# Patient Record
Sex: FEMALE | Race: WHITE | NOT HISPANIC OR LATINO | Employment: OTHER | URBAN - METROPOLITAN AREA
[De-identification: names, ages, dates, MRNs, and addresses within clinical notes are randomized per-mention and may not be internally consistent; named-entity substitution may affect disease eponyms.]

---

## 2017-12-19 ENCOUNTER — CONVERSION ENCOUNTER (OUTPATIENT)
Dept: GENERAL RADIOLOGY | Facility: HOSPITAL | Age: 67
End: 2017-12-19

## 2018-01-29 ENCOUNTER — CONVERSION ENCOUNTER (OUTPATIENT)
Dept: GENERAL RADIOLOGY | Facility: HOSPITAL | Age: 68
End: 2018-01-29

## 2018-04-17 ENCOUNTER — OFFICE VISIT CONVERTED (OUTPATIENT)
Dept: SURGERY | Facility: CLINIC | Age: 68
End: 2018-04-17
Attending: SURGERY

## 2018-05-04 ENCOUNTER — OFFICE VISIT CONVERTED (OUTPATIENT)
Dept: PULMONOLOGY | Facility: CLINIC | Age: 68
End: 2018-05-04
Attending: INTERNAL MEDICINE

## 2018-05-31 ENCOUNTER — OFFICE VISIT CONVERTED (OUTPATIENT)
Dept: PULMONOLOGY | Facility: CLINIC | Age: 68
End: 2018-05-31
Attending: INTERNAL MEDICINE

## 2018-07-30 ENCOUNTER — OFFICE VISIT CONVERTED (OUTPATIENT)
Dept: PULMONOLOGY | Facility: CLINIC | Age: 68
End: 2018-07-30
Attending: PHYSICIAN ASSISTANT

## 2018-08-10 ENCOUNTER — OFFICE VISIT CONVERTED (OUTPATIENT)
Dept: PULMONOLOGY | Facility: CLINIC | Age: 68
End: 2018-08-10
Attending: PHYSICIAN ASSISTANT

## 2018-12-20 ENCOUNTER — OFFICE VISIT CONVERTED (OUTPATIENT)
Dept: PULMONOLOGY | Facility: CLINIC | Age: 68
End: 2018-12-20
Attending: INTERNAL MEDICINE

## 2019-01-08 ENCOUNTER — HOSPITAL ENCOUNTER (OUTPATIENT)
Dept: GENERAL RADIOLOGY | Facility: HOSPITAL | Age: 69
Discharge: HOME | End: 2019-01-08
Attending: INTERNAL MEDICINE

## 2019-01-08 ENCOUNTER — HOSPITAL ENCOUNTER (OUTPATIENT)
Dept: LAB | Facility: HOSPITAL | Age: 69
Discharge: HOME | End: 2019-01-08
Attending: INTERNAL MEDICINE

## 2019-01-08 LAB
ALBUMIN SERPL-MCNC: 4.1 G/DL (ref 3.5–5)
ALBUMIN/GLOB SERPL: 1.6 {RATIO} (ref 1.4–2.6)
ALP SERPL-CCNC: 77 U/L (ref 43–160)
ALT SERPL-CCNC: 17 U/L (ref 10–40)
ANION GAP SERPL CALC-SCNC: 18 MMOL/L (ref 8–19)
AST SERPL-CCNC: 17 U/L (ref 15–50)
BASOPHILS # BLD AUTO: 0.03 10*3/UL (ref 0–0.2)
BASOPHILS NFR BLD AUTO: 0.25 % (ref 0–3)
BILIRUB SERPL-MCNC: 0.27 MG/DL (ref 0.2–1.3)
BUN SERPL-MCNC: 19 MG/DL (ref 5–25)
BUN/CREAT SERPL: 21 {RATIO} (ref 6–20)
CALCIUM SERPL-MCNC: 9.8 MG/DL (ref 8.7–10.4)
CHLORIDE SERPL-SCNC: 105 MMOL/L (ref 99–111)
CONV CO2: 22 MMOL/L (ref 22–32)
CONV TOTAL PROTEIN: 6.6 G/DL (ref 6.3–8.2)
CREAT UR-MCNC: 0.9 MG/DL (ref 0.5–0.9)
EOSINOPHIL # BLD AUTO: 0.12 10*3/UL (ref 0–0.7)
EOSINOPHIL # BLD AUTO: 0.97 % (ref 0–7)
ERYTHROCYTE [DISTWIDTH] IN BLOOD BY AUTOMATED COUNT: 14.5 % (ref 11.5–14.5)
GFR SERPLBLD BASED ON 1.73 SQ M-ARVRAT: >60 ML/MIN/{1.73_M2}
GLOBULIN UR ELPH-MCNC: 2.5 G/DL (ref 2–3.5)
GLUCOSE SERPL-MCNC: 89 MG/DL (ref 65–99)
HBA1C MFR BLD: 12.2 G/DL (ref 12–16)
HCT VFR BLD AUTO: 37.6 % (ref 37–47)
LYMPHOCYTES # BLD AUTO: 1.06 10*3/UL (ref 1–5)
MCH RBC QN AUTO: 30.5 PG (ref 27–31)
MCHC RBC AUTO-ENTMCNC: 32.5 G/DL (ref 33–37)
MCV RBC AUTO: 93.7 FL (ref 81–99)
MONOCYTES # BLD AUTO: 0.48 10*3/UL (ref 0.2–1.2)
MONOCYTES NFR BLD AUTO: 3.85 % (ref 3–10)
NEUTROPHILS # BLD AUTO: 10.7 10*3/UL (ref 2–8)
NEUTROPHILS NFR BLD AUTO: 86.4 % (ref 30–85)
NRBC BLD AUTO-RTO: 0 % (ref 0–0.01)
OSMOLALITY SERPL CALC.SUM OF ELEC: 292 MOSM/KG (ref 273–304)
PLATELET # BLD AUTO: 354 10*3/UL (ref 130–400)
PMV BLD AUTO: 7.7 FL (ref 7.4–10.4)
POTASSIUM SERPL-SCNC: 4.6 MMOL/L (ref 3.5–5.3)
RBC # BLD AUTO: 4.01 10*6/UL (ref 4.2–5.4)
SODIUM SERPL-SCNC: 140 MMOL/L (ref 135–147)
VARIANT LYMPHS NFR BLD MANUAL: 8.54 % (ref 20–45)
WBC # BLD AUTO: 12.4 10*3/UL (ref 4.8–10.8)

## 2019-01-15 ENCOUNTER — HOSPITAL ENCOUNTER (OUTPATIENT)
Dept: GENERAL RADIOLOGY | Facility: HOSPITAL | Age: 69
Discharge: HOME | End: 2019-01-15

## 2019-05-16 ENCOUNTER — HOSPITAL ENCOUNTER (OUTPATIENT)
Dept: URGENT CARE | Facility: CLINIC | Age: 69
Discharge: HOME | End: 2019-05-16
Attending: NURSE PRACTITIONER

## 2019-09-03 ENCOUNTER — OFFICE VISIT CONVERTED (OUTPATIENT)
Dept: PULMONOLOGY | Facility: CLINIC | Age: 69
End: 2019-09-03
Attending: PHYSICIAN ASSISTANT

## 2019-09-05 ENCOUNTER — HOSPITAL ENCOUNTER (OUTPATIENT)
Dept: GENERAL RADIOLOGY | Facility: HOSPITAL | Age: 69
Discharge: HOME | End: 2019-09-05
Attending: PHYSICIAN ASSISTANT

## 2019-10-22 ENCOUNTER — OFFICE VISIT CONVERTED (OUTPATIENT)
Dept: NEUROSURGERY | Facility: CLINIC | Age: 69
End: 2019-10-22
Attending: PHYSICIAN ASSISTANT

## 2019-10-22 ENCOUNTER — CONVERSION ENCOUNTER (OUTPATIENT)
Dept: NEUROLOGY | Facility: CLINIC | Age: 69
End: 2019-10-22

## 2019-11-13 ENCOUNTER — HOSPITAL ENCOUNTER (OUTPATIENT)
Dept: GENERAL RADIOLOGY | Facility: HOSPITAL | Age: 69
Discharge: HOME | End: 2019-11-13
Attending: PHYSICIAN ASSISTANT

## 2019-11-14 ENCOUNTER — HOSPITAL ENCOUNTER (OUTPATIENT)
Dept: GENERAL RADIOLOGY | Facility: HOSPITAL | Age: 69
Discharge: HOME | End: 2019-11-14
Attending: PHYSICIAN ASSISTANT

## 2020-01-08 ENCOUNTER — HOSPITAL ENCOUNTER (OUTPATIENT)
Dept: LAB | Facility: HOSPITAL | Age: 70
Discharge: HOME | End: 2020-01-08
Attending: PHYSICIAN ASSISTANT

## 2020-01-08 LAB
ALBUMIN SERPL-MCNC: 3.9 G/DL (ref 3.5–5)
ALBUMIN/GLOB SERPL: 1.4 {RATIO} (ref 1.4–2.6)
ALP SERPL-CCNC: 92 U/L (ref 43–160)
ALT SERPL-CCNC: 10 U/L (ref 10–40)
ANION GAP SERPL CALC-SCNC: 17 MMOL/L (ref 8–19)
AST SERPL-CCNC: 12 U/L (ref 15–50)
BASOPHILS # BLD AUTO: 0.04 10*3/UL (ref 0–0.2)
BASOPHILS NFR BLD AUTO: 0.3 % (ref 0–3)
BILIRUB SERPL-MCNC: 0.24 MG/DL (ref 0.2–1.3)
BUN SERPL-MCNC: 20 MG/DL (ref 5–25)
BUN/CREAT SERPL: 26 {RATIO} (ref 6–20)
CALCIUM SERPL-MCNC: 10.5 MG/DL (ref 8.7–10.4)
CHLORIDE SERPL-SCNC: 104 MMOL/L (ref 99–111)
CONV ABS IMM GRAN: 0.06 10*3/UL (ref 0–0.2)
CONV CO2: 22 MMOL/L (ref 22–32)
CONV IMMATURE GRAN: 0.5 % (ref 0–1.8)
CONV TOTAL PROTEIN: 6.6 G/DL (ref 6.3–8.2)
CREAT UR-MCNC: 0.77 MG/DL (ref 0.5–0.9)
DEPRECATED RDW RBC AUTO: 47.4 FL (ref 36.4–46.3)
EOSINOPHIL # BLD AUTO: 0.06 10*3/UL (ref 0–0.7)
EOSINOPHIL # BLD AUTO: 0.5 % (ref 0–7)
ERYTHROCYTE [DISTWIDTH] IN BLOOD BY AUTOMATED COUNT: 13.7 % (ref 11.7–14.4)
GFR SERPLBLD BASED ON 1.73 SQ M-ARVRAT: >60 ML/MIN/{1.73_M2}
GLOBULIN UR ELPH-MCNC: 2.7 G/DL (ref 2–3.5)
GLUCOSE SERPL-MCNC: 90 MG/DL (ref 65–99)
HCT VFR BLD AUTO: 39 % (ref 37–47)
HGB BLD-MCNC: 12.1 G/DL (ref 12–16)
LYMPHOCYTES # BLD AUTO: 0.9 10*3/UL (ref 1–5)
LYMPHOCYTES NFR BLD AUTO: 7.8 % (ref 20–45)
MCH RBC QN AUTO: 29.2 PG (ref 27–31)
MCHC RBC AUTO-ENTMCNC: 31 G/DL (ref 33–37)
MCV RBC AUTO: 94.2 FL (ref 81–99)
MONOCYTES # BLD AUTO: 0.66 10*3/UL (ref 0.2–1.2)
MONOCYTES NFR BLD AUTO: 5.7 % (ref 3–10)
NEUTROPHILS # BLD AUTO: 9.86 10*3/UL (ref 2–8)
NEUTROPHILS NFR BLD AUTO: 85.2 % (ref 30–85)
NRBC CBCN: 0 % (ref 0–0.7)
OSMOLALITY SERPL CALC.SUM OF ELEC: 290 MOSM/KG (ref 273–304)
PLATELET # BLD AUTO: 293 10*3/UL (ref 130–400)
PMV BLD AUTO: 10.7 FL (ref 9.4–12.3)
POTASSIUM SERPL-SCNC: 4.3 MMOL/L (ref 3.5–5.3)
RBC # BLD AUTO: 4.14 10*6/UL (ref 4.2–5.4)
SODIUM SERPL-SCNC: 139 MMOL/L (ref 135–147)
WBC # BLD AUTO: 11.58 10*3/UL (ref 4.8–10.8)

## 2020-01-14 ENCOUNTER — OFFICE VISIT CONVERTED (OUTPATIENT)
Dept: PULMONOLOGY | Facility: CLINIC | Age: 70
End: 2020-01-14
Attending: INTERNAL MEDICINE

## 2020-01-14 ENCOUNTER — OFFICE VISIT CONVERTED (OUTPATIENT)
Dept: NEUROSURGERY | Facility: CLINIC | Age: 70
End: 2020-01-14
Attending: PHYSICIAN ASSISTANT

## 2020-01-20 ENCOUNTER — HOSPITAL ENCOUNTER (OUTPATIENT)
Dept: GENERAL RADIOLOGY | Facility: HOSPITAL | Age: 70
Discharge: HOME | End: 2020-01-20
Attending: NEUROLOGICAL SURGERY

## 2020-01-20 ENCOUNTER — CONVERSION ENCOUNTER (OUTPATIENT)
Dept: NEUROLOGY | Facility: CLINIC | Age: 70
End: 2020-01-20

## 2020-01-20 ENCOUNTER — OFFICE VISIT CONVERTED (OUTPATIENT)
Dept: NEUROSURGERY | Facility: CLINIC | Age: 70
End: 2020-01-20
Attending: NEUROLOGICAL SURGERY

## 2020-01-29 ENCOUNTER — OFFICE VISIT CONVERTED (OUTPATIENT)
Dept: NEUROSURGERY | Facility: CLINIC | Age: 70
End: 2020-01-29
Attending: NEUROLOGICAL SURGERY

## 2020-03-03 ENCOUNTER — HOSPITAL ENCOUNTER (OUTPATIENT)
Dept: CARDIOLOGY | Facility: HOSPITAL | Age: 70
Discharge: HOME | End: 2020-03-03
Attending: INTERNAL MEDICINE

## 2020-05-04 ENCOUNTER — TELEPHONE CONVERTED (OUTPATIENT)
Dept: NEUROSURGERY | Facility: CLINIC | Age: 70
End: 2020-05-04
Attending: NEUROLOGICAL SURGERY

## 2020-05-07 ENCOUNTER — OFFICE VISIT CONVERTED (OUTPATIENT)
Dept: PULMONOLOGY | Facility: CLINIC | Age: 70
End: 2020-05-07
Attending: PHYSICIAN ASSISTANT

## 2020-06-11 ENCOUNTER — HOSPITAL ENCOUNTER (OUTPATIENT)
Dept: GENERAL RADIOLOGY | Facility: HOSPITAL | Age: 70
Discharge: HOME | End: 2020-06-11
Attending: PHYSICIAN ASSISTANT

## 2020-06-23 ENCOUNTER — OFFICE VISIT CONVERTED (OUTPATIENT)
Dept: PULMONOLOGY | Facility: CLINIC | Age: 70
End: 2020-06-23
Attending: NURSE PRACTITIONER

## 2020-06-23 ENCOUNTER — HOSPITAL ENCOUNTER (OUTPATIENT)
Dept: LAB | Facility: HOSPITAL | Age: 70
Discharge: HOME | End: 2020-06-23
Attending: NURSE PRACTITIONER

## 2020-06-23 LAB
ALBUMIN SERPL-MCNC: 4.1 G/DL (ref 3.5–5)
ALBUMIN/GLOB SERPL: 1.6 {RATIO} (ref 1.4–2.6)
ALP SERPL-CCNC: 89 U/L (ref 43–160)
ALT SERPL-CCNC: 17 U/L (ref 10–40)
ANION GAP SERPL CALC-SCNC: 22 MMOL/L (ref 8–19)
AST SERPL-CCNC: 22 U/L (ref 15–50)
BASOPHILS # BLD AUTO: 0.03 10*3/UL (ref 0–0.2)
BASOPHILS NFR BLD AUTO: 0.3 % (ref 0–3)
BILIRUB SERPL-MCNC: 0.24 MG/DL (ref 0.2–1.3)
BUN SERPL-MCNC: 13 MG/DL (ref 5–25)
BUN/CREAT SERPL: 17 {RATIO} (ref 6–20)
CALCIUM SERPL-MCNC: 10.2 MG/DL (ref 8.7–10.4)
CHLORIDE SERPL-SCNC: 105 MMOL/L (ref 99–111)
CONV ABS IMM GRAN: 0.05 10*3/UL (ref 0–0.2)
CONV CO2: 20 MMOL/L (ref 22–32)
CONV IMMATURE GRAN: 0.5 % (ref 0–1.8)
CONV TOTAL PROTEIN: 6.7 G/DL (ref 6.3–8.2)
CREAT UR-MCNC: 0.75 MG/DL (ref 0.5–0.9)
DEPRECATED RDW RBC AUTO: 50.6 FL (ref 36.4–46.3)
EOSINOPHIL # BLD AUTO: 0.12 10*3/UL (ref 0–0.7)
EOSINOPHIL # BLD AUTO: 1.2 % (ref 0–7)
ERYTHROCYTE [DISTWIDTH] IN BLOOD BY AUTOMATED COUNT: 14.3 % (ref 11.7–14.4)
GFR SERPLBLD BASED ON 1.73 SQ M-ARVRAT: >60 ML/MIN/{1.73_M2}
GLOBULIN UR ELPH-MCNC: 2.6 G/DL (ref 2–3.5)
GLUCOSE SERPL-MCNC: 112 MG/DL (ref 65–99)
HCT VFR BLD AUTO: 39 % (ref 37–47)
HGB BLD-MCNC: 12.1 G/DL (ref 12–16)
LYMPHOCYTES # BLD AUTO: 0.94 10*3/UL (ref 1–5)
LYMPHOCYTES NFR BLD AUTO: 9.2 % (ref 20–45)
MCH RBC QN AUTO: 29.8 PG (ref 27–31)
MCHC RBC AUTO-ENTMCNC: 31 G/DL (ref 33–37)
MCV RBC AUTO: 96.1 FL (ref 81–99)
MONOCYTES # BLD AUTO: 0.43 10*3/UL (ref 0.2–1.2)
MONOCYTES NFR BLD AUTO: 4.2 % (ref 3–10)
NEUTROPHILS # BLD AUTO: 8.63 10*3/UL (ref 2–8)
NEUTROPHILS NFR BLD AUTO: 84.6 % (ref 30–85)
NRBC CBCN: 0 % (ref 0–0.7)
OSMOLALITY SERPL CALC.SUM OF ELEC: 295 MOSM/KG (ref 273–304)
PLATELET # BLD AUTO: 323 10*3/UL (ref 130–400)
PMV BLD AUTO: 11 FL (ref 9.4–12.3)
POTASSIUM SERPL-SCNC: 4.5 MMOL/L (ref 3.5–5.3)
RBC # BLD AUTO: 4.06 10*6/UL (ref 4.2–5.4)
SODIUM SERPL-SCNC: 142 MMOL/L (ref 135–147)
WBC # BLD AUTO: 10.2 10*3/UL (ref 4.8–10.8)

## 2020-08-11 ENCOUNTER — OFFICE VISIT CONVERTED (OUTPATIENT)
Dept: PULMONOLOGY | Facility: CLINIC | Age: 70
End: 2020-08-11
Attending: NURSE PRACTITIONER

## 2020-08-27 ENCOUNTER — OFFICE VISIT CONVERTED (OUTPATIENT)
Dept: PULMONOLOGY | Facility: CLINIC | Age: 70
End: 2020-08-27
Attending: INTERNAL MEDICINE

## 2020-08-27 ENCOUNTER — HOSPITAL ENCOUNTER (OUTPATIENT)
Dept: LAB | Facility: HOSPITAL | Age: 70
Discharge: HOME | End: 2020-08-27
Attending: INTERNAL MEDICINE

## 2020-08-27 LAB
25(OH)D3 SERPL-MCNC: 25.1 NG/ML (ref 30–100)
ALBUMIN SERPL-MCNC: 3.9 G/DL (ref 3.5–5)
ALBUMIN/GLOB SERPL: 1.6 {RATIO} (ref 1.4–2.6)
ALP SERPL-CCNC: 105 U/L (ref 43–160)
ALT SERPL-CCNC: 9 U/L (ref 10–40)
ANION GAP SERPL CALC-SCNC: 17 MMOL/L (ref 8–19)
AST SERPL-CCNC: 15 U/L (ref 15–50)
BASOPHILS # BLD AUTO: 0.04 10*3/UL (ref 0–0.2)
BASOPHILS NFR BLD AUTO: 0.4 % (ref 0–3)
BILIRUB SERPL-MCNC: 0.36 MG/DL (ref 0.2–1.3)
BUN SERPL-MCNC: 11 MG/DL (ref 5–25)
BUN/CREAT SERPL: 13 {RATIO} (ref 6–20)
CALCIUM SERPL-MCNC: 10.8 MG/DL (ref 8.7–10.4)
CHLORIDE SERPL-SCNC: 104 MMOL/L (ref 99–111)
CONV ABS IMM GRAN: 0.05 10*3/UL (ref 0–0.2)
CONV CO2: 23 MMOL/L (ref 22–32)
CONV IMMATURE GRAN: 0.5 % (ref 0–1.8)
CONV TOTAL PROTEIN: 6.3 G/DL (ref 6.3–8.2)
CREAT UR-MCNC: 0.87 MG/DL (ref 0.5–0.9)
DEPRECATED RDW RBC AUTO: 51.2 FL (ref 36.4–46.3)
EOSINOPHIL # BLD AUTO: 0.08 10*3/UL (ref 0–0.7)
EOSINOPHIL # BLD AUTO: 0.9 % (ref 0–7)
ERYTHROCYTE [DISTWIDTH] IN BLOOD BY AUTOMATED COUNT: 14.4 % (ref 11.7–14.4)
GFR SERPLBLD BASED ON 1.73 SQ M-ARVRAT: >60 ML/MIN/{1.73_M2}
GLOBULIN UR ELPH-MCNC: 2.4 G/DL (ref 2–3.5)
GLUCOSE SERPL-MCNC: 97 MG/DL (ref 65–99)
HCT VFR BLD AUTO: 37.9 % (ref 37–47)
HGB BLD-MCNC: 11.4 G/DL (ref 12–16)
LYMPHOCYTES # BLD AUTO: 0.91 10*3/UL (ref 1–5)
LYMPHOCYTES NFR BLD AUTO: 9.9 % (ref 20–45)
MCH RBC QN AUTO: 29.5 PG (ref 27–31)
MCHC RBC AUTO-ENTMCNC: 30.1 G/DL (ref 33–37)
MCV RBC AUTO: 97.9 FL (ref 81–99)
MONOCYTES # BLD AUTO: 0.39 10*3/UL (ref 0.2–1.2)
MONOCYTES NFR BLD AUTO: 4.3 % (ref 3–10)
NEUTROPHILS # BLD AUTO: 7.69 10*3/UL (ref 2–8)
NEUTROPHILS NFR BLD AUTO: 84 % (ref 30–85)
NRBC CBCN: 0 % (ref 0–0.7)
OSMOLALITY SERPL CALC.SUM OF ELEC: 287 MOSM/KG (ref 273–304)
PLATELET # BLD AUTO: 312 10*3/UL (ref 130–400)
PMV BLD AUTO: 10.8 FL (ref 9.4–12.3)
POTASSIUM SERPL-SCNC: 4.5 MMOL/L (ref 3.5–5.3)
RBC # BLD AUTO: 3.87 10*6/UL (ref 4.2–5.4)
SODIUM SERPL-SCNC: 139 MMOL/L (ref 135–147)
T4 FREE SERPL-MCNC: 1.1 NG/DL (ref 0.9–1.8)
TSH SERPL-ACNC: 1.24 M[IU]/L (ref 0.27–4.2)
WBC # BLD AUTO: 9.16 10*3/UL (ref 4.8–10.8)

## 2020-08-31 ENCOUNTER — HOSPITAL ENCOUNTER (OUTPATIENT)
Dept: GENERAL RADIOLOGY | Facility: HOSPITAL | Age: 70
Discharge: HOME | End: 2020-08-31
Attending: INTERNAL MEDICINE

## 2020-08-31 ENCOUNTER — HOSPITAL ENCOUNTER (OUTPATIENT)
Dept: URGENT CARE | Facility: CLINIC | Age: 70
Discharge: HOME | End: 2020-08-31
Attending: INTERNAL MEDICINE

## 2020-09-04 LAB — SARS-COV-2 RNA SPEC QL NAA+PROBE: NOT DETECTED

## 2020-10-23 ENCOUNTER — OFFICE VISIT CONVERTED (OUTPATIENT)
Dept: PULMONOLOGY | Facility: CLINIC | Age: 70
End: 2020-10-23
Attending: INTERNAL MEDICINE

## 2020-10-30 ENCOUNTER — HOSPITAL ENCOUNTER (OUTPATIENT)
Dept: CT IMAGING | Facility: HOSPITAL | Age: 70
Discharge: HOME | End: 2020-10-30
Attending: INTERNAL MEDICINE

## 2020-12-03 ENCOUNTER — OFFICE VISIT CONVERTED (OUTPATIENT)
Dept: PULMONOLOGY | Facility: CLINIC | Age: 70
End: 2020-12-03
Attending: INTERNAL MEDICINE

## 2021-01-12 ENCOUNTER — HOSPITAL ENCOUNTER (OUTPATIENT)
Dept: CARDIOLOGY | Facility: HOSPITAL | Age: 71
Discharge: HOME | End: 2021-01-12
Attending: INTERNAL MEDICINE

## 2021-04-28 ENCOUNTER — HOSPITAL ENCOUNTER (OUTPATIENT)
Dept: LAB | Facility: HOSPITAL | Age: 71
Discharge: HOME | End: 2021-04-28
Attending: PHYSICIAN ASSISTANT

## 2021-04-28 ENCOUNTER — HOSPITAL ENCOUNTER (OUTPATIENT)
Dept: GENERAL RADIOLOGY | Facility: HOSPITAL | Age: 71
Discharge: HOME | End: 2021-04-28
Attending: PHYSICIAN ASSISTANT

## 2021-04-28 ENCOUNTER — OFFICE VISIT CONVERTED (OUTPATIENT)
Dept: PULMONOLOGY | Facility: CLINIC | Age: 71
End: 2021-04-28
Attending: PHYSICIAN ASSISTANT

## 2021-04-28 LAB
ALBUMIN SERPL-MCNC: 4.2 G/DL (ref 3.5–5)
ALBUMIN/GLOB SERPL: 1.5 {RATIO} (ref 1.4–2.6)
ALP SERPL-CCNC: 127 U/L (ref 43–160)
ALT SERPL-CCNC: 17 U/L (ref 10–40)
ANION GAP SERPL CALC-SCNC: 24 MMOL/L (ref 8–19)
AST SERPL-CCNC: 23 U/L (ref 15–50)
BASOPHILS # BLD AUTO: 0.02 10*3/UL (ref 0–0.2)
BASOPHILS NFR BLD AUTO: 0.2 % (ref 0–3)
BILIRUB SERPL-MCNC: 0.25 MG/DL (ref 0.2–1.3)
BUN SERPL-MCNC: 15 MG/DL (ref 5–25)
BUN/CREAT SERPL: 17 {RATIO} (ref 6–20)
CALCIUM SERPL-MCNC: 10.4 MG/DL (ref 8.7–10.4)
CHLORIDE SERPL-SCNC: 107 MMOL/L (ref 99–111)
CONV ABS IMM GRAN: 0.04 10*3/UL (ref 0–0.2)
CONV CO2: 18 MMOL/L (ref 22–32)
CONV IMMATURE GRAN: 0.4 % (ref 0–1.8)
CONV TOTAL PROTEIN: 7 G/DL (ref 6.3–8.2)
CREAT UR-MCNC: 0.9 MG/DL (ref 0.5–0.9)
DEPRECATED RDW RBC AUTO: 46.7 FL (ref 36.4–46.3)
EOSINOPHIL # BLD AUTO: 0.02 10*3/UL (ref 0–0.7)
EOSINOPHIL # BLD AUTO: 0.2 % (ref 0–7)
ERYTHROCYTE [DISTWIDTH] IN BLOOD BY AUTOMATED COUNT: 13.7 % (ref 11.7–14.4)
GFR SERPLBLD BASED ON 1.73 SQ M-ARVRAT: >60 ML/MIN/{1.73_M2}
GLOBULIN UR ELPH-MCNC: 2.8 G/DL (ref 2–3.5)
GLUCOSE SERPL-MCNC: 104 MG/DL (ref 65–99)
HCT VFR BLD AUTO: 37.1 % (ref 37–47)
HGB BLD-MCNC: 11.7 G/DL (ref 12–16)
LYMPHOCYTES # BLD AUTO: 0.75 10*3/UL (ref 1–5)
LYMPHOCYTES NFR BLD AUTO: 7.7 % (ref 20–45)
MAGNESIUM SERPL-MCNC: 2.13 MG/DL (ref 1.6–2.3)
MCH RBC QN AUTO: 29.5 PG (ref 27–31)
MCHC RBC AUTO-ENTMCNC: 31.5 G/DL (ref 33–37)
MCV RBC AUTO: 93.7 FL (ref 81–99)
MONOCYTES # BLD AUTO: 0.36 10*3/UL (ref 0.2–1.2)
MONOCYTES NFR BLD AUTO: 3.7 % (ref 3–10)
NEUTROPHILS # BLD AUTO: 8.51 10*3/UL (ref 2–8)
NEUTROPHILS NFR BLD AUTO: 87.8 % (ref 30–85)
NRBC CBCN: 0 % (ref 0–0.7)
OSMOLALITY SERPL CALC.SUM OF ELEC: 299 MOSM/KG (ref 273–304)
PLATELET # BLD AUTO: 288 10*3/UL (ref 130–400)
PMV BLD AUTO: 10.8 FL (ref 9.4–12.3)
POTASSIUM SERPL-SCNC: 4.7 MMOL/L (ref 3.5–5.3)
RBC # BLD AUTO: 3.96 10*6/UL (ref 4.2–5.4)
SODIUM SERPL-SCNC: 144 MMOL/L (ref 135–147)
WBC # BLD AUTO: 9.7 10*3/UL (ref 4.8–10.8)

## 2021-05-13 NOTE — PROGRESS NOTES
Quick Note      Patient Name: Christina Espinoza   Patient ID: [id number]   Sex: Female   YOB: 1950    Primary Care Provider: Chris Vega MD   Referring Provider: Chris Vega MD    Visit Date: May 4, 2020    Provider: Roby Reardon MD   Location: [facility]   Location Address: [address] [place] [address]  [id number]   Location Phone: [phone]          History Of Present Illness  TELEHEALTH TELEPHONE VISIT  Chief Complaint: follow up after pain management   Christina Espinoza is a 70 year old /White female who is presenting for evaluation via telehealth telephone visit. Verbal consent obtained before beginning visit.   Provider spent 18 minutes with the patient during telehealth visit.   The following staff were present during this visit: Coty Jenkins MA   Past Medical History/Overview of Patient Symptoms     Was confirmed at the beginning of the visit    Sx ongoing.  Better after the first injection relief after second.  Symptoms into the bilateral lower extremities right greater than left.  Diffuse lower extremities.  Also reports back pain in the lower lumbar spine.          BALBINA symptoms are better for about a week and a half after the first injection.  Second injection last week, has not helped yet.                     Physical Examination     telephone visit  sounds to have dry, nonproductive (chronic, from her pulmonary fibrosis)                   Assessment  · Spinal stenosis     724.00/M48.00  wants to continue with 3rd injection    will rtc after next injection if still symptomatic       Plan  · Orders  o Physician Telephone Evaluation, 11-20 minutes (43998) - - 05/04/2020  · Medications  o Medications have been Reconciled  o Transition of Care or Provider Policy  · Instructions  o Plan Of Care:   o Chronic conditions reviewed and taken into consideration for today's treatment plan.  o Patient instructed to seek medical attention urgently for  new or worsening symptoms.  o Patient was educated/instructed on their diagnosis, treatment and medications prior to discharge from the clinic today.  o Discussed Covid-19 precautions including, but not limited to, social distancing, avoid touching your face, and hand washing.   · Disposition  o Call or Return if symptoms worsen or persist.  o follow up 2 months     This was a  [telephone] visit from [home] .  I personally spent 18 minutes of phone time.  This does not include the time connecting to the patient , our medical assistant spent updating demographics, preferred pharmacy, medication changes, and the note, order(s), maintain those to the office staff or other ancillary task(s).    Please note portions of this document are an electronic transcription of spoken language to printed text. The electronic translation/transcription may permit erroneous or at times nonsensical words or phrases to be inadvertently transcribed.  I have reviewed the note for such errors; however, some may still exist. Before it was signed, the document was checked for such errors, but some may still exist.  The reader is encouraged to use prudent judgment when interpreting this document.             Electronically Signed by: Roby Reardon MD -Author on May 4, 2020 01:44:06 PM

## 2021-05-15 VITALS
HEIGHT: 62 IN | WEIGHT: 156.5 LBS | BODY MASS INDEX: 28.8 KG/M2 | SYSTOLIC BLOOD PRESSURE: 123 MMHG | DIASTOLIC BLOOD PRESSURE: 58 MMHG

## 2021-05-15 VITALS
BODY MASS INDEX: 28.79 KG/M2 | SYSTOLIC BLOOD PRESSURE: 114 MMHG | HEIGHT: 62 IN | DIASTOLIC BLOOD PRESSURE: 62 MMHG | WEIGHT: 156.44 LBS

## 2021-05-15 VITALS
BODY MASS INDEX: 28.52 KG/M2 | SYSTOLIC BLOOD PRESSURE: 125 MMHG | HEIGHT: 62 IN | WEIGHT: 155 LBS | DIASTOLIC BLOOD PRESSURE: 62 MMHG

## 2021-05-15 VITALS
BODY MASS INDEX: 27.84 KG/M2 | DIASTOLIC BLOOD PRESSURE: 65 MMHG | SYSTOLIC BLOOD PRESSURE: 130 MMHG | WEIGHT: 157.12 LBS | HEART RATE: 91 BPM | HEIGHT: 63 IN

## 2021-05-16 VITALS — HEIGHT: 63 IN | RESPIRATION RATE: 14 BRPM | BODY MASS INDEX: 24.87 KG/M2 | WEIGHT: 140.37 LBS

## 2021-05-28 VITALS
HEART RATE: 86 BPM | TEMPERATURE: 96.9 F | HEIGHT: 62 IN | DIASTOLIC BLOOD PRESSURE: 56 MMHG | OXYGEN SATURATION: 97 % | RESPIRATION RATE: 18 BRPM | BODY MASS INDEX: 28.16 KG/M2 | WEIGHT: 153 LBS | SYSTOLIC BLOOD PRESSURE: 126 MMHG

## 2021-05-28 VITALS
BODY MASS INDEX: 24.91 KG/M2 | OXYGEN SATURATION: 98 % | OXYGEN SATURATION: 98 % | DIASTOLIC BLOOD PRESSURE: 58 MMHG | DIASTOLIC BLOOD PRESSURE: 64 MMHG | BODY MASS INDEX: 27.62 KG/M2 | OXYGEN SATURATION: 98 % | DIASTOLIC BLOOD PRESSURE: 67 MMHG | WEIGHT: 150.12 LBS | DIASTOLIC BLOOD PRESSURE: 59 MMHG | HEART RATE: 76 BPM | HEIGHT: 63 IN | TEMPERATURE: 98.2 F | BODY MASS INDEX: 25.02 KG/M2 | RESPIRATION RATE: 14 BRPM | HEART RATE: 89 BPM | HEART RATE: 70 BPM | SYSTOLIC BLOOD PRESSURE: 100 MMHG | RESPIRATION RATE: 14 BRPM | SYSTOLIC BLOOD PRESSURE: 104 MMHG | SYSTOLIC BLOOD PRESSURE: 106 MMHG | DIASTOLIC BLOOD PRESSURE: 80 MMHG | TEMPERATURE: 98.1 F | WEIGHT: 141.19 LBS | WEIGHT: 144.44 LBS | HEIGHT: 62 IN | WEIGHT: 154.12 LBS | HEIGHT: 62 IN | WEIGHT: 140.56 LBS | RESPIRATION RATE: 12 BRPM | SYSTOLIC BLOOD PRESSURE: 114 MMHG | TEMPERATURE: 98.3 F | TEMPERATURE: 98.2 F | RESPIRATION RATE: 16 BRPM | HEART RATE: 77 BPM | HEIGHT: 63 IN | RESPIRATION RATE: 12 BRPM | HEIGHT: 62 IN | BODY MASS INDEX: 28.36 KG/M2 | OXYGEN SATURATION: 97 % | TEMPERATURE: 98.7 F | OXYGEN SATURATION: 96 % | SYSTOLIC BLOOD PRESSURE: 117 MMHG | HEART RATE: 84 BPM | BODY MASS INDEX: 26.58 KG/M2

## 2021-05-28 VITALS
DIASTOLIC BLOOD PRESSURE: 71 MMHG | TEMPERATURE: 98.6 F | OXYGEN SATURATION: 100 % | WEIGHT: 146.5 LBS | BODY MASS INDEX: 25.96 KG/M2 | HEART RATE: 71 BPM | RESPIRATION RATE: 14 BRPM | HEIGHT: 63 IN | SYSTOLIC BLOOD PRESSURE: 110 MMHG

## 2021-05-28 VITALS
DIASTOLIC BLOOD PRESSURE: 50 MMHG | BODY MASS INDEX: 29.44 KG/M2 | OXYGEN SATURATION: 98 % | HEART RATE: 78 BPM | HEART RATE: 76 BPM | OXYGEN SATURATION: 96 % | RESPIRATION RATE: 14 BRPM | SYSTOLIC BLOOD PRESSURE: 109 MMHG | HEIGHT: 62 IN | TEMPERATURE: 98 F | BODY MASS INDEX: 28.62 KG/M2 | HEIGHT: 62 IN | TEMPERATURE: 98.1 F | RESPIRATION RATE: 15 BRPM | WEIGHT: 160 LBS | DIASTOLIC BLOOD PRESSURE: 62 MMHG | SYSTOLIC BLOOD PRESSURE: 107 MMHG

## 2021-05-28 VITALS
TEMPERATURE: 97.7 F | RESPIRATION RATE: 14 BRPM | OXYGEN SATURATION: 97 % | WEIGHT: 141 LBS | HEART RATE: 90 BPM | OXYGEN SATURATION: 99 % | BODY MASS INDEX: 24.98 KG/M2 | BODY MASS INDEX: 28.34 KG/M2 | SYSTOLIC BLOOD PRESSURE: 95 MMHG | HEART RATE: 87 BPM | BODY MASS INDEX: 24.71 KG/M2 | TEMPERATURE: 98.2 F | DIASTOLIC BLOOD PRESSURE: 62 MMHG | TEMPERATURE: 98 F | HEIGHT: 62 IN | HEART RATE: 82 BPM | WEIGHT: 154 LBS | SYSTOLIC BLOOD PRESSURE: 85 MMHG | RESPIRATION RATE: 20 BRPM | DIASTOLIC BLOOD PRESSURE: 50 MMHG | HEIGHT: 63 IN | RESPIRATION RATE: 14 BRPM | HEIGHT: 63 IN | DIASTOLIC BLOOD PRESSURE: 64 MMHG | SYSTOLIC BLOOD PRESSURE: 108 MMHG | WEIGHT: 139.44 LBS | OXYGEN SATURATION: 98 %

## 2021-05-28 NOTE — PROGRESS NOTES
Patient: CHRISTINA ESPINOZA     Acct: [id number]     Report: #[id number]  UNIT #: [id number]     : 1950    Encounter Date:2020  PRIMARY CARE: Laurie Castro  ***Signed***  --------------------------------------------------------------------------------------------------------------------  TELEHEALTH NOTE      History of Present Illness      Chief Complaint: 6 week F/U, Bronchiectasis             Christina Espinoza is presenting for evaluation via Telehealth visit. Verbal consent    obtained before beginning visit.            Provider spent (21) minutes with the patient during telehealth visit.            The following staff were present during the visit: Aide Muro CMA, Patty Mercedes PA-C            The patient is a 70 year old white female patient of Dr. Carrion's last seen by     him in January. She has a history of antisynthetase syndrome, on chronic     immunosuppression therapy with prednisone 10 mg daily and had been on Imuran 75     mg daily. After the patient saw Dr. Carrion she called the office and thought     she had been on 75 mg twice daily and she had more energy and her breathing did     better on that higher dose. He increased her to 50 mg twice daily and she thinks     that has helped some but states that for at least for 4-6 months she feels like     her breathing is going downhill. She gets short of breath when she bends over     or does any activity. She has chronic cough but it is somewhat improved on     pepcid. She does not wish to take PPI due to risk of dementia. She currently     denies increased cough or wheezing and denies hemoptysis, fever or chills or      purulent sputum production. The patient did have sputum culture done that Dr. Carrion ordered as she did not realize she could put the sample cup in the     fridge before taking it to the lab and could never cough up the sputum and get     to the lab within 1 hour. She had repeat pulmonary function test and  these were     grossly unchanged from prior pulmonary function test in 2018. Her diffusion     capacity had actually improved slightly.             I reviewed the Review of Systems, medical, surgical and family history and agree     with those as entered.               Patient: CHRISTINA ESPINOZA   Acct: #[id number]   Report: #[id number]            UNIT #: [id number]    DOS:       LOCATION:[facility]     : 1950            PROVIDERS      ADMITTING:     FAMILY:  MD NONE         OTHER:       DICTATING:  Conrado Carrion DO            REASON FOR VISIT:  J84.9            *******Signed******                                    [facility]                          [facility] [employer] Management Services                            [place], [place]  [address]               __________________________________________________________________________             Patient Name:                   Attending Physician:      Christina Espinoza D.O.             Patient Visit # MR #            Admit Date  Disch Date     Location      [id number]    [id number]      2020                 [facility]- -             Date of Birth      1950      __________________________________________________________________________      821 - DIAGNOSTIC REPORT             PULMONARY FUNCTION TEST             SPIROMETRY:      FEV1/FVC ratio is 80%.      FEV1 is 84% of predicted.      Forced vital capacity is 90% of predicted.      No response to bronchodilator therapy seen.             LUNG VOLUMES:      Total lung capacity is 81% of predicted.      Residual volume is 78% of predicted.             DIFFUSION:      DLCO is 49% of predicted.             IMPRESSION:      1.  Spirometry shows no obstruction.      2.  No restriction seen on lung volumes.      3.  DLCO moderately decreased.      4.  No response to bronchodilator therapy seen.      5.  When compared with pulmonary function studies from  August 2018, there          have been no significant change.             To be electronically signed in [facility]      [id number] CONRADO CARRION D.O.             WC:yoana      D:  03/24/2020 16:11      T:  03/24/2020 16:59      #[id number]             Until signed, this is an unconfirmed preliminary report that may contain      errors and is subject to change.                   Until signed, this is an unconfirmed preliminary report that may contain      errors and is subject to change.                     <Electronically signed by Conrado Carrion DO>                03/25/20 1319               Conrado CarrionWB:NEHAL      D:03/24/20 1611                         Past Med History      HX: Bronchiectasis, Pulmonary Fibrosis, Emphysema      Never Smoker      Vaccines - Current      Overview of Symptoms      Complains of: Cough, SOA      Denies: Fever, body aches, chills            Allergies/Medications      Allergies:        Coded Allergies:             CLINDAMYCIN (Verified  Allergy, Severe, RASH HIVES, 1/14/20)           CIPROFLOXACIN (Verified  Allergy, Unknown, RASH, 1/14/20)           ERYTHROMYCIN BASE (Verified  Allergy, Unknown, RASH, 1/14/20)           LEVOFLOXACIN (Verified  Allergy, Unknown, 1/14/20)      Medications    Last Reconciled on 5/7/20 10:17 by GERARDO GARCIA      azaTHIOprine (Imuran) 50 Mg Tab      50 MG PO BID for 90 Days, #180 TAB         Prov: Conrado Carrion         2/17/20       Alendronate Sodium (Fosamax) 70 Mg Tablet      70 MG PO Sa for 90 Days, #12 TAB 2 Refills         Prov: Conrado Carrion         2/10/20       Ondansetron Hcl (ONDANSETRON HCL) 4 Mg Tablet      4 MG PO Q6H PRN for NAUSEA AND/OR VOMITING for 90 Days, #360 TAB 1 Refill         Prov: LION PERES         10/24/19       NEB-Albuterol Sulf (Albuterol) 2.5 Mg/3 Ml Vial.neb      2.5 MG INH BID, #60 NEB 5 Refills         Prov: Patty Mercedes PA-C          9/3/19       Neb-NaCl 3% (Sodium Chloride 3% Neb) 4 Ml Vial.neb      4 ML INH RTBID for 30 Days, #60 NEB 11 Refills         Prov: Patty Mercedes PA-C         9/3/19       predniSONE (predniSONE) 10 Mg Tablet      10 MG PO QDAY, TAB         Reported         9/3/19       Ibuprofen (Ibuprofen) 800 Mg Tablet      800 MG PO TID, #90 TAB 0 Refills         Reported         9/3/19       Gabapentin (Gabapentin) 300 Mg Capsule      300 MG PO BID, #60 CAP 0 Refills         Reported         9/3/19       MDI-Albuterol (Ventolin HFA) 18 Gm Hfa.aer.ad      2 PUFFS INH Q4H PRN for SHORTNESS OF BREATH, #3 INH 3 Refills         Prov: Conrado Carrion         2/5/19       HYDROcodone-Acetaminophen  Mg (HYDROcodone-Acetaminophen  Mg) 1 Tab     Tablet      1 TAB PO TID PRN for BREAKTHROUGH PAIN, TAB 0 Refills         Reported         12/20/18       Arformoterol Tartrate (Brovana) 15 Mcg/2 Ml Vial.neb      15 MCG INH BID, #60 NEB 12 Refills         Prov: Patty Mercedes PA-C         8/2/18       Nebulizer Accessories (Nebulizer Kit ROD) 1 Each Kit      EACH XX ONCE, #1 0 Refills         Prov: Patty Mercedes PA-C         7/31/18       Cholecalciferol (Vitamin D3) (Vitamin D3) 1,000 Units Capsule      1000 UNITS PO QDAY, #120 CAP 4 Refills         Prov: Conrado Carrion         11/2/17       Omega-3 Fatty Acids/Fish Oil (Fish Oil 1000 Mg) Unknown Strength Capsule      PO QDAY, #30 CAP 0 Refills         Reported         3/17/17            Plan/Instructions      Ambulatory Assessment/Plan:        Pulmonary fibrosis - J84.10            Therapeutic drug monitoring - Z51.81            Notes      New Medications      * Neb-Budesonide (Pulmicort) 0.5 MG/2 ML AMPUL.NEB: 0.5 MG INH BID #60         Instructions: DIAGNOSIS CODE REQUIRED PRIOR TO PRESCRIBING.      Renewed Medications      * MDI-Albuterol (Ventolin HFA) 18 GM HFA.AER.AD: 2 PUFFS INH Q4H PRN SHORTNESS       OF BREATH #3      * predniSONE 10 MG TABLET:         From: 10 MG  PO QDAY         To: 10 MG PO QDAY 30 Days #30      * Neb-NaCl 3% (Sodium Chloride 3% Neb) 4 ML VIAL.NEB: 4 ML INH RTBID 30 Days #60         Dx: Pulmonary fibrosis - J84.10      * NEB-Albuterol Sulf (Albuterol) 2.5 MG/3 ML VIAL.NEB: 2.5 MG INH BID #60         Instructions: DIAGNOSIS CODE REQUIRED PRIOR TO PRESCRIBING.      Discontinued Medications      * PANTOPRAZOLE (Protonix) 20 MG TABLET: 40 MG PO HS #90         Instructions: Take on an empty stomach.      * Alendronate Sodium (Fosamax) 70 MG TABLET: 70 MG PO Sa 90 Days #12      * Famotidine 20 MG TABLET: 20 MG PO BID 30 Days #60         Dx: ILD (interstitial lung disease) - J84.9      New Diagnostics      * Chest W/O Cont CT, Month         Dx: Pulmonary fibrosis - J84.10      * CBC, Month         Dx: Therapeutic drug monitoring - Z51.81      * Comp Metabolic Panel, Month         Dx: Therapeutic drug monitoring - Z51.81      * Vitamin D Level, Month         Dx: Therapeutic drug monitoring - Z51.81      Plan/Instructions      * Plan Of Care: ()            * Chronic conditions reviewed and taken into consideration for today's treatment       plan.      * Patient instructed to seek medical attention urgently for new or worsening       symptoms.      * Patient was educated/instructed on their diagnosis, treatment and medications       prior to discharge from the clinic today.            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      2. Bronchiectasis without acute exacerbation.       3. Chronic cough at baseline.       4. Exertional dyspnea somewhat worse in the past 6 months.        5. 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       6. Toxic  drug monitoring.             PLAN:       1. I have discussed with the patient regarding her current symptoms and     pulmonary function test results that do not have any significant change overall     from prior.       2. She has been feeling more short  of breath within the past 6 months and I     discussed that additional testing can be done so I will repeat a CT scan of the     chest for her now or within the next month rather than waiting until September 2020 when it would have been 1 year since he last CT scan. She would like to     have evaluation done sooner than that.       3. Continue brovana nebulizers twice daily and I will add pulmicort nebulizer     twice daily. I advised her to use albuterol nebulizer followed by 3% saline     nebulizer and vest therapy twice daily for airway clearance.       4. I advised her to have sputum culture done and how to arrange this. She can     put the sample cup in the fridge until she can take it to the lab. The patient     verbalized understanding.       5. Continue current dose of Imuran 50 mg twice daily and prednisone 10 mg daily.           6. She will need repeat labs done for monitoring while on these medications     without I will have these ordered for her.       7. Follow up in 2-3 months with Dr. Carrion to review test results and symptoms,     sooner if needed.      Codes:  Phone Eval 21-30 mi [id number]            Electronically signed by KAI MILES PA-C  05/08/2020 14:03       Disclaimer: Converted document may not contain table formatting or lab diagrams. Please see [employer] for the authenticated document.

## 2021-05-28 NOTE — PROGRESS NOTES
Patient: LAMIN MONTERO     Acct: [id number]     Report: #[id number]  UNIT #: [id number]     : 1950    Encounter Date:2019  PRIMARY CARE: Laurie Castro  ***Signed***  --------------------------------------------------------------------------------------------------------------------  Chief Complaint      Encounter Date      Sep 3, 2019            Primary Care Provider      DENNY SANTIAGO            Referring Provider      DENNY SANTIAGO            Patient Complaint      Patient is complaining of      Patient here today for 9 month follow up            VITALS      Height 62 in / 157.48 cm      Weight 154 lbs  / 69.22832 kg      BSA 1.71 m2      BMI 28.2 kg/m2      Temperature 98.2 F / 36.78 C - Oral      Pulse 90      Respirations 14      Blood Pressure 95/50 Sitting, Left Arm      Pulse Oximetry 98%, room air            HPI      The patient is a pleasant 69 year old white female patient of Dr. Carrion's and     also known to me from previous office visits.  She was last seen by Dr. Carrion     in 2018.  She has not followed up since then as she has been in [place] with     her sister whose  was on hospice and has since passed away.  She had     moved back her follow up appointments with us and has been under a lot of stres    s. 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  She has also had bronchiectasis. I have written for    vest therapy for her about one year ago and had just learned that she never did     receive that.  She is using 3% saline nebs, albuterol and feels like they help     her somewhat.  She does still have increased cough, typically dry cough,     sometimes productive of white and clear sputum.  She denies purulent sputum     production, hemoptysis, fever or chills.  She denies wheezing.  She is followed     by her rheumatologist, Dr. Jacobs and has not had any recent changes to her     medication  regimen.  She does tell me that she had lab work done by Dr. Singh    about 3-4 months ago.              I have reviewed her ROS, medical, surgical and family history and agree with     those as entered in the chart.      Copies To:   Conrado Carrion ;            DHARMESH      Constitutional:  Denies: Fatigue, Fever, Weight gain, Weight loss, Chills,     Insomnia, Other      Respiratory/Breathing:  Complains of: Shortness of air, Wheezing, Cough; Denies:    Hemoptysis, Pleuritic pain, Other      Endocrine:  Denies: Polydipsia, Polyuria, Heat/cold intolerance, Abnorml     menstrual pattern, Diabetes, Other      Eyes:  Denies: Blurred vision, Vision Changes, Other      Ears, nose, mouth, throat:  Denies: Mouth lesions, Thrush, Throat pain,     Hoarseness, Allergies/Hay Fever, Post Nasal Drip, Headaches, Recent Head Injury,    Nose Bleeding, Neck Stiffness, Thyroid Mass, Hearing Loss, Ear Fullness, Dry     Mouth, Nasal or Sinus Pain, Dry Lips, Nasal discharge, Nasal congestion, Other      Cardiovascular:  Denies: Palpitations, Syncope, Claudication, Chest Pain, Wake     up Gasping for air, Leg Swelling, Irregular Heart Rate, Cyanosis, Dyspnea on     Exertion, Other      Gastrointestinal:  Denies: Nausea, Constipation, Diarrhea, Abdominal pain,     Vomiting, Difficulty Swallowing, Reflux/Heartburn, Dysphagia, Jaundice,     Bloating, Melena, Bloody stools, Other      Genitourinary:  Denies: Urinary frequency, Incontinence, Hematuria, Urgency,     Nocturia, Dysuria, Testicular problems, Other      Musculoskeletal:  Denies: Joint Pain, Joint Stiffness, Joint Swelling, Myalgias,    Other      Hematologic/lymphatic:  DENIES: Lymphadenopathy, Bruising, Bleeding tendencies,     Other      Neurological:  Denies: Headache, Numbness, Weakness, Seizures, Other      Psychiatric:  Denies: Anxiety, Appropriate Effect, Depression, Other      Sleep:  No: Excessive daytime sleep, Morning Headache?, Snoring, Insomnia?, Stop    breathing at  sleep?, Other      Integumentary:  Denies: Rash, Dry skin, Skin Warm to Touch, Other      Immunologic/Allergic:  Denies: Latex allergy, Seasonal allergies, Asthma, Ur    ticaria, Eczema, Other      Immunization status:  No: Up to date            FAMILY/SOCIAL/MEDICAL HX      Surgical History:  Yes: Oral Surgery (SINUS SX, TONSILLECTOMY), Throat Surgery     (t  Surgery, Bladder Surgery, Bowel Surgery, Breast Surgery, CABG, Carotid Stenosis,    Cholecystectomy, Ear Surgery, Eye Surgery, Head Surgery, Hernia Surgery, Kidney     Surgery, Nose Surgery, Orthopedic Surgery, Prostatectomy, Rectal Surgery, Spinal    Surgery, Testicular Surgery, Valve Replacement, Vascular Surgery, Other     Surgeries      Heart - Family Hx:  Father      Diabetes - Family Hx:  Father, Child, Grandparent      Cancer/Type - Family Hx:  Mother, Father      Is Father Still Living?:  No      Is Mother Still Living?:  No      Social History:  No Tobacco Use, No Alcohol Use, No Recreational Drug use      Smoking status:  Never smoker      Occupation:  [employer]      Anticoagulation Therapy:  No      Antibiotic Prophylaxis:  No      Medical History:  Yes: Arthritis (RA), Hemorrhoids/Rectal Prob (ACID REFLUX),     Shortness Of Breath (PULMONARY FIBROSIS), Tuberculosis or Pos TB Te; No:     Alcoholism, Allergies, Anemia, Asthma, Blood Disease, Broken Bones, Cataracts,     Chemical Dependency, Chemotherapy/Cancer, Chronic Bronchitis/COPD, Emphysema,     Chronic Liver Disease, Colon Trouble, Colitis, Diverticulitis, Congestive Heart     Failu, Deafness or Ringing Ears, Convulsions, Depression, Anxiety, Bipolar     Disorder, Diabetes, Epilepsy, Seizures, Forgetfullness, Glaucoma, Gall Stones,     Gout, Head Injury, Heart Attack, Heart Murmur, Hepatitis, Hiatal Hernia, High     Blood Pressure, High Cholesterol, HIV (Do not ask - volu, Jaundice, Kidney or Bl    adder Disease, Kidney Stones, Migrane Headaches, Mitral Valve Prolapse, Night      sweats, Phlebitis, Psychiatric Care, Reflux Disease, Rheumatic Fever, Sexually     Transmitted Dis, Sinus Trouble, Skin Disease/Psoriais/Ecz, Stroke, Thyroid     Problem, Miscellaneous Medical/oth      Psychiatric History      none            PREVENTION      Date Influenza Vaccine Given:  Sep 1, 2018      Influenza Vaccine Declined:  No      2 or More Falls Past Year?:  No      Fall Past Year with Injury?:  No      Hx Pneumococcal Vaccination:  Yes      Encouraged to follow-up with:  PCP regarding preventative exams.      Chart initiated by      Aide Muro CMA            ALLERGIES/MEDICATIONS      Allergies:        Coded Allergies:             CLINDAMYCIN (Verified  Allergy, Severe, RASH HIVES, 9/3/19)           CIPROFLOXACIN (Verified  Allergy, Unknown, RASH, 9/3/19)           ERYTHROMYCIN BASE (Verified  Allergy, Unknown, RASH, 9/3/19)           LEVOFLOXACIN (Verified  Allergy, Unknown, 9/3/19)      Medications    Last Reconciled on 9/3/19 10:00 by GERARDO GARCIA      predniSONE* (predniSONE*) 10 Mg Tablet      10 MG PO QDAY, TAB         Reported         9/3/19       Ibuprofen (Ibuprofen) 800 Mg Tablet      800 MG PO TID, #90 TAB 0 Refills         Reported         9/3/19       Gabapentin (Gabapentin) 300 Mg Capsule      300 MG PO BID, #60 CAP 0 Refills         Reported         9/3/19       azaTHIOprine (Imuran*) 50 Mg Tab      75 MG PO QDAY for 30 Days, #45 TAB 3 Refills         Prov: Conrado Carrion         8/2/19       Pantoprazole (Protonix*) 20 Mg Tablet      40 MG PO HS, #90 TAB         Prov: Conrado Carrion         7/15/19       MDI-Albuterol (Ventolin HFA) 18 Gm Hfa.aer.ad      2 PUFFS INH Q4H PRN for SHORTNESS OF BREATH, #3 INH 3 Refills         Prov: Conrado Carrion         2/5/19       DULoxetine (Cymbalta) 60 Mg Capsule.dr      60 MG PO HS, #30 CAP 0 Refills         Reported         12/20/18       Hydrocodone/Acetaminophen 10/325 MG (Hydrocodone/Acetaminophen 10/325 MG) 1 Tab     Tablet       1 TAB PO TID PRN for BREAKTHROUGH PAIN, TAB 0 Refills         Reported         12/20/18       Alendronate Sodium (Fosamax) 70 Mg Tablet      70 MG PO Sa for 90 Days, #12 TAB 2 Refills         Prov: Conrado Carrion         11/30/18       Arformoterol Tartrate (Brovana) 15 Mcg/2 Ml Vial.neb      15 MCG INH BID, #60 NEB 12 Refills         Prov: Patty Mercedes PA-C         8/2/18       Nebulizer Accessories (Nebulizer Kit ROD) 1 Each Kit      EACH XX ONCE, #1 0 Refills         Prov: Patty Mercedes PA-C         7/31/18       Neb-NaCl 3% (Sodium Chloride 3% Neb) 4 Ml Vial.neb      4 ML INH RTBID for 30 Days, #60 NEB 4 Refills         Prov: Patty Mercedes PA-C         7/30/18       Ondansetron Hcl (ONDANSETRON HCL) 4 Mg Tablet      4 MG PO Q6H PRN for NAUSEA AND/OR VOMITING, #120 TAB 4 Refills         Prov: Conrado Carrion         5/4/18       Cholecalciferol (Vitamin D3*) 1,000 Units Capsule      1000 UNITS PO QDAY, #120 CAP 4 Refills         Prov: Conrado Carrion         11/2/17       Omega-3 Fatty Acids/Fish Oil (Fish Oil 1000 Mg) Unknown Strength Capsule      PO QDAY, #30 CAP 0 Refills         Reported         3/17/17      Current Medications      Current Medications Reviewed 9/3/19            EXAM      GEN-patient appears stated age resting comfortable in no acute distress      Eyes-PERRL,  conjunctiva are normal in appearance extraocular muscles are     intact, no scleral icterus      Nasal-both nares are patent turbinates appear normal no polyps seen no nasal     discharge or ulcerations      Ears-tympanic membranes are normal no erythema no bulging, normal to inspection      Lymphatic-no swollen or enlarged cervical nodes, or axillary node, or femoral     nodes, or supraclavicular nodes      Mouth normal dentition, no erythema no ulcerations oropharynx appears normal no     exudate no evidence of postnasal drip, MP(default value)      Neck-there are no palpable supraclavicular or cervical adenopathy,  thyroid is     normal in appearance no apparent nodules, there is no inspiratory or expiratory     stridor      Respiratory-Lungs have mildly decreased breath sounds, no wheezes, rhonchi or     crackles appreciated, normal work of breathing noted.        Cardiovascular-the heart rate is normal and regular S1 and S2 present with no     murmur or extra heart sounds, there is no JVD or pedal edema present      GI-the abdomen is normal in appearance, bowel sounds present and normal in all     quadrants no hepatosplenomegaly or masses felt      Extremities-no clubbing is present, pulses present in all extremities, capillary    refill time is normal      Musculoskeletal-Normal strength in upper and lower extremities, inspection shows    no evidence of muscle atrophy      Skin-skin is normal in appearance it is warm and dry, no rashes present, no     evidence of cyanosis, palpation reveals no masses      Neurological-the patient is alert and oriented to time place and person, moves     all 4 extremities, normal gait, normal affect and mood, CN2-12 intact      Psych-normal judgment and insight is good, normal mood and affect, alert and     oriented to person, place, time and date      Vtials      Vitals:             Height 62 in / 157.48 cm           Weight 154 lbs  / 69.51274 kg           BSA 1.71 m2           BMI 28.2 kg/m2           Temperature 98.2 F / 36.78 C - Oral           Pulse 90           Respirations 14           Blood Pressure 95/50 Sitting, Left Arm           Pulse Oximetry 98%, room air            REVIEW      Results Reviewed      [facility] Results Reviewed?:  Yes Prev Lab Results, Yes Prev Radiology Results, Yes     Previous [facility] Records      Lab Results      I personally reviewed previous lab work, imaging and provider notes.            Assessment      Pulmonary fibrosis - J84.10            Bronchiectasis - J47.9            Notes      New Medications      * Gabapentin 300 MG CAPSULE: 300 MG PO BID #60      *  Ibuprofen 800 MG TABLET: 800 MG PO TID #90      * predniSONE* 10 MG TABLET: 10 MG PO QDAY      * Benzonatate (Tessalon Perles) 100 MG CAP: 100 MG PO Q4H PRN COUGH #100      * NEB-Albuterol Sulf (Albuterol) 2.5 MG/3 ML VIAL.NEB: 2.5 MG INH BID #60         Instructions: DIAGNOSIS CODE REQUIRED PRIOR TO PRESCRIBING.      * azaTHIOprine (Imuran*) 50 MG TAB: 75 MG PO QDAY 30 Days #45         Dx: Pulmonary fibrosis - J84.10      Renewed Medications      * Neb-NaCl 3% (Sodium Chloride 3% Neb) 4 ML VIAL.NEB: 4 ML INH RTBID 30 Days #60         Dx: Pulmonary fibrosis - J84.10      Discontinued Medications      * Diclofenac Sodium 50 MG TABLET.DR: 50 MG PO BID #60      * BACLOFEN 10 MG TABLET: 10 MG PO BID #60      * azaTHIOprine (Imuran*) 50 MG TAB: 75 MG PO QDAY 30 Days #45         Instructions: 1 1/2 TAB      New Diagnostics      * Chest W/O Cont CT, 1 DAY         Dx: Pulmonary fibrosis - J84.10      * CBC, 3 Months         Dx: Pulmonary fibrosis - J84.10      * Comp Metabolic Panel, 3 Months         Dx: Pulmonary fibrosis - J84.10      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      2. Bronchiectasis on chest CT scan without acute exacerbation.       3.  Chronic cough.      4. Dyspnea at baseline.        5. 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       6. Toxic drug monitoring.             PLAN:       1.  At this time, I have discussed with patient regarding current management.      She should continue on Imuran and prednisone at current dosages and I will     request recent lab work from Dr. Jacobs's office.  She will need follow up CBC    and CMP in the next three months for continued monitoring while on     immunosuppression drugs.        2.  I will repeat a chest CT for her now for annual follow up of her pulmonary     fibrosis and bronchiectasis.  I have refilled 3% saline nebs and albuterol nebs     for her to use twice a day.        3.   Patient has tried using flutter valve, but due to poor expiratory force, the    patient was unable to effectively mobilize secretions.  There is no skilled     caregiver available to provide manual CPT.  She would benefit from vest therapy     to help her with airway clearance and I have appreciated bronchiectasis on last     chest CT.  We will try to get therapy for her now and I have asked her to call     us if she does not hear back from us about that in the next two weeks.      4.  I have also prescribed Tessalon Perles for her to use as needed for her     chronic cough, particularly at night to help her with sleep.      5. I will have her follow up in three months with Dr. Carrion to see how she is     doing with vest therapy and review CT results and lab work and she is to call us    sooner if needed.            Patient Education      Patient Education Provided:  How to use an Inhaler, How to use a Nebulizer      Time Spent:  > 50% /[facility]                 Disclaimer: Converted document may not contain table formatting or lab diagrams. Please see [employer] [facility] for the authenticated document.

## 2021-05-28 NOTE — PROGRESS NOTES
Patient: LAMIN MONTERO     Acct: [id number]     Report: #[id number]  UNIT #: [id number]     : 1950    Encounter Date:2020  PRIMARY CARE: Laurie Castro  ***Signed***  --------------------------------------------------------------------------------------------------------------------  Chief Complaint      Encounter Date      Aug 27, 2020            Primary Care Provider      Laurie Castro            Referring Provider      DENNY SANTIAGO            Patient Complaint      Patient is complaining of      Pt here for f/u and results, emphysema            VITALS      Height 5 ft 2 in / 157.48 cm      Weight 150 lbs 2 oz / 68.852061 kg      BSA 1.69 m2      BMI 27.5 kg/m2      Temperature 98.3 F / 36.83 C - Temporal      Pulse 77      Respirations 14      Blood Pressure 114/58 Sitting, Right Arm      Pulse Oximetry 97%, Room air      Initial Exhaled Nitrous Oxide      Date:  2020      Exhaled Nitrous Oxide Results:  5            HPI      The patient is a very pleasant 70 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             She is doing well in regards to her breathing. Pulmonary function test and CT     scan of the chest from 2020 were stable. She is complaining of severe     fatigue that has been going since having her surgery. The patient is chronically    on imuran 75 mg twice daily along with a small low dose of prednisone. The     patient states her fatigue is present in the morning until she goes to bed at     night. It decreases her activities. She has some associated depression, no     suicidal or homicidal ideations. She denies any associated chest pains, she has     no increased cough, no increased sputum production. She has significant joint     pains, she has some disfiguration of her hands from arthritis that she was     diagnosed with by a rheumatologist. These symptoms are persistent despite the     use of her prednisone. The patient  recently had back surgery a few months ago     and since then she has been severely fatigued. She has no associated symptoms,     no aggravating or relieving factors. She claims that her fatigue is quite     severe.            ROS      Constitutional:  Denies: Fatigue, Fever, Weight gain, Weight loss, Chills,     Insomnia, Other      Respiratory/Breathing:  Complains of: Shortness of air, Wheezing, Cough; Denies:    Hemoptysis, Pleuritic pain, Other      Endocrine:  Denies: Polydipsia, Polyuria, Heat/cold intolerance, Abnorml     menstrual pattern, Diabetes, Other      Eyes:  Denies: Blurred vision, Vision Changes, Other      Ears, nose, mouth, throat:  Denies: Mouth lesions, Thrush, Throat pain,     Hoarseness, Allergies/Hay Fever, Post Nasal Drip, Headaches, Recent Head Injury,    Nose Bleeding, Neck Stiffness, Thyroid Mass, Hearing Loss, Ear Fullness, Dry     Mouth, Nasal or Sinus Pain, Dry Lips, Nasal discharge, Nasal congestion, Other      Cardiovascular:  Denies: Palpitations, Syncope, Claudication, Chest Pain, Wake     up Gasping for air, Leg Swelling, Irregular Heart Rate, Cyanosis, Dyspnea on     Exertion, Other      Gastrointestinal:  Denies: Nausea, Constipation, Diarrhea, Abdominal pain,     Vomiting, Difficulty Swallowing, Reflux/Heartburn, Dysphagia, Jaundice,     Bloating, Melena, Bloody stools, Other      Genitourinary:  Denies: Urinary frequency, Incontinence, Hematuria, Urgency,     Nocturia, Dysuria, Testicular problems, Other      Musculoskeletal:  Denies: Joint Pain, Joint Stiffness, Joint Swelling, Myalgias,    Other      Hematologic/lymphatic:  DENIES: Lymphadenopathy, Bruising, Bleeding tendencies,     Other      Neurological:  Denies: Headache, Numbness, Weakness, Seizures, Other      Psychiatric:  Denies: Anxiety, Appropriate Effect, Depression, Other      Sleep:  No: Excessive daytime sleep, Morning Headache?, Snoring, Insomnia?, Stop    breathing at sleep?, Other      Integumentary:   Denies: Rash, Dry skin, Skin Warm to Touch, Other      Immunologic/Allergic:  Denies: Latex allergy, Seasonal allergies, Asthma,     Urticaria, Eczema, Other      Immunization status:  No: Up to date            FAMILY/SOCIAL/MEDICAL HX      Surgical History:  Yes: Back Surgery (july 2020), Oral Surgery (SINUS SX,     TONSILLECTOMY), Throat Surgery (t  Angioplasty, Appendectomy, Bladder Surgery, Bowel Surgery, Breast Surgery, CABG,    Carotid Stenosis, Cholecystectomy, Ear Surgery, Eye Surgery, Head Surgery,     Hernia Surgery, Kidney Surgery, Nose Surgery, Orthopedic Surgery, Prostatectomy,    Rectal Surgery, Spinal Surgery, Testicular Surgery, Valve Replacement, Vascular     Surgery, Other Surgeries      Heart - Family Hx:  Father      Diabetes - Family Hx:  Father, Child, Grandparent      Cancer/Type - Family Hx:  Mother, Father      Is Father Still Living?:  No      Is Mother Still Living?:  No      Social History:  No Tobacco Use, No Alcohol Use, No Recreational Drug use      Smoking status:  Never smoker      Occupation:  [employer]      Anticoagulation Therapy:  No      Antibiotic Prophylaxis:  No      Medical History:  Yes: Arthritis (RA), Hemorrhoids/Rectal Prob (ACID REFLUX),     Shortness Of Breath (PULMONARY FIBROSIS), Tuberculosis or Pos TB Te; No:     Alcoholism, Allergies, Anemia, Asthma, Blood Disease, Broken Bones, Cataracts,     Chemical Dependency, Chemotherapy/Cancer, Chronic Bronchitis/COPD, Emphysema,     Chronic Liver Disease, Colon Trouble, Colitis, Diverticulitis, Congestive Heart     Failu, Deafness or Ringing Ears, Convulsions, Depression, Anxiety, Bipolar     Disorder, Diabetes, Epilepsy, Seizures, Forgetfullness, Glaucoma, Gall Stones,     Gout, Head Injury, Heart Attack, Heart Murmur, Hepatitis, Hiatal Hernia, High     Blood Pressure, High Cholesterol, HIV (Do not ask - volu, Jaundice, Kidney or     Bladder Disease, Kidney Stones, Migrane Headaches, Mitral Valve Prolapse,  Night     sweats, Phlebitis, Psychiatric Care, Reflux Disease, Rheumatic Fever, Sexually     Transmitted Dis, Sinus Trouble, Skin Disease/Psoriais/Ecz, Stroke, Thyroid     Problem, Miscellaneous Medical/oth      Psychiatric History      None            PREVENTION      Hx Influenza Vaccination:  Yes      Date Influenza Vaccine Given:  Sep 1, 2019      Influenza Vaccine Declined:  No      2 or More Falls in Past Year?:  No      Fall Past Year with Injury?:  No      Hx Pneumococcal Vaccination:  Yes      Encouraged to follow-up with:  PCP regarding preventative exams.      Chart initiated by      Cristal Mcneill MA            ALLERGIES/MEDICATIONS      Allergies:        Coded Allergies:             CLINDAMYCIN (Verified  Allergy, Severe, RASH HIVES, 8/27/20)           CIPROFLOXACIN (Verified  Allergy, Unknown, RASH, 8/27/20)           ERYTHROMYCIN BASE (Verified  Allergy, Unknown, RASH, 8/27/20)           LEVOFLOXACIN (Verified  Allergy, Unknown, 8/27/20)      Medications    Last Reconciled on 8/27/20 11:31 by CONRADO CARRION MD      MDI-Albuterol (Ventolin HFA) 18 Gm Hfa.aer.ad      2 PUFFS INH Q4H PRN for SHORTNESS OF BREATH, #3 INH 3 Refills         Prov: IVET IBANEZ [facility]         8/11/20       Baclofen (BACLOFEN) 10 Mg Tablet      10 MG PO TID, #90 TAB 0 Refills         Reported         8/11/20       azaTHIOprine (Imuran) 50 Mg Tab      75 MG PO BID for 30 Days, #90 TAB 2 Refills         Prov: IVET IBANEZ [facility]         6/23/20       Neb-NaCl 3% (Sodium Chloride 3% Neb) 4 Ml Vial.neb      4 ML INH RTBID for 30 Days, #60 NEB 5 Refills         Prov: IVET IBANEZ [facility]         6/23/20       Arformoterol Tartrate (Brovana) 15 Mcg/2 Ml Vial.neb      15 MCG INH BID for 90 Days, #60 NEB 3 Refills         Prov: Conrado Carrion         6/1/20       Neb-Budesonide (Pulmicort) 0.5 Mg/2 Ml Ampul.neb      0.5 MG INH BID, #60 NEB 11 Refills         Prov: Patty Mercedes PA-C         5/7/20       NEB-Albuterol Sulf  (Albuterol) 2.5 Mg/3 Ml Vial.neb      2.5 MG INH BID, #60 NEB 5 Refills         Prov: Patty Mercedes PA-C         5/7/20       predniSONE (predniSONE) 10 Mg Tablet      10 MG PO QDAY for 30 Days, #30 TAB 5 Refills         Prov: Patty Mercedes PA-C         5/7/20       Alendronate Sodium (Fosamax) 70 Mg Tablet      70 MG PO Sa for 90 Days, #12 TAB 2 Refills         Prov: Conrado Carrion         2/10/20       Ondansetron Hcl (ONDANSETRON HCL) 4 Mg Tablet      4 MG PO Q6H PRN for NAUSEA AND/OR VOMITING for 90 Days, #360 TAB 1 Refill         Prov: LION PERES         10/24/19       Ibuprofen (Ibuprofen) 800 Mg Tablet      800 MG PO TID, #90 TAB 0 Refills         Reported         9/3/19       Gabapentin (Gabapentin) 300 Mg Capsule      300 MG PO BID, #60 CAP 0 Refills         Reported         9/3/19       HYDROcodone-Acetaminophen  Mg (HYDROcodone-Acetaminophen  Mg) 1 Tab     Tablet      1 TAB PO TID PRN for BREAKTHROUGH PAIN, TAB 0 Refills         Reported         12/20/18       Nebulizer Accessories (Nebulizer Kit ROD) 1 Each Kit      EACH XX ONCE, #1 0 Refills         Prov: Patty Mercedes PA-C         7/31/18       Cholecalciferol (Vitamin D3) (Vitamin D3) 1,000 Units Capsule      1000 UNITS PO QDAY, #120 CAP 4 Refills         Prov: Conrado Carrion         11/2/17       Omega-3 Fatty Acids/Fish Oil (Fish Oil 1000 Mg) Unknown Strength Capsule      PO QDAY, #30 CAP 0 Refills         Reported         3/17/17      Current Medications      Current Medications Reviewed 8/27/20            EXAM      GEN-patient appears stated age resting comfortable in no acute distress      Eyes-PERRL,  conjunctiva are normal in appearance extraocular muscles are     intact, no scleral icterus      Nasal-both nares are patent turbinates appear normal no polyps seen no nasal     discharge or ulcerations      Mouth upper dentures, no erythema no ulcerations oropharynx appears normal no     exudate no evidence of postnasal  drip, MP1      Neck-there are no palpable supraclavicular or cervical adenopathy, thyroid is     normal in appearance no apparent nodules, there is no inspiratory or expiratory     stridor      Respiratory-patient exhibits normal work of breathing, speaking in full     sentences without difficulty, the chest is normal in appearance, auscultation r    eveals bibasal crackles present normal percussion      Cardiovascular-the heart rate is normal and regular S1 and S2 present with no     murmur or extra heart sounds, there is no JVD or pedal edema present      GI-the abdomen is normal in appearance, bowel sounds present and normal in all     quadrants no hepatosplenomegaly or masses felt      Extremities bilateral upper extremity clubbing is present, pulses present in all    extremities, capillary refill time is normal      Skin-skin overlying the fingers do show some skin tightening and skin cracking,      Neurological-the patient is alert and oriented to time place and person, moves     all 4 extremities, normal gait, normal affect and mood cranial nerves II through    XII are grossly intact, deep tendon reflexes are grossly normal      Psych-patient is alert and oriented to person place time and date, normal mood,     normal affect, normal insight normal judgment      Vtials      Vitals:             Height 5 ft 2 in / 157.48 cm           Weight 150 lbs 2 oz / 68.090368 kg           BSA 1.69 m2           BMI 27.5 kg/m2           Temperature 98.3 F / 36.83 C - Temporal           Pulse 77           Respirations 14           Blood Pressure 114/58 Sitting, Right Arm           Pulse Oximetry 97%, Room air            REVIEW      Results Reviewed      P[facility]S Results Reviewed?:  Yes Prev Lab Results, Yes Prev Radiology Results, Yes     [facility]            Assessment      ILD (interstitial lung disease) - J84.9            Notes      New Diagnostics      * Overnight Oximetry, Routine         Dx: ILD (interstitial  lung disease) - J84.9      * CBC, Month         Dx: ILD (interstitial lung disease) - J84.9      * Vitamin D Level, Routine         Dx: ILD (interstitial lung disease) - J84.9      * CMP Comp Metabolic Panel, Month         Dx: ILD (interstitial lung disease) - J84.9      * Thyroid Profile, Month         Dx: ILD (interstitial lung disease) - J84.9      New Office Procedures      * Pneumovax-23, As Soon As Possible         Pneumococcal Vaccine Polyvalen (Pneumovax-23) 25 MCG/0.5 ML VIAL: 25        MICROGRAM INTRAMUSCULARLY Qty 25 MCG      ASSESSMENT:       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      2. Bronchiectasis with acute exacerbation.      3. Chronic cough, baseline.      4. Exertional dyspnea.      5. Recent back surgery two weeks ago.      6. 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      7. Therapeutic drug monitoring.      8. Depression.             PLAN:      1. We will check overnight oximetry to see if the patient has any significant     oxygen desaturations and if she does we will start her on overnight oximetry and    we will obtain home  sleep study to assess for the possibility of sleep apnea.       2. Check vitamin D level.       3. Check thyroid function studies.       4. Check CMP and CBC.       5. Continue current bronchodilator therapies.       6. Continue imuran and chronic prednisone for the patient's antisynthetase     syndrome.       7. Pneumovax given today in the office. The patient has had Prevnar. I     instructed the patient to get flu vaccine when it is available.       8. I have personally reviewed laboratory data, imaging and previous medical     records.            Patient Education      Education resources provided:  Yes (ILD)            Electronically signed by Conrado Carrion  08/28/2020 11:54       Disclaimer: Converted document may not contain table formatting or lab diagrams. Please see [employer] for the authenticated document.

## 2021-05-28 NOTE — PROGRESS NOTES
Patient: LAMIN MONTERO     Acct: [id number]     Report: #[id number]  UNIT #: [id number]     : 1950    Encounter Date:08/10/2018  PRIMARY CARE: Laurie Castro  ***Signed***  --------------------------------------------------------------------------------------------------------------------  Chief Complaint      Encounter Date      Aug 10, 2018            Primary Care Provider      DENNY SANTIAGO            Referring Provider      DENNY SANTIAGO            Patient Complaint      Patient is complaining of      Chest Ct/Pft/Sputum Results            VITALS      Height 5 ft 3 in / 160.02 cm      Weight 139 lbs 7 oz / 63.46615 kg      BSA 1.69 m2      BMI 24.7 kg/m2      Temperature 97.7 F / 36.5 C - Oral      Pulse 87      Respirations 14      Blood Pressure 85/64 Sitting, Right Arm      Pulse Oximetry 97%, roomair            HPI      The patient is a very pleasant 68 year old white female who is a patient of Dr. Lim. 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 At that time she was having some     increased coughing with increased clear sputum production that was thick. She     was not on any breathing treatments or inhalers when I saw her but she has been     on Imuran 75 mg twice daily and Prednisone 10 mg daily. She felt like her     shortness of breath had overall progressed in the past 6 months so I repeated a     chest CT scan and pulmonary function tests which we have reviewed today. Her     chest CT scan done on 18 showed stable pulmonary fibrosis, stable     pulmonary nodules and significant bronchiectasis in the upper lobes bilaterally.    I reviewed her pulmonary function tests performed on 18 anterior those do     not show any significant change from her pulmonary function tests performed back    in . Her FEV1 if 87% and was previously 90%, FVC 86% and her diffusion     capacity remains reduced at 44%  which is what it was previously. I obtained a     sputum culture last time and did not get the results but today I see it was gr    owing staphylococcus aureus susceptible to Levaquin and tetracycline. She     previously grew group B strep after her bronchoalveolar lavage in May and was     treated with 2 rounds of antibiotics but has not yet been treated for this     staphylococcus aureus she grew 10 days ago. She denies fevers or chills or     hemoptysis. She denies any wheezing.  About a week ago I started the patient on     Brovana nebulizer twice daily and she says these are helping her. She has also     been using 3% saline nebs and feels those are helping her as well. Her dyspnea     is improving but she still has cough productive of thick, clear sputum.             I have reviewed his Review of Systems medical, surgical and family history and     agree with those as entered.            ROS      Constitutional:  Denies: Fatigue, Fever, Weight gain, Weight loss, Chills,     Insomnia, Other      Respiratory/Breathing:  Complains of: Shortness of air, Wheezing, Cough; Denies:    Hemoptysis, Pleuritic pain, Other      Endocrine:  Denies: Polydipsia, Polyuria, Heat/cold intolerance, Abnorml     menstrual pattern, Diabetes, Other      Eyes:  Denies: Blurred vision, Vision Changes, Other      Ears, nose, mouth, throat:  Denies: Mouth lesions, Thrush, Throat pain,     Hoarseness, Allergies/Hay Fever, Post Nasal Drip, Headaches, Recent Head Injury,    Nose Bleeding, Neck Stiffness, Thyroid Mass, Hearing Loss, Ear Fullness, Dry     Mouth, Nasal or Sinus Pain, Dry Lips, Nasal discharge, Nasal congestion, Other      Cardiovascular:  Denies: Palpitations, Syncope, Claudication, Chest Pain, Wake     up Gasping for air, Leg Swelling, Irregular Heart Rate, Cyanosis, Dyspnea on     Exertion, Other      Gastrointestinal:  Denies: Nausea, Constipation, Diarrhea, Abdominal pain,     Vomiting, Difficulty Swallowing,  Reflux/Heartburn, Dysphagia, Jaundice,     Bloating, Melena, Bloody stools, Other      Genitourinary:  Denies: Urinary frequency, Incontinence, Hematuria, Urgency,     Nocturia, Dysuria, Testicular problems, Other      Musculoskeletal:  Denies: Joint Pain, Joint Stiffness, Joint Swelling, Myalgias,    Other      Hematologic/lymphatic:  DENIES: Lymphadenopathy, Bruising, Bleeding tendencies,     Other      Neurological:  Denies: Headache, Numbness, Weakness, Seizures, Other      Psychiatric:  Denies: Anxiety, Appropriate Effect, Depression, Other      Sleep:  No: Excessive daytime sleep, Morning Headache?, Snoring, Insomnia?, Stop    breathing at sleep?, Other      Integumentary:  Denies: Rash, Dry skin, Skin Warm to Touch, Other      Immunologic/Allergic:  Denies: Latex allergy, Seasonal allergies, Asthma,     Urticaria, Eczema, Other      Immunization status:  No: Up to date            FAMILY/SOCIAL/MEDICAL HX      Surgical History:  Yes: Oral Surgery (SINUS SX, TONSILLECTOMY), Throat Surgery     (t  Surgery, Bladder Surgery, Bowel Surgery, Breast Surgery, CABG, Carotid Stenosis,    Cholecystectomy, Ear Surgery, Eye Surgery, Head Surgery, Hernia Surgery, Kidney     Surgery, Nose Surgery, Orthopedic Surgery, Prostatectomy, Rectal Surgery, Spinal    Surgery, Testicular Surgery, Valve Replacement, Vascular Surgery, Other     Surgeries      Heart - Family Hx:  Father      Diabetes - Family Hx:  Father, Child, Grandparent      Cancer/Type - Family Hx:  Mother, Father      Is Father Still Living?:  No      Is Mother Still Living?:  No      Social History:  No Tobacco Use, No Alcohol Use, No Recreational Drug use      Smoking status:  Never smoker      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      Anticoagulation Therapy:  No      Antibiotic Prophylaxis:  No      Medical History:  Yes: Arthritis (RA), Hemorrhoids/Rectal Prob (ACID REFLUX),     Shortness Of Breath (PULMONARY FIBROSIS), Tuberculosis or Pos TB Te; No:      Alcoholism, Allergies, Anemia, Asthma, Blood Disease, Broken Bones, Cataracts,     Chemical Dependency, Chemotherapy/Cancer, Chronic Bronchitis/COPD, Emphysema,     Chronic Liver Disease, Colon Trouble, Colitis, Diverticulitis, Congestive Heart     Failu, Deafness or Ringing Ears, Convulsions, Depression, Anxiety, Bipolar     Disorder, Diabetes, Epilepsy, Seizures, Forgetfullness, Glaucoma, Gall Stones,     Gout, Head Injury, Heart Attack, Heart Murmur, Hepatitis, Hiatal Hernia, High     Blood Pressure, High Cholesterol, HIV (Do not ask - volu, Jaundice, Kidney or     Bladder Disease, Kidney Stones, Migrane Headaches, Mitral Valve Prolapse, Night     sweats, Phlebitis, Psychiatric Care, Reflux Disease, Rheumatic Fever, Sexually     Transmitted Dis, Sinus Trouble, Skin Disease/Psoriais/Ecz, Stroke, Thyroid     Problem, Miscellaneous Medical/oth            PREVENTION      Hx Influenza Vaccination:  Yes      Date Influenza Vaccine Given:  Sep 1, 2017      Influenza Vaccine Declined:  No      2 or More Falls Past Year?:  No      Fall Past Year with Injury?:  No      Hx Pneumococcal Vaccination:  Yes      Encouraged to follow-up with:  PCP regarding preventative exams.      Chart initiated by      Christina Colvin ma            ALLERGIES/MEDICATIONS      Allergies:        Coded Allergies:             CLINDAMYCIN (Verified  Allergy, Severe, RASH HIVES, 8/10/18)           CIPROFLOXACIN (Verified  Allergy, Unknown, RASH, 8/10/18)           ERYTHROMYCIN BASE (Verified  Allergy, Unknown, RASH, 8/10/18)           LEVOFLOXACIN (Verified  Allergy, Unknown, 8/10/18)      Medications    Last Reconciled on 8/10/18 11:33 by GERARDO GARCIA      Doxycycline Hyclate (Doxycycline Hyclate*) 100 Mg Capsule      100 MG PO BID for 7 Days, #14 CAP 0 Refills         Prov: Patty Mercedes PA-C         8/10/18       Arformoterol Tartrate (Brovana) 15 Mcg/2 Ml Vial.neb      15 MCG INH BID, #60 NEB 12 Refills         Prov: Patty Mercedes  EDWIN         8/2/18       Nebulizer Accessories (Nebulizer Kit ROD) 1 Each Kit      EACH XX ONCE, #1 0 Refills         Prov: Patty Mercedes PA-C         7/31/18       Neb-NaCl 3% (Sodium Chloride 3% Neb) 4 Ml Vial.neb      4 ML INH RTBID for 30 Days, #60 NEB 4 Refills         Prov: Patty Mercedes PA-C         7/30/18       Promethazine Hcl (Phenergan*) 25 Mg Tablet      25 MG PO Q8H Y for NAUSEA, #60 TAB 0 Refills         Prov: Conrado Carrion         5/16/18       predniSONE* (predniSONE*) 10 Mg Tablet      10 MG PO QDAY, TAB         Reported         5/14/18       azaTHIOprine (Imuran*) 50 Mg Tab      75 MG PO QDAY, TAB         Reported         5/14/18       Multivitamins (Multi-Vitamin) 1 Each Tablet      1 TAB PO QDAY, #30 TAB 0 Refills         Reported         5/14/18       Ondansetron HCl (Zofran*) 4 Mg Tablet      4 MG PO Q6H Y for NAUSEA AND/OR VOMITING, #120 TAB 4 Refills         Prov: Conrado Carrion         5/4/18       Pantoprazole (Protonix*) 20 Mg Tablet      40 MG PO HS, #90 TAB 4 Refills         Prov: Conrado Carrion         5/4/18       DULoxetine (Cymbalta) 20 Mg Capsule.dr      20 MG PO QDAY for 30 Days, #30 CAP 1 Refill         Prov: Conrado Carrion         2/6/18       MDI-Albuterol (Ventolin HFA*) 18 Gm Hfa.aer.ad      2 PUFFS INH Q4H Y for SHORTNESS OF BREATH, #3 INH 3 Refills         Prov: Conrado Carrion         1/8/18       Cholecalciferol (Vitamin D3*) 1,000 Units Capsule      1000 UNITS PO QDAY, #120 CAP 4 Refills         Prov: Conrado Carrion         11/2/17       Alendronate Sodium (Fosamax) 70 Mg Tablet      70 MG PO Sa for 90 Days, #12 TAB 2 Refills         Prov: Conrado Carrion         11/2/17       Omega-3 Fatty Acids/Fish Oil (Fish Oil 1000 Mg) Unknown Strength Capsule      PO QDAY, #30 CAP 0 Refills         Reported         3/17/17       Aspirin (Aspirin Baby *) 81 Mg Tab.chew      81 MG PO QDAY, #30 TAB.CHEW 0 Refills         Reported         9/22/15        Hydrocodone/Acetaminophen 10/325 MG (Hydrocodone/Acetaminophen 10/325 MG) 1 Tab     Tablet      1 TAB PO Q6H Y for PAIN, TAB 0 Refills         Reported         9/22/15      Current Medications      Current Medications Reviewed 8/10/18            EXAM      GEN-patient appears stated age resting comfortable in no acute distress      Eyes-PERRL,  conjunctiva are normal in appearance extraocular muscles are     intact, no scleral icterus      Nasal-both nares are patent turbinates appear normal no polyps seen no nasal     discharge or ulcerations      Ears-tympanic membranes are normal no erythema no bulging, normal to inspection      Lymphatic-no swollen or enlarged cervical nodes, or axillary node, or femoral     nodes, or supraclavicular nodes      Mouth normal dentition, no erythema no ulcerations oropharynx appears normal no     exudate no evidence of postnasal drip, MP(default value)      Neck-there are no palpable supraclavicular or cervical adenopathy, thyroid is     normal in appearance no apparent nodules, there is no inspiratory or expiratory     stridor      Respiratory-Grossly clear to auscultation bilaterally without wheezes, rhonchi     or crackles appreciated.  Normal work of breathing noted.       Cardiovascular-the heart rate is normal and regular S1 and S2 present with no     murmur or extra heart sounds, there is no JVD or pedal edema present      GI-the abdomen is normal in appearance, bowel sounds present and normal in all     quadrants no hepatosplenomegaly or masses felt      Extremities-no clubbing is present, pulses present in all extremities, capillary    refill time is normal      Musculoskeletal-Normal strength in upper and lower extremities, inspection shows    no evidence of muscle atrophy      Skin-skin is normal in appearance it is warm and dry, no rashes present, no     evidence of cyanosis, palpation reveals no masses      Neurological-the patient is alert and oriented to time place  and person, moves     all 4 extremities, normal gait, normal affect and mood, CN2-12 intact      Psych-normal judgment and insight is good, normal mood and affect, alert and     oriented to person, place, and time, and date      Vtials      Vitals:             Height 5 ft 3 in / 160.02 cm           Weight 139 lbs 7 oz / 63.85057 kg           BSA 1.69 m2           BMI 24.7 kg/m2           Temperature 97.7 F / 36.5 C - Oral           Pulse 87           Respirations 14           Blood Pressure 85/64 Sitting, Right Arm           Pulse Oximetry 97%, roomair            REVIEW      Results Reviewed      PCCS Results Reviewed?:  Yes Prev Lab Results, Yes Prev Radiology Results, Yes     Previous Mecial Records      Radiographic Results               [facility]                PACS RADIOLOGY REPORT            Patient: LAMIN MONTERO   Acct: #[id number]   Report: #[id number]            UNIT #: [id number]    DOS: 18 0756      INSURANCE:MEDICARE PART A   LOCATION:[facility]     : 1950            PROVIDERS      ADMITTING:     ATTENDING: Patty Mercedes PA-C      FAMILY:  DENNY SANTIAGO   ORDERING:  Patty Mercedes PA-C         OTHER:    DICTATING:  Eduard Fairchild MD            REQ #:[id number]   [facility]      REASON FOR EXAM:  COUGH      REASON FOR VISIT:  COUGH            *******Signed******         PROCEDURE:   CT CHEST WITHOUT CONTRAST             COMPARISON:   [facility], CT, CHEST W/O CONTRAST, 2018,     14:46.             INDICATIONS:   COUGH/PULMONARY FIBROSIS/SOA             TECHNIQUE:   CT images were created without the administration of contrast     material.               PROTOCOL:     Standard imaging protocol performed                RADIATION:     DLP: 437mGy*cm          Automated exposure control was utilized to minimize radiation dose.              FINDINGS:         A 0.9 cm nodule is noted in the  right thyroid lobe.             Several sub cm mediastinal lymph nodes are nonpathologic by size criteria.  No     hilar adenopathy is       identified.  No axillary or supraclavicular adenopathy is seen.  No pleural or     pericardial effusion       is seen.  Minimal Coronary artery atherosclerotic disease is present.             No pleural or pericardial effusion is seen.             There is severe reticulosis and traction bronchiectasis noted in the lower lobes    bilaterally.        There is at least mild honeycombing.  A 0.2 cm nodule in the right upper lobe on    image 21 is       stable.  A 0.4 cm nodular density in the left upper lobe on image 23 is stable.             The liver, gallbladder, spleen, pancreas and visualized adrenal glands appear     unremarkable.  There       is a healed fracture of the poster lateral left 8th rib.             CONCLUSION:         1. Findings consistent with usual interstitial pneumonitis, similar to the     previous examination.        Differential for this includes idiopathic pulmonary fibrosis, drug induced     pneumonitis, asbestosis       and connective tissue disease.      2. Small stable pulmonary nodules, as above              Eduard Fairchild M.D.             Electronically Signed and Approved By: Edaurd Fairchild M.D. on 8/06/2018 at 13:08                           Until signed, this is an unconfirmed preliminary report that may contain      errors and is subject to change.                                              WURRO:      D:08/06/18 1308      PFT Results      [id number]  [id number]                                 [facility]                          [employer]                            [place], [place]  [address]               __________________________________________________________________________             Patient Name:                   Attending Physician:      Christina Espinoza                 KAI ZHONG PA-C             Patient Visit # MR #            Admit Date  Disch Date     Location      [id number]    [id number]      08/06/2018                 [facility]             Date of Birth      1950      __________________________________________________________________________      0821 - DIAGNOSTIC REPORT             PULMONARY FUNCTION TEST             SPIROMETRY:      FEV1/FVC ratio is 78%.      FEV1 is 1.94 L, 87% of predicted.      Forced vital capacity is 2.94 L, 85% of predicted.      No response to bronchodilator therapy seen.             LUNG VOLUMES:      Total lung capacity is 85% of predicted.      Residual volume is 79% of predicted.             DIFFUSION:      DLCO is 44% of predicted.             IMPRESSION:      1. Spirometry shows no evidence of obstruction.      2. There is no evidence of restriction seen.      3. No response to bronchodilator therapy seen.      4. Diffusion capacity is severely decreased.      5. In comparison with pulmonary function studies that the patient had          performed in January of 2018, the patient has had no statistically          significant change in forced vital capacity and no statistically          significant change in FEV1.  The patient has had a 23% reduction in DLCO          and a 14% reduction in total lung capacity.             To be electronically signed in [employer]      [id number] JOSE ANGEL YOU D.O.             WC:rt      D:  08/20/2018 12:51      T:  08/20/2018 14:45      #[id number]             Until signed, this is an unconfirmed preliminary report that may contain      errors and is subject to change.                   08/20/18 8211  <Electronically signed by Jose Angel You DO>            Assessment      Notes      New Medications      * Doxycycline Hyclate (Doxycycline Hyclate*) 100 MG CAPSULE: 100 MG PO BID 7       Days #14      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      2. Bronchiectasis on  chest CT scan with acute exacerbation.       3.  Chronic cough.      4. Dyspnea improving.        5. 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       6. Toxic drug monitoring.             PLAN:       1. I have reviewed the patient recent chest CT scan and pulmonary function tests    with her and there is no overall significant change noted on either test. She     does have bronchiectasis on her chest CT scan and has had difficulty with airway    clearance so we will continue 3% saline nebs and start vest therapy. She has     tried to use flutter valve but due to poor expiratory force, she has been unable    to effectively mobilize her secretions. She has had 3 or more exacerbations in     the past year requiring antibiotics and daily productive cough for at least 6     months.       2.  We will set the patient  up with vest therapy to be done twice daily along     with 3% saline nebs.       3. Continue Brovana twice daily.       4. We will treat her staphylococcus aureus with doxycycline.       5. The patient will be out of town for 3 months visiting her sister so she can     follow up with us when she returns. She should call sooner if needed.            Patient Education      Patient Education Provided:  How to use a Nebulizer      Time Spent:  > 50% /[facility]                 Disclaimer: Converted document may not contain table formatting or lab diagrams. Please see [employer] for the authenticated document.

## 2021-05-28 NOTE — PROGRESS NOTES
Patient: LAMIN MONTERO     Acct: [id number]     Report: #[id number]  UNIT #: [id number]     : 1950    Encounter Date:2018  PRIMARY CARE: Laurie Castro  ***Signed***  --------------------------------------------------------------------------------------------------------------------  Chief Complaint      Encounter Date      May 4, 2018            Primary Care Provider      DENNY SANTIAGO            Referring Provider      DENNY SANTIAGO            Patient Complaint      Patient is complaining of      Pt here for 3m f/u on cough            VITALS      Height 5 ft 3 in / 160.02 cm      Weight 140 lbs 9 oz / 63.801046 kg      BSA 1.70 m2      BMI 24.9 kg/m2      Temperature 98.2 F / 36.78 C - Oral      Pulse 76      Respirations 12      Blood Pressure 100/59 Sitting, Left Arm      Pulse Oximetry 98%, Room air            HPI      The patient is a very pleasant 68 year old white 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             The patient still has persistent cough. This has been going on since before her     last office visit with me back in 2017. Her cough is nonproductive and     dry. It is present on a daily basis numerous times throughout the day. She     takes numerous cough drops with no significant improvement. She has no     aggravating or relieving factors that she is aware of. She has reflux but her     symptoms are very well controlled. She denies any excessive nasal congestion     with postnasal drainage. Her cough is not associated with any fevers or chills     or night sweats.  She is not complaining of any worsening shortness of breath.     She had CT scan of the chest since her last office visit that showed stability     in the previously seen pulmonary fibrosis. She had pulmonary function tests     that actually showed improvement. The patient takes Imuran 75 mg PO twice daily     along with 10 mg of Prednisone on a daily basis.            ROS       Constitutional:  Denies: Fatigue, Fever, Weight gain, Weight loss, Chills,     Insomnia, Other      Respiratory/Breathing:  Complains of: Cough, Denies: Shortness of air, Wheezing    , Hemoptysis, Pleuritic pain, Other      Endocrine:  Denies: Polydipsia, Polyuria, Heat/cold intolerance, Abnorml     menstrual pattern, Diabetes, Other      Eyes:  Denies: Blurred vision, Vision Changes, Other      Ears, nose, mouth, throat:  Denies: Mouth lesions, Thrush, Throat pain,     Hoarseness, Allergies/Hay Fever, Post Nasal Drip, Headaches, Recent Head Injury    , Nose Bleeding, Neck Stiffness, Thyroid Mass, Hearing Loss, Ear Fullness, Dry     Mouth, Nasal or Sinus Pain, Dry Lips, Nasal discharge, Nasal congestion, Other      Cardiovascular:  Denies: Palpitations, Syncope, Claudication, Chest Pain, Wake     up Gasping for air, Leg Swelling, Irregular Heart Rate, Cyanosis, Dyspnea on     Exertion, Other      Gastrointestinal:  Denies: Nausea, Constipation, Diarrhea, Abdominal pain,     Vomiting, Difficulty Swallowing, Reflux/Heartburn, Dysphagia, Jaundice, Bloating    , Melena, Bloody stools, Other      Genitourinary:  Denies: Urinary frequency, Incontinence, Hematuria, Urgency,     Nocturia, Dysuria, Testicular problems, Other      Musculoskeletal:  Denies: Joint Pain, Joint Stiffness, Joint Swelling, Myalgias    , Other      Hematologic/lymphatic:  DENIES: Lymphadenopathy, Bruising, Bleeding tendencies,     Other      Neurological:  Denies: Headache, Numbness, Weakness, Seizures, Other      Psychiatric:  Denies: Anxiety, Appropriate Effect, Depression, Other      Sleep:  No: Excessive daytime sleep, Morning Headache?, Snoring, Insomnia?,     Stop breathing at sleep?, Other      Integumentary:  Denies: Rash, Dry skin, Skin Warm to Touch, Other      Immunologic/Allergic:  Denies: Latex allergy, Seasonal allergies, Asthma,     Urticaria, Eczema, Other      Immunization status:  No: Up to date            FAMILY/SOCIAL/MEDICAL  HX      Surgical History:  Yes: Oral Surgery (SINUS SX, TONSILLECTOMY), Throat Surgery (    t  , Bladder Surgery, Bowel Surgery, Breast Surgery, CABG, Carotid Stenosis,     Cholecystectomy, Ear Surgery, Eye Surgery, Head Surgery, Hernia Surgery, Kidney     Surgery, Nose Surgery, Orthopedic Surgery, Prostatectomy, Rectal Surgery,     Spinal Surgery, Testicular Surgery, Valve Replacement, Vascular Surgery, Other     Surgeries      Heart - Family Hx:  Father      Diabetes - Family Hx:  Father, Child, Grandparent      Cancer/Type - Family Hx:  Mother, Father      Is Father Still Living?:  No      Is Mother Still Living?:  No      Social History:  No Tobacco Use, No Alcohol Use, No Recreational Drug use      Smoking status:  Never smoker      Occupation:  Impressto      Anticoagulation Therapy:  No      Antibiotic Prophylaxis:  No      Medical History:  Yes: Arthritis (RA), Hemorrhoids/Rectal Prob (ACID REFLUX),     Shortness Of Breath (PULMONARY FIBROSIS), Tuberculosis or Pos TB Te, No:     Alcoholism, Allergies, Anemia, Asthma, Blood Disease, Broken Bones, Cataracts,     Chemical Dependency, Chemotherapy/Cancer, Chronic Bronchitis/COPD, Emphysema,     Chronic Liver Disease, Colon Trouble, Colitis, Diverticulitis, Congestive Heart     Failu, Deafness or Ringing Ears, Convulsions, Depression, Anxiety, Bipolar     Disorder, Diabetes, Epilepsy, Seizures, Forgetfullness, Glaucoma, Gall Stones,     Gout, Head Injury, Heart Attack, Heart Murmur, Hepatitis, Hiatal Hernia, High     Blood Pressure, High Cholesterol, HIV (Do not ask - volu, Jaundice, Kidney or     Bladder Disease, Kidney Stones, Migrane Headaches, Mitral Valve Prolapse, Night     sweats, Phlebitis, Psychiatric Care, Reflux Disease, Rheumatic Fever, Sexually     Transmitted Dis, Sinus Trouble, Skin Disease/Psoriais/Ecz, Stroke, Thyroid     Problem, Miscellaneous Medical/oth      Psychiatric History      None            PREVENTION      Hx Influenza  Vaccination:  Yes      Date Influenza Vaccine Given:  Sep 1, 2017      Influenza Vaccine Declined:  No      2 or More Falls Past Year?:  No      Fall Past Year with Injury?:  No      Hx Pneumococcal Vaccination:  Yes      Encouraged to follow-up with:  PCP regarding preventative exams.      Chart initiated by      Cristal Mcneill MA            ALLERGIES/MEDICATIONS      Allergies:        Coded Allergies:             CIPROFLOXACIN (Verified  Allergy, Unknown, RASH, 5/4/18)           CLINDAMYCIN (Verified  Allergy, Unknown, RASH HIVES, 5/4/18)           ERYTHROMYCIN BASE (Verified  Allergy, Unknown, RASH, 5/4/18)           LEVOFLOXACIN (Verified  Allergy, Unknown, 5/4/18)      Medications    Last Reconciled on 5/4/18 13:06 by Conrado Carrion MD      Pantoprazole (Protonix*) 40 Mg Tablet      40 MG PO HS, #30 TAB 3 Refills         Prov: Conrado Carrion         4/20/18       Ondansetron Hcl (Ondansetron*) 4 Mg Tablet      4 MG PO Q4H Y for NAUSEA, #90 TAB 0 Refills         Prov: Conrado Carrion         4/19/18       DULoxetine (Cymbalta) 20 Mg Capsule.dr      20 MG PO QDAY for 30 Days, #30 CAP 1 Refill         Prov: Conrado Carrion         2/6/18       predniSONE* (predniSONE*) 20 Mg Tablet      20 MG PO QDAY for 30 Days, #30 TAB 0 Refills         Prov: Conrado Carrion         2/6/18       azaTHIOprine (Imuran*) 50 Mg Tab      75 MG PO BID for 30 Days, #90 TAB 3 Refills         Prov: Conrado Carrion         2/5/18       MDI-Albuterol (Ventolin HFA*) 18 Gm Hfa.aer.ad      2 PUFFS INH Q4H Y for SHORTNESS OF BREATH, #3 INH 3 Refills         Prov: Conrado Carrion         1/8/18       Benzonatate (Tessalon Perles) 100 Mg Capsule      200 MG PO TID, #120 CAP 3 Refills         Prov: Conrado Carrion         12/20/17       azaTHIOprine (Imuran*) 50 Mg Tab      75 MG PO QDAY, #180 TAB 6 Refills         Prov: Conrado Carrion         11/29/17       Cholecalciferol (Vitamin D3*) 1,000 Units Capsule      1000 UNITS PO QDAY, #120 CAP 4  Refills         Prov: Conrado Carrion         11/2/17       Alendronate Sodium (Fosamax) 70 Mg Tablet      70 MG PO Sa for 90 Days, #12 TAB 2 Refills         Prov: Conrado Carrion         11/2/17       Ergocalciferol (Ergocalciferol*) 50,000 Unit Capsule      15165 UNITS PO MO@09, #4 CAP 0 Refills         Prov: Conrado Carrion         5/18/17       rOPINIRole HCl (Requip) 0.25 Mg Tab      0.25 MG PO HS for 30 Days, #30 TAB         Reported         4/25/17       Multivitamins (Multi-Vitamin) Unknown Strength Tablet      PO QDAY, #30 TAB 0 Refills         Reported         3/17/17       Omega-3 Fatty Acids/Fish Oil (Fish Oil 1000 Mg) Unknown Strength Capsule      PO QDAY, #30 CAP 0 Refills         Reported         3/17/17       predniSONE* (predniSONE*) 20 Mg Tablet      20 MG PO QDAY, TAB 0 Refills         Reported         2/1/16       Albuterol (Proair HFA*) 8.5 Gm Inh      1-2 PUFFS INH RTQ6H Y for SHORTNESS OF BREATH, #1 INH 0 Refills         Reported         1/26/16       Aspirin (Aspirin Baby *) 81 Mg Tab.chew      81 MG PO QDAY, #30 TAB.CHEW 0 Refills         Reported         9/22/15       Hydrocodone/Acetaminophen 10/325 MG (Hydrocodone/Acetaminophen 10/325 MG) 1 Tab     Tablet      1 TAB PO Q6H Y for PAIN, TAB 0 Refills         Reported         9/22/15      Current Medications      Current Medications Reviewed 5/4/18            EXAM      GEN-patient appears stated age resting comfortable in no acute distress      Eyes-PERRL,  conjunctiva are normal in appearance extraocular muscles are intact    , no scleral icterus      Nasal-both nares are patent turbinates appear normal no polyps seen no nasal     discharge or ulcerations      Mouth upper dentures, no erythema no ulcerations oropharynx appears normal no     exudate no evidence of postnasal drip, MP1      Neck-there are no palpable supraclavicular or cervical adenopathy, thyroid is     normal in appearance no apparent nodules, there is no inspiratory or  expiratory     stridor      Respiratory-patient exhibits normal work of breathing, speaking in full     sentences without difficulty, the chest is normal in appearance, auscultation     reveals bibasal crackles present normal percussion      Cardiovascular-the heart rate is normal and regular S1 and S2 present with no     murmur or extra heart sounds, there is no JVD or pedal edema present      GI-the abdomen is normal in appearance, bowel sounds present and normal in all     quadrants no hepatosplenomegaly or masses felt      Extremities bilateral upper extremity clubbing is present, pulses present in     all extremities, capillary refill time is normal      Skin-skin overlying the fingers do show some skin tightening and skin cracking,      Neurological-the patient is alert and oriented to time place and person, moves     all 4 extremities, normal gait, normal affect and mood cranial nerves II     through XII are grossly intact, deep tendon reflexes are grossly normal      Psych-patient is alert and oriented to person place time and date, normal mood,     normal affect, normal insight normal judgment      Vtials      Vitals:             Height 5 ft 3 in / 160.02 cm           Weight 140 lbs 9 oz / 63.688527 kg           BSA 1.70 m2           BMI 24.9 kg/m2           Temperature 98.2 F / 36.78 C - Oral           Pulse 76           Respirations 12           Blood Pressure 100/59 Sitting, Left Arm           Pulse Oximetry 98%, Room air            REVIEW      Results Reviewed      [facility] Results Reviewed?:  Yes Prev Lab Results, Yes Prev Radiology Results, Yes     [facility]            Assessment      ILD (interstitial lung disease) - J84.9            Notes      New Medications      * azaTHIOprine (Imuran*) 50 MG TAB: 75 MG PO BID #270      * PANTOPRAZOLE (Protonix*) 20 MG TABLET: 40 MG PO HS #90       Instructions: Take on an empty stomach.      * Ondansetron HCl (Zofran*) 4 MG TABLET: 4 MG PO Q6H PRN  NAUSEA AND/OR VOMITING     #120      New Diagnostics      * PFT-Comp, PrePost,DLCO,BodyBox, 6 Months       Dx: ILD (interstitial lung disease) - J84.9      ASSESSMENT/PLAN:            1.  Chronic cough lasting greater than 8 weeks in immunosuppressed patient. At     this time we will plan on doing bronchoscopy with bronchoalveolar lavage to     rule out atypical infections, in particular Pneumocystis.        2. Antisynthetase syndrome. Continue Imuran 75 mg PO twice daily. Continue     Prednisone 10 mg daily.       3. Toxic drug monitoring. Continue following CBC as well as comprehensive     metabolic panel every 3 months.       4. Myositis. Continue Imuran.  Symptoms are very well controlled at this time.       5. I have personally reviewed laboratory data, imaging as well as previous     medical records.            Patient Education      Education resources provided:  Yes      Patient Education Provided:  Bronchoscopy      Other Education:  ILD            Procedure Orders      Get Consent signed for:        Bronchoscopy with bronchoalveolar lavage.      Risks and Benefits:        I explained the risks versus benefits to the patient including      pneumothorax, respiratory failure, bleeding and death. The patient voices      understanding and wishes to proceed with the procedure.                 Disclaimer: Converted document may not contain table formatting or lab diagrams. Please see [employer] for the authenticated document.

## 2021-05-28 NOTE — PROGRESS NOTES
Patient: LAMIN ESPINOZA     Acct: [id number]     Report: #[id number]  UNIT #: [id number]     : 1950    Encounter Date:2020  PRIMARY CARE: Laurie Castro  ***Signed***  --------------------------------------------------------------------------------------------------------------------  History of Present Illness            Chief Complaint: 6 week follow up/ Chest ct results/ Bronchiectasis            Lamin Espinoza is presenting for evaluation via Telehealth visit. Verbal consent    obtained before beginning visit.            PAST MEDICAL HISTORY/OVERVIEW OF PATIENT SYMPTOMS            Date of Onset: ()            Symptoms: Cough            Flu Vaccine: Denies            Pneumonia Vaccine: Current            Never Smoker            Quality of Symptoms:             Anything make it worse or better: ()            Severity of Symptoms: ()            Any known Exposure to COVID-19: No            Provider spent (12) minutes with the patient during telehealth visit.            The following staff were present during this visit: ()            The patient is a pleasant 70 year old female here for TeleHealth visit. The     patient has a history of antisynthetase syndrome with pulmonary fibrosis on     chronic Imuran and prednisone.  She is doing much better since her last office     visit. Since taking the Breztri her cough and shortness of breath is improved     significantly.  She also was taking the Bactrim for 14 days which states helped     significantly as well.  CT scan since last office visit showed stable inters    titial changes and overall she is doing very well.            Physical exam deferred due to TeleHealth visit.              Allergies and Medications      Allergies:        Coded Allergies:             CLINDAMYCIN (Verified  Allergy, Severe, RASH HIVES, 12/3/20)           CIPROFLOXACIN (Verified  Allergy, Unknown, RASH, 12/3/20)           ERYTHROMYCIN BASE (Verified  Allergy, Unknown,  RASH, 12/3/20)           LEVOFLOXACIN (Verified  Allergy, Unknown, 12/3/20)      Medications    Last Reconciled on 12/3/20 11:06 by JOSE ANGEL CARRION MD      Budesonide/Glycopyr/Formoterol (Breztri Aerosphere Inhaler) 10.7 Gm Hfa.aer.ad               Prov: Jose Angel Carrion         11/12/20       azaTHIOprine (Imuran) 50 Mg Tab      75 MG PO BID for 30 Days, #90 TAB 3 Refills         Prov: Jose Angel Carrion         10/29/20       Trimethoprim/Sulfamethoxazole DS (Bactrim /160 MG) 1 Each Tablet      1 TAB PO BID, #28 TAB 0 Refills         Prov: Jose Angel Carrion         10/23/20       Formoterol Fumarate (Perforomist) 20 Mcg/2 Ml Vial.neb               Prov: Jose Angel Carrion         8/27/20       MDI-Albuterol (Ventolin HFA) 18 Gm Hfa.aer.ad      2 PUFFS INH Q4H PRN for SHORTNESS OF BREATH, #3 INH 3 Refills         Prov: IVET IBANEZ [facility]         8/11/20       Baclofen (BACLOFEN) 10 Mg Tablet      10 MG PO TID, #90 TAB 0 Refills         Reported         8/11/20       Neb-NaCl 3% (Sodium Chloride 3% Neb) 4 Ml Vial.neb      4 ML INH RTBID for 30 Days, #60 NEB 5 Refills         Prov: IVET IBANEZ [facility]         6/23/20       Arformoterol Tartrate (Brovana) 15 Mcg/2 Ml Vial.neb      15 MCG INH BID for 90 Days, #60 NEB 3 Refills         Prov: Jose Angel Carrion         6/1/20       Neb-Budesonide (Pulmicort) 0.5 Mg/2 Ml Ampul.neb      0.5 MG INH BID, #60 NEB 11 Refills         Prov: KAI MILES PA-C         5/7/20       NEB-Albuterol Sulf (Albuterol) 2.5 Mg/3 Ml Vial.neb      2.5 MG INH BID, #60 NEB 5 Refills         Prov: KAI MILES PA-C         5/7/20       predniSONE (predniSONE) 10 Mg Tablet      10 MG PO QDAY for 30 Days, #30 TAB 5 Refills         Prov: KAI MILES PA-C         5/7/20       Alendronate Sodium (Fosamax) 70 Mg Tablet      70 MG PO Sa for 90 Days, #12 TAB 2 Refills         Prov: Jose Angel Carrion         2/10/20       Ondansetron Hcl (ONDANSETRON HCL) 4 Mg Tablet      4 MG PO Q6H  PRN for NAUSEA AND/OR VOMITING for 90 Days, #360 TAB 1 Refill         Prov: LION PERES         10/24/19       Ibuprofen (Ibuprofen) 800 Mg Tablet      800 MG PO TID, #90 TAB 0 Refills         Reported         9/3/19       Gabapentin (Gabapentin) 300 Mg Capsule      300 MG PO BID, #60 CAP 0 Refills         Reported         9/3/19       HYDROcodone-Acetaminophen  Mg (HYDROcodone-Acetaminophen  Mg) 1 Tab     Tablet      1 TAB PO TID PRN for BREAKTHROUGH PAIN, TAB 0 Refills         Reported         12/20/18       Nebulizer Accessories (Nebulizer Kit ROD) 1 Each Kit      EACH XX ONCE, #1 0 Refills         Prov: KAI MILES PA-C         7/31/18       Cholecalciferol (Vitamin D3) (Vitamin D3) 1,000 Units Capsule      1000 UNITS PO QDAY, #120 CAP 4 Refills         Prov: Conrado Carrion         11/2/17       Omega-3 Fatty Acids/Fish Oil (Fish Oil 1000 Mg) Unknown Strength Capsule      PO QDAY, #30 CAP 0 Refills         Reported         3/17/17            Assessment      Cough R05            Plan      Orders:  Phone Eval 11-20 mi [id number]      Instructions            * Chronic conditions reviewed and taken in consideration for today's treatment       plan.      * Plan Of Care: ()      * Patient instructed to seek medical attention urgently for new or worsening       symptoms.      * Patient was educated/instructed on their diagnosis, treatment and medications       today.      * Recommend self monitoring. Instructions given.      * Recommend self quarantine for 14 days.      * Recommend self quarantine until without fever for 72 hours without using fever       reducing medications.      * Recommends over the counter medications for symptom management.            ASSESSMENT:      1.  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       2. Bronchiectasis.      3. asthma       4. Antisynthetase syndrome.        5. Chronic immune suppression with imuran and chronic prednisone.              PLAN:        1. We will start patient on Symbicort.  I explained to the patient that if her     symptoms reoccur, then we can try Breztri.  I would like to get rid of the     glycopyrrolate as to not dry up any secretions for future since she does not     have any overlying COPD.      2. For the patient's myositis/antisynthetase syndrome, her CT scan appears to be     stable. We are holding off on pulmonary function tests at this time due to the     pandemic.      3. Continue Imuran at current dose.,      4. Continue prednisone at current dose.      5.  Continue serial labs for toxic drug monitoring.      6. Follow up in three months.            Electronically signed by Conrado Carrion  12/14/2020 14:17       Disclaimer: Converted document may not contain table formatting or lab diagrams. Please see [employer] [facility] for the authenticated document.

## 2021-05-28 NOTE — PROGRESS NOTES
Patient: LAMIN MONTERO     Acct: [id number]     Report: #[id number]  UNIT #: [id number]     : 1950    Encounter Date:2018  PRIMARY CARE: Laurie Castro  ***Signed***  --------------------------------------------------------------------------------------------------------------------  Chief Complaint      Encounter Date      May 31, 2018            Primary Care Provider      DENNY SANTIAGO            Referring Provider      DENNY SANTIAGO            Patient Complaint      Patient is complaining of      Pt here for 2w f/u and bronch results            VITALS      Height 5 ft 3 in / 160.02 cm      Weight 141 lbs 3 oz / 64.570513 kg      BSA 1.70 m2      BMI 25.0 kg/m2      Temperature 98.7 F / 37.06 C - Oral      Pulse 70      Respirations 12      Blood Pressure 104/64 Sitting, Left Arm      Pulse Oximetry 98%, Room air            HPI      The patient is a very pleasant 68 year old white female here today for follow     up. The patient has myositis induced lung disease.  She had endoscopy on 05/15/    2018. The patient grew group F strep.  The patient's cough is still present, it     is improved somewhat.  She has been on a week of Amoxil.  Her cough is dry, not     currently taking any medications at this time. She has no fevers, chills or     worsening shortness of breath.  The patient does feel fatigued and has some     difficulty sleeping, unsure if this is from her cough. She has no     lightheadedness, chest pain or any other symptoms at this time.            ROS      Constitutional:  Denies: Fatigue, Fever, Weight gain, Weight loss, Chills,     Insomnia, Other      Respiratory/Breathing:  Complains of: Cough, Denies: Shortness of air, Wheezing    , Hemoptysis, Pleuritic pain, Other      Endocrine:  Denies: Polydipsia, Polyuria, Heat/cold intolerance, Abnorml     menstrual pattern, Diabetes, Other      Eyes:  Denies: Blurred vision, Vision Changes, Other      Ears, nose, mouth, throat:   Denies: Mouth lesions, Thrush, Throat pain,     Hoarseness, Allergies/Hay Fever, Post Nasal Drip, Headaches, Recent Head Injury    , Nose Bleeding, Neck Stiffness, Thyroid Mass, Hearing Loss, Ear Fullness, Dry     Mouth, Nasal or Sinus Pain, Dry Lips, Nasal discharge, Nasal congestion, Other      Cardiovascular:  Denies: Palpitations, Syncope, Claudication, Chest Pain, Wake     up Gasping for air, Leg Swelling, Irregular Heart Rate, Cyanosis, Dyspnea on     Exertion, Other      Gastrointestinal:  Denies: Nausea, Constipation, Diarrhea, Abdominal pain,     Vomiting, Difficulty Swallowing, Reflux/Heartburn, Dysphagia, Jaundice, Bloating    , Melena, Bloody stools, Other      Genitourinary:  Denies: Urinary frequency, Incontinence, Hematuria, Urgency,     Nocturia, Dysuria, Testicular problems, Other      Musculoskeletal:  Denies: Joint Pain, Joint Stiffness, Joint Swelling, Myalgias    , Other      Hematologic/lymphatic:  DENIES: Lymphadenopathy, Bruising, Bleeding tendencies,     Other      Neurological:  Denies: Headache, Numbness, Weakness, Seizures, Other      Psychiatric:  Denies: Anxiety, Appropriate Effect, Depression, Other      Sleep:  No: Excessive daytime sleep, Morning Headache?, Snoring, Insomnia?,     Stop breathing at sleep?, Other      Integumentary:  Denies: Rash, Dry skin, Skin Warm to Touch, Other      Immunologic/Allergic:  Denies: Latex allergy, Seasonal allergies, Asthma,     Urticaria, Eczema, Other      Immunization status:  No: Up to date            FAMILY/SOCIAL/MEDICAL HX      Surgical History:  Yes: Oral Surgery (SINUS SX, TONSILLECTOMY), Throat Surgery (    t  , Bladder Surgery, Bowel Surgery, Breast Surgery, CABG, Carotid Stenosis,     Cholecystectomy, Ear Surgery, Eye Surgery, Head Surgery, Hernia Surgery, Kidney     Surgery, Nose Surgery, Orthopedic Surgery, Prostatectomy, Rectal Surgery,     Spinal Surgery, Testicular Surgery, Valve Replacement, Vascular Surgery, Other     Surgeries       Heart - Family Hx:  Father      Diabetes - Family Hx:  Father, Child, Grandparent      Cancer/Type - Family Hx:  Mother, Father      Is Father Still Living?:  No      Is Mother Still Living?:  No      Social History:  No Tobacco Use, No Alcohol Use, No Recreational Drug use      Smoking status:  Never smoker      Occupation:  [employer]      Anticoagulation Therapy:  No      Antibiotic Prophylaxis:  No      Medical History:  Yes: Arthritis (RA), Hemorrhoids/Rectal Prob (ACID REFLUX),     Shortness Of Breath (PULMONARY FIBROSIS), Tuberculosis or Pos TB Te, No:     Alcoholism, Allergies, Anemia, Asthma, Blood Disease, Broken Bones, Cataracts,     Chemical Dependency, Chemotherapy/Cancer, Chronic Bronchitis/COPD, Emphysema,     Chronic Liver Disease, Colon Trouble, Colitis, Diverticulitis, Congestive Heart     Failu, Deafness or Ringing Ears, Convulsions, Depression, Anxiety, Bipolar     Disorder, Diabetes, Epilepsy, Seizures, Forgetfullness, Glaucoma, Gall Stones,     Gout, Head Injury, Heart Attack, Heart Murmur, Hepatitis, Hiatal Hernia, High     Blood Pressure, High Cholesterol, HIV (Do not ask - volu, Jaundice, Kidney or     Bladder Disease, Kidney Stones, Migrane Headaches, Mitral Valve Prolapse, Night     sweats, Phlebitis, Psychiatric Care, Reflux Disease, Rheumatic Fever, Sexually     Transmitted Dis, Sinus Trouble, Skin Disease/Psoriais/Ecz, Stroke, Thyroid     Problem, Miscellaneous Medical/oth      Psychiatric History      None            PREVENTION      Hx Influenza Vaccination:  Yes      Date Influenza Vaccine Given:  Sep 1, 2017      Influenza Vaccine Declined:  No      2 or More Falls Past Year?:  No      Fall Past Year with Injury?:  No      Hx Pneumococcal Vaccination:  Yes      Encouraged to follow-up with:  PCP regarding preventative exams.      Chart initiated by      Cristal Mcneill MA            ALLERGIES/MEDICATIONS      Allergies:        Coded Allergies:             CLINDAMYCIN  (Verified  Allergy, Severe, RASH HIVES, 5/14/18)           CIPROFLOXACIN (Verified  Allergy, Unknown, RASH, 5/14/18)           ERYTHROMYCIN BASE (Verified  Allergy, Unknown, RASH, 5/14/18)           LEVOFLOXACIN (Verified  Allergy, Unknown, 5/14/18)      Medications    Last Reconciled on 12/20/17 16:22 by JOSE ANGEL CARRION MD      Amoxicillin (Amoxicillin) 875 Mg Tablet      875 MG PO BID for 7 Days, #14 TAB         Prov: Jose Angel Carrion         5/24/18       Promethazine Hcl (Phenergan*) 25 Mg Tablet      25 MG PO Q8H Y for NAUSEA, #60 TAB 0 Refills         Prov: Jose Angel Carrion         5/16/18       predniSONE* (predniSONE*) 10 Mg Tablet      10 MG PO QDAY, TAB         Reported         5/14/18       azaTHIOprine (Imuran*) 50 Mg Tab      75 MG PO QDAY, TAB         Reported         5/14/18       Multivitamins (Multi-Vitamin) 1 Each Tablet      1 TAB PO QDAY, #30 TAB 0 Refills         Reported         5/14/18       Ondansetron HCl (Zofran*) 4 Mg Tablet      4 MG PO Q6H Y for NAUSEA AND/OR VOMITING, #120 TAB 4 Refills         Prov: Jose Angel Carrion         5/4/18       Pantoprazole (Protonix*) 20 Mg Tablet      40 MG PO HS, #90 TAB 4 Refills         Prov: Jose Angel Carrion         5/4/18       DULoxetine (Cymbalta) 20 Mg Capsule.dr      20 MG PO QDAY for 30 Days, #30 CAP 1 Refill         Prov: Jose Angel Carrion         2/6/18       MDI-Albuterol (Ventolin HFA*) 18 Gm Hfa.aer.ad      2 PUFFS INH Q4H Y for SHORTNESS OF BREATH, #3 INH 3 Refills         Prov: Jose Angel Carrion         1/8/18       Cholecalciferol (Vitamin D3*) 1,000 Units Capsule      1000 UNITS PO QDAY, #120 CAP 4 Refills         Prov: Jose Angel Carrion         11/2/17       Alendronate Sodium (Fosamax) 70 Mg Tablet      70 MG PO Sa for 90 Days, #12 TAB 2 Refills         Prov: Jose Angel Carrion         11/2/17       Omega-3 Fatty Acids/Fish Oil (Fish Oil 1000 Mg) Unknown Strength Capsule      PO QDAY, #30 CAP 0 Refills         Reported         3/17/17       Aspirin  (Aspirin Baby *) 81 Mg Tab.chew      81 MG PO QDAY, #30 TAB.CHEW 0 Refills         Reported         9/22/15       Hydrocodone/Acetaminophen 10/325 MG (Hydrocodone/Acetaminophen 10/325 MG) 1 Tab     Tablet      1 TAB PO Q6H Y for PAIN, TAB 0 Refills         Reported         9/22/15      Current Medications      Current Medications Reviewed 5/31/18            EXAM      GEN-patient appears stated age resting comfortable in no acute distress      Eyes-PERRL,  conjunctiva are normal in appearance extraocular muscles are intact    , no scleral icterus      Nasal-both nares are patent turbinates appear normal no polyps seen no nasal     discharge or ulcerations      Mouth upper dentures, no erythema no ulcerations oropharynx appears normal no     exudate no evidence of postnasal drip, MP1      Neck-there are no palpable supraclavicular or cervical adenopathy, thyroid is     normal in appearance no apparent nodules, there is no inspiratory or expiratory     stridor      Respiratory-patient exhibits normal work of breathing, speaking in full     sentences without difficulty, the chest is normal in appearance, auscultation     reveals bibasal crackles present normal percussion      Cardiovascular-the heart rate is normal and regular S1 and S2 present with no     murmur or extra heart sounds, there is no JVD or pedal edema present      GI-the abdomen is normal in appearance, bowel sounds present and normal in all     quadrants no hepatosplenomegaly or masses felt      Extremities bilateral upper extremity clubbing is present, pulses present in     all extremities, capillary refill time is normal      Skin-skin overlying the fingers do show some skin tightening and skin cracking,      Neurological-the patient is alert and oriented to time place and person, moves     all 4 extremities, normal gait, normal affect and mood cranial nerves II     through XII are grossly intact, deep tendon reflexes are grossly normal       Psych-patient is alert and oriented to person place time and date, normal mood,     normal affect, normal insight normal judgment      Vtials      Vitals:             Height 5 ft 3 in / 160.02 cm           Weight 141 lbs 3 oz / 64.707863 kg           BSA 1.70 m2           BMI 25.0 kg/m2           Temperature 98.7 F / 37.06 C - Oral           Pulse 70           Respirations 12           Blood Pressure 104/64 Sitting, Left Arm           Pulse Oximetry 98%, Room air            REVIEW      Results Reviewed      [facility] Results Reviewed?:  Yes Prev Lab Results, Yes Prev Radiology Results, Yes     Previous [facility] Records            Assessment      Fatigue - R53.83            Notes      New Medications      * Amoxicillin 875 MG TABLET: 875 MG PO BID #14      New Diagnostics      * Thyroid Profile, Month       Dx: Fatigue - R53.83      * Vitamin D Level, Routine       Dx: Fatigue - R53.83      * CBC, Month       Dx: Fatigue - R53.83      * CMP Comp Metabolic Panel, Month       Dx: Fatigue - R53.83      ASSESSMENT/PLAN:       1. Chronic cough lasting greater than 8 weeks.  At this time, the patient does     have BAL with group F strep. We will give another week of antibiotics.  We will     give patient Tussionex.      2.  Fatigue. Check vitamin D level, CBC and CMP.      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 Continue     Prednisone 10 mg daily.       4. Toxic drug monitoring. Continue monitoring CMP and CBC.       5. I have personally reviewed laboratory data, imaging as well as previous     medical records.                 Disclaimer: Converted document may not contain table formatting or lab diagrams. Please see [facility] for the authenticated document.

## 2021-05-28 NOTE — PROGRESS NOTES
Patient: LAMIN MONTERO     Acct: [id number]     Report: #[id number]  UNIT #: [id number]     : 1950    Encounter Date:2018  PRIMARY CARE: Laurie Castro  ***Signed***  --------------------------------------------------------------------------------------------------------------------  Chief Complaint      Encounter Date      Dec 20, 2018            Primary Care Provider      DENNY SANTIAGO            Referring Provider      DENNY SANTIAGO            Patient Complaint      Patient is complaining of      Pt here for 4m f/u, chronic cough            VITALS      Height 5 ft 2 in / 157.48 cm      Weight 144 lbs 7 oz / 65.576004 kg      BSA 1.66 m2      BMI 26.4 kg/m2      Temperature 98.1 F / 36.72 C - Oral      Pulse 89      Respirations 14      Blood Pressure 106/80 Sitting, Right Arm      Pulse Oximetry 96%, Room air            HPI      The patient is a very pleasant 68 year old white female with 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             The patient is complaining of some cough and some worsening shortness of breath.    She has been having some cough now that has been present over the course of the     past 4 days. She denies any fevers or chills or myalgias. Overall she feels a     little more short of breath than usual. He has no aggravating or relieving     factors and describes her shortness of breath as very mild in nature and has     only been present over the course of the past 3-4 days and present in     conjunction with her cough. Her cough is nonproductive, she denies any     hemoptysis at this time.            ROS      Constitutional:  Denies: Fatigue, Fever, Weight gain, Weight loss, Chills,     Insomnia, Other      Respiratory/Breathing:  Complains of: Shortness of air, Wheezing, Cough; Denies:    Hemoptysis, Pleuritic pain, Other      Endocrine:  Denies: Polydipsia, Polyuria, Heat/cold intolerance, Abnorml      menstrual pattern, Diabetes, Other      Eyes:  Denies: Blurred vision, Vision Changes, Other      Ears, nose, mouth, throat:  Denies: Mouth lesions, Thrush, Throat pain,     Hoarseness, Allergies/Hay Fever, Post Nasal Drip, Headaches, Recent Head Injury,    Nose Bleeding, Neck Stiffness, Thyroid Mass, Hearing Loss, Ear Fullness, Dry     Mouth, Nasal or Sinus Pain, Dry Lips, Nasal discharge, Nasal congestion, Other      Cardiovascular:  Denies: Palpitations, Syncope, Claudication, Chest Pain, Wake     up Gasping for air, Leg Swelling, Irregular Heart Rate, Cyanosis, Dyspnea on     Exertion, Other      Gastrointestinal:  Denies: Nausea, Constipation, Diarrhea, Abdominal pain,     Vomiting, Difficulty Swallowing, Reflux/Heartburn, Dysphagia, Jaundice,     Bloating, Melena, Bloody stools, Other      Genitourinary:  Denies: Urinary frequency, Incontinence, Hematuria, Urgency,     Nocturia, Dysuria, Testicular problems, Other      Musculoskeletal:  Denies: Joint Pain, Joint Stiffness, Joint Swelling, Myalgias,    Other      Hematologic/lymphatic:  DENIES: Lymphadenopathy, Bruising, Bleeding tendencies,     Other      Neurological:  Denies: Headache, Numbness, Weakness, Seizures, Other      Psychiatric:  Denies: Anxiety, Appropriate Effect, Depression, Other      Sleep:  No: Excessive daytime sleep, Morning Headache?, Snoring, Insomnia?, Stop    breathing at sleep?, Other      Integumentary:  Denies: Rash, Dry skin, Skin Warm to Touch, Other      Immunologic/Allergic:  Denies: Latex allergy, Seasonal allergies, Asthma,     Urticaria, Eczema, Other      Immunization status:  No: Up to date            FAMILY/SOCIAL/MEDICAL HX      Surgical History:  Yes: Oral Surgery (SINUS SX, TONSILLECTOMY), Throat Surgery     (t  Surgery, Bladder Surgery, Bowel Surgery, Breast Surgery, CABG, Carotid Stenosis,    Cholecystectomy, Ear Surgery, Eye Surgery, Head Surgery, Hernia Surgery, Kidney     Surgery, Nose Surgery, Orthopedic Surgery,  Prostatectomy, Rectal Surgery, Spinal    Surgery, Testicular Surgery, Valve Replacement, Vascular Surgery, Other     Surgeries      Heart - Family Hx:  Father      Diabetes - Family Hx:  Father, Child, Grandparent      Cancer/Type - Family Hx:  Mother, Father      Is Father Still Living?:  No      Is Mother Still Living?:  No      Social History:  No Tobacco Use, No Alcohol Use, No Recreational Drug use      Smoking status:  Never smoker      Occupation:  [employer]      Anticoagulation Therapy:  No      Antibiotic Prophylaxis:  No      Medical History:  Yes: Arthritis (RA), Hemorrhoids/Rectal Prob (ACID REFLUX),     Shortness Of Breath (PULMONARY FIBROSIS), Tuberculosis or Pos TB Te; No:     Alcoholism, Allergies, Anemia, Asthma, Blood Disease, Broken Bones, Cataracts,     Chemical Dependency, Chemotherapy/Cancer, Chronic Bronchitis/COPD, Emphysema,     Chronic Liver Disease, Colon Trouble, Colitis, Diverticulitis, Congestive Heart     Failu, Deafness or Ringing Ears, Convulsions, Depression, Anxiety, Bipolar     Disorder, Diabetes, Epilepsy, Seizures, Forgetfullness, Glaucoma, Gall Stones,     Gout, Head Injury, Heart Attack, Heart Murmur, Hepatitis, Hiatal Hernia, High     Blood Pressure, High Cholesterol, HIV (Do not ask - volu, Jaundice, Kidney or     Bladder Disease, Kidney Stones, Migrane Headaches, Mitral Valve Prolapse, Night     sweats, Phlebitis, Psychiatric Care, Reflux Disease, Rheumatic Fever, Sexually     Transmitted Dis, Sinus Trouble, Skin Disease/Psoriais/Ecz, Stroke, Thyroid     Problem, Miscellaneous Medical/oth      Psychiatric History      None            PREVENTION      Hx Influenza Vaccination:  Yes      Date Influenza Vaccine Given:  Sep 1, 2018      Influenza Vaccine Declined:  No      2 or More Falls Past Year?:  No      Fall Past Year with Injury?:  No      Hx Pneumococcal Vaccination:  Yes      Encouraged to follow-up with:  PCP regarding preventative exams.      Chart  initiated by      Cristal Mcneill MA            ALLERGIES/MEDICATIONS      Allergies:        Coded Allergies:             CLINDAMYCIN (Verified  Allergy, Severe, RASH HIVES, 12/20/18)           CIPROFLOXACIN (Verified  Allergy, Unknown, RASH, 12/20/18)           ERYTHROMYCIN BASE (Verified  Allergy, Unknown, RASH, 12/20/18)           LEVOFLOXACIN (Verified  Allergy, Unknown, 12/20/18)      Medications    Last Reconciled on 12/20/18 12:36 by CONRADO YOU MD      Azithromycin Z-David (Zithromax Z-David) 250 Mg Tablet      250 MG PO ASDIR, #1 PACKET         Prov: Conrado You         12/20/18       azaTHIOprine (Imuran*) 50 Mg Tab      75 MG PO QDAY, #135 TAB 4 Refills         Prov: Conrado You         12/20/18       Azithromycin Z-David (Zithromax Z-David) 250 Mg Tablet      250 MG PO ASDIR, #1 PACKET         Prov: Conrado You         12/20/18       DULoxetine (Cymbalta) 60 Mg Capsule.dr      60 MG PO HS, #30 CAP 0 Refills         Reported         12/20/18       Hydrocodone/Acetaminophen 10/325 MG (Hydrocodone/Acetaminophen 10/325 MG) 1 Tab     Tablet      1 TAB PO TID PRN for BREAKTHROUGH PAIN, TAB 0 Refills         Reported         12/20/18       rOPINIRole HCl (rOPINIRole HCl) Unknown Strength Tablet      PO TID for 30 Days, TAB         Reported         12/20/18       Alendronate Sodium (Fosamax) 70 Mg Tablet      70 MG PO Sa for 90 Days, #12 TAB 2 Refills         Prov: Conrado You         11/30/18       predniSONE* (predniSONE*) 20 Mg Tablet      20 MG PO QDAY for 90 Days, #90 TAB 1 Refill         Prov: Conrado You         10/12/18       Doxycycline Hyclate (Doxycycline Hyclate*) 100 Mg Capsule      100 MG PO BID for 7 Days, #14 CAP 0 Refills         Prov: Patty Mercedes PA-C         8/10/18       Arformoterol Tartrate (Brovana) 15 Mcg/2 Ml Vial.neb      15 MCG INH BID, #60 NEB 12 Refills         Prov: Patty Mercedes PA-C         8/2/18       Nebulizer Accessories (Nebulizer Kit ROD) 1 Each Kit       EACH XX ONCE, #1 0 Refills         Prov: Patty Mercedes PA-C         7/31/18       Neb-NaCl 3% (Sodium Chloride 3% Neb) 4 Ml Vial.neb      4 ML INH RTBID for 30 Days, #60 NEB 4 Refills         Prov: Patty Mercedes PA-C         7/30/18       Promethazine Hcl (Phenergan*) 25 Mg Tablet      25 MG PO Q8H PRN for NAUSEA, #60 TAB 0 Refills         Prov: Conrado Carrion         5/16/18       azaTHIOprine (Imuran*) 50 Mg Tab      75 MG PO QDAY, TAB         Reported         5/14/18       Multivitamins (Multi-Vitamin) 1 Each Tablet      1 TAB PO QDAY, #30 TAB 0 Refills         Reported         5/14/18       Ondansetron HCl (Zofran*) 4 Mg Tablet      4 MG PO Q6H PRN for NAUSEA AND/OR VOMITING, #120 TAB 4 Refills         Prov: Conrado Carrion         5/4/18       Pantoprazole (Protonix*) 20 Mg Tablet      40 MG PO HS, #90 TAB 4 Refills         Prov: Conrado Carrion         5/4/18       MDI-Albuterol (Ventolin HFA) 18 Gm Hfa.aer.ad      2 PUFFS INH Q4H PRN for SHORTNESS OF BREATH, #3 INH 3 Refills         Prov: Conrado Carrion         1/8/18       Cholecalciferol (Vitamin D3*) 1,000 Units Capsule      1000 UNITS PO QDAY, #120 CAP 4 Refills         Prov: Conrado Carrion         11/2/17       Omega-3 Fatty Acids/Fish Oil (Fish Oil 1000 Mg) Unknown Strength Capsule      PO QDAY, #30 CAP 0 Refills         Reported         3/17/17       Aspirin Chew (Aspirin Baby) 81 Mg Tab.chew      81 MG PO QDAY, #30 TAB.CHEW 0 Refills         Reported         9/22/15       Hydrocodone/Acetaminophen 10/325 MG (Hydrocodone/Acetaminophen 10/325 MG) 1 Tab     Tablet      1 TAB PO Q6H PRN for PAIN, TAB 0 Refills         Reported         9/22/15      Current Medications      Current Medications Reviewed 12/20/18            EXAM      GEN-patient appears stated age resting comfortable in no acute distress      Eyes-PERRL,  conjunctiva are normal in appearance extraocular muscles are     intact, no scleral icterus      Nasal-both nares are patent  turbinates appear normal no polyps seen no nasal dis    charge or ulcerations      Mouth upper dentures, no erythema no ulcerations oropharynx appears normal no     exudate no evidence of postnasal drip, MP1      Neck-there are no palpable supraclavicular or cervical adenopathy, thyroid is     normal in appearance no apparent nodules, there is no inspiratory or expiratory     stridor      Respiratory-patient exhibits normal work of breathing, speaking in full sen    tences without difficulty, the chest is normal in appearance, auscultation     reveals bibasal crackles present normal percussion      Cardiovascular-the heart rate is normal and regular S1 and S2 present with no     murmur or extra heart sounds, there is no JVD or pedal edema present      GI-the abdomen is normal in appearance, bowel sounds present and normal in all     quadrants no hepatosplenomegaly or masses felt      Extremities bilateral upper extremity clubbing is present, pulses present in all    extremities, capillary refill time is normal      Skin-skin overlying the fingers do show some skin tightening and skin cracking,      Neurological-the patient is alert and oriented to time place and person, moves     all 4 extremities, normal gait, normal affect and mood cranial nerves II through    XII are grossly intact, deep tendon reflexes are grossly normal      Psych-patient is alert and oriented to person place time and date, normal mood,     normal affect, normal insight normal judgment      Vtials      Vitals:             Height 5 ft 2 in / 157.48 cm           Weight 144 lbs 7 oz / 65.417028 kg           BSA 1.66 m2           BMI 26.4 kg/m2           Temperature 98.1 F / 36.72 C - Oral           Pulse 89           Respirations 14           Blood Pressure 106/80 Sitting, Right Arm           Pulse Oximetry 96%, Room air            REVIEW      Results Reviewed      [facility] Results Reviewed?:  Yes Prev Lab Results, Yes Prev Radiology Results, Yes      [facility]            Assessment      Cough - R05            SOB (shortness of breath) - R06.02            Notes      New Medications      * rOPINIRole HCl Unknown Strength TABLET: PO TID 30 Days      * Hydrocodone/Acetaminophen 10/325 MG 1 TAB TABLET: 1 TAB PO TID PRN BREAKTHROUG      H PAIN      * DULoxetine (Cymbalta) 60 MG CAPSULE.DR: 60 MG PO HS #30      * Azithromycin Z-David (Zithromax Z-David) 250 MG TABLET: 250 MG PO ASDIR #1         Instructions: Take 500 mg (two tablets) by mouth the first day, then 250 mg (       one tablet) daily until all taken.      * azaTHIOprine (Imuran*) 50 MG TAB: 75 MG PO QDAY #135      * Azithromycin Z-David (Zithromax Z-David) 250 MG TABLET: 250 MG PO ASDIR #1         Instructions: Take 500 mg (two tablets) by mouth the first day, then 250 mg        (one tablet) daily until all taken.      New Diagnostics      * Chest 2 View, Week         Dx: Cough - R05      * CBC, Month         Dx: SOB (shortness of breath) - R06.02      * CMP Comp Metabolic Panel, Month         Dx: SOB (shortness of breath) - R06.02      New Office Procedures      * Flu Swab ELMA, As Soon As Possible      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      2. Bronchiectasis on chest CT scan with acute exacerbation.       3.  Chronic cough.      4. Dyspnea improving.        5. Antisynthetase syndrome on immunosuppression with Imuran and chronic P    rednisone.       6. Toxic drug monitoring.             PLAN:       1. We will obtain chest x-ray.       2. Obtain CBC and renal panel.       3. Continue bronchodilator therapies as needed.      4. The patient had influenza testing here today in the office with no need for     Tamiflu at this time.       5. The patient has no need for antibiotics at this time, she has no mucopurulent    sputum.       6. Continue Imuran 75 mg daily. Refills of this was given today.       7. Continue Prednisone at 10 mg. I have instructed her that  while she is acutely    ill that it is okay to increase her Prednisone to 20 mg but after her illness     subsides to decrease it back.       8. I have personally reviewed laboratory data, imaging as well as previous     medical records.            Patient Education      Education resources provided:  Yes (ild)                 Disclaimer: Converted document may not contain table formatting or lab diagrams. Please see [employer] for the authenticated document.

## 2021-05-28 NOTE — PROGRESS NOTES
"Patient: LAMIN MONTERO     Acct: [id number]     Report: #[id number]  UNIT #: [id number]     : 1950    Encounter Date:10/23/2020  PRIMARY CARE: Laurie Castro  ***Signed***  --------------------------------------------------------------------------------------------------------------------  Chief Complaint      Encounter Date      Oct 23, 2020            Primary Care Provider      Laurie Castro            Referring Provider      DENNY SANTIAGO            Patient Complaint      Patient is complaining of      Patient is complaining of SOA            VITALS      Height 5 ft 3 in / 160.02 cm      Weight 146 lbs 8 oz / 66.620246 kg      BSA 1.69 m2      BMI 26.0 kg/m2      Temperature 98.6 F / 37 C - Temporal      Pulse 71      Respirations 14      Blood Pressure 110/71 Sitting, Left Arm      Pulse Oximetry 100%, Room air      Initial Exhaled Nitrous Oxide      Date:  2020            HPI      The patient is a 70 year old female with history of myositis associated     interstitial lung disease here for follow up today.             The patient has significant amount of cough and dyspnea. She is using her rescue    inhaler more. Her symptoms have slowly progressed over the course of the past 2-    3 months. She coughs so much that she has \"back spasms and chest spasms.\"  Her     symptoms are slowly getting worse, the are worse with exertion, no other a    ggravating factors and no relieving factors that she can identify at this time.     She has no associated fever or chills, no hemoptysis and no night sweats. She     was tested for COVID-19 which was negative. She uses her bronchodilator     therapies and they do help but only partially.            ROS      Constitutional:  Denies: Fatigue, Fever, Weight gain, Weight loss, Chills,     Insomnia, Other      Respiratory/Breathing:  Complains of: Shortness of air; Denies: Wheezing, Cough,    Hemoptysis, Pleuritic pain, Other      Endocrine:  Denies: " Polydipsia, Polyuria, Heat/cold intolerance, Abnorml mens    trual pattern, Diabetes, Other      Eyes:  Denies: Blurred vision, Vision Changes, Other      Ears, nose, mouth, throat:  Denies: Mouth lesions, Thrush, Throat pain,     Hoarseness, Allergies/Hay Fever, Post Nasal Drip, Headaches, Recent Head Injury,    Nose Bleeding, Neck Stiffness, Thyroid Mass, Hearing Loss, Ear Fullness, Dry     Mouth, Nasal or Sinus Pain, Dry Lips, Nasal discharge, Nasal congestion, Other      Cardiovascular:  Denies: Palpitations, Syncope, Claudication, Chest Pain, Wake     up Gasping for air, Leg Swelling, Irregular Heart Rate, Cyanosis, Dyspnea on     Exertion, Other      Gastrointestinal:  Denies: Nausea, Constipation, Diarrhea, Abdominal pain,     Vomiting, Difficulty Swallowing, Reflux/Heartburn, Dysphagia, Jaundice,     Bloating, Melena, Bloody stools, Other      Genitourinary:  Denies: Urinary frequency, Incontinence, Hematuria, Urgency,     Nocturia, Dysuria, Testicular problems, Other      Musculoskeletal:  Denies: Joint Pain, Joint Stiffness, Joint Swelling, Myalgias,    Other      Hematologic/lymphatic:  DENIES: Lymphadenopathy, Bruising, Bleeding tendencies,     Other      Neurological:  Denies: Headache, Numbness, Weakness, Seizures, Other      Psychiatric:  Denies: Anxiety, Appropriate Effect, Depression, Other      Sleep:  No: Excessive daytime sleep, Morning Headache?, Snoring, Insomnia?, Stop    breathing at sleep?, Other      Integumentary:  Denies: Rash, Dry skin, Skin Warm to Touch, Other      Immunologic/Allergic:  Denies: Latex allergy, Seasonal allergies, Asthma,     Urticaria, Eczema, Other      Immunization status:  No: Up to date            FAMILY/SOCIAL/MEDICAL HX      Surgical History:  Yes: Back Surgery (july 2020), Oral Surgery (SINUS SX,     TONSILLECTOMY), Throat Surgery (t  Angioplasty, Appendectomy, Bladder Surgery, Bowel Surgery, Breast Surgery, CABG,    Carotid Stenosis, Cholecystectomy, Ear  Surgery, Eye Surgery, Head Surgery,     Hernia Surgery, Kidney Surgery, Nose Surgery, Orthopedic Surgery, Prostatectomy,    Rectal Surgery, Spinal Surgery, Testicular Surgery, Valve Replacement, Vascular     Surgery, Other Surgeries      Heart - Family Hx:  Father      Diabetes - Family Hx:  Father, Child, Grandparent      Cancer/Type - Family Hx:  Mother, Father      Is Father Still Living?:  No      Is Mother Still Living?:  No       Family History:  Yes      Social History:  No Tobacco Use, No Alcohol Use, No Recreational Drug use      Smoking status:  Never smoker      Anticoagulation Therapy:  No      Antibiotic Prophylaxis:  No      Medical History:  Yes: Arthritis (RA), Hemorrhoids/Rectal Prob (ACID REFLUX),     Shortness Of Breath (PULMONARY FIBROSIS), Tuberculosis or Pos TB Te; No: Anemia,    Asthma, Blood Disease, Broken Bones, Cataracts, Chemical Dependency,     Chemotherapy/Cancer, Chronic Bronchitis/COPD, Emphysema, Chronic Liver Disease,     Colon Trouble, Colitis, Diverticulitis, Congestive Heart Failu, Deafness or     Ringing Ears, Depression, Anxiety, Bipolar Disorder, Diabetes, Epilepsy,     Seizures, Glaucoma, Gall Stones, Gout, Head Injury, Heart Attack, Heart Murmur,     Hepatitis, Hiatal Hernia, High Blood Pressure, HIV (Do not ask - volu, Kidney or    Bladder Disease, Kidney Stones, Migrane Headaches, Mitral Valve Prolapse,     Psychiatric Care, Reflux Disease, Rheumatic Fever, Sexually Transmitted Dis,     Sinus Trouble, Skin Disease/Psoriais/Ecz, Stroke, Thyroid Problem, Miscellaneous    Medical/oth      Psychiatric History      none            PREVENTION      Hx Influenza Vaccination:  Yes      Date Influenza Vaccine Given:  Sep 1, 2019      Influenza Vaccine Declined:  Yes      2 or More Falls in Past Year?:  No      Fall Past Year with Injury?:  No      Hx Pneumococcal Vaccination:  Yes      Encouraged to follow-up with:  PCP regarding preventative exams.      Chart initiated by       Cristal Mcneill MA            ALLERGIES/MEDICATIONS      Allergies:        Coded Allergies:             CLINDAMYCIN (Verified  Allergy, Severe, RASH HIVES, 10/23/20)           CIPROFLOXACIN (Verified  Allergy, Unknown, RASH, 10/23/20)           ERYTHROMYCIN BASE (Verified  Allergy, Unknown, RASH, 10/23/20)           LEVOFLOXACIN (Verified  Allergy, Unknown, 10/23/20)      Medications    Last Reconciled on 10/23/20 11:17 by CONRADO CARRION MD      Formoterol Fumarate (Perforomist) 20 Mcg/2 Ml Vial.neb               Prov: Conrado Carrion         8/27/20       MDI-Albuterol (Ventolin HFA) 18 Gm Hfa.aer.ad      2 PUFFS INH Q4H PRN for SHORTNESS OF BREATH, #3 INH 3 Refills         Prov: IVET IBANEZ [facility]         8/11/20       Baclofen (BACLOFEN) 10 Mg Tablet      10 MG PO TID, #90 TAB 0 Refills         Reported         8/11/20       azaTHIOprine (Imuran) 50 Mg Tab      75 MG PO BID for 30 Days, #90 TAB 2 Refills         Prov: IVET IBANEZ [facility]         6/23/20       Neb-NaCl 3% (Sodium Chloride 3% Neb) 4 Ml Vial.neb      4 ML INH RTBID for 30 Days, #60 NEB 5 Refills         Prov: IVET IBANEZ [facility]         6/23/20       Arformoterol Tartrate (Brovana) 15 Mcg/2 Ml Vial.neb      15 MCG INH BID for 90 Days, #60 NEB 3 Refills         Prov: Conrado Carrion         6/1/20       Neb-Budesonide (Pulmicort) 0.5 Mg/2 Ml Ampul.neb      0.5 MG INH BID, #60 NEB 11 Refills         Prov: KAI MILES PA-C         5/7/20       NEB-Albuterol Sulf (Albuterol) 2.5 Mg/3 Ml Vial.neb      2.5 MG INH BID, #60 NEB 5 Refills         Prov: KAI MILES PA-C         5/7/20       predniSONE (predniSONE) 10 Mg Tablet      10 MG PO QDAY for 30 Days, #30 TAB 5 Refills         Prov: KAI MILES-C         5/7/20       Alendronate Sodium (Fosamax) 70 Mg Tablet      70 MG PO Sa for 90 Days, #12 TAB 2 Refills         Prov: Conrado Carrion         2/10/20       Ondansetron Hcl (ONDANSETRON HCL) 4 Mg Tablet      4 MG PO Q6H  PRN for NAUSEA AND/OR VOMITING for 90 Days, #360 TAB 1 Refill         Prov: LION PERES         10/24/19       Ibuprofen (Ibuprofen) 800 Mg Tablet      800 MG PO TID, #90 TAB 0 Refills         Reported         9/3/19       Gabapentin (Gabapentin) 300 Mg Capsule      300 MG PO BID, #60 CAP 0 Refills         Reported         9/3/19       HYDROcodone-Acetaminophen  Mg (HYDROcodone-Acetaminophen  Mg) 1 Tab     Tablet      1 TAB PO TID PRN for BREAKTHROUGH PAIN, TAB 0 Refills         Reported         12/20/18       Nebulizer Accessories (Nebulizer Kit ROD) 1 Each Kit      EACH XX ONCE, #1 0 Refills         Prov: KAI MILES PA-C         7/31/18       Cholecalciferol (Vitamin D3) (Vitamin D3) 1,000 Units Capsule      1000 UNITS PO QDAY, #120 CAP 4 Refills         Prov: Conrado Carrion         11/2/17       Omega-3 Fatty Acids/Fish Oil (Fish Oil 1000 Mg) Unknown Strength Capsule      PO QDAY, #30 CAP 0 Refills         Reported         3/17/17      Current Medications      Current Medications Reviewed 10/23/20            EXAM      GEN-patient appears stated age resting comfortable in no acute distress      Eyes-PERRL,  conjunctiva are normal in appearance extraocular muscles are intac    t, no scleral icterus      Nasal-both nares are patent turbinates appear normal no polyps seen no nasal     discharge or ulcerations      Mouth upper dentures, no erythema no ulcerations oropharynx appears normal no     exudate no evidence of postnasal drip, MP1      Neck-there are no palpable supraclavicular or cervical adenopathy, thyroid is     normal in appearance no apparent nodules, there is no inspiratory or expiratory     stridor      Respiratory-patient exhibits normal work of breathing, speaking in full     sentences without difficulty, the chest is normal in appearance, auscultation     reveals bibasal crackles present normal percussion      Cardiovascular-the heart rate is normal and regular S1 and S2  present with no     murmur or extra heart sounds, there is no JVD or pedal edema present      GI-the abdomen is normal in appearance, bowel sounds present and normal in all     quadrants no hepatosplenomegaly or masses felt      Extremities bilateral upper extremity clubbing is present, pulses present in all    extremities, capillary refill time is normal      Skin-skin overlying the fingers do show some skin tightening and skin cracking,      Neurological-the patient is alert and oriented to time place and person, moves     all 4 extremities, normal gait, normal affect and mood cranial nerves II through    XII are grossly intact, deep tendon reflexes are grossly normal      Psych-patient is alert and oriented to person place time and date, normal mood,     normal affect, normal insight normal judgment      Vtials      Vitals:             Height 5 ft 3 in / 160.02 cm           Weight 146 lbs 8 oz / 66.056144 kg           BSA 1.69 m2           BMI 26.0 kg/m2           Temperature 98.6 F / 37 C - Temporal           Pulse 71           Respirations 14           Blood Pressure 110/71 Sitting, Left Arm           Pulse Oximetry 100%, Room air            REVIEW      Results Reviewed      [facility] Results Reviewed?:  Yes Prev Lab Results, Yes Prev Radiology Results, Yes     [facility]            Assessment      ILD (interstitial lung disease) - J84.9            Notes      New Medications      * Trimethoprim/Sulfamethoxazole DS (Bactrim /160 MG) 1 EACH TABLET: 1 TAB       PO BID #28      * (Breztri)          Sample - Qty 4         Instructions: 2 puffs BID         Dx: ILD (interstitial lung disease) - J84.9      New Diagnostics      * PFT-Comp, PrePost,DLCO,BodyBox, Week         Dx: ILD (interstitial lung disease) - J84.9      * Chest W/O Cont CT, SCHEDULED PROCEDURE         Dx: ILD (interstitial lung disease) - J84.9      ASSESSMENT:      1.  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       2.  Bronchiectasis.      3. Acute on chronic cough.       4. Antisynthetase syndrome.        5. Chronic immune suppression with imuran and chronic prednisone.              PLAN:      1. Given the patient's chronic immune suppression with prednisone and     azathioprine, we will obtain a CT scan of the chest and if the patient has any     evidence of any acute infiltrative process we will proceed with bronchoscopy to     rule out PCJ or any other atypical organisms.       2. We will give the patient bactrim double strength 1 tablet PO twice daily for     14 days to see if this helps with her symptoms.       3. Continue current bronchodilator therapies.       4. I encouraged her to increase her baclofen to twice daily for her back spasms.           5. Obtain pulmonary function test to see if there is any worsening of her lung     function.       6. Samples will be given of Breztri today. She does not appear to be taking her     nebulized Brovana and Pulmicort as prescribed.       7. I have personally reviewed laboratory data, imaging and previous medical     records.            Patient Education      Education resources provided:  Yes      Patient Education Provided:  Acute Bronchitis            Electronically signed by Conrado Carrion  10/26/2020 21:30       Disclaimer: Converted document may not contain table formatting or lab diagrams. Please see [employer] for the authenticated document.

## 2021-05-28 NOTE — PROGRESS NOTES
Patient: LAMIN MONTERO     Acct: [id number]     Report: #[id number]  UNIT #: [id number]     : 1950    Encounter Date:2021  PRIMARY CARE: Laurie Castro  ***ISigned***  --------------------------------------------------------------------------------------------------------------------  Chief Complaint      Encounter Date      2021            Primary Care Provider      Laurie Castro            Referring Provider      DENNY SANTIAGO            Patient Complaint      Patient is complaining of      Pt here for 3m f/u,  pulmonary fibrosis,Bronchiectasis.            VITALS      Height 5 ft 2 in / 157.48 cm      Weight 153 lbs 0 oz / 69.984612 kg      BSA 1.71 m2      BMI 28.0 kg/m2      Temperature 96.9 F / 36.06 C - Temporal      Pulse 86      Respirations 18      Blood Pressure 126/56 Sitting, Left Arm      Pulse Oximetry 97%, Room air      Initial Exhaled Nitrous Oxide      Date:  2020            HPI      The patient is a 71 year old  female with history of pulmonary fibrosis,     bronchiectasis, antisynthetase syndrome. She follows with Dr. Carrion and is on     breztri inhaler at home however she states over the past several weeks she has     felt more short of breath and has had worsening cough. The patient felt she     would be able to take extra doses of her breztri inhaler as if it was a PRN     medication. She states she does not have an albuterol rescue inhaler at home. As    discussed she has had worsening cough and shortness of air. Her cough is pr    oductive of thick white sputum. She states that the color has not changed     however the amount is increased. She is also complaining of muscle spasms and     took it upon herself to increase her potassium intake and this helped minimally.    She denies any fever or chest pain. She denies any lower extremity edema and no     known sick contacts. She has not gotten her COVID-19 vaccine as she states she     had a poor reaction  to the pneumonia and she is afraid she will have a similar     response. The patient states in March she required an antibiotic for a sinus     infection. She states she feels she has not completely gotten over this     infection and it is leading to her worsening respiratory status. She states she     did less than 1 week of Bactrim at that time.             A 10 out of 14 point Review of Systems was explored with the patient and is     negative unless otherwise stated in the HPI.      Copies To:   Conrado Carrion      Constitutional:  Denies: Fatigue, Fever, Weight gain, Weight loss, Chills,     Insomnia, Other      Respiratory/Breathing:  Complains of: Shortness of air, Wheezing; Denies: Cough,    Hemoptysis, Pleuritic pain, Other      Endocrine:  Denies: Polydipsia, Polyuria, Heat/cold intolerance, Abnorml me    nstrual pattern, Diabetes, Other      Eyes:  Denies: Blurred vision, Vision Changes, Other      Ears, nose, mouth, throat:  Denies: Mouth lesions, Thrush, Throat pain,     Hoarseness, Allergies/Hay Fever, Post Nasal Drip, Headaches, Recent Head Injury,    Nose Bleeding, Neck Stiffness, Thyroid Mass, Hearing Loss, Ear Fullness, Dry     Mouth, Nasal or Sinus Pain, Dry Lips, Nasal discharge, Nasal congestion, Other      Cardiovascular:  Denies: Palpitations, Syncope, Claudication, Chest Pain, Wake     up Gasping for air, Leg Swelling, Irregular Heart Rate, Cyanosis, Dyspnea on     Exertion, Other      Gastrointestinal:  Denies: Nausea, Constipation, Diarrhea, Abdominal pain,     Vomiting, Difficulty Swallowing, Reflux/Heartburn, Dysphagia, Jaundice,     Bloating, Melena, Bloody stools, Other      Genitourinary:  Denies: Urinary frequency, Incontinence, Hematuria, Urgency,     Nocturia, Dysuria, Testicular problems, Other      Musculoskeletal:  Denies: Joint Pain, Joint Stiffness, Joint Swelling, Myalgias,    Other      Hematologic/lymphatic:  DENIES: Lymphadenopathy, Bruising, Bleeding  tendencies,     Other      Neurological:  Denies: Headache, Numbness, Weakness, Seizures, Other      Psychiatric:  Denies: Anxiety, Appropriate Effect, Depression, Other      Sleep:  No: Excessive daytime sleep, Morning Headache?, Snoring, Insomnia?, Stop    breathing at sleep?, Other      Integumentary:  Denies: Rash, Dry skin, Skin Warm to Touch, Other      Immunologic/Allergic:  Denies: Latex allergy, Seasonal allergies, Asthma,     Urticaria, Eczema, Other      Immunization status:  No: Up to date            FAMILY/SOCIAL/MEDICAL HX      Surgical History:  Yes: Back Surgery (july 2020), Oral Surgery (SINUS SX,     TONSILLECTOMY), Throat Surgery (t  Angioplasty, Appendectomy, Bladder Surgery, Bowel Surgery, Breast Surgery, CABG,    Carotid Stenosis, Cholecystectomy, Ear Surgery, Eye Surgery, Head Surgery,     Hernia Surgery, Kidney Surgery, Nose Surgery, Orthopedic Surgery, Prostatectomy,    Rectal Surgery, Spinal Surgery, Testicular Surgery, Valve Replacement, Vascular     Surgery, Other Surgeries      Heart - Family Hx:  Father      Diabetes - Family Hx:  Father, Child, Grandparent      Cancer/Type - Family Hx:  Mother, Father      Is Father Still Living?:  No      Is Mother Still Living?:  No       Family History:  Yes      Social History:  No Tobacco Use, No Alcohol Use, No Recreational Drug use      Smoking status:  Never smoker      Anticoagulation Therapy:  No      Antibiotic Prophylaxis:  No      Medical History:  Yes: Arthritis (RA), Hemorrhoids/Rectal Prob (ACID REFLUX),     Shortness Of Breath (PULMONARY FIBROSIS), Tuberculosis or Pos TB Te; No: Anemia,    Asthma, Blood Disease, Broken Bones, Cataracts, Chemical Dependency,     Chemotherapy/Cancer, Chronic Bronchitis/COPD, Emphysema, Chronic Liver Disease,     Colon Trouble, Colitis, Diverticulitis, Congestive Heart Failu, Deafness or     Ringing Ears, Depression, Anxiety, Bipolar Disorder, Diabetes, Epilepsy,     Seizures, Glaucoma, Gall Stones, Gout,  Head Injury, Heart Attack, Heart Murmur,     Hepatitis, Hiatal Hernia, High Blood Pressure, HIV (Do not ask - volu, Kidney or    Bladder Disease, Kidney Stones, Migrane Headaches, Mitral Valve Prolapse,     Psychiatric Care, Reflux Disease, Rheumatic Fever, Sexually Transmitted Dis,     Sinus Trouble, Skin Disease/Psoriais/Ecz, Stroke, Thyroid Problem, Miscellaneous    Medical/oth      Psychiatric History      None            PREVENTION      Hx Influenza Vaccination:  Yes      Date Influenza Vaccine Given:  Sep 1, 2019      Influenza Vaccine Declined:  Yes      2 or More Falls in Past Year?:  No      Fall Past Year with Injury?:  No      Hx Pneumococcal Vaccination:  Yes      Encouraged to follow-up with:  PCP regarding preventative exams.      Chart initiated by      Cristal Mcneill MA            ALLERGIES/MEDICATIONS      Allergies:        Coded Allergies:             CLINDAMYCIN (Verified  Allergy, Severe, RASH HIVES, 4/28/21)           CIPROFLOXACIN (Verified  Allergy, Unknown, RASH, 4/28/21)           ERYTHROMYCIN BASE (Verified  Allergy, Unknown, RASH, 4/28/21)           LEVOFLOXACIN (Verified  Allergy, Unknown, 4/28/21)      Medications    Last Reconciled on 4/28/21 10:21 by JULIOCESAR PATHAK      Trimethoprim/Sulfamethoxazole DS (Bactrim /160 MG) 1 Each Tablet      1 TAB PO BID for 14 Days, #28 TAB 0 Refills         Prov: JULIOCESAR PATHAK         4/28/21       MDI-Albuterol (Ventolin HFA) 8 Gm Hfa.aer.ad      2 PUFFS INH Q4H PRN for SHORTNESS OF BREATH, #1 MDI 4 Refills         Prov: JULIOCESAR PATHAK         4/28/21       Budesonide/Glycopyr/Formoterol (Breztri Aerosphere Inhaler) 10.7 Gm Hfa.aer.ad      2 PUFFS INH BID, #1 INH 4 Refills         Prov: JULIOCESAR PATHAK         4/28/21       predniSONE (predniSONE) 10 Mg Tablet      10 MG PO QDAY for 90 Days, #90 TAB 3 Refills         Prov: Conrado Carrion         3/3/21       Alendronate Sodium (Fosamax) 70 Mg Tablet      70 MG PO Sa for 30 Days, #4 TAB 11  Refills         Prov: Conrado Carrion         3/3/21       azaTHIOprine (Imuran) 50 Mg Tab      75 MG PO QDAY for 30 Days, #45 TAB 11 Refills         Prov: Conrado Carrion         3/3/21       Budesonide/Glycopyr/Formoterol (Breztri Aerosphere Inhaler) 10.7 Gm Hfa.aer.ad      2 PUFFS INH BID for 30 Days, #1 INH 3 Refills         Prov: Conrado Carrion         1/18/21       MDI-Albuterol (Ventolin HFA) 18 Gm Hfa.aer.ad      2 PUFFS INH Q4H PRN for SHORTNESS OF BREATH, #3 INH 3 Refills         Prov: IVET IBANEZ [facility]         8/11/20       Baclofen (BACLOFEN) 10 Mg Tablet      10 MG PO TID, #90 TAB 0 Refills         Reported         8/11/20       Neb-NaCl 3% (Sodium Chloride 3% Neb) 4 Ml Vial.neb      4 ML INH RTBID for 30 Days, #60 NEB 5 Refills         Prov: IVET IBANEZ [facility]         6/23/20       NEB-Albuterol Sulf (Albuterol) 2.5 Mg/3 Ml Vial.neb      2.5 MG INH BID, #60 NEB 5 Refills         Prov: KAI MILES PA-C         5/7/20       Ondansetron Hcl (ONDANSETRON HCL) 4 Mg Tablet      4 MG PO Q6H PRN for NAUSEA AND/OR VOMITING for 90 Days, #360 TAB 1 Refill         Prov: LION PERES         10/24/19       Ibuprofen (Ibuprofen) 800 Mg Tablet      800 MG PO TID, #90 TAB 0 Refills         Reported         9/3/19       Gabapentin (Gabapentin) 300 Mg Capsule      300 MG PO BID, #60 CAP 0 Refills         Reported         9/3/19       HYDROcodone-Acetaminophen  Mg (HYDROcodone-Acetaminophen  Mg) 1 Tab     Tablet      1 TAB PO TID PRN for BREAKTHROUGH PAIN, TAB 0 Refills         Reported         12/20/18       Nebulizer Accessories (Nebulizer Kit ROD) 1 Each Kit      EACH XX ONCE, #1 0 Refills         Prov: KAI MILES PA-C         7/31/18       Cholecalciferol (Vitamin D3) (Vitamin D3) 1,000 Units Capsule      1000 UNITS PO QDAY, #120 CAP 4 Refills         Prov: Conrado Cariron         11/2/17       Omega-3 Fatty Acids/Fish Oil (Fish Oil 1000 Mg) Unknown Strength Capsule      PO QDAY,  #30 CAP 0 Refills         Reported         3/17/17      Current Medications      Current Medications Reviewed 4/28/21            EXAM      Vital Signs Reviewed      Gen: WDWN, Alert, NAD.        HEENT:  PERRL, EOMI.  OP, nares clear, no sinus tenderness.      Neck:  Supple, no JVD, no thyromegaly.      Lymph: No axillary, cervical, supraclavicular lymphadenopathy noted bilaterally.      Chest: The patient is able to speak full sentences, she has an occasional cough.     Good aeration, clear to auscultation bilaterally, tympanic to percussion     bilaterally, no work of breathing noted.      CV:  RRR, no MGR, pulses 2+, equal.      Abd:  Soft, NT, ND, + BS, no HSM.      EXT:  No clubbing, no cyanosis, no edema, no joint tenderness.       Neuro:  A  Skin: No rashes or lesions.      Vtials      Vitals:             Height 5 ft 2 in / 157.48 cm           Weight 153 lbs 0 oz / 69.931520 kg           BSA 1.71 m2           BMI 28.0 kg/m2           Temperature 96.9 F / 36.06 C - Temporal           Pulse 86           Respirations 18           Blood Pressure 126/56 Sitting, Left Arm           Pulse Oximetry 97%, Room air            REVIEW      Results Reviewed      [facility] Results Reviewed?:  Yes Prev Lab Results, Yes Prev Radiology Results, Yes     Previous [facility] Records            Assessment      Cough - R05            Therapeutic drug monitoring - Z51.81            Notes      New Medications      * Budesonide/Glycopyr/Formoterol (Breztri Aerosphere Inhaler) 10.7 GM       HFA.AER.AD: 2 PUFFS INH BID #1      * MDI-Albuterol (Ventolin HFA) 8 GM HFA.AER.AD: 2 PUFFS INH Q4H PRN SHORTNESS OF      BREATH #1      * Trimethoprim/Sulfamethoxazole DS (Bactrim /160 MG) 1 EACH TABLET: 1 TAB       PO BID 14 Days #28      Discontinued Medications      * Neb-Budesonide (Pulmicort) 0.5 MG/2 ML AMPUL.NEB: 0.5 MG INH BID #60         Instructions: DIAGNOSIS CODE REQUIRED PRIOR TO PRESCRIBING.      * ARFORMOTEROL TARTRATE (Brovana) 15 MCG/2  ML VIAL.NEB: 15 MCG INH BID 90 Days       #60         Instructions: DX: J41.0 NPI: [id number]         Dx: Pulmonary fibrosis - J84.10      * Trimethoprim/Sulfamethoxazole DS (Bactrim /160 MG) 1 EACH TABLET: 1 TAB       PO BID 7 Days #14      New Diagnostics      * Chest 2 View, As Soon As Possible         Dx: Cough - R05      * CBC, Routine         Dx: Therapeutic drug monitoring - Z51.81      * CMP Comp Metabolic Panel, As Soon As Possible         Dx: Therapeutic drug monitoring - Z51.81      * Vitamin D Level, As Soon As Possible         Dx: Therapeutic drug monitoring - Z51.81      * Magnesium Serum, As Soon As Possible         Dx: Therapeutic drug monitoring - Z51.81      ASSESSMENT:      1.  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       2. Bronchiectasis with acute exacerbation.      3. Asthma.       4. Antisynthetase syndrome.        5. Chronic immune suppression with imuran and chronic prednisone.        6. Muscle spasms.             PLAN:      1. Continue breztri inhaler 2 puffs twice daily. We will give her some samples     from clinic today.       2. I will send a prescription for albuterol rescue inhaler. I educated the     patient on proper inhaler usage and when to use her rescue inhaler.       3. Obtain chest x-ray given worsening cough and shortness of breath. If there     are any changed noted on the chest x-ray a CT scan may be beneficial as the     patient may need bronchoscopy if she has any dense consolidations concerning for    pneumonia given her immunosuppressed status.        4. Obtain CBC, CMP, vitamin D and magnesium level.  We will see if the patient     can have these labs done today.       5. I feel that the patient has an acute exacerbation of her bronchiectasis.  I     suspect that the antibiotics that she took back in march were not enough to     fully dose given her immunosuppressed status. I will send in a prescription for     Bactrim for 14 days.       6.  Continue Imuran and prednisone at the current dose.       7. I advised the patient that if she is not feeling any better after 4 days of     antibiotic therapy that she is to call our office. She is also to call us if she    starts spiking fevers. The patient verbalized understanding.      8. I advised the patient to go to the ER, call our office or call her primary     care provider for any new or concerning symptoms.       9. Follow up with Dr. Carrion in 3-4 months, sooner if needed.            Electronically signed by JULIOCESAR PATHAK  05/10/2021 19:15       Disclaimer: Converted document may not contain table formatting or lab diagrams. Please see [facility] for the authenticated document.

## 2021-05-28 NOTE — PROGRESS NOTES
Patient: LAMIN MONTERO     Acct: [id number]     Report: #[id number]  UNIT #: [id number]     : 1950    Encounter Date:2018  PRIMARY CARE: Laurie Castro  ***Signed***  --------------------------------------------------------------------------------------------------------------------  Chief Complaint      Encounter Date      2018            Primary Care Provider      DENNY SANTIAGO            Referring Provider      DENNY SANTIAGO            Patient Complaint      Patient is complaining of      Pt here for cough and congestion            VITALS      Height 5 ft 3 in / 160.02 cm      Weight 141 lbs 0 oz / 63.562258 kg      BSA 1.70 m2      BMI 25.0 kg/m2      Temperature 98.0 F / 36.67 C - Oral      Pulse 82      Respirations 20      Blood Pressure 108/62 Sitting, Right Arm      Pulse Oximetry 99%, room air            HPI      The patient is a very pleasant 68 year old white female who is a patient of Dr. Carrion's. She last saw him on 18 after having a bronchoscopy 2 weeks     before that. 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 She had a bronchoscopy done for chronic     cough and grew group F strep. She was treated with 1 week of amoxicillin by Dr. Carrion and when she returned for follow up she was still having some increased     coughing so he treated her with another week of amoxicillin. The patient states     that she thinks she did better on antibiotics but when she finished the second     week of amoxicillin and then on she has felt more short of breath with     increased coughing. She has been coughing up a lot of clear to white colored     sputum. This has become very bothersome to her. The patient states that she     coughs all day long and does not notice any precipitating factors, any time     when it is worse or any relieving factors. She is currently not using any     breathing treatments or  inhalers. 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 The patient states that     overall her breathing seems to have worsened in the past 5-6 months. She denies     any hemoptysis, fevers or chills. She denies increased wheezing. She denies any     purulent sputum production.             I have reviewed his Review of Systems medical, surgical and family history and     agree with those as entered.            ROS      Constitutional:  Denies: Fatigue, Fever, Weight gain, Weight loss, Chills,     Insomnia, Other      Respiratory/Breathing:  Complains of: Shortness of air, Wheezing, Cough, Denies    : Hemoptysis, Pleuritic pain, Other      Endocrine:  Denies: Polydipsia, Polyuria, Heat/cold intolerance, Abnorml     menstrual pattern, Diabetes, Other      Eyes:  Denies: Blurred vision, Vision Changes, Other      Ears, nose, mouth, throat:  Denies: Mouth lesions, Thrush, Throat pain,     Hoarseness, Allergies/Hay Fever, Post Nasal Drip, Headaches, Recent Head Injury    , Nose Bleeding, Neck Stiffness, Thyroid Mass, Hearing Loss, Ear Fullness, Dry     Mouth, Nasal or Sinus Pain, Dry Lips, Nasal discharge, Nasal congestion, Other      Cardiovascular:  Denies: Palpitations, Syncope, Claudication, Chest Pain, Wake     up Gasping for air, Leg Swelling, Irregular Heart Rate, Cyanosis, Dyspnea on     Exertion, Other      Gastrointestinal:  Denies: Nausea, Constipation, Diarrhea, Abdominal pain,     Vomiting, Difficulty Swallowing, Reflux/Heartburn, Dysphagia, Jaundice, Bloating    , Melena, Bloody stools, Other      Genitourinary:  Denies: Urinary frequency, Incontinence, Hematuria, Urgency,     Nocturia, Dysuria, Testicular problems, Other      Musculoskeletal:  Denies: Joint Pain, Joint Stiffness, Joint Swelling, Myalgias    , Other      Hematologic/lymphatic:  DENIES: Lymphadenopathy, Bruising, Bleeding tendencies,     Other      Neurological:  Denies: Headache,  Numbness, Weakness, Seizures, Other      Psychiatric:  Denies: Anxiety, Appropriate Effect, Depression, Other      Sleep:  No: Excessive daytime sleep, Morning Headache?, Snoring, Insomnia?,     Stop breathing at sleep?, Other      Integumentary:  Denies: Rash, Dry skin, Skin Warm to Touch, Other      Immunologic/Allergic:  Denies: Latex allergy, Seasonal allergies, Asthma,     Urticaria, Eczema, Other      Immunization status:  No: Up to date            FAMILY/SOCIAL/MEDICAL HX      Surgical History:  Yes: Oral Surgery (SINUS SX, TONSILLECTOMY), Throat Surgery (    t  , Bladder Surgery, Bowel Surgery, Breast Surgery, CABG, Carotid Stenosis,     Cholecystectomy, Ear Surgery, Eye Surgery, Head Surgery, Hernia Surgery, Kidney     Surgery, Nose Surgery, Orthopedic Surgery, Prostatectomy, Rectal Surgery,     Spinal Surgery, Testicular Surgery, Valve Replacement, Vascular Surgery, Other     Surgeries      Heart - Family Hx:  Father      Diabetes - Family Hx:  Father, Child, Grandparent      Cancer/Type - Family Hx:  Mother, Father      Is Father Still Living?:  No      Is Mother Still Living?:  No      Social History:  No Tobacco Use, No Alcohol Use, No Recreational Drug use      Smoking status:  Never smoker      Occupation:  [employer]      Anticoagulation Therapy:  No      Antibiotic Prophylaxis:  No      Medical History:  Yes: Arthritis (RA), Hemorrhoids/Rectal Prob (ACID REFLUX),     Shortness Of Breath (PULMONARY FIBROSIS), Tuberculosis or Pos TB Te, No:     Alcoholism, Allergies, Anemia, Asthma, Blood Disease, Broken Bones, Cataracts,     Chemical Dependency, Chemotherapy/Cancer, Chronic Bronchitis/COPD, Emphysema,     Chronic Liver Disease, Colon Trouble, Colitis, Diverticulitis, Congestive Heart     Failu, Deafness or Ringing Ears, Convulsions, Depression, Anxiety, Bipolar     Disorder, Diabetes, Epilepsy, Seizures, Forgetfullness, Glaucoma, Gall Stones,     Gout, Head Injury, Heart Attack, Heart  Murmur, Hepatitis, Hiatal Hernia, High     Blood Pressure, High Cholesterol, HIV (Do not ask - volu, Jaundice, Kidney or     Bladder Disease, Kidney Stones, Migrane Headaches, Mitral Valve Prolapse, Night     sweats, Phlebitis, Psychiatric Care, Reflux Disease, Rheumatic Fever, Sexually     Transmitted Dis, Sinus Trouble, Skin Disease/Psoriais/Ecz, Stroke, Thyroid     Problem, Miscellaneous Medical/oth      Psychiatric History      None            PREVENTION      Hx Influenza Vaccination:  Yes      Date Influenza Vaccine Given:  Sep 1, 2017      Influenza Vaccine Declined:  No      2 or More Falls Past Year?:  No      Fall Past Year with Injury?:  No      Hx Pneumococcal Vaccination:  Yes      Encouraged to follow-up with:  PCP regarding preventative exams.      Chart initiated by      Joy Laureano MA            ALLERGIES/MEDICATIONS      Allergies:        Coded Allergies:             CLINDAMYCIN (Verified  Allergy, Severe, RASH HIVES, 7/30/18)           CIPROFLOXACIN (Verified  Allergy, Unknown, RASH, 7/30/18)           ERYTHROMYCIN BASE (Verified  Allergy, Unknown, RASH, 7/30/18)           LEVOFLOXACIN (Verified  Allergy, Unknown, 7/30/18)      Medications    Last Reconciled on 7/30/18 14:50 by KAI ZHONG PA-C      Neb-NaCl 3% (Sodium Chloride 3% Neb) 4 Ml Vial.neb      4 ML INH RTBID for 30 Days, #60 NEB 4 Refills         Prov: Kai Mercedes PA-C         7/30/18       Arformoterol Tartrate (Brovana) 15 Mcg/2 Ml Vial.neb      15 MCG INH BID, #60 NEB 4 Refills         Prov: Kai Mercedes PA-C         7/30/18       Promethazine Hcl (Phenergan*) 25 Mg Tablet      25 MG PO Q8H Y for NAUSEA, #60 TAB 0 Refills         Prov: Conrado Carrion         5/16/18       predniSONE* (predniSONE*) 10 Mg Tablet      10 MG PO QDAY, TAB         Reported         5/14/18       azaTHIOprine (Imuran*) 50 Mg Tab      75 MG PO QDAY, TAB         Reported         5/14/18       Multivitamins (Multi-Vitamin) 1 Each Tablet      1  TAB PO QDAY, #30 TAB 0 Refills         Reported         5/14/18       Ondansetron HCl (Zofran*) 4 Mg Tablet      4 MG PO Q6H Y for NAUSEA AND/OR VOMITING, #120 TAB 4 Refills         Prov: Conrado Carrion         5/4/18       Pantoprazole (Protonix*) 20 Mg Tablet      40 MG PO HS, #90 TAB 4 Refills         Prov: Conrado Carrion         5/4/18       DULoxetine (Cymbalta) 20 Mg Capsule.dr      20 MG PO QDAY for 30 Days, #30 CAP 1 Refill         Prov: Conrado Carrion         2/6/18       MDI-Albuterol (Ventolin HFA*) 18 Gm Hfa.aer.ad      2 PUFFS INH Q4H Y for SHORTNESS OF BREATH, #3 INH 3 Refills         Prov: Conrado Carrion         1/8/18       Cholecalciferol (Vitamin D3*) 1,000 Units Capsule      1000 UNITS PO QDAY, #120 CAP 4 Refills         Prov: Conrado Carrion         11/2/17       Alendronate Sodium (Fosamax) 70 Mg Tablet      70 MG PO Sa for 90 Days, #12 TAB 2 Refills         Prov: Conrado Carrion         11/2/17       Omega-3 Fatty Acids/Fish Oil (Fish Oil 1000 Mg) Unknown Strength Capsule      PO QDAY, #30 CAP 0 Refills         Reported         3/17/17       Aspirin (Aspirin Baby *) 81 Mg Tab.chew      81 MG PO QDAY, #30 TAB.CHEW 0 Refills         Reported         9/22/15       Hydrocodone/Acetaminophen 10/325 MG (Hydrocodone/Acetaminophen 10/325 MG) 1 Tab     Tablet      1 TAB PO Q6H Y for PAIN, TAB 0 Refills         Reported         9/22/15      Current Medications      Current Medications Reviewed 7/30/18            EXAM      GEN-patient appears stated age resting comfortable in no acute distress      Eyes-PERRL,  conjunctiva are normal in appearance extraocular muscles are intact    , no scleral icterus      Nasal-both nares are patent turbinates appear normal no polyps seen no nasal     discharge or ulcerations      Ears-tympanic membranes are normal no erythema no bulging, normal to inspection      Lymphatic-no swollen or enlarged cervical nodes, or axillary node, or femoral     nodes, or  supraclavicular nodes      Mouth normal dentition, no erythema no ulcerations oropharynx appears normal no     exudate no evidence of postnasal drip, MP(default value)      Neck-there are no palpable supraclavicular or cervical adenopathy, thyroid is     normal in appearance no apparent nodules, there is no inspiratory or expiratory     stridor      Respiratory-mildly decreased breath sounds throughout but overall clear, no     wheezing, rhonchi or crackles appreciated, normal work of breathing noted.       Cardiovascular-the heart rate is normal and regular S1 and S2 present with no     murmur or extra heart sounds, there is no JVD or pedal edema present      GI-the abdomen is normal in appearance, bowel sounds present and normal in all     quadrants no hepatosplenomegaly or masses felt      Extremities-no clubbing is present, pulses present in all extremities,     capillary refill time is normal      Musculoskeletal-Normal strength in upper and lower extremities, inspection     shows no evidence of muscle atrophy      Skin-skin is normal in appearance it is warm and dry, no rashes present, no     evidence of cyanosis, palpation reveals no masses      Neurological-the patient is alert and oriented to time place and person, moves     all 4 extremities, normal gait, normal affect and mood, CN2-12 intact      Psych-normal judgment and insight is good, normal mood and affect, alert and     oriented to person, place, and time, and date      Vtials      Vitals:             Height 5 ft 3 in / 160.02 cm           Weight 141 lbs 0 oz / 63.653558 kg           BSA 1.70 m2           BMI 25.0 kg/m2           Temperature 98.0 F / 36.67 C - Oral           Pulse 82           Respirations 20           Blood Pressure 108/62 Sitting, Right Arm           Pulse Oximetry 99%, room air            REVIEW      Results Reviewed      P[facility] Results Reviewed?:  Yes Prev Lab Results, Yes Prev Radiology Results, Yes     Previous [facility] Records             Assessment      Pulmonary fibrosis - J84.10            Chronic cough - R05            SOB (shortness of breath) - R06.02            Notes      New Medications      * Neb-NaCl 3% (Sodium Chloride 3% Neb) 4 ML VIAL.NEB: 4 ML INH RTBID 30 Days #60       Dx: Pulmonary fibrosis - J84.10      * NEBULIZER ACCESSORIES (Nebulizer Kit ROD) 1 EACH KIT: EACH XX ONCE #1       Instructions: Use as directed to administer nebulized medications.      * ARFORMOTEROL TARTRATE (Brovana) 15 MCG/2 ML VIAL.NEB: 15 MCG INH BID #60       Instructions: DX: J41.0 NPI: [id number]       Dx: Pulmonary fibrosis - J84.10      Discontinued Medications      * Amoxicillin 875 MG TABLET: 875 MG PO BID 7 Days #14      * Amoxicillin 875 MG TABLET: 875 MG PO BID #14      New Diagnostics      * PFT-Comp, PrePost,DLCO,BodyBox, As Soon As Possible       Dx: Pulmonary fibrosis - J84.10      * Chest W/O Cont CT, As Soon As Possible       Dx: Pulmonary fibrosis - J84.10      * Sputum Culture W/Gram Stain, As Soon As Possible       Dx: Pulmonary fibrosis - J84.10      ASSESSMENT:      1. Chronic cough, seems to be worsening lately.       2. Shortness of breath.       3. 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       4. Toxic drug monitoring.       5. Pulmonary fibrosis.             PLAN:       1. I have discussed with the patient in detail as well as Dr. Carrion about     rechecking a chest CT scan and pulmonary function tests now that her symptoms     have been worsening in the past 6 months. Her last chest imaging and pulmonary     function tests were done 6-7 months ago.       2 I will check a sputum culture to see if she is growing  any bacteria or     atypical organisms in her sputum since the group F strep has been treated with     2 weeks of amoxicillin.       3. I have given the patient a nebulizer machine and samples of Brovana and     showed her how to use those today. She will start on Brovana  treatments twice     daily and 3% saline nebulizer twice daily.       4. Follow up next month after her testing, sooner if needed.            Patient Education      Patient Education Provided:  How to use a Nebulizer      Time Spent:  > 50% /[facility]                 Disclaimer: Converted document may not contain table formatting or lab diagrams. Please see [employer] for the authenticated document.

## 2021-05-28 NOTE — PROGRESS NOTES
Patient: LAMIN MONTERO     Acct: [id number]     Report: #[id number]  UNIT #: [id number]     : 1950    Encounter Date:2020  PRIMARY CARE: Laurie Castro  ***Signed***  --------------------------------------------------------------------------------------------------------------------  Chief Complaint      Encounter Date      2020            Primary Care Provider      Laurie Castro            Referring Provider      DENNY SANTIAGO            Patient Complaint      Patient is complaining of      Patient here today for acute visit, cough, SOA and wheezing            VITALS      Height 62 in / 157.48 cm      Weight 160 lbs  / 72.433696 kg      BSA 1.74 m2      BMI 29.3 kg/m2      Temperature 98.1 F / 36.72 C - Temporal      Pulse 76      Respirations 14      Blood Pressure 109/50 Sitting, Left Arm      Pulse Oximetry 96%, room air      Initial Exhaled Nitrous Oxide      Date:  2020      Exhaled Nitrous Oxide Results:  5            HPI      The patient is a 70 year old female patient of Dr. 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 The patient states over the past coupled     weeks she has been having some increasing shortness of air and fatigue. The     patient states she is wanting her imuran dose increased. The patient states she     does get short of air that is worse with exertion and activity, moderate in     severity and improved with rest. The patient has a chronic cough that is     somewhat improved on Pepcid. The patient denies any fever or chills, night s    weats, hemoptysis,  purulent sputum production, swollen glands in head and neck,    unintentional weight loss, chest pain or chest tightness, abdominal pain, nausea    or vomiting or diarrhea. The patient denies  any headaches, myalgias, changes in    sense of taste and smell any coronavirus or flu like symptoms.  The patient     states she is on Brovana and Pulmicort however her medications are expensive and    wants to see if we can get the nebulizer treatment somewhere else cheaper. The     patient states she is also needs a new mask for her nebulizer machine. The     patient would like to have a 6 minute walk test done and an order to have oxygen    to use with exertion. A 6 minute walk test was performed in the office today and    the patient's resting oxygen was 95% and with ambulation it dropped to 88%.     After 1.5 liters was applied it went back up to 98%. The patient does qualify     for oxygen and she would like to be set up for oxygen. The patient states she     needs a refill on her prednisone today. The patient had a repeat CT scan of the     chest completed on 06/11/2020  and it showed stable findings of interstitial     lung disease with probable UIP pattern.             I reviewed the Review of Systems, medical, surgical and family history and agree    with those as entered.      Copies To:   Conrado Carrion      Constitutional:  Complains of: Fatigue; Denies: Fever, Weight gain, Weight loss,    Chills, Insomnia, Other      Respiratory/Breathing:  Complains of: Shortness of air, Wheezing, Cough; Denies:    Hemoptysis, Pleuritic pain, Other      Endocrine:  Denies: Polydipsia, Polyuria, Heat/cold intolerance, Abnorml     menstrual pattern, Diabetes, Other      Eyes:  Denies: Blurred vision, Vision Changes, Other      Ears, nose, mouth, throat:  Denies: Mouth lesions, Thrush, Throat pain,     Hoarseness, Allergies/Hay Fever, Post Nasal Drip, Headaches, Recent Head Injury,    Nose Bleeding, Neck Stiffness, Thyroid Mass, Hearing Loss, Ear Fullness, Dry     Mouth, Nasal or Sinus Pain, Dry Lips, Nasal discharge, Nasal congestion, Other      Cardiovascular:  Denies: Palpitations, Syncope, Claudication, Chest Pain, Wake     up Gasping for air, Leg Swelling, Irregular Heart Rate, Cyanosis, Dyspnea on      Exertion, Other      Gastrointestinal:  Denies: Nausea, Constipation, Diarrhea, Abdominal pain,     Vomiting, Difficulty Swallowing, Reflux/Heartburn, Dysphagia, Jaundice,     Bloating, Melena, Bloody stools, Other      Genitourinary:  Denies: Urinary frequency, Incontinence, Hematuria, Urgency,     Nocturia, Dysuria, Testicular problems, Other      Musculoskeletal:  Denies: Joint Pain, Joint Stiffness, Joint Swelling, Myalgias,    Other      Hematologic/lymphatic:  DENIES: Lymphadenopathy, Bruising, Bleeding tendencies,     Other      Neurological:  Denies: Headache, Numbness, Weakness, Seizures, Other      Psychiatric:  Denies: Anxiety, Appropriate Effect, Depression, Other      Integumentary:  Denies: Rash, Dry skin, Skin Warm to Touch, Other      Immunologic/Allergic:  Denies: Latex allergy, Seasonal allergies, Asthma,     Urticaria, Eczema, Other      Immunization status:  No: Up to date            FAMILY/SOCIAL/MEDICAL HX      Surgical History:  Yes: Oral Surgery (SINUS SX, TONSILLECTOMY), Throat Surgery     (t  Surgery, Bladder Surgery, Bowel Surgery, Breast Surgery, CABG, Carotid Stenosis,    Cholecystectomy, Ear Surgery, Eye Surgery, Head Surgery, Hernia Surgery, Kidney     Surgery, Nose Surgery, Orthopedic Surgery, Prostatectomy, Rectal Surgery, Spinal    Surgery, Testicular Surgery, Valve Replacement, Vascular Surgery, Other     Surgeries      Heart - Family Hx:  Father      Diabetes - Family Hx:  Father, Child, Grandparent      Cancer/Type - Family Hx:  Mother, Father      Is Father Still Living?:  No      Is Mother Still Living?:  No      Social History:  No Tobacco Use, No Alcohol Use, No Recreational Drug use      Smoking status:  Never smoker      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      Anticoagulation Therapy:  No      Antibiotic Prophylaxis:  No      Medical History:  Yes: Arthritis (RA), Hemorrhoids/Rectal Prob (ACID REFLUX),     Shortness Of Breath (PULMONARY FIBROSIS), Tuberculosis or Pos TB  Te; No: Al    coholism, Allergies, Anemia, Asthma, Blood Disease, Broken Bones, Cataracts,     Chemical Dependency, Chemotherapy/Cancer, Chronic Bronchitis/COPD, Emphysema,     Chronic Liver Disease, Colon Trouble, Colitis, Diverticulitis, Congestive Heart     Failu, Deafness or Ringing Ears, Convulsions, Depression, Anxiety, Bipolar     Disorder, Diabetes, Epilepsy, Seizures, Forgetfullness, Glaucoma, Gall Stones,     Gout, Head Injury, Heart Attack, Heart Murmur, Hepatitis, Hiatal Hernia, High     Blood Pressure, High Cholesterol, HIV (Do not ask - volu, Jaundice, Kidney or     Bladder Disease, Kidney Stones, Migrane Headaches, Mitral Valve Prolapse, Night     sweats, Phlebitis, Psychiatric Care, Reflux Disease, Rheumatic Fever, Sexually     Transmitted Dis, Sinus Trouble, Skin Disease/Psoriais/Ecz, Stroke, Thyroid     Problem, Miscellaneous Medical/oth      Psychiatric History      none            PREVENTION      Hx Influenza Vaccination:  Yes      Date Influenza Vaccine Given:  Sep 1, 2019      Influenza Vaccine Declined:  No      2 or More Falls Past Year?:  No      Fall Past Year with Injury?:  No      Hx Pneumococcal Vaccination:  Yes      Encouraged to follow-up with:  PCP regarding preventative exams.      Chart initiated by      Aide Muro CMA            ALLERGIES/MEDICATIONS      Allergies:        Coded Allergies:             CLINDAMYCIN (Verified  Allergy, Severe, RASH HIVES, 6/23/20)           CIPROFLOXACIN (Verified  Allergy, Unknown, RASH, 6/23/20)           ERYTHROMYCIN BASE (Verified  Allergy, Unknown, RASH, 6/23/20)           LEVOFLOXACIN (Verified  Allergy, Unknown, 6/23/20)      Medications    Last Reconciled on 6/23/20 16:13 by IVET IBANEZ       azaTHIOprine (Imuran) 50 Mg Tab      75 MG PO BID for 30 Days, #90 TAB 2 Refills         Prov: IVET IBANEZ [facility]         6/23/20       Neb-NaCl 3% (Sodium Chloride 3% Neb) 4 Ml Vial.neb      4 ML INH RTBID for 30 Days, #60 NEB 5 Refills          [facility]         6/23/20       Arformoterol Tartrate (Brovana) 15 Mcg/2 Ml Vial.neb      15 MCG INH BID for 90 Days, #60 NEB 3 Refills         Prov: Conrado Carrion         6/1/20       Neb-Budesonide (Pulmicort) 0.5 Mg/2 Ml Ampul.neb      0.5 MG INH BID, #60 NEB 11 Refills         Prov: Patty Mercedes PA-C         5/7/20       NEB-Albuterol Sulf (Albuterol) 2.5 Mg/3 Ml Vial.neb      2.5 MG INH BID, #60 NEB 5 Refills         Prov: Patty Mercedes PA-C         5/7/20       predniSONE (predniSONE) 10 Mg Tablet      10 MG PO QDAY for 30 Days, #30 TAB 5 Refills         Prov: Patty Mecredes PA-C         5/7/20       MDI-Albuterol (Ventolin HFA) 18 Gm Hfa.aer.ad      2 PUFFS INH Q4H PRN for SHORTNESS OF BREATH, #3 INH 3 Refills         Prov: Patty Mercedes PA-C         5/7/20       Alendronate Sodium (Fosamax) 70 Mg Tablet      70 MG PO Sa for 90 Days, #12 TAB 2 Refills         Prov: Conrado Carrion         2/10/20       Ondansetron Hcl (ONDANSETRON HCL) 4 Mg Tablet      4 MG PO Q6H PRN for NAUSEA AND/OR VOMITING for 90 Days, #360 TAB 1 Refill         Prov: LION PERES         10/24/19       Ibuprofen (Ibuprofen) 800 Mg Tablet      800 MG PO TID, #90 TAB 0 Refills         Reported         9/3/19       Gabapentin (Gabapentin) 300 Mg Capsule      300 MG PO BID, #60 CAP 0 Refills         Reported         9/3/19       HYDROcodone-Acetaminophen  Mg (HYDROcodone-Acetaminophen  Mg) 1 Tab     Tablet      1 TAB PO TID PRN for BREAKTHROUGH PAIN, TAB 0 Refills         Reported         12/20/18       Nebulizer Accessories (Nebulizer Kit ROD) 1 Each Kit      EACH XX ONCE, #1 0 Refills         Prov: Patty Mercedes PA-C         7/31/18       Cholecalciferol (Vitamin D3) (Vitamin D3) 1,000 Units Capsule      1000 UNITS PO QDAY, #120 CAP 4 Refills         Prov: Conrado Carrion         11/2/17       Omega-3 Fatty Acids/Fish Oil (Fish Oil 1000 Mg) Unknown Strength Capsule      PO QDAY, #30 CAP  0 Refills         Reported         3/17/17      Current Medications      Current Medications Reviewed 6/23/20            EXAM      GEN-patient appears stated age resting comfortable in no acute distress      Eyes-PERRL,  conjunctiva are normal in appearance extraocular muscles are     intact, no scleral icterus      Lymphatic-no swollen or enlarged cervical nodes, or axillary node, or femoral     nodes, or supraclavicular nodes      Mouth normal dentition, no erythema no ulcerations oropharynx appears normal no     exudate no evidence of postnasal drip, MP       Neck-there are no palpable supraclavicular or cervical adenopathy, thyroid is     normal in appearance no apparent nodules, there is no inspiratory or expiratory     stridor      Respiratory-mildly decreased breath sounds throughout, no wheezes rales or rhon    chi, normal work of breathing noted, the patient is able to speak full sentences    without difficulty.       Cardiovascular-the heart rate is normal and regular S1 and S2 present with no     murmur or extra heart sounds, there is no JVD or pedal edema present      GI-the abdomen is normal in appearance, bowel sounds present and normal in all     quadrants no hepatosplenomegaly or masses felt      Extremities-no clubbing is present, pulses present in all extremities, capillary    refill time is normal      Musculoskeletal-Normal strength in upper and lower extremities, inspection shows    no evidence of muscle atrophy      Skin-skin is normal in appearance it is warm and dry, no rashes present, no     evidence of cyanosis, palpation reveals no masses      Neurological-the patient is alert and oriented to time place and person, moves     all 4 extremities, normal gait, normal affect and mood, CN2-12 intact      Psych-normal judgment and insight is good, normal mood and affect, alert and     oriented to person, place, and time, and date      Vtials      Vitals:             Height 62 in / 157.48 cm            Weight 160 lbs  / 72.917989 kg           BSA 1.74 m2           BMI 29.3 kg/m2           Temperature 98.1 F / 36.72 C - Temporal           Pulse 76           Respirations 14           Blood Pressure 109/50 Sitting, Left Arm           Pulse Oximetry 96%, room air            REVIEW      Results Reviewed      [facility] Results Reviewed?:  Yes Prev Lab Results, Yes Prev Radiology Results, Yes     Previous M[facility] Records      Lab Results      I personally reviewed GERARDO Lawson office note.      Radiographic Results               [facility]                PACS RADIOLOGY REPORT            Patient: LAMIN MONTERO   Acct: #[id number]   Report: #[id number]            UNIT #: [id number]    DOS: 20 0745      INSURANCE:[employer] O   ORDER #:[id number]      LOCATION:[facility]     : 1950            PROVIDERS      ADMITTING:     ATTENDING: Patty Mercedes PA-C      FAMILY:  Laurie Castro   ORDERING:  Patty Mercedes PA-C         OTHER:    DICTATING:  GABRIELA GILLETTE MD            REQ #:[id number]   [facility]      REASON FOR EXAM:        REASON FOR VISIT:  PULMONARY FIBROSIS            *******Signed******         PROCEDURE:   CT CHEST WITHOUT CONTRAST             COMPARISON:   [facility], CT, CHEST W/ CONTRAST, 2016, 17:28.     [facility], CT, CHEST W/O CONTRAST, 2019, 11:53.             INDICATIONS:   PULMONARY FIBROSIS FOLLOWUP, CHRONIC SHORTNESS OF BREATH,     CHEST/MUSCLE SPASMS FOR 6       MONTHS             TECHNIQUE:   CT images were created without the administration of contrast     material.               PROTOCOL:     High Resolution Protocol                RADIATION:     DLP: 235.4mGy*cm          Automated exposure control was utilized to minimize radiation dose.              FINDINGS:         The visualized soft tissue structures at the base  of the neck including the     thyroid appear within       normal limits.  There is no lower cervical or axillary adenopathy by size     criteria.  The heart size       is normal.  There is no pericardial effusion.  The aorta and main pulmonary     artery are normal in       caliber.  There is calcified coronary artery atherosclerotic calcification.      There are small       reactive mediastinal lymph nodes not meeting size criteria for pathologic     enlargement.  These       appear unchanged from 2019. The esophagus is normal in course and caliber.             The central airways are patent.  There is lower lobe predominant bronchiectasis.     There is       peripheral reticulation and interlobular septal thickening within a basilar     predominance.  There is       some ground-glass opacity which does abut the pleural surface.  There is likely     developing       honeycombing within the periphery of the lung bases.  This is most compatible     with a probable UIP       pattern.  There is a small area of ground-glass opacity within the medial left     upper lobe adjacent       to the great vessels.  This is similar to the prior examination.  There is no     significant       progression of interstitial lung disease findings.  There is pleural calcifi    cation within the       posterior lung apices.              The visualized portions of the upper abdomen demonstrate no acute findings.      There is a partially       visualized contour deformity of the anterior left kidney which appears to     represent volume       averaging.  There is no hydronephrosis.  Degenerative changes of the midthoracic    spine.  No acute       osseous findings.             CONCLUSION:         1. 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                GABRIELA GILLETTE MD             Electronically Signed and Approved By: GABRIELA GILLETTE MD on 6/11/2020 at 8:31                           Until signed, this  is an unconfirmed preliminary report that may contain      errors and is subject to change.                                              BATB1:      D:06/11/20 0812            Assessment      Pulmonary fibrosis - J84.10            Medication monitoring encounter - Z51.81            Notes      New Medications      * azaTHIOprine (Imuran) 50 MG TAB: 75 MG PO BID 30 Days #90      Changed Medications      * Neb-NaCl 3% (Sodium Chloride 3% Neb) 4 ML VIAL.NEB: 4 ML INH RTBID 30 Days #60         Instructions: DX: J84.10, J43.9         Dx: Pulmonary fibrosis - J84.10      New Diagnostics      * CBC With Auto Diff, Routine         Dx: Pulmonary fibrosis - J84.10      * Comp Metabolic Panel, 1 DAY         Dx: Pulmonary fibrosis - J84.10      ASSESSMENT:      1. 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       2. Bronchiectasis without acute exacerbation.      3. Chronic cough at baseline.       4. Exertional dyspnea.       5. Antisynthesase syndrome on immunosuppression with imuran and chronic     prednisone.       6. Therapeutic drug monitoring.             PLAN:      1. I will increase the patient's Imuran from 50 mg twice daily to 75 mg twice     daily. I will order a CBC and CMP.       2. I will write an order for the patient to receive a new nebulizer mask .       3. Continue pulmicort and brovana everyday as prescribed. I will have our     medical assistant see if they can get her nebulizer treatment cheaper through     [facility] and notify the patient.       4. The patient admits to not using her chest vest every day. I advised the     patient to use her chest vest twice daily for assistance with airway clearance     and use albuterol nebulizer followed by 3% saline nebulizer prior to using the     chest vest.       5. The patient is advised to call the office, call 911 or go to the ER for any     new or worsening symptoms.       6. The patient had a 6 minute walk test in the office today and her oxygen      saturation dropped to 88%. I will set the patient up on 2 liters per minute     oxygen per nasal cannula.       7. The patient reports she is up to date with flu and pneumonia vaccines.       8. The patient's CT scan of the chest done on 06/11/2020 shows stable findings     of interstitial lung disease with probable UIP pattern. We will continue monitor    CT scan of the chest yearly.       9. Follow up with Dr. Carrion as scheduled in August, sooner if needed.            Patient Education      Patient Education Provided:  Acute Bronchitis      Time Spent:  > 50% /Coord Care            Electronically signed by IVET IBANEZ [facility]  06/28/2020 20:31       Disclaimer: Converted document may not contain table formatting or lab diagrams. Please see [employer] [facility] for the authenticated document.

## 2021-05-28 NOTE — PROGRESS NOTES
Patient: LAMIN MONTERO     Acct: [id number]     Report: #[id number]  UNIT #: [id number]     : 1950    Encounter Date:2020  PRIMARY CARE: Laurie Castro  ***Signed***  --------------------------------------------------------------------------------------------------------------------  Chief Complaint      Encounter Date      Aug 11, 2020            Primary Care Provider      Laurie Castro            Referring Provider      DENNY SANTIAGO            Patient Complaint      Patient is complaining of      PT here today for acute cough and SOA, wants increase in Steriod, Pulmonary     fibrosis            VITALS      Height 62 in / 157.48 cm      Temperature 98 F / 36.67 C - Temporal      Pulse 78      Respirations 15      Blood Pressure 107/62 Sitting, Left Arm      Pulse Oximetry 98%, room air      Initial Exhaled Nitrous Oxide      Date:  2020      Exhaled Nitrous Oxide Results:  5            HPI      The patient is a 70 year old female, patient of Dr. 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  The patient reports that she is taking     Brovana and Pulmicort everyday as prescribed.  The patient states that she does     have shortness of air at times that is worse with exertion, moderate in severity    and improved with rest.  The patient states she does have an intermittent dry     cough.  The patient states she also has rheumatoid arthritis and does have joint    pain. The patient reports she did have back surgery two weeks and was wondering     if we could increase her prednisone from 10 mg to 20 mg daily due to having some    increasing joint pain and some shortness of air. The patient also has a chronic     cough that is somewhat improved on Pepcid as well. The patient denies any fever,    chills, night sweats, hemoptysis, purulent sputum production, chest pain, chest      tightness, swollen glands in the head and neck, nausea, vomiting or diarrhea.      The patient denies any headaches, myalgias, changes in sense of taste and/or     smell or any other coronavirus or flu-like symptoms.  The patient also denies     any paresthesias, numbness and tingling, incontinence or cauda equina syndrome     symptoms.  The patient states that she is able to perform ADLs.              I have personally reviewed the review of systems, past family, social, surgical     and medical histories and I agree with those as entered in the chart.      Copies To:   Conrado Carrion      Constitutional:  Denies: Fatigue, Fever, Weight gain, Weight loss, Chills,     Insomnia, Other      Respiratory/Breathing:  Complains of: Shortness of air, Wheezing, Cough; Denies:    Hemoptysis, Pleuritic pain, Other      Endocrine:  Denies: Polydipsia, Polyuria, Heat/cold intolerance, Abnorml     menstrual pattern, Diabetes, Other      Eyes:  Denies: Blurred vision, Vision Changes, Other      Ears, nose, mouth, throat:  Denies: Mouth lesions, Thrush, Throat pain,     Hoarseness, Allergies/Hay Fever, Post Nasal Drip, Headaches, Recent Head Injury,    Nose Bleeding, Neck Stiffness, Thyroid Mass, Hearing Loss, Ear Fullness, Dry     Mouth, Nasal or Sinus Pain, Dry Lips, Nasal discharge, Nasal congestion, Other      Cardiovascular:  Denies: Palpitations, Syncope, Claudication, Chest Pain, Wake     up Gasping for air, Leg Swelling, Irregular Heart Rate, Cyanosis, Dyspnea on     Exertion, Other      Gastrointestinal:  Denies: Nausea, Constipation, Diarrhea, Abdominal pain,     Vomiting, Difficulty Swallowing, Reflux/Heartburn, Dysphagia, Jaundice,     Bloating, Melena, Bloody stools, Other      Genitourinary:  Denies: Urinary frequency, Incontinence, Hematuria, Urgency,     Nocturia, Dysuria, Testicular problems, Other      Musculoskeletal:  Denies: Joint Pain, Joint Stiffness, Joint Swelling, Myalgias,    Other       Hematologic/lymphatic:  DENIES: Lymphadenopathy, Bruising, Bleeding tendencies,     Other      Neurological:  Denies: Headache, Numbness, Weakness, Seizures, Other      Psychiatric:  Denies: Anxiety, Appropriate Effect, Depression, Other      Sleep:  No: Excessive daytime sleep, Morning Headache?, Snoring, Insomnia?, Stop    breathing at sleep?, Other      Integumentary:  Denies: Rash, Dry skin, Skin Warm to Touch, Other      Immunologic/Allergic:  Denies: Latex allergy, Seasonal allergies, Asthma,     Urticaria, Eczema, Other      Immunization status:  No: Up to date            FAMILY/SOCIAL/MEDICAL HX      Surgical History:  Yes: Back Surgery (july 2020), Oral Surgery (SINUS SX,     TONSILLECTOMY), Throat Surgery (t  Angioplasty, Appendectomy, Bladder Surgery, Bowel Surgery, Breast Surgery, CABG,    Carotid Stenosis, Cholecystectomy, Ear Surgery, Eye Surgery, Head Surgery,     Hernia Surgery, Kidney Surgery, Nose Surgery, Orthopedic Surgery, Prostatectomy,    Rectal Surgery, Spinal Surgery, Testicular Surgery, Valve Replacement, Vascular     Surgery, Other Surgeries      Heart - Family Hx:  Father      Diabetes - Family Hx:  Father, Child, Grandparent      Cancer/Type - Family Hx:  Mother, Father      Is Father Still Living?:  No      Is Mother Still Living?:  No      Social History:  No Tobacco Use, No Alcohol Use, No Recreational Drug use      Smoking status:  Never smoker      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      Anticoagulation Therapy:  No      Antibiotic Prophylaxis:  No      Medical History:  Yes: Arthritis (RA), Hemorrhoids/Rectal Prob (ACID REFLUX),     Shortness Of Breath (PULMONARY FIBROSIS), Tuberculosis or Pos TB Te; No:     Alcoholism, Allergies, Anemia, Asthma, Blood Disease, Broken Bones, Cataracts,     Chemical Dependency, Chemotherapy/Cancer, Chronic Bronchitis/COPD, Emphysema,     Chronic Liver Disease, Colon Trouble, Colitis, Diverticulitis, Congestive Heart     Failu, Deafness or Ringing  Ears, Convulsions, Depression, Anxiety, Bipolar     Disorder, Diabetes, Epilepsy, Seizures, Forgetfullness, Glaucoma, Gall Stones,     Gout, Head Injury, Heart Attack, Heart Murmur, Hepatitis, Hiatal Hernia, High     Blood Pressure, High Cholesterol, HIV (Do not ask - volu, Jaundice, Kidney or     Bladder Disease, Kidney Stones, Migrane Headaches, Mitral Valve Prolapse, Night     sweats, Phlebitis, Psychiatric Care, Reflux Disease, Rheumatic Fever, Sexually     Transmitted Dis, Sinus Trouble, Skin Disease/Psoriais/Ecz, Stroke, Thyroid     Problem, Miscellaneous Medical/oth      Psychiatric History      none            PREVENTION      Hx Influenza Vaccination:  Yes      Date Influenza Vaccine Given:  Sep 1, 2019      Influenza Vaccine Declined:  No      2 or More Falls in Past Year?:  No      Fall Past Year with Injury?:  No      Hx Pneumococcal Vaccination:  Yes      Encouraged to follow-up with:  PCP regarding preventative exams.      Chart initiated by      Aide Muro CMA            ALLERGIES/MEDICATIONS      Allergies:        Coded Allergies:             CLINDAMYCIN (Verified  Allergy, Severe, RASH HIVES, 8/11/20)           CIPROFLOXACIN (Verified  Allergy, Unknown, RASH, 8/11/20)           ERYTHROMYCIN BASE (Verified  Allergy, Unknown, RASH, 8/11/20)           LEVOFLOXACIN (Verified  Allergy, Unknown, 8/11/20)      Medications    Last Reconciled on 8/11/20 16:16 by KIRBY PANTOJA-Albuterol (Ventolin HFA) 18 Gm Hfa.aer.ad      2 PUFFS INH Q4H PRN for SHORTNESS OF BREATH, #3 INH 3 Refills         Prov: IVET IBANEZ [facility]         8/11/20       Baclofen (BACLOFEN) 10 Mg Tablet      10 MG PO TID, #90 TAB 0 Refills         Reported         8/11/20       azaTHIOprine (Imuran) 50 Mg Tab      75 MG PO BID for 30 Days, #90 TAB 2 Refills         Prov: IVET IBANEZ [facility]         6/23/20       Neb-NaCl 3% (Sodium Chloride 3% Neb) 4 Ml Vial.neb      4 ML INH RTBID for 30 Days, #60 NEB 5 Refills          Prov: IVET IBANEZ [facility]         6/23/20       Arformoterol Tartrate (Brovana) 15 Mcg/2 Ml Vial.neb      15 MCG INH BID for 90 Days, #60 NEB 3 Refills         Prov: Conrado Carrion         6/1/20       Neb-Budesonide (Pulmicort) 0.5 Mg/2 Ml Ampul.neb      0.5 MG INH BID, #60 NEB 11 Refills         Prov: Patty Mercedes PA-C         5/7/20       NEB-Albuterol Sulf (Albuterol) 2.5 Mg/3 Ml Vial.neb      2.5 MG INH BID, #60 NEB 5 Refills         Prov: Patty Mercedes PA-C         5/7/20       predniSONE (predniSONE) 10 Mg Tablet      10 MG PO QDAY for 30 Days, #30 TAB 5 Refills         Prov: Patty Mercedes PA-C         5/7/20       Alendronate Sodium (Fosamax) 70 Mg Tablet      70 MG PO Sa for 90 Days, #12 TAB 2 Refills         Prov: Conrado Carrion         2/10/20       Ondansetron Hcl (ONDANSETRON HCL) 4 Mg Tablet      4 MG PO Q6H PRN for NAUSEA AND/OR VOMITING for 90 Days, #360 TAB 1 Refill         Prov: LION PERES         10/24/19       Ibuprofen (Ibuprofen) 800 Mg Tablet      800 MG PO TID, #90 TAB 0 Refills         Reported         9/3/19       Gabapentin (Gabapentin) 300 Mg Capsule      300 MG PO BID, #60 CAP 0 Refills         Reported         9/3/19       HYDROcodone-Acetaminophen  Mg (HYDROcodone-Acetaminophen  Mg) 1 Tab     Tablet      1 TAB PO TID PRN for BREAKTHROUGH PAIN, TAB 0 Refills         Reported         12/20/18       Nebulizer Accessories (Nebulizer Kit ROD) 1 Each Kit      EACH XX ONCE, #1 0 Refills         Prov: Patty Mercedes PA-C         7/31/18       Cholecalciferol (Vitamin D3) (Vitamin D3) 1,000 Units Capsule      1000 UNITS PO QDAY, #120 CAP 4 Refills         Prov: Conrado Carrion         11/2/17       Omega-3 Fatty Acids/Fish Oil (Fish Oil 1000 Mg) Unknown Strength Capsule      PO QDAY, #30 CAP 0 Refills         Reported         3/17/17      Current Medications      Current Medications Reviewed 8/11/20            EXAM      GEN-patient appears stated age resting  comfortable in no acute distress      Eyes-PERRL,  conjunctiva are normal in appearance extraocular muscles are     intact, no scleral icterus      Nasal-both nares are patent turbinates appear normal no polyps seen no nasal     discharge or ulcerations      Ears-tympanic membranes are normal no erythema no bulging, normal to inspection      Lymphatic-no swollen or enlarged cervical nodes, or axillary node, or femoral     nodes, or supraclavicular nodes      Mouth normal dentition, no erythema no ulcerations oropharynx appears normal no     exudate no evidence of postnasal drip.        Neck-there are no palpable supraclavicular or cervical adenopathy, thyroid is     normal in appearance no apparent nodules, there is no inspiratory or expiratory     stridor      Respiratory-Mildly decreased breath sounds throughout, no wheezes, rales or     rhonchi appreciated, normal work of breathing noted.  Patient is able to speak     full sentences without difficulty.        Cardiovascular-the heart rate is normal and regular S1 and S2 present with no     murmur or extra heart sounds, there is no JVD or pedal edema present      GI-the abdomen is normal in appearance, bowel sounds present and normal in all     quadrants no hepatosplenomegaly or masses felt      Extremities-no clubbing is present, pulses present in all extremities, capillary    refill time is normal      Musculoskeletal-Normal strength in upper and lower extremities, inspection shows    no evidence of muscle atrophy      Skin-skin is normal in appearance it is warm and dry, no rashes present, no     evidence of cyanosis, palpation reveals no masses      Neurological-the patient is alert and oriented to time place and person, moves     all 4 extremities, normal gait, normal affect and mood, CN2-12 intact      Psych-normal judgment and insight is good, normal mood and affect, alert and     oriented to person, place, time and date      Vtials      Vitals:              Height 62 in / 157.48 cm           Temperature 98 F / 36.67 C - Temporal           Pulse 78           Respirations 15           Blood Pressure 107/62 Sitting, Left Arm           Pulse Oximetry 98%, room air            REVIEW      Results Reviewed      PCCS Results Reviewed?:  Yes Prev Lab Results, Yes Prev Radiology Results, Yes     Previous Mecial Records      Lab Results      I reviewed my last office visit note.            Assessment      Cough - R05            Notes      New Medications      * BACLOFEN 10 MG TABLET: 10 MG PO TID #90      Renewed Medications      * MDI-Albuterol (Ventolin HFA) 18 GM HFA.AER.AD: 2 PUFFS INH Q4H PRN SHORTNESS       OF BREATH #3      New Diagnostics      * Chest 2 View, 1 DAY         Dx: Cough - R05      ASSESSMENT:       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      2. Bronchiectasis with acute exacerbation.      3. Chronic cough, baseline.      4. Exertional dyspnea.      5. Recent back surgery two weeks ago.      6. 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      7. Therapeutic drug monitoring.            PLAN:      1. The patient to continue Imuran 75 mg twice a day as prescribed.      2. I spoke with Dr. Carrion and appreciate his help and input.  I advised     patient that we will not increase prednisone at this time due to patient only     being two weeks out from back surgery and the risks of not healing, spinal     infection, etc.  The patient is in agreement to continue prednisone 10 mg daily     as currently prescribed.      3. I will order a chest x-ray.      4. The patient is to continue Brovana and Pulmicort everyday as prescribed and     rinse her mouth out after each use.      5. The patient to continue albuterol nebulizers and saline nebulizers for airway    clearance.      6. The patient is advised to continue oxygen and keep SPO2 89% and above.      7. Patient is advised to call the office, 911 or go to  the ER with any new or     worsening symptoms.      8.  The patient is up to date on flu and pneumonia vaccines.  The patient states    she will check with [facility] and see when she is due for her Pneumovax vaccine.      9. The patient is advised to follow up with Dr. Carrion as already scheduled in     August, sooner if needed.            Patient Education      Patient Education Provided:  Acute Bronchitis      Time Spent:  > 50% /Coord Care            Electronically signed by IVET IBANEZ [facility]  08/12/2020 20:11       Disclaimer: Converted document may not contain table formatting or lab diagrams. Please see [facility] for the authenticated document.

## 2021-06-03 ENCOUNTER — CONVERSION ENCOUNTER (OUTPATIENT)
Dept: OTOLARYNGOLOGY | Facility: CLINIC | Age: 71
End: 2021-06-03

## 2021-06-03 ENCOUNTER — OFFICE VISIT CONVERTED (OUTPATIENT)
Dept: OTOLARYNGOLOGY | Facility: CLINIC | Age: 71
End: 2021-06-03
Attending: NURSE PRACTITIONER

## 2021-06-05 NOTE — H&P
History and Physical      Patient Name: Christina Espinoza   Patient ID: [id number]   Sex: Female   YOB: 1950    Primary Care Provider: Laurie Castro MD   Referring Provider: Laurie Castro MD    Visit Date: Clarissa 3, 2021    Provider: CATY Patricio   [place] [facility]   Location Address: [address] [place] [address], [address] [place] [address]  [id number]   Location Phone: [phone]          Chief Complaint     1. Vertigo       History Of Present Illness  Christina Espinoza is a 71 year old /White female who presents to the office today as a consult from Laurie Castro MD.      She presents to the clinic today for evaluation of vertigo. She states that this began around 6 months ago.  She states the first time it occurred she was sitting on her couch and suddenly began to have a room spinning sensation and nausea. The vertigo lasted approximately one minute. Since then she has had this happen multiple times. She also feels off balance with walking at times.  She sometimes will use a cane when she goes out. She reports she had back surgery in July 2020 and since that time she has not been as physically active as she had been before. She has not had any falls.  She has had no change in her hearing or tinnitus symptoms but occasionally does have achiness in her ears.       Past Medical History  Arthritis; Asthma; Cervicalgia; Dizziness; Hip pain; Leg pain; Leg swelling; Limb Pain; Limb Swelling; Lumbago/low back pain; Lumbar spinal stenosis; Lung disease; Nausea; Neurogenic claudication; Neuropathy; Radiculopathy, lumbosacral; Spondylolisthesis , lumbar         Past Surgical History  Spinal Surgery; Tonsillectomy; Tubal ligation         Medication List  Fosamax 70 mg oral tablet; hydrocodone-acetaminophen  mg oral tablet; ibuprofen 800 mg oral tablet; Imuran 50 mg oral tablet; prednisone 10 mg oral tablet         Allergy List  clindamycin HCl; erythromycin       Allergies  "Reconciled  Family Medical History  Family history of bleeding disorder; Family history of Arthritis; Family history of cancer; Family history of stroke; Family history of heart disease; Family history of diabetes mellitus; Family history of osteoporosis         Social History  Alcohol (Never); Caffeine (Current some day); ; Second hand smoke exposure (Never); Tobacco (Never)         Review of Systems  · Constitutional  o Denies  o : fever, night sweats, weight loss  · Eyes  o Denies  o : discharge from eye, impaired vision  · HENT  o Admits  o : *See HPI  · Cardiovascular  o Denies  o : chest pain, irregular heart beats  · Respiratory  o Denies  o : shortness of breath, wheezing, coughing up blood  · Gastrointestinal  o Admits  o : heartburn, reflux  o Denies  o : vomiting blood  · Genitourinary  o Denies  o : frequency  · Integument  o Denies  o : rash, skin dryness  · Neurologic  o Admits  o : dizziness  o Denies  o : seizures, loss of balance, loss of consciousness  · Endocrine  o Denies  o : cold intolerance, heat intolerance  · Heme-Lymph  o Denies  o : easy bleeding, anemia      Vitals  Date Time BP Position Site L\R Cuff Size HR RR TEMP (F) WT  HT  BMI kg/m2 BSA m2 O2 Sat FR L/min FiO2 HC       06/03/2021 02:06 PM        97.6 151lbs 6oz 5'  2.5\" 27.25 1.74             Physical Examination  · Constitutional  o Appearance  o : well developed, well-nourished, alert and in no acute distress, voice clear and strong  · Head and Face  o Head  o :   § Inspection  § : no deformities or lesions  o Face  o :   § Inspection  § : No facial lesions; House-Brackmann I/VI bilaterally  § Palpation  § : No TMJ crepitus nor  muscle tenderness bilaterally  · Eyes  o Vision  o :   § Visual Fields  § : Extraocular movements are intact. No spontaneous or gaze-induced nystagmus.  o Conjunctivae  o : clear  o Sclerae  o : clear  o Pupils and Irises  o : pupils equal, round, and reactive to light.   · Ears, Nose, " Mouth and Throat  o Ears  o :   § External Ears  § : appearance within normal limits, no lesions present  § Otoscopic Examination  § : tympanic membrane appearance within normal limits bilaterally without perforations, well-aerated middle ears  § Hearing  § : intact to conversational voice both ears  o Nose  o :   § External Nose  § : appearance normal  § Intranasal Exam  § : mucosa within normal limits, vestibules normal, no intranasal lesions present, septum midline, sinuses non tender to percussion  o Oral Cavity  o :   § Oral Mucosa  § : oral mucosa normal without pallor or cyanosis  § Lips  § : lip appearance normal  § Teeth  § : normal dentition for age  § Gums  § : gums pink, non-swollen, no bleeding present  § Tongue  § : tongue appearance normal; normal mobility  § Palate  § : hard palate normal, soft palate appearance normal with symmetric mobility  o Throat  o :   § Oropharynx  § : no inflammation or lesions present, tonsils within normal limits  · Neck  o Inspection/Palpation  o : normal appearance, no masses or tenderness, trachea midline; thyroid size normal, nontender, no nodules or masses present on palpation  · Respiratory  o Respiratory Effort  o : breathing unlabored  o Inspection of Chest  o : normal appearance, no retractions  · Lymphatic  o Neck  o : no lymphadenopathy present  o Supraclavicular Nodes  o : no lymphadenopathy present  o Preauricular Nodes  o : no lymphadenopathy present  · Skin and Subcutaneous Tissue  o General Inspection  o : Regarding face and neck - there are no rashes present, no lesions present, and no areas of discoloration  · Neurologic  o Cranial Nerves  o : cranial nerves II-XII are grossly intact bilaterally  o Gait and Station  o : normal gait, able to stand without diffculty. [place]-Hallpike maneuver negative bilaterally. The keto marching test negative. Romberg test negative.  · Psychiatric  o Judgement and Insight  o : judgment and insight intact  o Mood and  Affect  o : mood normal, affect appropriate          Assessment  · BPPV (benign paroxysmal positional vertigo)     386.11/H81.10  · Balance problem     781.99/R26.89      Plan  · Orders  o PHYSICAL THERAPY CONSULTATION ([facility]) - 386.11/H81.10, 781.99/R26.89 - 06/03/2021  · Medications  o Medications have been Reconciled  o Transition of Care or Provider Policy  · Instructions  o She presents to the clinic today for evaluation of vertigo. She states that this began around 6 months ago. She states the first time it occurred she was sitting on her couch and suddenly began to have a room spinning sensation and nausea. The vertigo lasted approximately one minute. Since then she has had this happen multiple times. She also feels off balance with walking at times. She sometimes will use a cane when she goes out. She reports she had back surgery in July 2020 and since that time she has not been as physically active as she had been before. She has not had any falls. She has had no change in her hearing or tinnitus symptoms but occasionally does have achiness in her ears. On examination today bilateral external auditory canals and bilateral tympanic membrane appearance is normal. There are no perforations and middle ears are well aerated. Tuning fork testing shows Rush with no lateralization and Rinne positive bilaterally. Eros-Hallpike maneuver was negative bilaterally. Romberg test and Fukuda marching test were both negative. Her gait is a little bit wide-based she seems steady on her feet. Based on her history it sounds like she is having issues with benign paroxysmal positional vertigo. Additionally she has likely experienced some deconditioning from being more sedentary in the past year. I think she would benefit from physical therapy consult and treatment for BPPV. We have discussed maintaining her physical activity level, drinking plenty of water and being sure blood pressure is well controlled. She will take her  meclizine as needed.  o Electronically Identified Patient Education Materials Provided Electronically            Electronically Signed by: CATY Patricio -Author on Clarissa 3, 2021 02:54:29 PM

## 2021-06-09 ENCOUNTER — TRANSCRIBE ORDERS (OUTPATIENT)
Dept: PHYSICAL THERAPY | Facility: CLINIC | Age: 71
End: 2021-06-09

## 2021-06-09 DIAGNOSIS — R26.89 BALANCE PROBLEM: ICD-10-CM

## 2021-06-09 DIAGNOSIS — H81.10 BENIGN PAROXYSMAL POSITIONAL VERTIGO, UNSPECIFIED LATERALITY: Primary | ICD-10-CM

## 2021-07-06 ENCOUNTER — TELEPHONE (OUTPATIENT)
Dept: PULMONOLOGY | Facility: CLINIC | Age: 71
End: 2021-07-06

## 2021-07-06 NOTE — TELEPHONE ENCOUNTER
Pt called stating she was going to be put on xeljanz rra and was told not to take prednisone.  She wants your input

## 2021-07-13 RX ORDER — BUDESONIDE, GLYCOPYRROLATE, AND FORMOTEROL FUMARATE 160; 9; 4.8 UG/1; UG/1; UG/1
AEROSOL, METERED RESPIRATORY (INHALATION)
Qty: 10.7 G | Refills: 2 | Status: SHIPPED | OUTPATIENT
Start: 2021-07-13 | End: 2021-10-14 | Stop reason: SDUPTHER

## 2021-07-15 VITALS — TEMPERATURE: 97.6 F | WEIGHT: 151.37 LBS | HEIGHT: 62 IN | BODY MASS INDEX: 27.86 KG/M2

## 2021-07-26 ENCOUNTER — LAB (OUTPATIENT)
Dept: LAB | Facility: HOSPITAL | Age: 71
End: 2021-07-26

## 2021-07-26 ENCOUNTER — TRANSCRIBE ORDERS (OUTPATIENT)
Dept: LAB | Facility: HOSPITAL | Age: 71
End: 2021-07-26

## 2021-07-26 DIAGNOSIS — R53.83 TIREDNESS: ICD-10-CM

## 2021-07-26 DIAGNOSIS — Z79.899 ENCOUNTER FOR LONG-TERM (CURRENT) USE OF OTHER MEDICATIONS: ICD-10-CM

## 2021-07-26 DIAGNOSIS — M06.09 RHEUMATOID ARTHRITIS OF MULTIPLE SITES WITHOUT RHEUMATOID FACTOR (HCC): ICD-10-CM

## 2021-07-26 DIAGNOSIS — J84.9 TROPICAL PULMONARY ALVEOLITIS (HCC): ICD-10-CM

## 2021-07-26 DIAGNOSIS — Z79.899 ENCOUNTER FOR LONG-TERM (CURRENT) USE OF OTHER MEDICATIONS: Primary | ICD-10-CM

## 2021-07-26 LAB
ALBUMIN SERPL-MCNC: 4.1 G/DL (ref 3.5–5.2)
ALBUMIN/GLOB SERPL: 1.7 G/DL
ALP SERPL-CCNC: 246 U/L (ref 39–117)
ALT SERPL W P-5'-P-CCNC: 29 U/L (ref 1–33)
ANION GAP SERPL CALCULATED.3IONS-SCNC: 10.8 MMOL/L (ref 5–15)
AST SERPL-CCNC: 23 U/L (ref 1–32)
BASOPHILS # BLD AUTO: 0.02 10*3/MM3 (ref 0–0.2)
BASOPHILS NFR BLD AUTO: 0.3 % (ref 0–1.5)
BILIRUB SERPL-MCNC: 0.4 MG/DL (ref 0–1.2)
BUN SERPL-MCNC: 12 MG/DL (ref 8–23)
BUN/CREAT SERPL: 17.9 (ref 7–25)
CALCIUM SPEC-SCNC: 10.2 MG/DL (ref 8.6–10.5)
CHLORIDE SERPL-SCNC: 106 MMOL/L (ref 98–107)
CO2 SERPL-SCNC: 23.2 MMOL/L (ref 22–29)
CREAT SERPL-MCNC: 0.67 MG/DL (ref 0.57–1)
CRP SERPL-MCNC: 3.48 MG/DL (ref 0–0.5)
DEPRECATED RDW RBC AUTO: 44 FL (ref 37–54)
EOSINOPHIL # BLD AUTO: 0.12 10*3/MM3 (ref 0–0.4)
EOSINOPHIL NFR BLD AUTO: 1.8 % (ref 0.3–6.2)
ERYTHROCYTE [DISTWIDTH] IN BLOOD BY AUTOMATED COUNT: 13.1 % (ref 12.3–15.4)
GFR SERPL CREATININE-BSD FRML MDRD: 87 ML/MIN/1.73
GLOBULIN UR ELPH-MCNC: 2.4 GM/DL
GLUCOSE SERPL-MCNC: 78 MG/DL (ref 65–99)
HAV IGM SERPL QL IA: NORMAL
HBV CORE IGM SERPL QL IA: NORMAL
HBV SURFACE AG SERPL QL IA: NORMAL
HCT VFR BLD AUTO: 35.6 % (ref 34–46.6)
HCV AB SER DONR QL: NORMAL
HGB BLD-MCNC: 11.3 G/DL (ref 12–15.9)
IMM GRANULOCYTES # BLD AUTO: 0.02 10*3/MM3 (ref 0–0.05)
IMM GRANULOCYTES NFR BLD AUTO: 0.3 % (ref 0–0.5)
LYMPHOCYTES # BLD AUTO: 0.96 10*3/MM3 (ref 0.7–3.1)
LYMPHOCYTES NFR BLD AUTO: 14.3 % (ref 19.6–45.3)
MCH RBC QN AUTO: 29.3 PG (ref 26.6–33)
MCHC RBC AUTO-ENTMCNC: 31.7 G/DL (ref 31.5–35.7)
MCV RBC AUTO: 92.2 FL (ref 79–97)
MONOCYTES # BLD AUTO: 0.44 10*3/MM3 (ref 0.1–0.9)
MONOCYTES NFR BLD AUTO: 6.6 % (ref 5–12)
NEUTROPHILS NFR BLD AUTO: 5.14 10*3/MM3 (ref 1.7–7)
NEUTROPHILS NFR BLD AUTO: 76.7 % (ref 42.7–76)
NRBC BLD AUTO-RTO: 0 /100 WBC (ref 0–0.2)
PLATELET # BLD AUTO: 308 10*3/MM3 (ref 140–450)
PMV BLD AUTO: 10.6 FL (ref 6–12)
POTASSIUM SERPL-SCNC: 4.2 MMOL/L (ref 3.5–5.2)
PROT SERPL-MCNC: 6.5 G/DL (ref 6–8.5)
RBC # BLD AUTO: 3.86 10*6/MM3 (ref 3.77–5.28)
SODIUM SERPL-SCNC: 140 MMOL/L (ref 136–145)
WBC # BLD AUTO: 6.7 10*3/MM3 (ref 3.4–10.8)

## 2021-07-26 PROCEDURE — 36415 COLL VENOUS BLD VENIPUNCTURE: CPT

## 2021-07-26 PROCEDURE — 85025 COMPLETE CBC W/AUTO DIFF WBC: CPT

## 2021-07-26 PROCEDURE — 80074 ACUTE HEPATITIS PANEL: CPT

## 2021-07-26 PROCEDURE — 80053 COMPREHEN METABOLIC PANEL: CPT

## 2021-07-26 PROCEDURE — 86480 TB TEST CELL IMMUN MEASURE: CPT

## 2021-07-26 PROCEDURE — 86140 C-REACTIVE PROTEIN: CPT

## 2021-07-29 LAB
GAMMA INTERFERON BACKGROUND BLD IA-ACNC: 0.01 IU/ML
M TB IFN-G BLD-IMP: NEGATIVE
M TB IFN-G CD4+ BCKGRND COR BLD-ACNC: 0.02 IU/ML
M TB IFN-G CD4+CD8+ BCKGRND COR BLD-ACNC: 0.01 IU/ML
MITOGEN IGNF BLD-ACNC: >10 IU/ML
QUANTIFERON INCUBATION: NORMAL
SERVICE CMNT-IMP: NORMAL

## 2021-08-26 ENCOUNTER — OFFICE VISIT (OUTPATIENT)
Dept: INTERNAL MEDICINE | Facility: CLINIC | Age: 71
End: 2021-08-26

## 2021-08-26 VITALS
BODY MASS INDEX: 27.32 KG/M2 | DIASTOLIC BLOOD PRESSURE: 72 MMHG | HEIGHT: 63 IN | HEART RATE: 70 BPM | TEMPERATURE: 97.6 F | OXYGEN SATURATION: 100 % | SYSTOLIC BLOOD PRESSURE: 120 MMHG | WEIGHT: 154.2 LBS

## 2021-08-26 DIAGNOSIS — G89.29 OTHER CHRONIC PAIN: ICD-10-CM

## 2021-08-26 DIAGNOSIS — M06.9 RHEUMATOID ARTHRITIS, INVOLVING UNSPECIFIED SITE, UNSPECIFIED WHETHER RHEUMATOID FACTOR PRESENT (HCC): Primary | ICD-10-CM

## 2021-08-26 PROBLEM — E78.2 MIXED HYPERLIPIDEMIA: Status: ACTIVE | Noted: 2021-08-26

## 2021-08-26 PROBLEM — M43.10 ACQUIRED SPONDYLOLISTHESIS: Status: ACTIVE | Noted: 2020-01-29

## 2021-08-26 PROBLEM — M25.559 HIP PAIN: Status: ACTIVE | Noted: 2020-01-20

## 2021-08-26 PROBLEM — J45.909 ASTHMA: Status: ACTIVE | Noted: 2021-08-26

## 2021-08-26 PROBLEM — M51.36 DEGENERATION OF LUMBAR INTERVERTEBRAL DISC: Status: ACTIVE | Noted: 2020-07-10

## 2021-08-26 PROBLEM — M54.40 LUMBAGO WITH SCIATICA: Status: ACTIVE | Noted: 2021-08-26

## 2021-08-26 PROBLEM — K58.1 IRRITABLE BOWEL SYNDROME WITH CONSTIPATION: Status: ACTIVE | Noted: 2021-08-26

## 2021-08-26 PROBLEM — J84.10 PULMONARY FIBROSIS (HCC): Status: ACTIVE | Noted: 2021-08-26

## 2021-08-26 PROBLEM — R42 DIZZINESS: Status: ACTIVE | Noted: 2021-08-26

## 2021-08-26 PROBLEM — E66.3 OVERWEIGHT WITH BODY MASS INDEX (BMI) 25.0-29.9: Status: ACTIVE | Noted: 2021-08-26

## 2021-08-26 PROBLEM — Z98.1 S/P LUMBAR SPINAL FUSION: Status: ACTIVE | Noted: 2020-08-18

## 2021-08-26 PROBLEM — M54.2 NECK PAIN: Status: ACTIVE | Noted: 2020-01-20

## 2021-08-26 PROBLEM — M81.0 AGE RELATED OSTEOPOROSIS: Status: ACTIVE | Noted: 2021-08-26

## 2021-08-26 PROBLEM — M48.062 SPINAL STENOSIS OF LUMBAR REGION WITH NEUROGENIC CLAUDICATION: Status: ACTIVE | Noted: 2020-01-20

## 2021-08-26 PROBLEM — J44.9 COPD (CHRONIC OBSTRUCTIVE PULMONARY DISEASE) CASE MANAGEMENT PATIENT: Status: ACTIVE | Noted: 2020-07-10

## 2021-08-26 PROBLEM — M51.369 DEGENERATION OF LUMBAR INTERVERTEBRAL DISC: Status: ACTIVE | Noted: 2020-07-10

## 2021-08-26 PROCEDURE — 99204 OFFICE O/P NEW MOD 45 MIN: CPT | Performed by: INTERNAL MEDICINE

## 2021-08-26 RX ORDER — ABATACEPT 125 MG/ML
INJECTION, SOLUTION SUBCUTANEOUS
COMMUNITY
Start: 2021-08-12 | End: 2021-09-16 | Stop reason: SINTOL

## 2021-08-26 RX ORDER — HYDROCODONE BITARTRATE AND ACETAMINOPHEN 10; 325 MG/1; MG/1
1 TABLET ORAL EVERY 6 HOURS PRN
COMMUNITY

## 2021-08-26 RX ORDER — ALENDRONATE SODIUM 70 MG/1
70 TABLET ORAL
COMMUNITY

## 2021-08-26 RX ORDER — IBUPROFEN 800 MG/1
TABLET ORAL
COMMUNITY
Start: 2021-07-24 | End: 2021-09-22

## 2021-08-26 RX ORDER — AZATHIOPRINE 50 MG/1
50 TABLET ORAL DAILY
COMMUNITY
End: 2021-10-06 | Stop reason: HOSPADM

## 2021-08-26 RX ORDER — ONDANSETRON 4 MG/1
4 TABLET, FILM COATED ORAL EVERY 8 HOURS PRN
COMMUNITY

## 2021-08-26 NOTE — PROGRESS NOTES
"Chief Complaint  Establish [facility], Rheumatoid Arthritis (\"My medication im on doesnt touch it\"), and Foot Swelling    Subjective          Christina Espinoza presents to [facility]  Patient presents to establish care    Patient has multiple specialists for management of her Chronic medical conditions  Dr. Brenton Carrion, pulmonology for pulmonary fibrosis  Dr. Bunny Clemens, orthopedics, seeing for neck pain  Dr. Tray yu, neurosurgery, for low back/sciatica  Dr. Joaquin, rheumatology for RA    Recently started Orencia for RA, had first injection last Friday. On Imuran for pulm fib.   Was recently taken off of prednisone. Having severe pain since then. Takes IBU 800mg BID and Norco 10 at least TID. States that the Norco makes her very groggy. Wanting to know if there is anything else she can take for pain that will not make her sleepy.         Objective   Vital Signs:   /72   Pulse 70   Temp 97.6 °F (36.4 °C)   Ht 160 cm (63\")   Wt 69.9 kg (154 lb 3.2 oz)   SpO2 100%   BMI 27.32 kg/m²     Physical Exam  Vitals reviewed.   Constitutional:       Appearance: Normal appearance. She is well-developed.   HENT:      Head: Normocephalic and atraumatic.      Mouth/Throat:      Pharynx: No oropharyngeal exudate.   Eyes:      Conjunctiva/sclera: Conjunctivae normal.      Pupils: Pupils are equal, round, and reactive to light.   Neck:      Thyroid: No thyroid mass, thyromegaly or thyroid tenderness.   Cardiovascular:      Rate and Rhythm: Normal rate and regular rhythm.      Heart sounds: No murmur heard.   No friction rub. No gallop.    Pulmonary:      Effort: Pulmonary effort is normal.      Breath sounds: Normal breath sounds. No wheezing or rhonchi.   Abdominal:      General: There is no distension.      Tenderness: There is no abdominal tenderness.   Lymphadenopathy:      Cervical: No cervical adenopathy.   Skin:     General: Skin is warm and dry. "   Neurological:      Mental Status: She is alert and oriented to person, place, and time.   Psychiatric:         Mood and Affect: Affect normal.        Result Review :          Procedures      Assessment and Plan    Diagnoses and all orders for this visit:    1. Rheumatoid arthritis, involving unspecified site, unspecified whether rheumatoid factor present (CMS/[facility]) (Primary)  Assessment & Plan:  Patient recently started Orencia for RA. Has only had 1 injection so far.   Encouraged patient to continue current pain management regimen until she sees how she will respond to the Orencia.   Discussed that pain management referral, patient refused.       2. Other chronic pain  Assessment & Plan:  Discussed with patient that I do not typically prescribe chronic narcotic pain medications and that I would refer her to pain management for ongoing treatment of pain.   Discussed that she may benefit from other modalities of pain control if she continues to need high dose narcotics and is having sedation.   Patient prefers to hold off on pain management referral at this time.       I spent 45 minutes caring for Christina on this date of service. This time includes time spent by me in the following activities:preparing for the visit, obtaining and/or reviewing a separately obtained history, performing a medically appropriate examination and/or evaluation , counseling and educating the patient/family/caregiver and documenting information in the medical record  Follow Up   Return for follow up as needed/desired.  Patient was given instructions and counseling regarding her condition or for health maintenance advice. Please see specific information pulled into the AVS if appropriate.

## 2021-08-26 NOTE — ASSESSMENT & PLAN NOTE
Discussed with patient that I do not typically prescribe chronic narcotic pain medications and that I would refer her to pain management for ongoing treatment of pain.   Discussed that she may benefit from other modalities of pain control if she continues to need high dose narcotics and is having sedation.   Patient prefers to hold off on pain management referral at this time.

## 2021-08-26 NOTE — ASSESSMENT & PLAN NOTE
Patient recently started Orencia for RA. Has only had 1 injection so far.   Encouraged patient to continue current pain management regimen until she sees how she will respond to the Orencia.   Discussed that pain management referral, patient refused.

## 2021-09-15 NOTE — PROGRESS NOTES
Primary Care Provider  Laurie Castro MD     Referring Provider  No ref. provider found     Chief Complaint  Follow-up (3-4 month/ issues with coughing), Cough, Shortness of Breath, and Wheezing    Subjective          History of Presenting Illness  Patient is a 71-year-old  female, patient of Dr. Carrion with a history of pulmonary fibrosis, bronchiectasis, and antisynthetase syndrome who presents for follow-up visit today.  Patient states that she is taking Imuran 50 mg once daily however she would like to have medicine increased as she has been feeling worse since last visit.  Patient states that she is taking Breztri every day as prescribed and use albuterol inhaler as needed.  Patient states over the last week or so she has been having an increasing productive cough, shortness of breath, and wheezing.  Patient states that she is also feeling fatigued.  Patient states that she took a Covid test several days ago that she bought at the [email or web address] and it came back negative.  Patient states that she has not received her Covid vaccine and has not received her flu vaccine this year yet either.  Patient does report she is up-to-date with her pneumonia vaccines.  Patient denies fever, chills, night sweats, swollen glands in the head and neck, unintentional weight loss, hemoptysis, dysphagia, chest pain, palpitations, chest tightness, abdominal pain, nausea, vomiting, and diarrhea.  Patient also denies any myalgias, changes in sense of taste and/or smell, sore throat, any other coronavirus or flu-like symptoms.  Patient denies any leg swelling, orthopnea, paroxysmal nocturnal dyspnea.  Patient is able to perform her activities of daily living.      Review of Systems   Constitutional: Positive for fatigue. Negative for activity change, appetite change, chills, diaphoresis, fever, unexpected weight gain and unexpected weight loss.        Negative for Insomnia   HENT: Negative for congestion (Nasal), mouth sores,  nosebleeds, postnasal drip, sore throat, swollen glands and trouble swallowing.         Negative for Thrush  Negative for Hoarseness  Negative for Allergies/Hay Fever  Negative for Recent Head injury  Negative for Ear Fullness  Negative for Nasal or Sinus pain  Negative for Dry lips  Negative for Nasal discharge   Respiratory: Positive for cough, shortness of breath and wheezing. Negative for apnea and chest tightness.         Negative for Hemoptysis  Negative for Pleuritic pain   Cardiovascular: Negative for chest pain, palpitations and leg swelling.        Negative for Claudication  Negative for Cyanosis  Negative for Dyspnea on exertion   Gastrointestinal: Negative for abdominal pain, diarrhea, nausea, vomiting and GERD.   Musculoskeletal: Negative for joint swelling and myalgias.        Negative for Joint pain  Negative for Joint stiffness   Skin: Negative for color change, dry skin, pallor and rash.   Neurological: Negative for syncope, weakness and headache.   Hematological: Negative for adenopathy. Does not bruise/bleed easily.        Family History   Problem Relation Age of Onset   • Arthritis Mother    • Cancer Mother    • Osteoporosis Mother    • Cancer Father    • Heart disease Father    • Diabetes Father    • Arthritis Sister    • Heart disease Sister    • Bleeding Disorder Brother    • Stroke Brother    • Diabetes Maternal Grandfather    • Diabetes Son         Social History     Socioeconomic History   • Marital status:      Spouse name: Not on file   • Number of children: Not on file   • Years of education: Not on file   • Highest education level: Not on file   Tobacco Use   • Smoking status: Never Smoker   • Smokeless tobacco: Never Used   Vaping Use   • Vaping Use: Never used   Substance and Sexual Activity   • Alcohol use: Never        Past Medical History:   Diagnosis Date   • Arthritis    • Asthma    • Cervicalgia 01/20/2020   • Leg pain    • Leg swelling    • Limb pain    • Lumbago  01/20/2020    low back pain    • Lumbar spinal stenosis 01/20/2020   • Lung disease    • Nausea    • Neurogenic claudication 01/20/2020   • Neuropathy    • Radiculopathy, lumbosacral region 01/20/2020   • Spondylolisthesis, lumbar region 01/29/2020    grade 1 at L3/4        Immunization History   Administered Date(s) Administered   • Pneumococcal Conjugate 13-Valent (PCV13) 01/16/2017       Allergies   Allergen Reactions   • Clindamycin Rash, Shortness Of Breath and Swelling   • Adalimumab Rash   • Azithromycin Rash and Nausea And Vomiting   • Erythromycin Rash and Unknown - High Severity          Current Outpatient Medications:   •  albuterol (PROVENTIL) (2.5 MG/3ML) 0.083% nebulizer solution, Take 2.5 mg by nebulization Every 4 (Four) Hours As Needed., Disp: , Rfl:   •  albuterol sulfate HFA (Ventolin HFA) 108 (90 Base) MCG/ACT inhaler, Inhale 2 puffs Every 4 (Four) Hours As Needed for Wheezing., Disp: , Rfl:   •  alendronate (FOSAMAX) 70 MG tablet, 1 tablet 30 minutes before the first food, beverage or medicine of the day with plain water, Disp: , Rfl:   •  azaTHIOprine (Imuran) 50 MG tablet, Imuran 50 mg oral tablet take 1 tablet (50 mg) by oral route once daily   Active, Disp: , Rfl:   •  Breztri Aerosphere 160-9-4.8 MCG/ACT aerosol inhaler, INHALE TWO PUFFS BY MOUTH TWICE A DAY, Disp: 10.7 g, Rfl: 2  •  HYDROcodone-acetaminophen (NORCO)  MG per tablet, hydrocodone-acetaminophen  mg oral tablet take 1 tablet by oral route every 6 hours as needed for pain   Active, Disp: , Rfl:   •  ibuprofen (ADVIL,MOTRIN) 800 MG tablet, , Disp: , Rfl:   •  ondansetron (ZOFRAN) 4 MG tablet, ondansetron HCl 4 mg tablet, Disp: , Rfl:   •  doxycycline (VIBRAMYCIN) 100 MG capsule, Take 1 capsule by mouth 2 (Two) Times a Day., Disp: 20 capsule, Rfl: 0  •  predniSONE (DELTASONE) 20 MG tablet, Take 2 tablets by mouth Daily., Disp: 14 tablet, Rfl: 0     Objective     Physical Exam  Vital Signs Reviewed  WDWN, Alert, NAD.   "  HEENT:  PERRL, EOMI.  OP, nares clear, no sinus tenderness  Neck:  Supple, no JVD, no thyromegaly.  Lymph: no axillary, cervical, supraclavicular lymphadenopathy noted bilaterally  Chest: Mildly decreased breath sounds throughout with diffuse expiratory wheezes. No rales or rhonchi appreciated.  Normal work of breathing noted.  Patient is able speak full sentences without difficulty.  CV: RRR, no MGR, pulses 2+, equal.  Abd:  Soft, NT, ND, + BS, no HSM  EXT:  no clubbing, no cyanosis, no edema, no joint tenderness  Neuro:  A&Ox3, CN grossly intact, no focal deficits.  Skin: No rashes or lesions noted.    Vital Signs:   /53 (BP Location: Left arm, Patient Position: Sitting, Cuff Size: Adult)   Pulse 91   Temp 98 °F (36.7 °C) (Temporal)   Resp 19   Ht 157.5 cm (62\")   Wt 67.1 kg (148 lb)   SpO2 95% Comment: room air  BMI 27.07 kg/m²         Result Review :   I have personally reviewed GERARDO Naidu last office visit note.          Assessment and Plan      Assessment:  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       2. Bronchiectasis with acute exacerbation.      3. Asthma.       4. Antisynthetase syndrome.        5. Chronic immune suppression with imuran and chronic prednisone.        6. Cough.  7. Wheezing.  8. Never smoker.         Plan:  1.  Bronchiectasis with acute exacerbation.  I will start patient on doxycycline 100 mg twice daily for 10 days and prednisone burst.  Expectations and course of treatment discussed with the patient. How to take medications and possible side effects of medications discussed with the patient. Patient verbalized understanding and compliance.  2.  Flu swab completed in the office today came back negative.  I will send patient for Covid 19 testing.  3.  I will order a CBC, CMP, chest x-ray, and sputum culture.  4.  Patient is wanting Imuran increased however I spoke with Dr. Carrion and appreciate his help and input.  I advised patient we will not " increase Imuran until we have the results of her Covid test.   Patient is advised to quarantine until Covid results come back.  Patient is advised to continue to follow CDC recommendations such as social distancing, wearing a mask, and washing hands for least 20 seconds.  5.  Continue Imuran at current dose.    6.  Continue Breztri every day as prescribed and rinse mouth out after each use.  7.  Continue albuterol inhaler and nebulizer treatments as needed.  8.  Patient reports they are up-to-date with her pneumonia vaccine.  Patient is advised to receive the flu vaccine when it comes out this fall and to receive the Covid vaccine.  Risks of not receiving vaccines discussed with patient and patient verbalized understanding.  9. Patient to call the office, 911, or go to the ER with new or worsening symptoms.      10. Follow up in 3 to 4 weeks, sooner if needed.          Follow Up   Return for 3-4 weeks.  Patient was given instructions and counseling regarding her condition or for health maintenance advice. Please see specific information pulled into the AVS if appropriate.

## 2021-09-16 ENCOUNTER — OFFICE VISIT (OUTPATIENT)
Dept: PULMONOLOGY | Facility: CLINIC | Age: 71
End: 2021-09-16

## 2021-09-16 ENCOUNTER — LAB (OUTPATIENT)
Dept: LAB | Facility: HOSPITAL | Age: 71
End: 2021-09-16

## 2021-09-16 VITALS
HEIGHT: 62 IN | SYSTOLIC BLOOD PRESSURE: 110 MMHG | RESPIRATION RATE: 19 BRPM | DIASTOLIC BLOOD PRESSURE: 53 MMHG | BODY MASS INDEX: 27.23 KG/M2 | TEMPERATURE: 98 F | HEART RATE: 91 BPM | WEIGHT: 148 LBS | OXYGEN SATURATION: 95 %

## 2021-09-16 DIAGNOSIS — J44.9 COPD (CHRONIC OBSTRUCTIVE PULMONARY DISEASE) CASE MANAGEMENT PATIENT (HCC): ICD-10-CM

## 2021-09-16 DIAGNOSIS — R05.9 COUGH: ICD-10-CM

## 2021-09-16 DIAGNOSIS — D89.89 ANTISYNTHETASE SYNDROME (HCC): ICD-10-CM

## 2021-09-16 DIAGNOSIS — J47.9 BRONCHIECTASIS WITHOUT COMPLICATION (HCC): ICD-10-CM

## 2021-09-16 DIAGNOSIS — J84.10 PULMONARY FIBROSIS (HCC): ICD-10-CM

## 2021-09-16 DIAGNOSIS — J45.909 ASTHMA, UNSPECIFIED ASTHMA SEVERITY, UNSPECIFIED WHETHER COMPLICATED, UNSPECIFIED WHETHER PERSISTENT: ICD-10-CM

## 2021-09-16 DIAGNOSIS — R06.00 DYSPNEA, UNSPECIFIED TYPE: ICD-10-CM

## 2021-09-16 DIAGNOSIS — D84.9 IMMUNOSUPPRESSED STATUS (HCC): ICD-10-CM

## 2021-09-16 DIAGNOSIS — D84.9 IMMUNOSUPPRESSED STATUS (HCC): Primary | ICD-10-CM

## 2021-09-16 LAB
EXPIRATION DATE: NORMAL
FLUAV AG NPH QL: NEGATIVE
FLUBV AG NPH QL: NEGATIVE
INTERNAL CONTROL: NORMAL
Lab: NORMAL

## 2021-09-16 PROCEDURE — U0004 COV-19 TEST NON-CDC HGH THRU: HCPCS

## 2021-09-16 PROCEDURE — 99214 OFFICE O/P EST MOD 30 MIN: CPT | Performed by: NURSE PRACTITIONER

## 2021-09-16 PROCEDURE — 87804 INFLUENZA ASSAY W/OPTIC: CPT | Performed by: NURSE PRACTITIONER

## 2021-09-16 RX ORDER — DOXYCYCLINE HYCLATE 100 MG/1
100 CAPSULE ORAL 2 TIMES DAILY
Qty: 20 CAPSULE | Refills: 0 | Status: SHIPPED | OUTPATIENT
Start: 2021-09-16 | End: 2021-10-06 | Stop reason: HOSPADM

## 2021-09-16 RX ORDER — ALBUTEROL SULFATE 90 UG/1
2 AEROSOL, METERED RESPIRATORY (INHALATION) EVERY 4 HOURS PRN
COMMUNITY
End: 2021-09-22

## 2021-09-16 RX ORDER — PREDNISONE 20 MG/1
40 TABLET ORAL DAILY
Qty: 14 TABLET | Refills: 0 | Status: SHIPPED | OUTPATIENT
Start: 2021-09-16 | End: 2021-10-06 | Stop reason: HOSPADM

## 2021-09-16 RX ORDER — ALBUTEROL SULFATE 2.5 MG/3ML
2.5 SOLUTION RESPIRATORY (INHALATION) EVERY 4 HOURS PRN
COMMUNITY
End: 2022-08-09

## 2021-09-17 ENCOUNTER — HOSPITAL ENCOUNTER (OUTPATIENT)
Dept: GENERAL RADIOLOGY | Facility: HOSPITAL | Age: 71
Discharge: HOME | End: 2021-09-17

## 2021-09-17 ENCOUNTER — LAB (OUTPATIENT)
Dept: LAB | Facility: HOSPITAL | Age: 71
End: 2021-09-17

## 2021-09-17 DIAGNOSIS — J84.10 PULMONARY FIBROSIS (HCC): ICD-10-CM

## 2021-09-17 DIAGNOSIS — D84.9 IMMUNOSUPPRESSED STATUS (HCC): ICD-10-CM

## 2021-09-17 DIAGNOSIS — D89.89 ANTISYNTHETASE SYNDROME (HCC): ICD-10-CM

## 2021-09-17 DIAGNOSIS — J44.9 COPD (CHRONIC OBSTRUCTIVE PULMONARY DISEASE) CASE MANAGEMENT PATIENT (HCC): ICD-10-CM

## 2021-09-17 DIAGNOSIS — J45.909 ASTHMA, UNSPECIFIED ASTHMA SEVERITY, UNSPECIFIED WHETHER COMPLICATED, UNSPECIFIED WHETHER PERSISTENT: ICD-10-CM

## 2021-09-17 DIAGNOSIS — J47.9 BRONCHIECTASIS WITHOUT COMPLICATION (HCC): ICD-10-CM

## 2021-09-17 LAB
ALBUMIN SERPL-MCNC: 3.7 G/DL (ref 3.5–5.2)
ALBUMIN/GLOB SERPL: 1.8 G/DL
ALP SERPL-CCNC: 115 U/L (ref 39–117)
ALT SERPL W P-5'-P-CCNC: 25 U/L (ref 1–33)
ANION GAP SERPL CALCULATED.3IONS-SCNC: 11.2 MMOL/L (ref 5–15)
AST SERPL-CCNC: 34 U/L (ref 1–32)
BASOPHILS # BLD AUTO: 0 10*3/MM3 (ref 0–0.2)
BASOPHILS NFR BLD AUTO: 0 % (ref 0–1.5)
BILIRUB SERPL-MCNC: 0.3 MG/DL (ref 0–1.2)
BUN SERPL-MCNC: 17 MG/DL (ref 8–23)
BUN/CREAT SERPL: 28.3 (ref 7–25)
CALCIUM SPEC-SCNC: 9.5 MG/DL (ref 8.6–10.5)
CHLORIDE SERPL-SCNC: 104 MMOL/L (ref 98–107)
CO2 SERPL-SCNC: 22.8 MMOL/L (ref 22–29)
CREAT SERPL-MCNC: 0.6 MG/DL (ref 0.57–1)
DEPRECATED RDW RBC AUTO: 44.3 FL (ref 37–54)
EOSINOPHIL # BLD AUTO: 0 10*3/MM3 (ref 0–0.4)
EOSINOPHIL NFR BLD AUTO: 0 % (ref 0.3–6.2)
ERYTHROCYTE [DISTWIDTH] IN BLOOD BY AUTOMATED COUNT: 13.7 % (ref 12.3–15.4)
GFR SERPL CREATININE-BSD FRML MDRD: 99 ML/MIN/1.73
GLOBULIN UR ELPH-MCNC: 2.1 GM/DL
GLUCOSE SERPL-MCNC: 84 MG/DL (ref 65–99)
HCT VFR BLD AUTO: 33.7 % (ref 34–46.6)
HGB BLD-MCNC: 11 G/DL (ref 12–15.9)
IMM GRANULOCYTES # BLD AUTO: 0.03 10*3/MM3 (ref 0–0.05)
IMM GRANULOCYTES NFR BLD AUTO: 0.4 % (ref 0–0.5)
LYMPHOCYTES # BLD AUTO: 0.41 10*3/MM3 (ref 0.7–3.1)
LYMPHOCYTES NFR BLD AUTO: 6 % (ref 19.6–45.3)
MCH RBC QN AUTO: 28.9 PG (ref 26.6–33)
MCHC RBC AUTO-ENTMCNC: 32.6 G/DL (ref 31.5–35.7)
MCV RBC AUTO: 88.5 FL (ref 79–97)
MONOCYTES # BLD AUTO: 0.36 10*3/MM3 (ref 0.1–0.9)
MONOCYTES NFR BLD AUTO: 5.3 % (ref 5–12)
NEUTROPHILS NFR BLD AUTO: 6 10*3/MM3 (ref 1.7–7)
NEUTROPHILS NFR BLD AUTO: 88.3 % (ref 42.7–76)
NRBC BLD AUTO-RTO: 0 /100 WBC (ref 0–0.2)
PLATELET # BLD AUTO: 200 10*3/MM3 (ref 140–450)
PMV BLD AUTO: 11.2 FL (ref 6–12)
POTASSIUM SERPL-SCNC: 4.4 MMOL/L (ref 3.5–5.2)
PROT SERPL-MCNC: 5.8 G/DL (ref 6–8.5)
RBC # BLD AUTO: 3.81 10*6/MM3 (ref 3.77–5.28)
SARS-COV-2 RNA NOSE QL NAA+PROBE: DETECTED
SODIUM SERPL-SCNC: 138 MMOL/L (ref 136–145)
WBC # BLD AUTO: 6.8 10*3/MM3 (ref 3.4–10.8)

## 2021-09-17 PROCEDURE — 36415 COLL VENOUS BLD VENIPUNCTURE: CPT

## 2021-09-17 PROCEDURE — 85025 COMPLETE CBC W/AUTO DIFF WBC: CPT

## 2021-09-17 PROCEDURE — 80053 COMPREHEN METABOLIC PANEL: CPT

## 2021-09-17 PROCEDURE — 71046 X-RAY EXAM CHEST 2 VIEWS: CPT

## 2021-09-20 ENCOUNTER — TRANSCRIBE ORDERS (OUTPATIENT)
Dept: ADMINISTRATIVE | Facility: HOSPITAL | Age: 71
End: 2021-09-20

## 2021-09-20 ENCOUNTER — TELEPHONE (OUTPATIENT)
Dept: PULMONOLOGY | Facility: CLINIC | Age: 71
End: 2021-09-20

## 2021-09-20 DIAGNOSIS — U07.1 COVID-19 VIRUS DETECTED: ICD-10-CM

## 2021-09-20 DIAGNOSIS — U07.1 COVID-19 VIRUS DETECTED: Primary | ICD-10-CM

## 2021-09-20 DIAGNOSIS — U07.1 PNEUMONIA DUE TO COVID-19 VIRUS: Primary | ICD-10-CM

## 2021-09-20 DIAGNOSIS — Z51.81 MEDICATION MONITORING ENCOUNTER: Primary | ICD-10-CM

## 2021-09-20 DIAGNOSIS — U07.1 COVID-19: Primary | ICD-10-CM

## 2021-09-20 DIAGNOSIS — J12.82 PNEUMONIA DUE TO COVID-19 VIRUS: Primary | ICD-10-CM

## 2021-09-20 RX ORDER — EPINEPHRINE 1 MG/ML
0.3 INJECTION, SOLUTION, CONCENTRATE INTRAVENOUS AS NEEDED
Status: DISCONTINUED | OUTPATIENT
Start: 2021-09-21 | End: 2021-09-23 | Stop reason: HOSPADM

## 2021-09-20 RX ORDER — DIPHENHYDRAMINE HYDROCHLORIDE 50 MG/ML
50 INJECTION INTRAMUSCULAR; INTRAVENOUS AS NEEDED
Status: DISCONTINUED | OUTPATIENT
Start: 2021-09-21 | End: 2021-09-23 | Stop reason: HOSPADM

## 2021-09-20 NOTE — TELEPHONE ENCOUNTER
Patient called and left  statting she spoke with Dr. Alejandro on satuerday and he wants her scheduled for an infusion today. Please call patient with appt.

## 2021-09-20 NOTE — TELEPHONE ENCOUNTER
Spoke with patient and informed her of positive Covid results and chest x-ray results. Patient states that she already spoke with someone from our office and is being set up for monoclonal antibody infusion. Patient advised to go to the ER with any new or worsening symptoms and to follow health department and CDC recommendations. Advised patient that we will repeat scan in 6-8 weeks to ensure resolution of pneumonia.

## 2021-09-21 ENCOUNTER — HOSPITAL ENCOUNTER (OUTPATIENT)
Dept: INFUSION THERAPY | Facility: HOSPITAL | Age: 71
Discharge: HOME | End: 2021-09-21
Admitting: INTERNAL MEDICINE

## 2021-09-21 VITALS
OXYGEN SATURATION: 100 % | HEART RATE: 99 BPM | DIASTOLIC BLOOD PRESSURE: 52 MMHG | RESPIRATION RATE: 18 BRPM | TEMPERATURE: 97.5 F | SYSTOLIC BLOOD PRESSURE: 101 MMHG

## 2021-09-21 DIAGNOSIS — U07.1 COVID-19: Primary | ICD-10-CM

## 2021-09-21 PROCEDURE — M0243 CASIRIVI AND IMDEVI INFUSION: HCPCS | Performed by: INTERNAL MEDICINE

## 2021-09-21 PROCEDURE — 25010000006 INJECTION, CASIRIVIMAB AND IMDEVIMAB, 1200 MG: Performed by: INTERNAL MEDICINE

## 2021-09-21 RX ORDER — ARFORMOTEROL TARTRATE 15 UG/2ML
15 SOLUTION RESPIRATORY (INHALATION) 2 TIMES DAILY
COMMUNITY
End: 2021-10-14

## 2021-09-21 RX ORDER — ALBUTEROL SULFATE 90 UG/1
2 AEROSOL, METERED RESPIRATORY (INHALATION) EVERY 4 HOURS PRN
COMMUNITY
End: 2021-10-29 | Stop reason: SDUPTHER

## 2021-09-21 RX ORDER — ATORVASTATIN CALCIUM 20 MG/1
20 TABLET, FILM COATED ORAL DAILY
COMMUNITY
Start: 2021-09-15

## 2021-09-21 RX ADMIN — CASIRIVIMAB AND IMDEVIMAB 300 MG: 600; 600 INJECTION, SOLUTION, CONCENTRATE INTRAVENOUS at 09:33

## 2021-09-22 ENCOUNTER — HOSPITAL ENCOUNTER (INPATIENT)
Facility: HOSPITAL | Age: 71
LOS: 14 days | Discharge: HOME HEALTH | End: 2021-10-06
Attending: EMERGENCY MEDICINE | Admitting: INTERNAL MEDICINE

## 2021-09-22 ENCOUNTER — APPOINTMENT (OUTPATIENT)
Dept: GENERAL RADIOLOGY | Facility: HOSPITAL | Age: 71
End: 2021-09-22

## 2021-09-22 DIAGNOSIS — Z98.1 S/P LUMBAR SPINAL FUSION: ICD-10-CM

## 2021-09-22 DIAGNOSIS — R09.02 HYPOXIA: ICD-10-CM

## 2021-09-22 DIAGNOSIS — M48.062 SPINAL STENOSIS OF LUMBAR REGION WITH NEUROGENIC CLAUDICATION: ICD-10-CM

## 2021-09-22 DIAGNOSIS — Z78.9 DECREASED ACTIVITIES OF DAILY LIVING (ADL): ICD-10-CM

## 2021-09-22 DIAGNOSIS — J44.9 COPD (CHRONIC OBSTRUCTIVE PULMONARY DISEASE) CASE MANAGEMENT PATIENT (HCC): ICD-10-CM

## 2021-09-22 DIAGNOSIS — J84.10 PULMONARY FIBROSIS (HCC): ICD-10-CM

## 2021-09-22 DIAGNOSIS — R26.2 DIFFICULTY WALKING: ICD-10-CM

## 2021-09-22 DIAGNOSIS — R13.12 OROPHARYNGEAL DYSPHAGIA: Primary | ICD-10-CM

## 2021-09-22 DIAGNOSIS — U07.1 COVID-19: ICD-10-CM

## 2021-09-22 LAB
ALBUMIN SERPL-MCNC: 3 G/DL (ref 3.5–5.2)
ALBUMIN SERPL-MCNC: 3.4 G/DL (ref 3.5–5.2)
ALBUMIN/GLOB SERPL: 1.4 G/DL
ALP SERPL-CCNC: 155 U/L (ref 39–117)
ALP SERPL-CCNC: 157 U/L (ref 39–117)
ALT SERPL W P-5'-P-CCNC: 26 U/L (ref 1–33)
ALT SERPL W P-5'-P-CCNC: 29 U/L (ref 1–33)
ANION GAP SERPL CALCULATED.3IONS-SCNC: 11.8 MMOL/L (ref 5–15)
AST SERPL-CCNC: 33 U/L (ref 1–32)
AST SERPL-CCNC: 35 U/L (ref 1–32)
BASOPHILS # BLD AUTO: 0.01 10*3/MM3 (ref 0–0.2)
BASOPHILS NFR BLD AUTO: 0.1 % (ref 0–1.5)
BILIRUB CONJ SERPL-MCNC: 0.4 MG/DL (ref 0–0.3)
BILIRUB INDIRECT SERPL-MCNC: 0.5 MG/DL
BILIRUB SERPL-MCNC: 0.9 MG/DL (ref 0–1.2)
BILIRUB SERPL-MCNC: 1 MG/DL (ref 0–1.2)
BUN SERPL-MCNC: 23 MG/DL (ref 8–23)
BUN/CREAT SERPL: 31.9 (ref 7–25)
CALCIUM SPEC-SCNC: 9.2 MG/DL (ref 8.6–10.5)
CHLORIDE SERPL-SCNC: 101 MMOL/L (ref 98–107)
CO2 SERPL-SCNC: 24.2 MMOL/L (ref 22–29)
CREAT SERPL-MCNC: 0.65 MG/DL (ref 0.57–1)
CREAT SERPL-MCNC: 0.72 MG/DL (ref 0.57–1)
D DIMER PPP FEU-MCNC: 27.1 MG/L (FEU) (ref 0–0.59)
D-LACTATE SERPL-SCNC: 1.5 MMOL/L (ref 0.5–2)
D-LACTATE SERPL-SCNC: 2.3 MMOL/L (ref 0.5–2)
DEPRECATED RDW RBC AUTO: 49.8 FL (ref 37–54)
EOSINOPHIL # BLD AUTO: 0.03 10*3/MM3 (ref 0–0.4)
EOSINOPHIL NFR BLD AUTO: 0.3 % (ref 0.3–6.2)
ERYTHROCYTE [DISTWIDTH] IN BLOOD BY AUTOMATED COUNT: 14.6 % (ref 12.3–15.4)
FERRITIN SERPL-MCNC: 1183 NG/ML (ref 13–150)
GFR SERPL CREATININE-BSD FRML MDRD: 80 ML/MIN/1.73
GFR SERPL CREATININE-BSD FRML MDRD: 90 ML/MIN/1.73
GLOBULIN UR ELPH-MCNC: 2.4 GM/DL
GLUCOSE SERPL-MCNC: 85 MG/DL (ref 65–99)
HCT VFR BLD AUTO: 36.7 % (ref 34–46.6)
HGB BLD-MCNC: 11.4 G/DL (ref 12–15.9)
HOLD SPECIMEN: NORMAL
HOLD SPECIMEN: NORMAL
IMM GRANULOCYTES # BLD AUTO: 0.17 10*3/MM3 (ref 0–0.05)
IMM GRANULOCYTES NFR BLD AUTO: 1.5 % (ref 0–0.5)
LARGE PLATELETS: NORMAL
LDH SERPL-CCNC: 431 U/L (ref 135–214)
LYMPHOCYTES # BLD AUTO: 0.73 10*3/MM3 (ref 0.7–3.1)
LYMPHOCYTES NFR BLD AUTO: 6.4 % (ref 19.6–45.3)
MCH RBC QN AUTO: 28.7 PG (ref 26.6–33)
MCHC RBC AUTO-ENTMCNC: 31.1 G/DL (ref 31.5–35.7)
MCV RBC AUTO: 92.4 FL (ref 79–97)
MONOCYTES # BLD AUTO: 0.49 10*3/MM3 (ref 0.1–0.9)
MONOCYTES NFR BLD AUTO: 4.3 % (ref 5–12)
NEUTROPHILS NFR BLD AUTO: 87.4 % (ref 42.7–76)
NEUTROPHILS NFR BLD AUTO: 9.91 10*3/MM3 (ref 1.7–7)
NRBC BLD AUTO-RTO: 0 /100 WBC (ref 0–0.2)
NT-PROBNP SERPL-MCNC: 1110 PG/ML (ref 0–900)
PLATELET # BLD AUTO: 89 10*3/MM3 (ref 140–450)
PMV BLD AUTO: 10.5 FL (ref 6–12)
POTASSIUM SERPL-SCNC: 3.5 MMOL/L (ref 3.5–5.2)
PROCALCITONIN SERPL-MCNC: 1.08 NG/ML (ref 0–0.25)
PROT SERPL-MCNC: 5.7 G/DL (ref 6–8.5)
PROT SERPL-MCNC: 5.8 G/DL (ref 6–8.5)
RBC # BLD AUTO: 3.97 10*6/MM3 (ref 3.77–5.28)
RBC MORPH BLD: NORMAL
SMALL PLATELETS BLD QL SMEAR: NORMAL
SODIUM SERPL-SCNC: 137 MMOL/L (ref 136–145)
TROPONIN T SERPL-MCNC: 0.19 NG/ML (ref 0–0.03)
WBC # BLD AUTO: 11.34 10*3/MM3 (ref 3.4–10.8)
WBC MORPH BLD: NORMAL
WHOLE BLOOD HOLD SPECIMEN: NORMAL
WHOLE BLOOD HOLD SPECIMEN: NORMAL

## 2021-09-22 PROCEDURE — 93005 ELECTROCARDIOGRAM TRACING: CPT | Performed by: EMERGENCY MEDICINE

## 2021-09-22 PROCEDURE — 25010000002 MORPHINE PER 10 MG: Performed by: EMERGENCY MEDICINE

## 2021-09-22 PROCEDURE — 87150 DNA/RNA AMPLIFIED PROBE: CPT | Performed by: EMERGENCY MEDICINE

## 2021-09-22 PROCEDURE — 83615 LACTATE (LD) (LDH) ENZYME: CPT | Performed by: INTERNAL MEDICINE

## 2021-09-22 PROCEDURE — 84145 PROCALCITONIN (PCT): CPT | Performed by: INTERNAL MEDICINE

## 2021-09-22 PROCEDURE — 94799 UNLISTED PULMONARY SVC/PX: CPT

## 2021-09-22 PROCEDURE — 63710000001 AZATHIOPRINE PER 50 MG: Performed by: INTERNAL MEDICINE

## 2021-09-22 PROCEDURE — 25010000002 CEFEPIME PER 500 MG: Performed by: INTERNAL MEDICINE

## 2021-09-22 PROCEDURE — 93010 ELECTROCARDIOGRAM REPORT: CPT | Performed by: INTERNAL MEDICINE

## 2021-09-22 PROCEDURE — 93005 ELECTROCARDIOGRAM TRACING: CPT

## 2021-09-22 PROCEDURE — 84484 ASSAY OF TROPONIN QUANT: CPT | Performed by: EMERGENCY MEDICINE

## 2021-09-22 PROCEDURE — 85379 FIBRIN DEGRADATION QUANT: CPT | Performed by: INTERNAL MEDICINE

## 2021-09-22 PROCEDURE — 71045 X-RAY EXAM CHEST 1 VIEW: CPT

## 2021-09-22 PROCEDURE — 36415 COLL VENOUS BLD VENIPUNCTURE: CPT | Performed by: EMERGENCY MEDICINE

## 2021-09-22 PROCEDURE — 82728 ASSAY OF FERRITIN: CPT | Performed by: INTERNAL MEDICINE

## 2021-09-22 PROCEDURE — 85007 BL SMEAR W/DIFF WBC COUNT: CPT | Performed by: EMERGENCY MEDICINE

## 2021-09-22 PROCEDURE — 87147 CULTURE TYPE IMMUNOLOGIC: CPT | Performed by: EMERGENCY MEDICINE

## 2021-09-22 PROCEDURE — 83605 ASSAY OF LACTIC ACID: CPT | Performed by: EMERGENCY MEDICINE

## 2021-09-22 PROCEDURE — 80053 COMPREHEN METABOLIC PANEL: CPT | Performed by: EMERGENCY MEDICINE

## 2021-09-22 PROCEDURE — 80076 HEPATIC FUNCTION PANEL: CPT | Performed by: INTERNAL MEDICINE

## 2021-09-22 PROCEDURE — 25010000002 ENOXAPARIN PER 10 MG: Performed by: INTERNAL MEDICINE

## 2021-09-22 PROCEDURE — 99285 EMERGENCY DEPT VISIT HI MDM: CPT

## 2021-09-22 PROCEDURE — XW033E5 INTRODUCTION OF REMDESIVIR ANTI-INFECTIVE INTO PERIPHERAL VEIN, PERCUTANEOUS APPROACH, NEW TECHNOLOGY GROUP 5: ICD-10-PCS | Performed by: INTERNAL MEDICINE

## 2021-09-22 PROCEDURE — 83880 ASSAY OF NATRIURETIC PEPTIDE: CPT | Performed by: EMERGENCY MEDICINE

## 2021-09-22 PROCEDURE — 87040 BLOOD CULTURE FOR BACTERIA: CPT | Performed by: EMERGENCY MEDICINE

## 2021-09-22 PROCEDURE — 85025 COMPLETE CBC W/AUTO DIFF WBC: CPT | Performed by: EMERGENCY MEDICINE

## 2021-09-22 PROCEDURE — 25010000002 DEXAMETHASONE PER 1 MG: Performed by: EMERGENCY MEDICINE

## 2021-09-22 PROCEDURE — 82565 ASSAY OF CREATININE: CPT | Performed by: INTERNAL MEDICINE

## 2021-09-22 RX ORDER — HYDROCODONE BITARTRATE AND ACETAMINOPHEN 10; 325 MG/1; MG/1
1 TABLET ORAL EVERY 6 HOURS PRN
Status: DISCONTINUED | OUTPATIENT
Start: 2021-09-22 | End: 2021-10-06 | Stop reason: HOSPADM

## 2021-09-22 RX ORDER — ARFORMOTEROL TARTRATE 15 UG/2ML
15 SOLUTION RESPIRATORY (INHALATION)
Status: DISCONTINUED | OUTPATIENT
Start: 2021-09-22 | End: 2021-10-06 | Stop reason: HOSPADM

## 2021-09-22 RX ORDER — DEXAMETHASONE SODIUM PHOSPHATE 10 MG/ML
10 INJECTION INTRAMUSCULAR; INTRAVENOUS DAILY
Status: DISCONTINUED | OUTPATIENT
Start: 2021-09-23 | End: 2021-09-24

## 2021-09-22 RX ORDER — ALBUTEROL SULFATE 2.5 MG/3ML
2.5 SOLUTION RESPIRATORY (INHALATION) EVERY 4 HOURS PRN
Status: DISCONTINUED | OUTPATIENT
Start: 2021-09-22 | End: 2021-10-06 | Stop reason: HOSPADM

## 2021-09-22 RX ORDER — ONDANSETRON 4 MG/1
4 TABLET, FILM COATED ORAL EVERY 8 HOURS PRN
Status: DISCONTINUED | OUTPATIENT
Start: 2021-09-22 | End: 2021-10-06 | Stop reason: HOSPADM

## 2021-09-22 RX ORDER — CEFEPIME 1 G/50ML
2 INJECTION, SOLUTION INTRAVENOUS EVERY 12 HOURS
Status: DISCONTINUED | OUTPATIENT
Start: 2021-09-23 | End: 2021-09-25

## 2021-09-22 RX ORDER — DEXAMETHASONE SODIUM PHOSPHATE 10 MG/ML
10 INJECTION INTRAMUSCULAR; INTRAVENOUS ONCE
Status: COMPLETED | OUTPATIENT
Start: 2021-09-22 | End: 2021-09-22

## 2021-09-22 RX ORDER — SODIUM CHLORIDE 0.9 % (FLUSH) 0.9 %
10 SYRINGE (ML) INJECTION AS NEEDED
Status: DISCONTINUED | OUTPATIENT
Start: 2021-09-22 | End: 2021-10-06 | Stop reason: HOSPADM

## 2021-09-22 RX ORDER — DEXAMETHASONE SODIUM PHOSPHATE 10 MG/ML
10 INJECTION INTRAMUSCULAR; INTRAVENOUS DAILY
Status: DISCONTINUED | OUTPATIENT
Start: 2021-09-22 | End: 2021-09-22

## 2021-09-22 RX ORDER — IBUPROFEN 200 MG
200 TABLET ORAL EVERY 6 HOURS PRN
COMMUNITY
End: 2021-10-06 | Stop reason: HOSPADM

## 2021-09-22 RX ORDER — CEFEPIME 1 G/50ML
2 INJECTION, SOLUTION INTRAVENOUS ONCE
Status: COMPLETED | OUTPATIENT
Start: 2021-09-22 | End: 2021-09-22

## 2021-09-22 RX ORDER — ATORVASTATIN CALCIUM 20 MG/1
20 TABLET, FILM COATED ORAL NIGHTLY
Status: DISCONTINUED | OUTPATIENT
Start: 2021-09-22 | End: 2021-10-06 | Stop reason: HOSPADM

## 2021-09-22 RX ORDER — AZATHIOPRINE 50 MG/1
50 TABLET ORAL DAILY
Status: DISCONTINUED | OUTPATIENT
Start: 2021-09-22 | End: 2021-09-23

## 2021-09-22 RX ADMIN — ENOXAPARIN SODIUM 40 MG: 40 INJECTION SUBCUTANEOUS at 17:58

## 2021-09-22 RX ADMIN — CEFEPIME 2 G: 1 INJECTION, SOLUTION INTRAVENOUS at 19:49

## 2021-09-22 RX ADMIN — HYDROCODONE BITARTRATE AND ACETAMINOPHEN 1 TABLET: 10; 325 TABLET ORAL at 20:06

## 2021-09-22 RX ADMIN — MORPHINE SULFATE 4 MG: 4 INJECTION, SOLUTION INTRAMUSCULAR; INTRAVENOUS at 11:13

## 2021-09-22 RX ADMIN — ATORVASTATIN CALCIUM 20 MG: 40 TABLET, FILM COATED ORAL at 20:43

## 2021-09-22 RX ADMIN — REMDESIVIR 200 MG: 100 INJECTION, POWDER, LYOPHILIZED, FOR SOLUTION INTRAVENOUS at 17:06

## 2021-09-22 RX ADMIN — AZATHIOPRINE 50 MG: 50 TABLET ORAL at 19:48

## 2021-09-22 RX ADMIN — DEXAMETHASONE SODIUM PHOSPHATE 10 MG: 10 INJECTION INTRAMUSCULAR; INTRAVENOUS at 09:14

## 2021-09-23 ENCOUNTER — APPOINTMENT (OUTPATIENT)
Dept: CARDIOLOGY | Facility: HOSPITAL | Age: 71
End: 2021-09-23

## 2021-09-23 ENCOUNTER — APPOINTMENT (OUTPATIENT)
Dept: CT IMAGING | Facility: HOSPITAL | Age: 71
End: 2021-09-23

## 2021-09-23 PROBLEM — E43 SEVERE MALNUTRITION (HCC): Status: ACTIVE | Noted: 2021-09-23

## 2021-09-23 LAB
ALBUMIN SERPL-MCNC: 2.8 G/DL (ref 3.5–5.2)
ALBUMIN/GLOB SERPL: 1 G/DL
ALP SERPL-CCNC: 174 U/L (ref 39–117)
ALT SERPL W P-5'-P-CCNC: 23 U/L (ref 1–33)
ANION GAP SERPL CALCULATED.3IONS-SCNC: 13 MMOL/L (ref 5–15)
AST SERPL-CCNC: 29 U/L (ref 1–32)
BACTERIA BLD CULT: ABNORMAL
BASOPHILS # BLD AUTO: 0.03 10*3/MM3 (ref 0–0.2)
BASOPHILS NFR BLD AUTO: 0.2 % (ref 0–1.5)
BH CV LOW VAS LEFT COMMON FEMORAL SPONT: 1
BH CV LOW VAS LEFT EXTERNAL ILIAC AUGMENT: NORMAL
BH CV LOW VAS LEFT EXTERNAL ILIAC COMPRESS: NORMAL
BH CV LOW VAS RIGHT POSTERIOR TIBIAL VESSEL: 1
BH CV LOW VAS RIGHT SOLEAL VESSEL: 1
BH CV LOWER VASCULAR LEFT COMMON FEMORAL COMPRESS: NORMAL
BH CV LOWER VASCULAR LEFT COMMON FEMORAL PHASIC: NORMAL
BH CV LOWER VASCULAR LEFT COMMON FEMORAL SPONT: NORMAL
BH CV LOWER VASCULAR LEFT COMMON FEMORAL THROMBUS: NORMAL
BH CV LOWER VASCULAR LEFT DISTAL FEMORAL COMPRESS: NORMAL
BH CV LOWER VASCULAR LEFT EXTERNAL ILIAC COMPETENT: NORMAL
BH CV LOWER VASCULAR LEFT EXTERNAL ILIAC PHASIC: NORMAL
BH CV LOWER VASCULAR LEFT EXTERNAL ILIAC SPONT: NORMAL
BH CV LOWER VASCULAR LEFT GREATER SAPH AK COMPRESS: NORMAL
BH CV LOWER VASCULAR LEFT GREATER SAPH BK COMPRESS: NORMAL
BH CV LOWER VASCULAR LEFT MID FEMORAL AUGMENT: NORMAL
BH CV LOWER VASCULAR LEFT MID FEMORAL COMPETENT: NORMAL
BH CV LOWER VASCULAR LEFT MID FEMORAL COMPRESS: NORMAL
BH CV LOWER VASCULAR LEFT MID FEMORAL PHASIC: NORMAL
BH CV LOWER VASCULAR LEFT MID FEMORAL SPONT: NORMAL
BH CV LOWER VASCULAR LEFT PERONEAL COMPRESS: NORMAL
BH CV LOWER VASCULAR LEFT POPLITEAL AUGMENT: NORMAL
BH CV LOWER VASCULAR LEFT POPLITEAL COMPETENT: NORMAL
BH CV LOWER VASCULAR LEFT POPLITEAL COMPRESS: NORMAL
BH CV LOWER VASCULAR LEFT POPLITEAL PHASIC: NORMAL
BH CV LOWER VASCULAR LEFT POPLITEAL SPONT: NORMAL
BH CV LOWER VASCULAR LEFT POSTERIOR TIBIAL COMPRESS: NORMAL
BH CV LOWER VASCULAR LEFT PROXIMAL FEMORAL COMPRESS: NORMAL
BH CV LOWER VASCULAR RIGHT COMMON FEMORAL AUGMENT: NORMAL
BH CV LOWER VASCULAR RIGHT COMMON FEMORAL COMPETENT: NORMAL
BH CV LOWER VASCULAR RIGHT COMMON FEMORAL COMPRESS: NORMAL
BH CV LOWER VASCULAR RIGHT COMMON FEMORAL PHASIC: NORMAL
BH CV LOWER VASCULAR RIGHT COMMON FEMORAL SPONT: NORMAL
BH CV LOWER VASCULAR RIGHT DISTAL FEMORAL COMPRESS: NORMAL
BH CV LOWER VASCULAR RIGHT GREATER SAPH AK COMPRESS: NORMAL
BH CV LOWER VASCULAR RIGHT GREATER SAPH BK COMPRESS: NORMAL
BH CV LOWER VASCULAR RIGHT MID FEMORAL AUGMENT: NORMAL
BH CV LOWER VASCULAR RIGHT MID FEMORAL COMPETENT: NORMAL
BH CV LOWER VASCULAR RIGHT MID FEMORAL COMPRESS: NORMAL
BH CV LOWER VASCULAR RIGHT MID FEMORAL PHASIC: NORMAL
BH CV LOWER VASCULAR RIGHT MID FEMORAL SPONT: NORMAL
BH CV LOWER VASCULAR RIGHT PERONEAL COMPRESS: NORMAL
BH CV LOWER VASCULAR RIGHT POPLITEAL AUGMENT: NORMAL
BH CV LOWER VASCULAR RIGHT POPLITEAL COMPETENT: NORMAL
BH CV LOWER VASCULAR RIGHT POPLITEAL COMPRESS: NORMAL
BH CV LOWER VASCULAR RIGHT POPLITEAL PHASIC: NORMAL
BH CV LOWER VASCULAR RIGHT POPLITEAL SPONT: NORMAL
BH CV LOWER VASCULAR RIGHT POSTERIOR TIBIAL COMPRESS: NORMAL
BH CV LOWER VASCULAR RIGHT POSTERIOR TIBIAL THROMBUS: NORMAL
BH CV LOWER VASCULAR RIGHT PROXIMAL FEMORAL COMPRESS: NORMAL
BH CV LOWER VASCULAR RIGHT SAPHENOFEMORAL JUNCTION COMPRESS: NORMAL
BH CV LOWER VASCULAR RIGHT SOLEAL COMPRESS: NORMAL
BH CV LOWER VASCULAR RIGHT SOLEAL THROMBUS: NORMAL
BILIRUB CONJ SERPL-MCNC: 0.2 MG/DL (ref 0–0.3)
BILIRUB SERPL-MCNC: 0.6 MG/DL (ref 0–1.2)
BOTTLE TYPE: ABNORMAL
BUN SERPL-MCNC: 31 MG/DL (ref 8–23)
BUN/CREAT SERPL: 50.8 (ref 7–25)
CALCIUM SPEC-SCNC: 9 MG/DL (ref 8.6–10.5)
CHLORIDE SERPL-SCNC: 103 MMOL/L (ref 98–107)
CO2 SERPL-SCNC: 23 MMOL/L (ref 22–29)
CREAT SERPL-MCNC: 0.61 MG/DL (ref 0.57–1)
DEPRECATED RDW RBC AUTO: 48.2 FL (ref 37–54)
EOSINOPHIL # BLD AUTO: 0.01 10*3/MM3 (ref 0–0.4)
EOSINOPHIL NFR BLD AUTO: 0.1 % (ref 0.3–6.2)
ERYTHROCYTE [DISTWIDTH] IN BLOOD BY AUTOMATED COUNT: 14.6 % (ref 12.3–15.4)
GFR SERPL CREATININE-BSD FRML MDRD: 97 ML/MIN/1.73
GLOBULIN UR ELPH-MCNC: 2.8 GM/DL
GLUCOSE SERPL-MCNC: 89 MG/DL (ref 65–99)
HCT VFR BLD AUTO: 34.4 % (ref 34–46.6)
HGB BLD-MCNC: 10.9 G/DL (ref 12–15.9)
IMM GRANULOCYTES # BLD AUTO: 0.25 10*3/MM3 (ref 0–0.05)
IMM GRANULOCYTES NFR BLD AUTO: 1.3 % (ref 0–0.5)
LARGE PLATELETS: NORMAL
LYMPHOCYTES # BLD AUTO: 0.72 10*3/MM3 (ref 0.7–3.1)
LYMPHOCYTES NFR BLD AUTO: 3.8 % (ref 19.6–45.3)
MAXIMAL PREDICTED HEART RATE: 149 BPM
MCH RBC QN AUTO: 28.7 PG (ref 26.6–33)
MCHC RBC AUTO-ENTMCNC: 31.7 G/DL (ref 31.5–35.7)
MCV RBC AUTO: 90.5 FL (ref 79–97)
MONOCYTES # BLD AUTO: 0.88 10*3/MM3 (ref 0.1–0.9)
MONOCYTES NFR BLD AUTO: 4.6 % (ref 5–12)
NEUTROPHILS NFR BLD AUTO: 17.17 10*3/MM3 (ref 1.7–7)
NEUTROPHILS NFR BLD AUTO: 90 % (ref 42.7–76)
NRBC BLD AUTO-RTO: 0 /100 WBC (ref 0–0.2)
PLATELET # BLD AUTO: 107 10*3/MM3 (ref 140–450)
PMV BLD AUTO: 11.3 FL (ref 6–12)
POTASSIUM SERPL-SCNC: 3.9 MMOL/L (ref 3.5–5.2)
PROT SERPL-MCNC: 5.6 G/DL (ref 6–8.5)
QT INTERVAL: 331 MS
RBC # BLD AUTO: 3.8 10*6/MM3 (ref 3.77–5.28)
RBC MORPH BLD: NORMAL
SMALL PLATELETS BLD QL SMEAR: NORMAL
SODIUM SERPL-SCNC: 139 MMOL/L (ref 136–145)
STRESS TARGET HR: 127 BPM
WBC # BLD AUTO: 19.06 10*3/MM3 (ref 3.4–10.8)
WBC MORPH BLD: NORMAL

## 2021-09-23 PROCEDURE — 94640 AIRWAY INHALATION TREATMENT: CPT

## 2021-09-23 PROCEDURE — 25010000002 TOCILIZUMAB 162 MG/0.9ML SOLUTION 0.9 ML SYRINGE: Performed by: INTERNAL MEDICINE

## 2021-09-23 PROCEDURE — 80053 COMPREHEN METABOLIC PANEL: CPT | Performed by: INTERNAL MEDICINE

## 2021-09-23 PROCEDURE — 99291 CRITICAL CARE FIRST HOUR: CPT | Performed by: INTERNAL MEDICINE

## 2021-09-23 PROCEDURE — 82248 BILIRUBIN DIRECT: CPT | Performed by: INTERNAL MEDICINE

## 2021-09-23 PROCEDURE — 94799 UNLISTED PULMONARY SVC/PX: CPT

## 2021-09-23 PROCEDURE — 97161 PT EVAL LOW COMPLEX 20 MIN: CPT

## 2021-09-23 PROCEDURE — 63710000001 ONDANSETRON PER 8 MG: Performed by: INTERNAL MEDICINE

## 2021-09-23 PROCEDURE — 93970 EXTREMITY STUDY: CPT | Performed by: SURGERY

## 2021-09-23 PROCEDURE — 97110 THERAPEUTIC EXERCISES: CPT

## 2021-09-23 PROCEDURE — 85007 BL SMEAR W/DIFF WBC COUNT: CPT | Performed by: INTERNAL MEDICINE

## 2021-09-23 PROCEDURE — XW033H5 INTRODUCTION OF TOCILIZUMAB INTO PERIPHERAL VEIN, PERCUTANEOUS APPROACH, NEW TECHNOLOGY GROUP 5: ICD-10-PCS | Performed by: INTERNAL MEDICINE

## 2021-09-23 PROCEDURE — 25010000002 ENOXAPARIN PER 10 MG: Performed by: INTERNAL MEDICINE

## 2021-09-23 PROCEDURE — 0 IOPAMIDOL PER 1 ML: Performed by: INTERNAL MEDICINE

## 2021-09-23 PROCEDURE — 97165 OT EVAL LOW COMPLEX 30 MIN: CPT

## 2021-09-23 PROCEDURE — 94660 CPAP INITIATION&MGMT: CPT

## 2021-09-23 PROCEDURE — 93970 EXTREMITY STUDY: CPT

## 2021-09-23 PROCEDURE — 25010000002 DEXAMETHASONE PER 1 MG: Performed by: INTERNAL MEDICINE

## 2021-09-23 PROCEDURE — 71260 CT THORAX DX C+: CPT

## 2021-09-23 PROCEDURE — 25010000002 CEFEPIME PER 500 MG: Performed by: INTERNAL MEDICINE

## 2021-09-23 PROCEDURE — 85025 COMPLETE CBC W/AUTO DIFF WBC: CPT | Performed by: INTERNAL MEDICINE

## 2021-09-23 PROCEDURE — 25010000002 VANCOMYCIN 5 G RECONSTITUTED SOLUTION: Performed by: INTERNAL MEDICINE

## 2021-09-23 PROCEDURE — 63710000001 AZATHIOPRINE PER 50 MG: Performed by: INTERNAL MEDICINE

## 2021-09-23 RX ORDER — BUDESONIDE 0.5 MG/2ML
0.5 INHALANT ORAL
Status: DISCONTINUED | OUTPATIENT
Start: 2021-09-23 | End: 2021-10-06 | Stop reason: HOSPADM

## 2021-09-23 RX ORDER — CLONAZEPAM 0.5 MG/1
0.5 TABLET ORAL 2 TIMES DAILY
Status: DISCONTINUED | OUTPATIENT
Start: 2021-09-23 | End: 2021-09-28

## 2021-09-23 RX ORDER — MORPHINE SULFATE 2 MG/ML
2 INJECTION, SOLUTION INTRAMUSCULAR; INTRAVENOUS EVERY 4 HOURS PRN
Status: DISCONTINUED | OUTPATIENT
Start: 2021-09-23 | End: 2021-10-06 | Stop reason: HOSPADM

## 2021-09-23 RX ADMIN — CEFEPIME 2 G: 1 INJECTION, SOLUTION INTRAVENOUS at 22:03

## 2021-09-23 RX ADMIN — DEXAMETHASONE SODIUM PHOSPHATE 10 MG: 10 INJECTION INTRAMUSCULAR; INTRAVENOUS at 08:00

## 2021-09-23 RX ADMIN — ALBUTEROL SULFATE 2.5 MG: 2.5 SOLUTION RESPIRATORY (INHALATION) at 00:36

## 2021-09-23 RX ADMIN — CLONAZEPAM 0.5 MG: 0.5 TABLET ORAL at 22:05

## 2021-09-23 RX ADMIN — HYDROCODONE BITARTRATE AND ACETAMINOPHEN 1 TABLET: 10; 325 TABLET ORAL at 08:03

## 2021-09-23 RX ADMIN — ENOXAPARIN SODIUM 60 MG: 60 INJECTION SUBCUTANEOUS at 22:36

## 2021-09-23 RX ADMIN — VANCOMYCIN HYDROCHLORIDE 1250 MG: 5 INJECTION, POWDER, LYOPHILIZED, FOR SOLUTION INTRAVENOUS at 20:01

## 2021-09-23 RX ADMIN — CEFEPIME 2 G: 1 INJECTION, SOLUTION INTRAVENOUS at 08:00

## 2021-09-23 RX ADMIN — BUDESONIDE 0.5 MG: 0.5 INHALANT ORAL at 22:10

## 2021-09-23 RX ADMIN — TOCILIZUMAB 486 MG: 180 INJECTION, SOLUTION SUBCUTANEOUS at 14:37

## 2021-09-23 RX ADMIN — ARFORMOTEROL TARTRATE 15 MCG: 15 SOLUTION RESPIRATORY (INHALATION) at 22:10

## 2021-09-23 RX ADMIN — ALBUTEROL SULFATE 2.5 MG: 2.5 SOLUTION RESPIRATORY (INHALATION) at 04:10

## 2021-09-23 RX ADMIN — AZATHIOPRINE 50 MG: 50 TABLET ORAL at 08:00

## 2021-09-23 RX ADMIN — ATORVASTATIN CALCIUM 20 MG: 40 TABLET, FILM COATED ORAL at 22:36

## 2021-09-23 RX ADMIN — ONDANSETRON HYDROCHLORIDE 4 MG: 4 TABLET, FILM COATED ORAL at 09:22

## 2021-09-23 RX ADMIN — ALBUTEROL SULFATE 2.5 MG: 2.5 SOLUTION RESPIRATORY (INHALATION) at 11:23

## 2021-09-23 RX ADMIN — ENOXAPARIN SODIUM 60 MG: 60 INJECTION SUBCUTANEOUS at 14:37

## 2021-09-23 RX ADMIN — ARFORMOTEROL TARTRATE 15 MCG: 15 SOLUTION RESPIRATORY (INHALATION) at 11:22

## 2021-09-23 RX ADMIN — CLONAZEPAM 0.5 MG: 0.5 TABLET ORAL at 14:37

## 2021-09-23 RX ADMIN — IOPAMIDOL 100 ML: 755 INJECTION, SOLUTION INTRAVENOUS at 13:55

## 2021-09-23 RX ADMIN — ARFORMOTEROL TARTRATE 15 MCG: 15 SOLUTION RESPIRATORY (INHALATION) at 00:36

## 2021-09-24 LAB
ALBUMIN SERPL-MCNC: 3.1 G/DL (ref 3.5–5.2)
ALBUMIN SERPL-MCNC: 3.1 G/DL (ref 3.5–5.2)
ALBUMIN/GLOB SERPL: 1.2 G/DL
ALP SERPL-CCNC: 141 U/L (ref 39–117)
ALP SERPL-CCNC: 142 U/L (ref 39–117)
ALT SERPL W P-5'-P-CCNC: 21 U/L (ref 1–33)
ALT SERPL W P-5'-P-CCNC: 22 U/L (ref 1–33)
ANION GAP SERPL CALCULATED.3IONS-SCNC: 13.5 MMOL/L (ref 5–15)
AST SERPL-CCNC: 20 U/L (ref 1–32)
AST SERPL-CCNC: 20 U/L (ref 1–32)
BACTERIA SPEC AEROBE CULT: ABNORMAL
BILIRUB CONJ SERPL-MCNC: 0.2 MG/DL (ref 0–0.3)
BILIRUB INDIRECT SERPL-MCNC: 0.4 MG/DL
BILIRUB SERPL-MCNC: 0.6 MG/DL (ref 0–1.2)
BILIRUB SERPL-MCNC: 0.7 MG/DL (ref 0–1.2)
BUN SERPL-MCNC: 34 MG/DL (ref 8–23)
BUN/CREAT SERPL: 51.5 (ref 7–25)
CALCIUM SPEC-SCNC: 9.4 MG/DL (ref 8.6–10.5)
CHLORIDE SERPL-SCNC: 105 MMOL/L (ref 98–107)
CO2 SERPL-SCNC: 20.5 MMOL/L (ref 22–29)
CREAT SERPL-MCNC: 0.66 MG/DL (ref 0.57–1)
CREAT SERPL-MCNC: 0.69 MG/DL (ref 0.57–1)
GFR SERPL CREATININE-BSD FRML MDRD: 84 ML/MIN/1.73
GFR SERPL CREATININE-BSD FRML MDRD: 88 ML/MIN/1.73
GLOBULIN UR ELPH-MCNC: 2.5 GM/DL
GLUCOSE SERPL-MCNC: 120 MG/DL (ref 65–99)
GRAM STN SPEC: ABNORMAL
ISOLATED FROM: ABNORMAL
POTASSIUM SERPL-SCNC: 4.6 MMOL/L (ref 3.5–5.2)
PROT SERPL-MCNC: 5.6 G/DL (ref 6–8.5)
PROT SERPL-MCNC: 5.6 G/DL (ref 6–8.5)
SODIUM SERPL-SCNC: 139 MMOL/L (ref 136–145)
VANCOMYCIN SERPL-MCNC: 6.63 MCG/ML (ref 5–40)

## 2021-09-24 PROCEDURE — 63710000001 ONDANSETRON PER 8 MG: Performed by: INTERNAL MEDICINE

## 2021-09-24 PROCEDURE — 94799 UNLISTED PULMONARY SVC/PX: CPT

## 2021-09-24 PROCEDURE — 25010000002 MORPHINE PER 10 MG: Performed by: INTERNAL MEDICINE

## 2021-09-24 PROCEDURE — 80076 HEPATIC FUNCTION PANEL: CPT | Performed by: INTERNAL MEDICINE

## 2021-09-24 PROCEDURE — 25010000002 ENOXAPARIN PER 10 MG: Performed by: INTERNAL MEDICINE

## 2021-09-24 PROCEDURE — 25010000002 CEFEPIME PER 500 MG: Performed by: INTERNAL MEDICINE

## 2021-09-24 PROCEDURE — 80202 ASSAY OF VANCOMYCIN: CPT | Performed by: INTERNAL MEDICINE

## 2021-09-24 PROCEDURE — 25010000002 VANCOMYCIN 5 G RECONSTITUTED SOLUTION: Performed by: INTERNAL MEDICINE

## 2021-09-24 PROCEDURE — 80053 COMPREHEN METABOLIC PANEL: CPT

## 2021-09-24 PROCEDURE — 25010000002 DEXAMETHASONE PER 1 MG: Performed by: INTERNAL MEDICINE

## 2021-09-24 PROCEDURE — 94760 N-INVAS EAR/PLS OXIMETRY 1: CPT

## 2021-09-24 PROCEDURE — 99291 CRITICAL CARE FIRST HOUR: CPT | Performed by: INTERNAL MEDICINE

## 2021-09-24 PROCEDURE — 82565 ASSAY OF CREATININE: CPT | Performed by: INTERNAL MEDICINE

## 2021-09-24 RX ORDER — DEXAMETHASONE SODIUM PHOSPHATE 10 MG/ML
10 INJECTION INTRAMUSCULAR; INTRAVENOUS 2 TIMES DAILY
Status: DISCONTINUED | OUTPATIENT
Start: 2021-09-24 | End: 2021-09-28

## 2021-09-24 RX ADMIN — VANCOMYCIN HYDROCHLORIDE 750 MG: 5 INJECTION, POWDER, LYOPHILIZED, FOR SOLUTION INTRAVENOUS at 12:36

## 2021-09-24 RX ADMIN — ENOXAPARIN SODIUM 60 MG: 60 INJECTION SUBCUTANEOUS at 11:36

## 2021-09-24 RX ADMIN — CEFEPIME 2 G: 1 INJECTION, SOLUTION INTRAVENOUS at 21:57

## 2021-09-24 RX ADMIN — DEXAMETHASONE SODIUM PHOSPHATE 10 MG: 10 INJECTION INTRAMUSCULAR; INTRAVENOUS at 20:03

## 2021-09-24 RX ADMIN — BUDESONIDE 0.5 MG: 0.5 INHALANT ORAL at 21:37

## 2021-09-24 RX ADMIN — REMDESIVIR 100 MG: 100 INJECTION, POWDER, LYOPHILIZED, FOR SOLUTION INTRAVENOUS at 14:28

## 2021-09-24 RX ADMIN — NYSTATIN 500000 UNITS: 100000 SUSPENSION ORAL at 20:03

## 2021-09-24 RX ADMIN — MORPHINE SULFATE 2 MG: 2 INJECTION, SOLUTION INTRAMUSCULAR; INTRAVENOUS at 05:41

## 2021-09-24 RX ADMIN — CEFEPIME 2 G: 1 INJECTION, SOLUTION INTRAVENOUS at 08:00

## 2021-09-24 RX ADMIN — ARFORMOTEROL TARTRATE 15 MCG: 15 SOLUTION RESPIRATORY (INHALATION) at 08:49

## 2021-09-24 RX ADMIN — MORPHINE SULFATE 2 MG: 2 INJECTION, SOLUTION INTRAMUSCULAR; INTRAVENOUS at 01:14

## 2021-09-24 RX ADMIN — MORPHINE SULFATE 2 MG: 2 INJECTION, SOLUTION INTRAMUSCULAR; INTRAVENOUS at 11:36

## 2021-09-24 RX ADMIN — DEXAMETHASONE SODIUM PHOSPHATE 10 MG: 10 INJECTION INTRAMUSCULAR; INTRAVENOUS at 08:10

## 2021-09-24 RX ADMIN — CLONAZEPAM 0.5 MG: 0.5 TABLET ORAL at 20:03

## 2021-09-24 RX ADMIN — CLONAZEPAM 0.5 MG: 0.5 TABLET ORAL at 08:10

## 2021-09-24 RX ADMIN — BUDESONIDE 0.5 MG: 0.5 INHALANT ORAL at 08:49

## 2021-09-24 RX ADMIN — NYSTATIN 500000 UNITS: 100000 SUSPENSION ORAL at 18:06

## 2021-09-24 RX ADMIN — ONDANSETRON HYDROCHLORIDE 4 MG: 4 TABLET, FILM COATED ORAL at 01:14

## 2021-09-24 RX ADMIN — ATORVASTATIN CALCIUM 20 MG: 40 TABLET, FILM COATED ORAL at 20:03

## 2021-09-24 RX ADMIN — LINACLOTIDE 145 MCG: 145 CAPSULE, GELATIN COATED ORAL at 07:59

## 2021-09-24 RX ADMIN — SODIUM CHLORIDE, PRESERVATIVE FREE 10 ML: 5 INJECTION INTRAVENOUS at 20:04

## 2021-09-24 RX ADMIN — HYDROCODONE BITARTRATE AND ACETAMINOPHEN 1 TABLET: 10; 325 TABLET ORAL at 14:28

## 2021-09-24 RX ADMIN — ARFORMOTEROL TARTRATE 15 MCG: 15 SOLUTION RESPIRATORY (INHALATION) at 21:37

## 2021-09-25 ENCOUNTER — APPOINTMENT (OUTPATIENT)
Dept: CARDIOLOGY | Facility: HOSPITAL | Age: 71
End: 2021-09-25

## 2021-09-25 LAB
BASOPHILS # BLD AUTO: 0.01 10*3/MM3 (ref 0–0.2)
BASOPHILS NFR BLD AUTO: 0.1 % (ref 0–1.5)
BH CV ECHO MEAS - AO ROOT DIAM: 2.9 CM
BH CV ECHO MEAS - EDV(MOD-SP2): 66 ML
BH CV ECHO MEAS - EDV(MOD-SP4): 65 ML
BH CV ECHO MEAS - EF(MOD-BP): 56.8 %
BH CV ECHO MEAS - ESV(MOD-SP2): 32 ML
BH CV ECHO MEAS - ESV(MOD-SP4): 28 ML
BH CV ECHO MEAS - IVSD: 0.8 CM
BH CV ECHO MEAS - LA DIMENSION(2D): 2.3 CM
BH CV ECHO MEAS - LAT PEAK E' VEL: 10.6 CM/SEC
BH CV ECHO MEAS - LVIDD: 4.3 CM
BH CV ECHO MEAS - LVIDS: 3.2 CM
BH CV ECHO MEAS - LVOT DIAM: 2 CM
BH CV ECHO MEAS - LVPWD: 0.9 CM
BH CV ECHO MEAS - MED PEAK E' VEL: 7.51 CM/SEC
BH CV ECHO MEAS - RVDD: 2.5 CM
BH CV ECHO MEAS - TR MAX PG: 59 MMHG
DEPRECATED RDW RBC AUTO: 49.2 FL (ref 37–54)
EOSINOPHIL # BLD AUTO: 0 10*3/MM3 (ref 0–0.4)
EOSINOPHIL NFR BLD AUTO: 0 % (ref 0.3–6.2)
ERYTHROCYTE [DISTWIDTH] IN BLOOD BY AUTOMATED COUNT: 14.7 % (ref 12.3–15.4)
HCT VFR BLD AUTO: 36.2 % (ref 34–46.6)
HGB BLD-MCNC: 11.5 G/DL (ref 12–15.9)
IMM GRANULOCYTES # BLD AUTO: 0.18 10*3/MM3 (ref 0–0.05)
IMM GRANULOCYTES NFR BLD AUTO: 1.5 % (ref 0–0.5)
LEFT ATRIUM VOLUME INDEX: 17.2 ML/M2
LYMPHOCYTES # BLD AUTO: 0.6 10*3/MM3 (ref 0.7–3.1)
LYMPHOCYTES NFR BLD AUTO: 4.9 % (ref 19.6–45.3)
MAXIMAL PREDICTED HEART RATE: 149 BPM
MCH RBC QN AUTO: 28.8 PG (ref 26.6–33)
MCHC RBC AUTO-ENTMCNC: 31.8 G/DL (ref 31.5–35.7)
MCV RBC AUTO: 90.7 FL (ref 79–97)
MONOCYTES # BLD AUTO: 0.49 10*3/MM3 (ref 0.1–0.9)
MONOCYTES NFR BLD AUTO: 4 % (ref 5–12)
NEUTROPHILS NFR BLD AUTO: 11.06 10*3/MM3 (ref 1.7–7)
NEUTROPHILS NFR BLD AUTO: 89.5 % (ref 42.7–76)
NRBC BLD AUTO-RTO: 0 /100 WBC (ref 0–0.2)
PLATELET # BLD AUTO: 138 10*3/MM3 (ref 140–450)
PMV BLD AUTO: 11.8 FL (ref 6–12)
RBC # BLD AUTO: 3.99 10*6/MM3 (ref 3.77–5.28)
STRESS TARGET HR: 127 BPM
WBC # BLD AUTO: 12.34 10*3/MM3 (ref 3.4–10.8)

## 2021-09-25 PROCEDURE — 97110 THERAPEUTIC EXERCISES: CPT | Performed by: PHYSICAL THERAPIST

## 2021-09-25 PROCEDURE — 93308 TTE F-UP OR LMTD: CPT | Performed by: INTERNAL MEDICINE

## 2021-09-25 PROCEDURE — 93325 DOPPLER ECHO COLOR FLOW MAPG: CPT

## 2021-09-25 PROCEDURE — 85025 COMPLETE CBC W/AUTO DIFF WBC: CPT | Performed by: INTERNAL MEDICINE

## 2021-09-25 PROCEDURE — 93321 DOPPLER ECHO F-UP/LMTD STD: CPT

## 2021-09-25 PROCEDURE — 94760 N-INVAS EAR/PLS OXIMETRY 1: CPT

## 2021-09-25 PROCEDURE — 94799 UNLISTED PULMONARY SVC/PX: CPT

## 2021-09-25 PROCEDURE — 99291 CRITICAL CARE FIRST HOUR: CPT | Performed by: INTERNAL MEDICINE

## 2021-09-25 PROCEDURE — 93308 TTE F-UP OR LMTD: CPT

## 2021-09-25 PROCEDURE — 93325 DOPPLER ECHO COLOR FLOW MAPG: CPT | Performed by: INTERNAL MEDICINE

## 2021-09-25 PROCEDURE — 25010000002 FUROSEMIDE PER 20 MG: Performed by: INTERNAL MEDICINE

## 2021-09-25 PROCEDURE — 93321 DOPPLER ECHO F-UP/LMTD STD: CPT | Performed by: INTERNAL MEDICINE

## 2021-09-25 PROCEDURE — 25010000002 DEXAMETHASONE PER 1 MG: Performed by: INTERNAL MEDICINE

## 2021-09-25 PROCEDURE — 25010000002 ENOXAPARIN PER 10 MG: Performed by: INTERNAL MEDICINE

## 2021-09-25 PROCEDURE — 25010000002 CEFEPIME PER 500 MG: Performed by: INTERNAL MEDICINE

## 2021-09-25 RX ORDER — FUROSEMIDE 10 MG/ML
40 INJECTION INTRAMUSCULAR; INTRAVENOUS
Status: DISCONTINUED | OUTPATIENT
Start: 2021-09-25 | End: 2021-09-28

## 2021-09-25 RX ADMIN — FUROSEMIDE 40 MG: 10 INJECTION, SOLUTION INTRAMUSCULAR; INTRAVENOUS at 08:55

## 2021-09-25 RX ADMIN — ATORVASTATIN CALCIUM 20 MG: 40 TABLET, FILM COATED ORAL at 21:12

## 2021-09-25 RX ADMIN — BUDESONIDE 0.5 MG: 0.5 INHALANT ORAL at 21:44

## 2021-09-25 RX ADMIN — NYSTATIN 500000 UNITS: 100000 SUSPENSION ORAL at 09:14

## 2021-09-25 RX ADMIN — ARFORMOTEROL TARTRATE 15 MCG: 15 SOLUTION RESPIRATORY (INHALATION) at 07:17

## 2021-09-25 RX ADMIN — CLONAZEPAM 0.5 MG: 0.5 TABLET ORAL at 08:00

## 2021-09-25 RX ADMIN — NYSTATIN 500000 UNITS: 100000 SUSPENSION ORAL at 17:27

## 2021-09-25 RX ADMIN — CEFEPIME 2 G: 1 INJECTION, SOLUTION INTRAVENOUS at 08:00

## 2021-09-25 RX ADMIN — CLONAZEPAM 0.5 MG: 0.5 TABLET ORAL at 21:12

## 2021-09-25 RX ADMIN — ENOXAPARIN SODIUM 60 MG: 60 INJECTION SUBCUTANEOUS at 14:11

## 2021-09-25 RX ADMIN — ARFORMOTEROL TARTRATE 15 MCG: 15 SOLUTION RESPIRATORY (INHALATION) at 21:44

## 2021-09-25 RX ADMIN — HYDROCODONE BITARTRATE AND ACETAMINOPHEN 1 TABLET: 10; 325 TABLET ORAL at 21:12

## 2021-09-25 RX ADMIN — BUDESONIDE 0.5 MG: 0.5 INHALANT ORAL at 07:16

## 2021-09-25 RX ADMIN — DEXAMETHASONE SODIUM PHOSPHATE 10 MG: 10 INJECTION INTRAMUSCULAR; INTRAVENOUS at 08:00

## 2021-09-25 RX ADMIN — HYDROCODONE BITARTRATE AND ACETAMINOPHEN 1 TABLET: 10; 325 TABLET ORAL at 14:10

## 2021-09-25 RX ADMIN — HYDROCODONE BITARTRATE AND ACETAMINOPHEN 1 TABLET: 10; 325 TABLET ORAL at 08:00

## 2021-09-25 RX ADMIN — NYSTATIN 500000 UNITS: 100000 SUSPENSION ORAL at 21:12

## 2021-09-25 RX ADMIN — DEXAMETHASONE SODIUM PHOSPHATE 10 MG: 10 INJECTION INTRAMUSCULAR; INTRAVENOUS at 21:12

## 2021-09-25 RX ADMIN — REMDESIVIR 100 MG: 100 INJECTION, POWDER, LYOPHILIZED, FOR SOLUTION INTRAVENOUS at 14:11

## 2021-09-25 RX ADMIN — ENOXAPARIN SODIUM 60 MG: 60 INJECTION SUBCUTANEOUS at 00:05

## 2021-09-25 RX ADMIN — NYSTATIN 500000 UNITS: 100000 SUSPENSION ORAL at 14:10

## 2021-09-25 RX ADMIN — FUROSEMIDE 40 MG: 10 INJECTION, SOLUTION INTRAMUSCULAR; INTRAVENOUS at 17:27

## 2021-09-26 LAB
ALBUMIN SERPL-MCNC: 3.6 G/DL (ref 3.5–5.2)
ALBUMIN/GLOB SERPL: 1.2 G/DL
ALP SERPL-CCNC: 155 U/L (ref 39–117)
ALT SERPL W P-5'-P-CCNC: 28 U/L (ref 1–33)
ANION GAP SERPL CALCULATED.3IONS-SCNC: 12.6 MMOL/L (ref 5–15)
AST SERPL-CCNC: 24 U/L (ref 1–32)
BASOPHILS # BLD AUTO: 0.02 10*3/MM3 (ref 0–0.2)
BASOPHILS NFR BLD AUTO: 0.1 % (ref 0–1.5)
BILIRUB SERPL-MCNC: 0.7 MG/DL (ref 0–1.2)
BUN SERPL-MCNC: 44 MG/DL (ref 8–23)
BUN/CREAT SERPL: 55 (ref 7–25)
CALCIUM SPEC-SCNC: 9.7 MG/DL (ref 8.6–10.5)
CHLORIDE SERPL-SCNC: 96 MMOL/L (ref 98–107)
CO2 SERPL-SCNC: 26.4 MMOL/L (ref 22–29)
CREAT SERPL-MCNC: 0.8 MG/DL (ref 0.57–1)
CRP SERPL-MCNC: 4.65 MG/DL (ref 0–0.5)
D DIMER PPP FEU-MCNC: 5.37 MG/L (FEU) (ref 0–0.59)
DEPRECATED RDW RBC AUTO: 46.1 FL (ref 37–54)
EOSINOPHIL # BLD AUTO: 0 10*3/MM3 (ref 0–0.4)
EOSINOPHIL NFR BLD AUTO: 0 % (ref 0.3–6.2)
ERYTHROCYTE [DISTWIDTH] IN BLOOD BY AUTOMATED COUNT: 14.2 % (ref 12.3–15.4)
FERRITIN SERPL-MCNC: 956.4 NG/ML (ref 13–150)
GFR SERPL CREATININE-BSD FRML MDRD: 71 ML/MIN/1.73
GLOBULIN UR ELPH-MCNC: 2.9 GM/DL
GLUCOSE SERPL-MCNC: 150 MG/DL (ref 65–99)
HCT VFR BLD AUTO: 38.6 % (ref 34–46.6)
HGB BLD-MCNC: 12.4 G/DL (ref 12–15.9)
IMM GRANULOCYTES # BLD AUTO: 0.33 10*3/MM3 (ref 0–0.05)
IMM GRANULOCYTES NFR BLD AUTO: 2.4 % (ref 0–0.5)
LYMPHOCYTES # BLD AUTO: 0.46 10*3/MM3 (ref 0.7–3.1)
LYMPHOCYTES NFR BLD AUTO: 3.3 % (ref 19.6–45.3)
MAGNESIUM SERPL-MCNC: 2.3 MG/DL (ref 1.6–2.4)
MCH RBC QN AUTO: 28.2 PG (ref 26.6–33)
MCHC RBC AUTO-ENTMCNC: 32.1 G/DL (ref 31.5–35.7)
MCV RBC AUTO: 87.7 FL (ref 79–97)
MONOCYTES # BLD AUTO: 0.46 10*3/MM3 (ref 0.1–0.9)
MONOCYTES NFR BLD AUTO: 3.3 % (ref 5–12)
NEUTROPHILS NFR BLD AUTO: 12.68 10*3/MM3 (ref 1.7–7)
NEUTROPHILS NFR BLD AUTO: 90.9 % (ref 42.7–76)
NRBC BLD AUTO-RTO: 0 /100 WBC (ref 0–0.2)
PLATELET # BLD AUTO: 185 10*3/MM3 (ref 140–450)
PMV BLD AUTO: 11.5 FL (ref 6–12)
POTASSIUM SERPL-SCNC: 4.6 MMOL/L (ref 3.5–5.2)
PROT SERPL-MCNC: 6.5 G/DL (ref 6–8.5)
RBC # BLD AUTO: 4.4 10*6/MM3 (ref 3.77–5.28)
SODIUM SERPL-SCNC: 135 MMOL/L (ref 136–145)
WBC # BLD AUTO: 13.95 10*3/MM3 (ref 3.4–10.8)

## 2021-09-26 PROCEDURE — 94799 UNLISTED PULMONARY SVC/PX: CPT

## 2021-09-26 PROCEDURE — 85025 COMPLETE CBC W/AUTO DIFF WBC: CPT | Performed by: INTERNAL MEDICINE

## 2021-09-26 PROCEDURE — 80053 COMPREHEN METABOLIC PANEL: CPT

## 2021-09-26 PROCEDURE — 99233 SBSQ HOSP IP/OBS HIGH 50: CPT | Performed by: INTERNAL MEDICINE

## 2021-09-26 PROCEDURE — 85379 FIBRIN DEGRADATION QUANT: CPT | Performed by: INTERNAL MEDICINE

## 2021-09-26 PROCEDURE — 25010000002 FUROSEMIDE PER 20 MG: Performed by: INTERNAL MEDICINE

## 2021-09-26 PROCEDURE — 25010000002 DEXAMETHASONE PER 1 MG: Performed by: INTERNAL MEDICINE

## 2021-09-26 PROCEDURE — 82728 ASSAY OF FERRITIN: CPT | Performed by: INTERNAL MEDICINE

## 2021-09-26 PROCEDURE — 25010000002 ENOXAPARIN PER 10 MG: Performed by: INTERNAL MEDICINE

## 2021-09-26 PROCEDURE — 83735 ASSAY OF MAGNESIUM: CPT | Performed by: INTERNAL MEDICINE

## 2021-09-26 PROCEDURE — 86140 C-REACTIVE PROTEIN: CPT | Performed by: INTERNAL MEDICINE

## 2021-09-26 PROCEDURE — 97110 THERAPEUTIC EXERCISES: CPT

## 2021-09-26 RX ADMIN — NYSTATIN 500000 UNITS: 100000 SUSPENSION ORAL at 21:09

## 2021-09-26 RX ADMIN — ATORVASTATIN CALCIUM 20 MG: 40 TABLET, FILM COATED ORAL at 21:07

## 2021-09-26 RX ADMIN — ENOXAPARIN SODIUM 60 MG: 60 INJECTION SUBCUTANEOUS at 00:34

## 2021-09-26 RX ADMIN — CLONAZEPAM 0.5 MG: 0.5 TABLET ORAL at 21:07

## 2021-09-26 RX ADMIN — APIXABAN 10 MG: 5 TABLET, FILM COATED ORAL at 21:15

## 2021-09-26 RX ADMIN — CLONAZEPAM 0.5 MG: 0.5 TABLET ORAL at 08:58

## 2021-09-26 RX ADMIN — ARFORMOTEROL TARTRATE 15 MCG: 15 SOLUTION RESPIRATORY (INHALATION) at 09:37

## 2021-09-26 RX ADMIN — FUROSEMIDE 40 MG: 10 INJECTION, SOLUTION INTRAMUSCULAR; INTRAVENOUS at 08:58

## 2021-09-26 RX ADMIN — ARFORMOTEROL TARTRATE 15 MCG: 15 SOLUTION RESPIRATORY (INHALATION) at 19:51

## 2021-09-26 RX ADMIN — NYSTATIN 500000 UNITS: 100000 SUSPENSION ORAL at 12:47

## 2021-09-26 RX ADMIN — DEXAMETHASONE SODIUM PHOSPHATE 10 MG: 10 INJECTION INTRAMUSCULAR; INTRAVENOUS at 21:07

## 2021-09-26 RX ADMIN — HYDROCODONE BITARTRATE AND ACETAMINOPHEN 1 TABLET: 10; 325 TABLET ORAL at 03:33

## 2021-09-26 RX ADMIN — APIXABAN 10 MG: 5 TABLET, FILM COATED ORAL at 08:57

## 2021-09-26 RX ADMIN — HYDROCODONE BITARTRATE AND ACETAMINOPHEN 1 TABLET: 10; 325 TABLET ORAL at 08:58

## 2021-09-26 RX ADMIN — REMDESIVIR 100 MG: 100 INJECTION, POWDER, LYOPHILIZED, FOR SOLUTION INTRAVENOUS at 12:47

## 2021-09-26 RX ADMIN — NYSTATIN 500000 UNITS: 100000 SUSPENSION ORAL at 17:33

## 2021-09-26 RX ADMIN — HYDROCODONE BITARTRATE AND ACETAMINOPHEN 1 TABLET: 10; 325 TABLET ORAL at 15:06

## 2021-09-26 RX ADMIN — HYDROCODONE BITARTRATE AND ACETAMINOPHEN 1 TABLET: 10; 325 TABLET ORAL at 21:09

## 2021-09-26 RX ADMIN — NYSTATIN 500000 UNITS: 100000 SUSPENSION ORAL at 08:58

## 2021-09-26 RX ADMIN — FUROSEMIDE 40 MG: 10 INJECTION, SOLUTION INTRAMUSCULAR; INTRAVENOUS at 17:33

## 2021-09-26 RX ADMIN — BUDESONIDE 0.5 MG: 0.5 INHALANT ORAL at 09:37

## 2021-09-26 RX ADMIN — DEXAMETHASONE SODIUM PHOSPHATE 10 MG: 10 INJECTION INTRAMUSCULAR; INTRAVENOUS at 08:58

## 2021-09-26 RX ADMIN — BUDESONIDE 0.5 MG: 0.5 INHALANT ORAL at 19:52

## 2021-09-27 LAB
ALBUMIN SERPL-MCNC: 3.1 G/DL (ref 3.5–5.2)
ALBUMIN/GLOB SERPL: 1.1 G/DL
ALP SERPL-CCNC: 134 U/L (ref 39–117)
ALT SERPL W P-5'-P-CCNC: 25 U/L (ref 1–33)
ANION GAP SERPL CALCULATED.3IONS-SCNC: 13.4 MMOL/L (ref 5–15)
AST SERPL-CCNC: 23 U/L (ref 1–32)
BACTERIA SPEC AEROBE CULT: NORMAL
BILIRUB SERPL-MCNC: 0.6 MG/DL (ref 0–1.2)
BUN SERPL-MCNC: 47 MG/DL (ref 8–23)
BUN/CREAT SERPL: 67.1 (ref 7–25)
CALCIUM SPEC-SCNC: 9 MG/DL (ref 8.6–10.5)
CHLORIDE SERPL-SCNC: 99 MMOL/L (ref 98–107)
CO2 SERPL-SCNC: 21.6 MMOL/L (ref 22–29)
CREAT SERPL-MCNC: 0.7 MG/DL (ref 0.57–1)
GFR SERPL CREATININE-BSD FRML MDRD: 82 ML/MIN/1.73
GLOBULIN UR ELPH-MCNC: 2.7 GM/DL
GLUCOSE SERPL-MCNC: 134 MG/DL (ref 65–99)
POTASSIUM SERPL-SCNC: 4.9 MMOL/L (ref 3.5–5.2)
PROT SERPL-MCNC: 5.8 G/DL (ref 6–8.5)
SODIUM SERPL-SCNC: 134 MMOL/L (ref 136–145)

## 2021-09-27 PROCEDURE — 94799 UNLISTED PULMONARY SVC/PX: CPT

## 2021-09-27 PROCEDURE — 25010000002 DEXAMETHASONE PER 1 MG: Performed by: INTERNAL MEDICINE

## 2021-09-27 PROCEDURE — 25010000002 FUROSEMIDE PER 20 MG: Performed by: INTERNAL MEDICINE

## 2021-09-27 PROCEDURE — 80053 COMPREHEN METABOLIC PANEL: CPT

## 2021-09-27 PROCEDURE — 97110 THERAPEUTIC EXERCISES: CPT

## 2021-09-27 RX ADMIN — HYDROCODONE BITARTRATE AND ACETAMINOPHEN 1 TABLET: 10; 325 TABLET ORAL at 09:12

## 2021-09-27 RX ADMIN — ARFORMOTEROL TARTRATE 15 MCG: 15 SOLUTION RESPIRATORY (INHALATION) at 20:23

## 2021-09-27 RX ADMIN — ARFORMOTEROL TARTRATE 15 MCG: 15 SOLUTION RESPIRATORY (INHALATION) at 10:26

## 2021-09-27 RX ADMIN — HYDROCODONE BITARTRATE AND ACETAMINOPHEN 1 TABLET: 10; 325 TABLET ORAL at 18:24

## 2021-09-27 RX ADMIN — NYSTATIN 500000 UNITS: 100000 SUSPENSION ORAL at 21:08

## 2021-09-27 RX ADMIN — DEXAMETHASONE SODIUM PHOSPHATE 10 MG: 10 INJECTION INTRAMUSCULAR; INTRAVENOUS at 21:08

## 2021-09-27 RX ADMIN — CLONAZEPAM 0.5 MG: 0.5 TABLET ORAL at 21:08

## 2021-09-27 RX ADMIN — APIXABAN 10 MG: 5 TABLET, FILM COATED ORAL at 09:01

## 2021-09-27 RX ADMIN — BUDESONIDE 0.5 MG: 0.5 INHALANT ORAL at 20:23

## 2021-09-27 RX ADMIN — FUROSEMIDE 40 MG: 10 INJECTION, SOLUTION INTRAMUSCULAR; INTRAVENOUS at 17:33

## 2021-09-27 RX ADMIN — NYSTATIN 500000 UNITS: 100000 SUSPENSION ORAL at 08:30

## 2021-09-27 RX ADMIN — DEXAMETHASONE SODIUM PHOSPHATE 10 MG: 10 INJECTION INTRAMUSCULAR; INTRAVENOUS at 09:01

## 2021-09-27 RX ADMIN — CLONAZEPAM 0.5 MG: 0.5 TABLET ORAL at 09:01

## 2021-09-27 RX ADMIN — APIXABAN 10 MG: 5 TABLET, FILM COATED ORAL at 21:08

## 2021-09-27 RX ADMIN — BUDESONIDE 0.5 MG: 0.5 INHALANT ORAL at 10:26

## 2021-09-27 RX ADMIN — ATORVASTATIN CALCIUM 20 MG: 40 TABLET, FILM COATED ORAL at 21:07

## 2021-09-27 RX ADMIN — NYSTATIN 500000 UNITS: 100000 SUSPENSION ORAL at 17:35

## 2021-09-27 RX ADMIN — LINACLOTIDE 145 MCG: 145 CAPSULE, GELATIN COATED ORAL at 08:30

## 2021-09-27 RX ADMIN — NYSTATIN 500000 UNITS: 100000 SUSPENSION ORAL at 13:00

## 2021-09-27 RX ADMIN — FUROSEMIDE 40 MG: 10 INJECTION, SOLUTION INTRAMUSCULAR; INTRAVENOUS at 09:01

## 2021-09-28 LAB
ALBUMIN SERPL-MCNC: 3.7 G/DL (ref 3.5–5.2)
ALBUMIN/GLOB SERPL: 1.5 G/DL
ALP SERPL-CCNC: 130 U/L (ref 39–117)
ALT SERPL W P-5'-P-CCNC: 27 U/L (ref 1–33)
ANION GAP SERPL CALCULATED.3IONS-SCNC: 12 MMOL/L (ref 5–15)
AST SERPL-CCNC: 20 U/L (ref 1–32)
BASOPHILS # BLD AUTO: 0.04 10*3/MM3 (ref 0–0.2)
BASOPHILS NFR BLD AUTO: 0.2 % (ref 0–1.5)
BILIRUB SERPL-MCNC: 0.8 MG/DL (ref 0–1.2)
BUN SERPL-MCNC: 53 MG/DL (ref 8–23)
BUN/CREAT SERPL: 67.9 (ref 7–25)
CALCIUM SPEC-SCNC: 9.6 MG/DL (ref 8.6–10.5)
CHLORIDE SERPL-SCNC: 96 MMOL/L (ref 98–107)
CO2 SERPL-SCNC: 28 MMOL/L (ref 22–29)
CREAT SERPL-MCNC: 0.78 MG/DL (ref 0.57–1)
DEPRECATED RDW RBC AUTO: 45.7 FL (ref 37–54)
EOSINOPHIL # BLD AUTO: 0 10*3/MM3 (ref 0–0.4)
EOSINOPHIL NFR BLD AUTO: 0 % (ref 0.3–6.2)
ERYTHROCYTE [DISTWIDTH] IN BLOOD BY AUTOMATED COUNT: 14.5 % (ref 12.3–15.4)
GFR SERPL CREATININE-BSD FRML MDRD: 73 ML/MIN/1.73
GLOBULIN UR ELPH-MCNC: 2.4 GM/DL
GLUCOSE SERPL-MCNC: 162 MG/DL (ref 65–99)
HCT VFR BLD AUTO: 38.7 % (ref 34–46.6)
HGB BLD-MCNC: 12.6 G/DL (ref 12–15.9)
IMM GRANULOCYTES # BLD AUTO: 0.44 10*3/MM3 (ref 0–0.05)
IMM GRANULOCYTES NFR BLD AUTO: 2.6 % (ref 0–0.5)
LYMPHOCYTES # BLD AUTO: 0.49 10*3/MM3 (ref 0.7–3.1)
LYMPHOCYTES NFR BLD AUTO: 2.9 % (ref 19.6–45.3)
MCH RBC QN AUTO: 28.5 PG (ref 26.6–33)
MCHC RBC AUTO-ENTMCNC: 32.6 G/DL (ref 31.5–35.7)
MCV RBC AUTO: 87.6 FL (ref 79–97)
MONOCYTES # BLD AUTO: 0.62 10*3/MM3 (ref 0.1–0.9)
MONOCYTES NFR BLD AUTO: 3.7 % (ref 5–12)
NEUTROPHILS NFR BLD AUTO: 15.23 10*3/MM3 (ref 1.7–7)
NEUTROPHILS NFR BLD AUTO: 90.6 % (ref 42.7–76)
NRBC BLD AUTO-RTO: 0 /100 WBC (ref 0–0.2)
PLATELET # BLD AUTO: 206 10*3/MM3 (ref 140–450)
PMV BLD AUTO: 11.8 FL (ref 6–12)
POTASSIUM SERPL-SCNC: 4.6 MMOL/L (ref 3.5–5.2)
PROT SERPL-MCNC: 6.1 G/DL (ref 6–8.5)
RBC # BLD AUTO: 4.42 10*6/MM3 (ref 3.77–5.28)
SODIUM SERPL-SCNC: 136 MMOL/L (ref 136–145)
WBC # BLD AUTO: 16.82 10*3/MM3 (ref 3.4–10.8)

## 2021-09-28 PROCEDURE — 80053 COMPREHEN METABOLIC PANEL: CPT

## 2021-09-28 PROCEDURE — 94799 UNLISTED PULMONARY SVC/PX: CPT

## 2021-09-28 PROCEDURE — 97530 THERAPEUTIC ACTIVITIES: CPT

## 2021-09-28 PROCEDURE — 25010000002 DEXAMETHASONE PER 1 MG: Performed by: INTERNAL MEDICINE

## 2021-09-28 PROCEDURE — 25010000002 FUROSEMIDE PER 20 MG: Performed by: INTERNAL MEDICINE

## 2021-09-28 PROCEDURE — 99233 SBSQ HOSP IP/OBS HIGH 50: CPT | Performed by: INTERNAL MEDICINE

## 2021-09-28 PROCEDURE — 85025 COMPLETE CBC W/AUTO DIFF WBC: CPT | Performed by: INTERNAL MEDICINE

## 2021-09-28 PROCEDURE — 97116 GAIT TRAINING THERAPY: CPT

## 2021-09-28 RX ORDER — MEGESTROL ACETATE 40 MG/ML
800 SUSPENSION ORAL DAILY
Status: COMPLETED | OUTPATIENT
Start: 2021-09-28 | End: 2021-10-04

## 2021-09-28 RX ORDER — DEXAMETHASONE SODIUM PHOSPHATE 10 MG/ML
6 INJECTION INTRAMUSCULAR; INTRAVENOUS 2 TIMES DAILY
Status: DISCONTINUED | OUTPATIENT
Start: 2021-09-28 | End: 2021-09-30

## 2021-09-28 RX ADMIN — HYDROCODONE BITARTRATE AND ACETAMINOPHEN 1 TABLET: 10; 325 TABLET ORAL at 09:16

## 2021-09-28 RX ADMIN — NYSTATIN 500000 UNITS: 100000 SUSPENSION ORAL at 20:09

## 2021-09-28 RX ADMIN — LINACLOTIDE 145 MCG: 145 CAPSULE, GELATIN COATED ORAL at 07:30

## 2021-09-28 RX ADMIN — ATORVASTATIN CALCIUM 20 MG: 40 TABLET, FILM COATED ORAL at 20:08

## 2021-09-28 RX ADMIN — BUDESONIDE 0.5 MG: 0.5 INHALANT ORAL at 08:36

## 2021-09-28 RX ADMIN — NYSTATIN 500000 UNITS: 100000 SUSPENSION ORAL at 09:00

## 2021-09-28 RX ADMIN — CLONAZEPAM 0.5 MG: 0.5 TABLET ORAL at 09:30

## 2021-09-28 RX ADMIN — ARFORMOTEROL TARTRATE 15 MCG: 15 SOLUTION RESPIRATORY (INHALATION) at 20:32

## 2021-09-28 RX ADMIN — APIXABAN 10 MG: 5 TABLET, FILM COATED ORAL at 20:08

## 2021-09-28 RX ADMIN — ARFORMOTEROL TARTRATE 15 MCG: 15 SOLUTION RESPIRATORY (INHALATION) at 08:36

## 2021-09-28 RX ADMIN — BUDESONIDE 0.5 MG: 0.5 INHALANT ORAL at 20:32

## 2021-09-28 RX ADMIN — DEXAMETHASONE SODIUM PHOSPHATE 6 MG: 10 INJECTION INTRAMUSCULAR; INTRAVENOUS at 20:08

## 2021-09-28 RX ADMIN — FUROSEMIDE 40 MG: 10 INJECTION, SOLUTION INTRAMUSCULAR; INTRAVENOUS at 09:15

## 2021-09-28 RX ADMIN — HYDROCODONE BITARTRATE AND ACETAMINOPHEN 1 TABLET: 10; 325 TABLET ORAL at 14:27

## 2021-09-28 RX ADMIN — DEXAMETHASONE SODIUM PHOSPHATE 10 MG: 10 INJECTION INTRAMUSCULAR; INTRAVENOUS at 09:15

## 2021-09-28 RX ADMIN — APIXABAN 10 MG: 5 TABLET, FILM COATED ORAL at 09:15

## 2021-09-28 RX ADMIN — MEGESTROL ACETATE 800 MG: 40 SUSPENSION ORAL at 20:09

## 2021-09-29 PROCEDURE — 99232 SBSQ HOSP IP/OBS MODERATE 35: CPT | Performed by: INTERNAL MEDICINE

## 2021-09-29 PROCEDURE — 97530 THERAPEUTIC ACTIVITIES: CPT

## 2021-09-29 PROCEDURE — 94799 UNLISTED PULMONARY SVC/PX: CPT

## 2021-09-29 PROCEDURE — 25010000002 DEXAMETHASONE PER 1 MG: Performed by: INTERNAL MEDICINE

## 2021-09-29 RX ADMIN — ATORVASTATIN CALCIUM 20 MG: 40 TABLET, FILM COATED ORAL at 21:38

## 2021-09-29 RX ADMIN — NYSTATIN 500000 UNITS: 100000 SUSPENSION ORAL at 21:39

## 2021-09-29 RX ADMIN — APIXABAN 10 MG: 5 TABLET, FILM COATED ORAL at 08:52

## 2021-09-29 RX ADMIN — NYSTATIN 500000 UNITS: 100000 SUSPENSION ORAL at 08:52

## 2021-09-29 RX ADMIN — DEXAMETHASONE SODIUM PHOSPHATE 6 MG: 10 INJECTION INTRAMUSCULAR; INTRAVENOUS at 08:52

## 2021-09-29 RX ADMIN — APIXABAN 10 MG: 5 TABLET, FILM COATED ORAL at 21:38

## 2021-09-29 RX ADMIN — HYDROCODONE BITARTRATE AND ACETAMINOPHEN 1 TABLET: 10; 325 TABLET ORAL at 16:35

## 2021-09-29 RX ADMIN — ARFORMOTEROL TARTRATE 15 MCG: 15 SOLUTION RESPIRATORY (INHALATION) at 20:27

## 2021-09-29 RX ADMIN — DEXAMETHASONE SODIUM PHOSPHATE 6 MG: 10 INJECTION INTRAMUSCULAR; INTRAVENOUS at 21:38

## 2021-09-29 RX ADMIN — HYDROCODONE BITARTRATE AND ACETAMINOPHEN 1 TABLET: 10; 325 TABLET ORAL at 08:52

## 2021-09-29 RX ADMIN — BUDESONIDE 0.5 MG: 0.5 INHALANT ORAL at 20:27

## 2021-09-29 RX ADMIN — SODIUM CHLORIDE, PRESERVATIVE FREE 10 ML: 5 INJECTION INTRAVENOUS at 21:39

## 2021-09-29 RX ADMIN — BUDESONIDE 0.5 MG: 0.5 INHALANT ORAL at 10:15

## 2021-09-29 RX ADMIN — MEGESTROL ACETATE 800 MG: 40 SUSPENSION ORAL at 08:52

## 2021-09-29 RX ADMIN — ARFORMOTEROL TARTRATE 15 MCG: 15 SOLUTION RESPIRATORY (INHALATION) at 10:15

## 2021-09-29 RX ADMIN — LINACLOTIDE 145 MCG: 145 CAPSULE, GELATIN COATED ORAL at 08:30

## 2021-09-29 RX ADMIN — NYSTATIN 500000 UNITS: 100000 SUSPENSION ORAL at 18:25

## 2021-09-30 PROCEDURE — 97110 THERAPEUTIC EXERCISES: CPT

## 2021-09-30 PROCEDURE — 99232 SBSQ HOSP IP/OBS MODERATE 35: CPT | Performed by: INTERNAL MEDICINE

## 2021-09-30 PROCEDURE — 94799 UNLISTED PULMONARY SVC/PX: CPT

## 2021-09-30 PROCEDURE — 63710000001 PREDNISONE PER 1 MG: Performed by: INTERNAL MEDICINE

## 2021-09-30 PROCEDURE — 97530 THERAPEUTIC ACTIVITIES: CPT

## 2021-09-30 RX ORDER — HYDROXYZINE HYDROCHLORIDE 25 MG/1
25 TABLET, FILM COATED ORAL NIGHTLY
Status: DISCONTINUED | OUTPATIENT
Start: 2021-09-30 | End: 2021-10-06 | Stop reason: HOSPADM

## 2021-09-30 RX ORDER — LIDOCAINE HYDROCHLORIDE 20 MG/ML
5 SOLUTION OROPHARYNGEAL
Status: DISCONTINUED | OUTPATIENT
Start: 2021-09-30 | End: 2021-10-06 | Stop reason: HOSPADM

## 2021-09-30 RX ORDER — PREDNISONE 20 MG/1
60 TABLET ORAL
Status: DISCONTINUED | OUTPATIENT
Start: 2021-09-30 | End: 2021-10-06 | Stop reason: HOSPADM

## 2021-09-30 RX ADMIN — NYSTATIN 500000 UNITS: 100000 SUSPENSION ORAL at 21:32

## 2021-09-30 RX ADMIN — HYDROCODONE BITARTRATE AND ACETAMINOPHEN 1 TABLET: 10; 325 TABLET ORAL at 17:28

## 2021-09-30 RX ADMIN — ATORVASTATIN CALCIUM 20 MG: 40 TABLET, FILM COATED ORAL at 21:33

## 2021-09-30 RX ADMIN — ARFORMOTEROL TARTRATE 15 MCG: 15 SOLUTION RESPIRATORY (INHALATION) at 10:48

## 2021-09-30 RX ADMIN — ARFORMOTEROL TARTRATE 15 MCG: 15 SOLUTION RESPIRATORY (INHALATION) at 21:10

## 2021-09-30 RX ADMIN — BUDESONIDE 0.5 MG: 0.5 INHALANT ORAL at 21:10

## 2021-09-30 RX ADMIN — PREDNISONE 60 MG: 20 TABLET ORAL at 08:24

## 2021-09-30 RX ADMIN — NYSTATIN 500000 UNITS: 100000 SUSPENSION ORAL at 17:25

## 2021-09-30 RX ADMIN — SODIUM CHLORIDE, PRESERVATIVE FREE 10 ML: 5 INJECTION INTRAVENOUS at 21:32

## 2021-09-30 RX ADMIN — APIXABAN 10 MG: 5 TABLET, FILM COATED ORAL at 08:24

## 2021-09-30 RX ADMIN — LINACLOTIDE 145 MCG: 145 CAPSULE, GELATIN COATED ORAL at 06:34

## 2021-09-30 RX ADMIN — NYSTATIN 500000 UNITS: 100000 SUSPENSION ORAL at 08:23

## 2021-09-30 RX ADMIN — HYDROCODONE BITARTRATE AND ACETAMINOPHEN 1 TABLET: 10; 325 TABLET ORAL at 23:35

## 2021-09-30 RX ADMIN — HYDROCODONE BITARTRATE AND ACETAMINOPHEN 1 TABLET: 10; 325 TABLET ORAL at 08:31

## 2021-09-30 RX ADMIN — HYDROXYZINE HYDROCHLORIDE 25 MG: 25 TABLET, FILM COATED ORAL at 23:35

## 2021-09-30 RX ADMIN — MEGESTROL ACETATE 800 MG: 40 SUSPENSION ORAL at 08:23

## 2021-09-30 RX ADMIN — APIXABAN 10 MG: 5 TABLET, FILM COATED ORAL at 21:33

## 2021-09-30 RX ADMIN — BUDESONIDE 0.5 MG: 0.5 INHALANT ORAL at 10:48

## 2021-09-30 RX ADMIN — NYSTATIN 500000 UNITS: 100000 SUSPENSION ORAL at 11:33

## 2021-10-01 ENCOUNTER — APPOINTMENT (OUTPATIENT)
Dept: GENERAL RADIOLOGY | Facility: HOSPITAL | Age: 71
End: 2021-10-01

## 2021-10-01 LAB
ALBUMIN SERPL-MCNC: 3.2 G/DL (ref 3.5–5.2)
ALBUMIN/GLOB SERPL: 1.3 G/DL
ALP SERPL-CCNC: 97 U/L (ref 39–117)
ALT SERPL W P-5'-P-CCNC: 20 U/L (ref 1–33)
ANION GAP SERPL CALCULATED.3IONS-SCNC: 12 MMOL/L (ref 5–15)
AST SERPL-CCNC: 19 U/L (ref 1–32)
BASOPHILS # BLD AUTO: 0.06 10*3/MM3 (ref 0–0.2)
BASOPHILS NFR BLD AUTO: 0.4 % (ref 0–1.5)
BILIRUB SERPL-MCNC: 1.1 MG/DL (ref 0–1.2)
BUN SERPL-MCNC: 40 MG/DL (ref 8–23)
BUN/CREAT SERPL: 60.6 (ref 7–25)
CALCIUM SPEC-SCNC: 9.2 MG/DL (ref 8.6–10.5)
CHLORIDE SERPL-SCNC: 100 MMOL/L (ref 98–107)
CO2 SERPL-SCNC: 21 MMOL/L (ref 22–29)
CREAT SERPL-MCNC: 0.66 MG/DL (ref 0.57–1)
DEPRECATED RDW RBC AUTO: 52.8 FL (ref 37–54)
EOSINOPHIL # BLD AUTO: 0.01 10*3/MM3 (ref 0–0.4)
EOSINOPHIL NFR BLD AUTO: 0.1 % (ref 0.3–6.2)
ERYTHROCYTE [DISTWIDTH] IN BLOOD BY AUTOMATED COUNT: 15.7 % (ref 12.3–15.4)
GFR SERPL CREATININE-BSD FRML MDRD: 88 ML/MIN/1.73
GLOBULIN UR ELPH-MCNC: 2.4 GM/DL
GLUCOSE SERPL-MCNC: 105 MG/DL (ref 65–99)
HCT VFR BLD AUTO: 41.8 % (ref 34–46.6)
HGB BLD-MCNC: 12.8 G/DL (ref 12–15.9)
IMM GRANULOCYTES # BLD AUTO: 0.18 10*3/MM3 (ref 0–0.05)
IMM GRANULOCYTES NFR BLD AUTO: 1.2 % (ref 0–0.5)
LYMPHOCYTES # BLD AUTO: 0.98 10*3/MM3 (ref 0.7–3.1)
LYMPHOCYTES NFR BLD AUTO: 6.8 % (ref 19.6–45.3)
MCH RBC QN AUTO: 29.2 PG (ref 26.6–33)
MCHC RBC AUTO-ENTMCNC: 30.6 G/DL (ref 31.5–35.7)
MCV RBC AUTO: 95.2 FL (ref 79–97)
MONOCYTES # BLD AUTO: 1.11 10*3/MM3 (ref 0.1–0.9)
MONOCYTES NFR BLD AUTO: 7.6 % (ref 5–12)
NEUTROPHILS NFR BLD AUTO: 12.17 10*3/MM3 (ref 1.7–7)
NEUTROPHILS NFR BLD AUTO: 83.9 % (ref 42.7–76)
NRBC BLD AUTO-RTO: 0 /100 WBC (ref 0–0.2)
PLATELET # BLD AUTO: 180 10*3/MM3 (ref 140–450)
PMV BLD AUTO: 11.7 FL (ref 6–12)
POTASSIUM SERPL-SCNC: 5.1 MMOL/L (ref 3.5–5.2)
PROT SERPL-MCNC: 5.6 G/DL (ref 6–8.5)
RBC # BLD AUTO: 4.39 10*6/MM3 (ref 3.77–5.28)
SODIUM SERPL-SCNC: 133 MMOL/L (ref 136–145)
WBC # BLD AUTO: 14.51 10*3/MM3 (ref 3.4–10.8)

## 2021-10-01 PROCEDURE — 80053 COMPREHEN METABOLIC PANEL: CPT | Performed by: INTERNAL MEDICINE

## 2021-10-01 PROCEDURE — 94799 UNLISTED PULMONARY SVC/PX: CPT

## 2021-10-01 PROCEDURE — 74230 X-RAY XM SWLNG FUNCJ C+: CPT

## 2021-10-01 PROCEDURE — 97110 THERAPEUTIC EXERCISES: CPT

## 2021-10-01 PROCEDURE — 94762 N-INVAS EAR/PLS OXIMTRY CONT: CPT

## 2021-10-01 PROCEDURE — 92611 MOTION FLUOROSCOPY/SWALLOW: CPT

## 2021-10-01 PROCEDURE — 63710000001 PREDNISONE PER 1 MG: Performed by: INTERNAL MEDICINE

## 2021-10-01 PROCEDURE — 85025 COMPLETE CBC W/AUTO DIFF WBC: CPT | Performed by: INTERNAL MEDICINE

## 2021-10-01 RX ADMIN — HYDROCODONE BITARTRATE AND ACETAMINOPHEN 1 TABLET: 10; 325 TABLET ORAL at 12:38

## 2021-10-01 RX ADMIN — MEGESTROL ACETATE 800 MG: 40 SUSPENSION ORAL at 08:27

## 2021-10-01 RX ADMIN — BUDESONIDE 0.5 MG: 0.5 INHALANT ORAL at 09:41

## 2021-10-01 RX ADMIN — LIDOCAINE HYDROCHLORIDE 5 ML: 20 SOLUTION ORAL; TOPICAL at 12:38

## 2021-10-01 RX ADMIN — PREDNISONE 60 MG: 20 TABLET ORAL at 08:27

## 2021-10-01 RX ADMIN — LINACLOTIDE 145 MCG: 145 CAPSULE, GELATIN COATED ORAL at 06:34

## 2021-10-01 RX ADMIN — ATORVASTATIN CALCIUM 20 MG: 40 TABLET, FILM COATED ORAL at 20:07

## 2021-10-01 RX ADMIN — NYSTATIN 500000 UNITS: 100000 SUSPENSION ORAL at 08:27

## 2021-10-01 RX ADMIN — ARFORMOTEROL TARTRATE 15 MCG: 15 SOLUTION RESPIRATORY (INHALATION) at 09:41

## 2021-10-01 RX ADMIN — LIDOCAINE HYDROCHLORIDE 5 ML: 20 SOLUTION ORAL; TOPICAL at 08:27

## 2021-10-01 RX ADMIN — HYDROCODONE BITARTRATE AND ACETAMINOPHEN 1 TABLET: 10; 325 TABLET ORAL at 06:35

## 2021-10-01 RX ADMIN — BUDESONIDE 0.5 MG: 0.5 INHALANT ORAL at 19:53

## 2021-10-01 RX ADMIN — NYSTATIN 500000 UNITS: 100000 SUSPENSION ORAL at 17:42

## 2021-10-01 RX ADMIN — HYDROCODONE BITARTRATE AND ACETAMINOPHEN 1 TABLET: 10; 325 TABLET ORAL at 20:07

## 2021-10-01 RX ADMIN — NYSTATIN 500000 UNITS: 100000 SUSPENSION ORAL at 12:38

## 2021-10-01 RX ADMIN — HYDROXYZINE HYDROCHLORIDE 25 MG: 25 TABLET, FILM COATED ORAL at 20:07

## 2021-10-01 RX ADMIN — APIXABAN 10 MG: 5 TABLET, FILM COATED ORAL at 08:27

## 2021-10-01 RX ADMIN — NYSTATIN 500000 UNITS: 100000 SUSPENSION ORAL at 20:07

## 2021-10-01 RX ADMIN — ARFORMOTEROL TARTRATE 15 MCG: 15 SOLUTION RESPIRATORY (INHALATION) at 19:53

## 2021-10-01 RX ADMIN — APIXABAN 10 MG: 5 TABLET, FILM COATED ORAL at 20:07

## 2021-10-01 NOTE — THERAPY TREATMENT NOTE
Patient Name: Christina Espinoza  : 1950    MRN: [id number]                              Today's Date: 10/1/2021       Admit Date: 2021    Visit Dx:     ICD-10-CM ICD-9-CM   1. Hypoxia  R09.02 799.02   2. COVID-19  U07.1 079.89   3. Decreased activities of daily living (ADL)  Z78.9 V49.89   4. Difficulty walking  R26.2 719.7   5. S/P lumbar spinal fusion  Z98.1 V45.4   6. Pulmonary fibrosis (CMS/[facility])  J84.10 515   7. Spinal stenosis of lumbar region with neurogenic claudication  M48.062 724.03   8. COPD (chronic obstructive pulmonary disease) case management patient (CMS/[facility])  J44.9 496     Patient Active Problem List   Diagnosis   • Acquired spondylolisthesis   • Age related osteoporosis   • Asthma   • Chronic pain   • COPD (chronic obstructive pulmonary disease) case management patient ([facility])   • Degeneration of lumbar intervertebral disc   • Dizziness   • Hip pain   • Irritable bowel syndrome with constipation   • Neck pain   • Lumbago with sciatica   • Spinal stenosis of lumbar region with neurogenic claudication   • Mixed hyperlipidemia   • Overweight with body mass index (BMI) 25.0-29.9   • Pulmonary fibrosis ([facility])   • Rheumatoid arthritis ([facility])   • S/P lumbar spinal fusion   • Bronchiectasis without complication ([facility])   • Immunosuppressed status ([facility])   • 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[facility])   • Dyspnea   • Cough   • COVID-19   • Severe malnutrition (CMS/[facility])     Past Medical History:   Diagnosis Date   • Arthritis    • Asthma    • Cervicalgia 2020   • Leg pain    • Leg swelling    • Limb pain    • Lumbago 2020    low back pain    • Lumbar spinal stenosis 2020   • Lung disease    • Nausea    • Neurogenic claudication 2020   • Neuropathy    • Pulmonary fibrosis (CMS/[facility])    • Radiculopathy, lumbosacral region 2020   • Spondylolisthesis, lumbar region 2020    grade 1 at L3/4     Past Surgical History:   Procedure Laterality Date   • SPINE SURGERY     • TONSILLECTOMY     •  TUBAL ABDOMINAL LIGATION       General Information     Row Name 10/01/21 [facility]          OT Time and Intention    Document Type  therapy note (daily note)  -PG     Mode of Treatment  individual therapy;occupational therapy  -PG       User Key  (r) = Recorded By, (t) = Taken By, (c) = Cosigned By    Initials Name Provider Type    PG Bubba Valdivia OT Occupational Therapist          Mobility/ADL's    No documentation.       Obj/Interventions     Row Name 10/01/21 [facility]          Shoulder (Therapeutic Exercise)    Shoulder (Therapeutic Exercise)  strengthening exercise  -PG     Shoulder Strengthening (Therapeutic Exercise)  10 repetitions;1 lb free weight  -PG     Row Name 10/01/21 [facility]          Elbow/Forearm (Therapeutic Exercise)    Elbow/Forearm (Therapeutic Exercise)  strengthening exercise  -PG     Elbow/Forearm Strengthening (Therapeutic Exercise)  10 repetitions;1 lb free weight  -PG     Row Name 10/01/21 [facility]          Therapeutic Exercise    Therapeutic Exercise  shoulder;elbow/forearm  -PG       User Key  (r) = Recorded By, (t) = Taken By, (c) = Cosigned By    Initials Name Provider Type    PG Bbuba Valdivia OT Occupational Therapist        Goals/Plan    No documentation.       Clinical Impression     Row Name 10/01/21 1117          Plan of Care Review    Progress  no change  -PG       User Key  (r) = Recorded By, (t) = Taken By, (c) = Cosigned By    Initials Name Provider Type    PG Bubba Valdivia OT Occupational Therapist        Outcome Measures     Row Name 10/01/21 1117          How much help from another is currently needed...    Putting on and taking off regular lower body clothing?  1  -PG     Bathing (including washing, rinsing, and drying)  1  -PG     Toileting (which includes using toilet bed pan or urinal)  1  -PG     Putting on and taking off regular upper body clothing  2  -PG     Taking care of personal grooming (such as brushing teeth)  2  -PG     Eating meals  3  -PG     AM-PAC 6 Clicks Score (OT)   10  -PG     Row Name 10/01/21 0725          How much help from another person do you currently need...    Turning from your back to your side while in flat bed without using bedrails?  4  -JK     Moving from lying on back to sitting on the side of a flat bed without bedrails?  3  -JK     Moving to and from a bed to a chair (including a wheelchair)?  3  -JK     Standing up from a chair using your arms (e.g., wheelchair, bedside chair)?  3  -JK     Climbing 3-5 steps with a railing?  2  -JK     To walk in hospital room?  3  -JK     AM-PAC 6 Clicks Score (PT)  18  -JK     Row Name 10/01/21 1117          Functional Assessment    Outcome Measure Options  AM-PAC 6 Clicks Daily Activity (OT);Optimal Instrument  -PG     Row Name 10/01/21 1117          Optimal Instrument    Optimal Instrument  Optimal - 3  -PG     Bending/Stooping  3  -PG     Standing  2  -PG     Reaching  2  -PG       User Key  (r) = Recorded By, (t) = Taken By, (c) = Cosigned By    Initials Name Provider Type    Telly Bauer, RN Registered Nurse    Bubba Arredondo OT Occupational Therapist          Occupational Therapy Education                 Title: PT OT SLP Therapies (Done)     Topic: Occupational Therapy (Done)     Point: ADL training (Done)     Description:   Instruct learner(s) on proper safety adaptation and remediation techniques during self care or transfers.   Instruct in proper use of assistive devices.              Learning Progress Summary           Patient Acceptance, E, VU by VB at 9/26/2021 1825    Acceptance, E,TB, VU by SADIE at 9/26/2021 0248    Acceptance, E,D, VU,DU by PG at 9/23/2021 0917                   Point: Home exercise program (Done)     Description:   Instruct learner(s) on appropriate technique for monitoring, assisting and/or progressing therapeutic exercises/activities.              Learning Progress Summary           Patient Acceptance, E, VU by VB at 9/26/2021 1825    Acceptance, E,TB, VU by SADIE at 9/26/2021 0248     Acceptance, E,D, VU,DU by PG at 9/23/2021 0917                   Point: Precautions (Done)     Description:   Instruct learner(s) on prescribed precautions during self-care and functional transfers.              Learning Progress Summary           Patient Acceptance, E, VU by VB at 9/26/2021 1825    Acceptance, E,TB, VU by RG at 9/26/2021 0248    Acceptance, E,D, VU,DU by PG at 9/23/2021 0917                   Point: Body mechanics (Done)     Description:   Instruct learner(s) on proper positioning and spine alignment during self-care, functional mobility activities and/or exercises.              Learning Progress Summary           Patient Acceptance, E, VU by VB at 9/26/2021 1825    Acceptance, E,TB, VU by RG at 9/26/2021 0248    Acceptance, E,D, VU,DU by PG at 9/23/2021 0917                               User Key     Initials Effective Dates Name Provider Type Discipline     06/16/21 -  Madonna Cordero RN Registered Nurse Nurse    PG 06/16/21 -  Bubba Valdivia OT Occupational Therapist OT     05/28/21 -  Freddy Perez RNA Registered Nurse Nurse              OT Recommendation and Plan  Planned Therapy Interventions (OT): activity tolerance training, BADL retraining, occupation/activity based interventions, patient/caregiver education/training, strengthening exercise, transfer/mobility retraining  Therapy Frequency (OT): 5 times/wk  Plan of Care Review  Plan of Care Reviewed With: patient  Progress: no change  Outcome Summary: Patient required less assistance from supine to sit and then completed recliner transfer with contact-guard/minimal assistance     Time Calculation:   Time Calculation- OT     Row Name 10/01/21 1118             Time Calculation- OT    OT Received On  10/01/21  -PG      OT Goal Re-Cert Due Date  10/02/21  -PG         Timed Charges    78546 - OT Therapeutic Exercise Minutes  10  -PG         Total Minutes    Timed Charges Total Minutes  10  -PG       Total Minutes  10  -PG        User Key   (r) = Recorded By, (t) = Taken By, (c) = Cosigned By    Initials Name Provider Type     Bubba Valdivia OT Occupational Therapist        Therapy Charges for Today     Code Description Service Date Service Provider Modifiers Qty    [id number]  OT THERAPEUTIC ACT EA 15 MIN 9/30/2021 Bubba Valdivia OT GO 1    [id number]  OT THER PROC EA 15 MIN 10/1/2021 Bubba Valdivia OT GO 1               Bubba Valdivia OT  10/1/2021

## 2021-10-01 NOTE — PLAN OF CARE
"Goal Outcome Evaluation:              Outcome Summary: Sore in patients mouth have made eating/drinking painful. PAtient has required 3/4 L o2 tonight. Medicated once for back pain. Unabe to swallow thick liquids due to \"getting hung\" swallow study ordered for this am. Medicated with Atarax once this shift for anxiety.  "

## 2021-10-01 NOTE — THERAPY TREATMENT NOTE
Acute Care - Physical Therapy Treatment Note   [facility]     Patient Name: Christina Espinoza  : 1950  MRN: [id number]  Today's Date: 10/1/2021      Visit Dx:     ICD-10-CM ICD-9-CM   1. Hypoxia  R09.02 799.02   2. COVID-19  U07.1 079.89   3. Decreased activities of daily living (ADL)  Z78.9 V49.89   4. Difficulty walking  R26.2 719.7   5. S/P lumbar spinal fusion  Z98.1 V45.4   6. Pulmonary fibrosis (CMS/[facility])  J84.10 515   7. Spinal stenosis of lumbar region with neurogenic claudication  M48.062 724.03   8. COPD (chronic obstructive pulmonary disease) case management patient (CMS/[facility])  J44.9 496     Patient Active Problem List   Diagnosis   • Acquired spondylolisthesis   • Age related osteoporosis   • Asthma   • Chronic pain   • COPD (chronic obstructive pulmonary disease) case management patient ([facility])   • Degeneration of lumbar intervertebral disc   • Dizziness   • Hip pain   • Irritable bowel syndrome with constipation   • Neck pain   • Lumbago with sciatica   • Spinal stenosis of lumbar region with neurogenic claudication   • Mixed hyperlipidemia   • Overweight with body mass index (BMI) 25.0-29.9   • Pulmonary fibrosis ([facility])   • Rheumatoid arthritis ([facility])   • S/P lumbar spinal fusion   • Bronchiectasis without complication ([facility])   • Immunosuppressed status ([facility])   • 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)   • Dyspnea   • Cough   • COVID-19   • Severe malnutrition (CMS/[facility])     Past Medical History:   Diagnosis Date   • Arthritis    • Asthma    • Cervicalgia 2020   • Leg pain    • Leg swelling    • Limb pain    • Lumbago 2020    low back pain    • Lumbar spinal stenosis 2020   • Lung disease    • Nausea    • Neurogenic claudication 2020   • Neuropathy    • Pulmonary fibrosis (CMS/[facility])    • Radiculopathy, lumbosacral region 2020   • Spondylolisthesis, lumbar region 2020    grade 1 at L3/4     Past Surgical History:   Procedure Laterality Date   • SPINE SURGERY     • TONSILLECTOMY      • TUBAL ABDOMINAL LIGATION          PT Assessment (last 12 hours)      PT Evaluation and Treatment     Row Name 10/01/21 [facility]          Physical Therapy Time and Intention    Subjective Information  complains of;fatigue;weakness  -     Document Type  therapy note (daily note)  -     Mode of Treatment  individual therapy;physical therapy  -WM     Total Minutes, Physical Therapy  15  -WM     Patient Effort  fair  -     Symptoms Noted During/After Treatment  fatigue  -     Row Name 10/01/21 [facility]          Cognition    Affect/Mental Status (Cognitive)  WFL  -     Row Name 10/01/21 [facility]          Hip (Therapeutic Exercise)    Hip Strengthening (Therapeutic Exercise)  bilateral;aBduction;aDduction;heel slides;supine;10 repetitions;2 sets  -     Row Name 10/01/21 [facility]          Knee (Therapeutic Exercise)    Knee (Therapeutic Exercise)  isometric exercises  -     Knee Isometrics (Therapeutic Exercise)  bilateral;quad sets;supine;10 repetitions;3 second hold;2 sets  -     Knee Strengthening (Therapeutic Exercise)  bilateral;SAQ (short arc quad);supine;10 repetitions;2 sets  -     Row Name 10/01/21 [facility]          Ankle (Therapeutic Exercise)    Ankle AROM (Therapeutic Exercise)  bilateral;dorsiflexion;plantarflexion;supine;10 repetitions;2 sets  -     Row Name 10/01/21 [facility]          Therapy Assessment/Plan (PT)    Criteria for Skilled Interventions Met (PT)  yes;meets criteria  -     Row Name 10/01/21 123          Progress Summary (PT)    Progress Toward Functional Goals (PT)  progress toward functional goals is fair  -WM       User Key  (r) = Recorded By, (t) = Taken By, (c) = Cosigned By    Initials Name Provider Type     Conrado Johnson PTA Physical Therapy Assistant        Physical Therapy Education                 Title: PT OT SLP Therapies (Done)     Topic: Physical Therapy (Done)     Point: Mobility training (Done)     Learning Progress Summary           Patient Acceptance, E, VU by VB at  9/26/2021 1825    Acceptance, E,TB, VU by RG at 9/26/2021 0248    Acceptance, E, VU,NR by CS at 9/23/2021 1459                   Point: Home exercise program (Done)     Learning Progress Summary           Patient Acceptance, E, VU by VB at 9/26/2021 1825    Acceptance, E,TB, VU by RG at 9/26/2021 0248                   Point: Body mechanics (Done)     Learning Progress Summary           Patient Acceptance, E, VU by VB at 9/26/2021 1825    Acceptance, E,TB, VU by RG at 9/26/2021 0248                   Point: Precautions (Done)     Learning Progress Summary           Patient Acceptance, E, VU by VB at 9/26/2021 1825    Acceptance, E,TB, VU by RG at 9/26/2021 0248    Acceptance, E, VU,NR by  at 9/23/2021 1459                               User Key     Initials Effective Dates Name Provider Type Discipline     06/16/21 -  Madonna Cordero, RN Registered Nurse Nurse     04/25/21 -  Shelby Damico, LINDA Physical Therapist PT     05/28/21 -  Freddy Perez RNA Registered Nurse Nurse              PT Recommendation and Plan  Therapy Frequency (PT): daily  Progress Summary (PT)  Progress Toward Functional Goals (PT): progress toward functional goals is fair  Outcome Measures     Row Name 10/01/21 1237 09/30/21 0844          How much help from another person do you currently need...    Turning from your back to your side while in flat bed without using bedrails?  4  -WM  4  -DK     Moving from lying on back to sitting on the side of a flat bed without bedrails?  3  -WM  3  -DK     Moving to and from a bed to a chair (including a wheelchair)?  3  -WM  2  -DK     Standing up from a chair using your arms (e.g., wheelchair, bedside chair)?  3  -WM  2  -DK     Climbing 3-5 steps with a railing?  2  -WM  2  -DK     To walk in hospital room?  3  -WM  2  -DK     AM-PAC 6 Clicks Score (PT)  18  -WM  15  -DK        Functional Assessment    Outcome Measure Options  --  AM-PAC 6 Clicks Basic Mobility (PT)  -DK       User Key  (r)  = Recorded By, (t) = Taken By, (c) = Cosigned By    Initials Name Provider Type    Conrado Almendarez PTA Physical Therapy Assistant    Karley Jett PTA Physical Therapy Assistant           Time Calculation:   PT Charges     Row Name 10/01/21 1235             Time Calculation    PT Received On  10/01/21  -WM         Timed Charges    81335 - PT Therapeutic Exercise Minutes  15  -WM         Total Minutes    Timed Charges Total Minutes  15  -WM       Total Minutes  15  -WM        User Key  (r) = Recorded By, (t) = Taken By, (c) = Cosigned By    Initials Name Provider Type    Conrado Almendarez PTA Physical Therapy Assistant        Therapy Charges for Today     Code Description Service Date Service Provider Modifiers Qty    28590484011 HC PT THER PROC EA 15 MIN 10/1/2021 Conrado Johnson PTA GP 1          PT G-Codes  Outcome Measure Options: AM-PAC 6 Clicks Daily Activity (OT), Optimal Instrument  AM-PAC 6 Clicks Score (PT): 18  AM-PAC 6 Clicks Score (OT): 10    Conrado Johnson PTA  10/1/2021

## 2021-10-01 NOTE — PROGRESS NOTES
[facility]     Progress Note    Patient Name: Christina Espinoza  : 1950  MRN: [id number]  Primary Care Physician:  Laurie Castro MD  Date of admission: 2021    Subjective   Subjective     Chief Complaint: Covid pneumonia, pulmonary interstitial disease, hypoxia, pulmonary embolism left lung, dysphagia, cachexia    HPI:  Patient Reports Viscous Lidocaine helping the sores on her tongue. She did drink her bone soup today. She had a modified barium swallow showing aspiration with thin liquids.    Review of Systems   Review of Systems   Constitutional: Activity change: in bed mostly.   HENT: Positive for mouth sores.    Eyes: Negative.    Respiratory: Shortness of breath: on exertion.    Gastrointestinal: Negative.    Endocrine: Negative.    Musculoskeletal: Positive for arthralgias and back pain.   Skin: Negative.    Allergic/Immunologic: Negative.    Neurological: Positive for weakness.   Hematological: Negative.    Psychiatric/Behavioral: The patient is nervous/anxious.        Objective   Objective     Vitals:   Temp:  [97.2 °F (36.2 °C)-97.9 °F (36.6 °C)] 97.8 °F (36.6 °C)  Heart Rate:  [73-83] 76  Resp:  [18] 18  BP: (100-128)/(57-67) 128/63  Flow (L/min):  [2-5] 2  Physical Exam    Constitutional: Awake, alert, oriented, cooperative   Eyes: PERRLA, sclerae anicteric, no conjunctival injection   HENT: NCAT, mucous membranes moist   Neck: Supple, no thyromegaly, no lymphadenopathy, trachea midline   Respiratory: Clear to auscultation bilaterally, nonlabored respirations    Cardiovascular: RRR, no murmurs, rubs, or gallops, palpable pedal pulses bilaterally   Gastrointestinal: Positive bowel sounds, soft, nontender, nondistended   Musculoskeletal: No bilateral ankle edema, no clubbing or cyanosis to extremities   Psychiatric: Appropriate affect, cooperative   Neurologic: Oriented x 3, strength symmetric in all extremities, Cranial Nerves grossly intact to confrontation, speech clear   Skin: No rashes      Result Review    Result Review:  I have personally reviewed the results from the time of this admission to 10/1/2021 18:03 EDT and agree with these findings:  [x]  Laboratory  []  Microbiology  [x]  Radiology  []  EKG/Telemetry   []  Cardiology/Vascular   []  Pathology  []  Old records  []  Other:  Most notable findings include: Diet change per recommendations with nectar liquids, full liquid diet. Speech consult    Assessment/Plan   Assessment / Plan   COVID-19 pneumonia  Hypoxia  Desaturation with exertion  Mouth sores  Dysphagia  Malnutrition  Cachexia  Anxiety  DVT  Coagulopathy due to Covid  Pulmonary interstitial disease  Brief Patient Summary:  Christina Espinoza is a 71 y.o. female who has cachexia and malnutrition due to anorexia after Covid pneumonia. Also has dysphagia    Active Hospital Problems:  Active Hospital Problems    Diagnosis    • COVID-19    • Severe malnutrition (CMS/HCC)      Plan:   Change diet to full liquids and thickening to consistency of nectar  [facility]en by [facility] and precert started  Speech consult  OT PT working with patient      DVT prophylaxis:  Medical DVT prophylaxis orders are present.    CODE STATUS:   Level Of Support Discussed With: Patient  Code Status: CPR  Medical Interventions (Level of Support Prior to Arrest): Full      Electronically signed by Laurie Castro MD, 10/01/21, 6:03 PM EDT.

## 2021-10-01 NOTE — MBS/VFSS/FEES
Acute Care - Speech Language Pathology   Swallow Modified Barium Swallow Study  [facility]     Patient Name: Christina Espinoza  : 1950  MRN: [id number]  Today's Date: 10/1/2021               Admit Date: 2021    Visit Dx:     ICD-10-CM ICD-9-CM   1. Hypoxia  R09.02 799.02   2. COVID-19  U07.1 079.89   3. Decreased activities of daily living (ADL)  Z78.9 V49.89   4. Difficulty walking  R26.2 719.7   5. S/P lumbar spinal fusion  Z98.1 V45.4   6. Pulmonary fibrosis (CMS/[facility])  J84.10 515   7. Spinal stenosis of lumbar region with neurogenic claudication  M48.062 724.03   8. COPD (chronic obstructive pulmonary disease) case management patient (CMS/[facility])  J44.9 496     Patient Active Problem List   Diagnosis   • Acquired spondylolisthesis   • Age related osteoporosis   • Asthma   • Chronic pain   • COPD (chronic obstructive pulmonary disease) case management patient ([facility])   • Degeneration of lumbar intervertebral disc   • Dizziness   • Hip pain   • Irritable bowel syndrome with constipation   • Neck pain   • Lumbago with sciatica   • Spinal stenosis of lumbar region with neurogenic claudication   • Mixed hyperlipidemia   • Overweight with body mass index (BMI) 25.0-29.9   • Pulmonary fibrosis ([facility])   • Rheumatoid arthritis ([facility])   • S/P lumbar spinal fusion   • Bronchiectasis without complication ([facility])   • Immunosuppressed status ([facility])   • 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[facility])   • Dyspnea   • Cough   • COVID-19   • Severe malnutrition (CMS/[facility])     Past Medical History:   Diagnosis Date   • Arthritis    • Asthma    • Cervicalgia 2020   • Leg pain    • Leg swelling    • Limb pain    • Lumbago 2020    low back pain    • Lumbar spinal stenosis 2020   • Lung disease    • Nausea    • Neurogenic claudication 2020   • Neuropathy    • Pulmonary fibrosis (CMS/[facility])    • Radiculopathy, lumbosacral region 2020   • Spondylolisthesis, lumbar region 2020    grade 1 at L3/4     Past Surgical History:    Procedure Laterality Date   • SPINE SURGERY  2020   • TONSILLECTOMY     • TUBAL ABDOMINAL LIGATION         MODIFIED BARIUM SWALLOW STUDY: SPEECH PATHOLOGY REPORT        DATE OF SERVICE: 10/1/2021    PERTINENT INFORMATION:  Ms. Espinoza is a 71 year old female with diagnosis of dysphagia. Patient stating complaint of sore tongue as well as difficulty swallowing solids.    She was referred for an MBSS by Dr. FAVIO Castro to rule out aspiration as well as to determine appropriate treatment plan for this patient.      PROCEDURE:    Ms. Espinoza was alert and cooperative.  The patient was viewed in the lateral plane.  The following Ba consistencies were administered: Thin liquids, nectar thick liquids, barium mixed with applesauce, barium mixed with cracker. The following compensatory swallowing strategies were performed: Bolus modification, cyclic ingestion.    RESULTS:    1.  Nectar liquid by spoon with lingual pumping, spillage to the vallecula, swallow completed.  2. Nectar liquid by cup with spillage to the pharynx, swallow completed.  3. Thin liquid with loss of partial bolus into the pharynx and into the laryngeal vestibule with aspiration occurring.  Swallow completed with laryngeal penetration occurring, coughing noted.  4. Purée with holding the bolus, lingual pumping with spillage to the vallecula, swallow completed with oral residue remaining.    5. Solid results with patient taking very small bites, lingual pumping with spillage to the vallecula, swallow completed.  Minimal pharyngeal residue noted.  7.  Nectar thick liquid via straw with swishing the bolus prior to swallow completed.      IMPRESSIONS:    Ms. Espinoza demonstrated oral pharyngeal dysphagia characterized by swallow delay, decreased oral motor coordination, decreased oral motor strength.  Laryngeal penetration and chava aspiration were noted with thin liquid.  Patient did produce cough.    FUNCTIONAL DEFICIT: Patient scored level 4 of 7 on  Functional Communication Measures for swallowing indicating a 40-59% limitation in function for current status, goal status, and discharge status.      RECOMMENDATIONS:   1.  Diet with full liquid, nectar thickened liquid.  2.  Positioning fully upright for all p.o. intake and 30 minutes following.  3.  Alternate small bites and small sips of solids and liquids at a slow rate, double swallow.  4.  May wish to consider core track if patient demonstrating poor p.o. intake.  5.  Speech pathology consult for dysphagia.        Yes, patient/responsible party agrees with the plan of care and has been informed of all alternatives, risks and benefits.    Thank you for this referral.                                                                        EDUCATION  The patient has been educated in the following areas:   Modified Diet Instruction.              Time Calculation:   Time Calculation- SLP     Row Name 10/01/21 1407             Time Calculation- SLP    SLP Received On  10/01/21  -TB         Untimed Charges    SLP Eval/Re-eval   ST Motion Fluoro Eval Swallow - 89243  -TB      16083-ZE Motion Fluoro Eval Swallow Minutes  90  -TB         Total Minutes    Untimed Charges Total Minutes  90  -TB       Total Minutes  90  -TB        User Key  (r) = Recorded By, (t) = Taken By, (c) = Cosigned By    Initials Name Provider Type    TB Charlene Bowers SLP Speech and Language Pathologist          Therapy Charges for Today     Code Description Service Date Service Provider Modifiers Qty    41853735375  ST MOTION FLUORO EVAL SWALLOW 6 10/1/2021 Charlene Bowers SLP GN 1               ELIER Hendrix  10/1/2021

## 2021-10-01 NOTE — PLAN OF CARE
Goal Outcome Evaluation:  Plan of Care Reviewed With: patient        Progress: no change  Outcome Summary: Patient remains weak and has a decreased appetite. Barium swallow showed risk for aspiration with thin liquids. Diet changed per speech recommendation

## 2021-10-01 NOTE — SIGNIFICANT NOTE
10/01/21 0858   Plan   Plan Comments SW spoke with pts daughter regarding rehab. She accepted the bed at [facility]. precert was started today.

## 2021-10-02 PROCEDURE — 94762 N-INVAS EAR/PLS OXIMTRY CONT: CPT

## 2021-10-02 PROCEDURE — 94799 UNLISTED PULMONARY SVC/PX: CPT

## 2021-10-02 PROCEDURE — 97164 PT RE-EVAL EST PLAN CARE: CPT

## 2021-10-02 PROCEDURE — 97110 THERAPEUTIC EXERCISES: CPT

## 2021-10-02 PROCEDURE — 63710000001 PREDNISONE PER 1 MG: Performed by: INTERNAL MEDICINE

## 2021-10-02 RX ADMIN — ATORVASTATIN CALCIUM 20 MG: 40 TABLET, FILM COATED ORAL at 21:28

## 2021-10-02 RX ADMIN — MEGESTROL ACETATE 800 MG: 40 SUSPENSION ORAL at 09:09

## 2021-10-02 RX ADMIN — PREDNISONE 60 MG: 20 TABLET ORAL at 09:08

## 2021-10-02 RX ADMIN — SODIUM CHLORIDE, PRESERVATIVE FREE 10 ML: 5 INJECTION INTRAVENOUS at 21:29

## 2021-10-02 RX ADMIN — APIXABAN 10 MG: 5 TABLET, FILM COATED ORAL at 21:28

## 2021-10-02 RX ADMIN — HYDROCODONE BITARTRATE AND ACETAMINOPHEN 1 TABLET: 10; 325 TABLET ORAL at 23:20

## 2021-10-02 RX ADMIN — ARFORMOTEROL TARTRATE 15 MCG: 15 SOLUTION RESPIRATORY (INHALATION) at 08:50

## 2021-10-02 RX ADMIN — NYSTATIN 500000 UNITS: 100000 SUSPENSION ORAL at 21:29

## 2021-10-02 RX ADMIN — HYDROCODONE BITARTRATE AND ACETAMINOPHEN 1 TABLET: 10; 325 TABLET ORAL at 09:10

## 2021-10-02 RX ADMIN — LIDOCAINE HYDROCHLORIDE 5 ML: 20 SOLUTION ORAL; TOPICAL at 17:28

## 2021-10-02 RX ADMIN — NYSTATIN 500000 UNITS: 100000 SUSPENSION ORAL at 12:54

## 2021-10-02 RX ADMIN — LIDOCAINE HYDROCHLORIDE 5 ML: 20 SOLUTION ORAL; TOPICAL at 09:08

## 2021-10-02 RX ADMIN — SODIUM CHLORIDE, PRESERVATIVE FREE 10 ML: 5 INJECTION INTRAVENOUS at 09:10

## 2021-10-02 RX ADMIN — ARFORMOTEROL TARTRATE 15 MCG: 15 SOLUTION RESPIRATORY (INHALATION) at 19:50

## 2021-10-02 RX ADMIN — NYSTATIN 500000 UNITS: 100000 SUSPENSION ORAL at 17:28

## 2021-10-02 RX ADMIN — LINACLOTIDE 145 MCG: 145 CAPSULE, GELATIN COATED ORAL at 09:09

## 2021-10-02 RX ADMIN — HYDROCODONE BITARTRATE AND ACETAMINOPHEN 1 TABLET: 10; 325 TABLET ORAL at 17:28

## 2021-10-02 RX ADMIN — BUDESONIDE 0.5 MG: 0.5 INHALANT ORAL at 19:50

## 2021-10-02 RX ADMIN — APIXABAN 10 MG: 5 TABLET, FILM COATED ORAL at 09:09

## 2021-10-02 RX ADMIN — BUDESONIDE 0.5 MG: 0.5 INHALANT ORAL at 08:50

## 2021-10-02 RX ADMIN — NYSTATIN 500000 UNITS: 100000 SUSPENSION ORAL at 09:09

## 2021-10-02 NOTE — PLAN OF CARE
Goal Outcome Evaluation:           Progress: improving  Outcome Summary: VSS. O2 sat above 90% on 3 L. Complain of back pain and tongue pain, medicated per order. No significant changes at this time.

## 2021-10-02 NOTE — PROGRESS NOTES
[facility]     Progress Note    Patient Name: Christina Espinoza  : 1950  MRN: [id number]  Primary Care Physician:  Laurie Castro MD  Date of admission: 2021    Subjective   Subjective     Chief Complaint: Covid pneumonia, pulmonary interstitial disease, hypoxia, pulmonary embolism left lung, dysphagia, cachexia    HPI:  Patient Reports her tongue is better with the viscous lidocaine, says she is wearing drinking her thickened fluids and it does help her swallow.  She still lying in bed but agrees to sit up with help.    Review of Systems   Review of Systems   Constitutional: Positive for fatigue. Activity change: up with help occasionally. Appetite change: poor appetite.   HENT: Positive for mouth sores and trouble swallowing.    Eyes: Negative.    Respiratory: Shortness of breath: on exertion.    Cardiovascular: Negative.    Gastrointestinal: Negative.    Endocrine: Negative.    Genitourinary: Negative.    Musculoskeletal: Back pain: chronic.   Skin: Negative.    Allergic/Immunologic: Negative.    Neurological: Positive for weakness.   Hematological: Negative.    Psychiatric/Behavioral: The patient is nervous/anxious.        Objective   Objective     Vitals:   Temp:  [97.1 °F (36.2 °C)-97.9 °F (36.6 °C)] 97.1 °F (36.2 °C)  Heart Rate:  [76-94] 93  Resp:  [16-20] 16  BP: (102-128)/(52-71) 102/52  Flow (L/min):  [2] 2  Physical Exam    Constitutional: Awake, alert, oriented cooperative   Eyes: PERRLA, sclerae anicteric, no conjunctival injection   HENT: NCAT, mucous membranes moist   Neck: Supple, no thyromegaly, no lymphadenopathy, trachea midline   Respiratory: Clear to auscultation bilaterally, nonlabored respirations    Cardiovascular: RRR, no murmurs, rubs, or gallops, palpable pedal pulses bilaterally   Gastrointestinal: Positive bowel sounds, soft, nontender, nondistended   Musculoskeletal: No bilateral ankle edema, no clubbing or cyanosis to extremities   Psychiatric: Appropriate affect,  cooperative   Neurologic: Oriented x 3, strength symmetric in all extremities, Cranial Nerves grossly intact to confrontation, speech clear   Skin: No rashes     Result Review    Result Review:  I have personally reviewed the results from the time of this admission to 10/2/2021 12:46 EDT and agree with these findings:  [x]  Laboratory  []  Microbiology  []  Radiology  []  EKG/Telemetry   []  Cardiology/Vascular   []  Pathology  []  Old records  []  Other:  Most notable findings include: Anorexia ,cachexia, malnutrition, dysphagia, mouth sores    Assessment/Plan   Assessment / Plan   Covid 19 pneumonia  Hypoxia  Desaturation with exertion  Mouth sores  Dysphagia and aspiration  Malnutrition  Cachexia  Anxiety  DVT  Pulmonary embolism  Coagulopathy due to Covid  Pulmonary interstitial disease    Brief Patient Summary:  Christina Espinoza is a 71 y.o. female who was treated for COVID-19 pneumonia and her oxygenation has improved.  She is remained cachectic and weak and not eating.  Found to have dysphagia and placed on thickened liquids and full liquid diet.  Being seen by speech therapy.  [facility]wait[facility] bed.    Active Hospital Problems:  Active Hospital Problems    Diagnosis    • COVID-19    • Severe malnutrition (CMS/HCC)      Plan:   Encourage patient to sit up for meals  Continue full liquids and thickening of thin liquids  Speech therapy to work with patient  Discussed lidocaine for tongue  OT PT  Monitoring of oxygen  Continue present medications    DVT prophylaxis:  Medical DVT prophylaxis orders are present.    CODE STATUS:   Level Of Support Discussed With: Patient  Code Status: CPR  Medical Interventions (Level of Support Prior to Arrest): Full      Electronically signed by Laurie Castro MD, 10/02/21, 12:46 PM EDT.

## 2021-10-02 NOTE — THERAPY RE-EVALUATION
Acute Care - Physical Therapy Re-Evaluation   [place]     Patient Name: Christina Espinoza  : 1950  MRN: [id number]  Today's Date: 10/2/2021      Visit Dx:     ICD-10-CM ICD-9-CM   1. Hypoxia  R09.02 799.02   2. COVID-19  U07.1 079.89   3. Decreased activities of daily living (ADL)  Z78.9 V49.89   4. Difficulty walking  R26.2 719.7   5. S/P lumbar spinal fusion  Z98.1 V45.4   6. Pulmonary fibrosis (CMS/[facility])  J84.10 515   7. Spinal stenosis of lumbar region with neurogenic claudication  M48.062 724.03   8. COPD (chronic obstructive pulmonary disease) case management patient (CMS/[facility])  J44.9 496     Patient Active Problem List   Diagnosis   • Acquired spondylolisthesis   • Age related osteoporosis   • Asthma   • Chronic pain   • COPD (chronic obstructive pulmonary disease) case management patient ([facility])   • Degeneration of lumbar intervertebral disc   • Dizziness   • Hip pain   • Irritable bowel syndrome with constipation   • Neck pain   • Lumbago with sciatica   • Spinal stenosis of lumbar region with neurogenic claudication   • Mixed hyperlipidemia   • Overweight with body mass index (BMI) 25.0-29.9   • Pulmonary fibrosis ([facility])   • Rheumatoid arthritis ([facility])   • S/P lumbar spinal fusion   • Bronchiectasis without complication ([facility])   • Immunosuppressed status ([facility])   • 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[facility])   • Dyspnea   • Cough   • COVID-19   • Severe malnutrition (CMS/[facility])     Past Medical History:   Diagnosis Date   • Arthritis    • Asthma    • Cervicalgia 2020   • Leg pain    • Leg swelling    • Limb pain    • Lumbago 2020    low back pain    • Lumbar spinal stenosis 2020   • Lung disease    • Nausea    • Neurogenic claudication 2020   • Neuropathy    • Pulmonary fibrosis (CMS/[facility])    • Radiculopathy, lumbosacral region 2020   • Spondylolisthesis, lumbar region 2020    grade 1 at L3/4     Past Surgical History:   Procedure Laterality Date   • SPINE SURGERY     • TONSILLECTOMY      • TUBAL ABDOMINAL LIGATION          PT Assessment (last 12 hours)      PT Evaluation and Treatment     Row Name 10/02/21 1222          Physical Therapy Time and Intention    Subjective Information  no complaints  (Pended)   -     Document Type  re-evaluation  (Pended)   -     Mode of Treatment  individual therapy;physical therapy  (Pended)   -     Patient Effort  adequate  (Pended)   -     Row Name 10/02/21 1222          Strength (Manual Muscle Testing)    Strength (Manual Muscle Testing)  left lower extremity strength;right lower extremity strength  (Pended)   -     Left Lower Extremity Strength  hip;knee;ankle  (Pended)   -RV     Hip, Left (Strength)  4-/5  (Pended)   -RV     Knee, Left (Strength)  4-/5  (Pended)   -RV     Ankle, Left (Strength)  4-/5  (Pended)   -RV     Right Lower Extremity Strength  hip;knee;ankle  (Pended)   -RV     Hip, Right (Strength)  4-/5  (Pended)   -RV     Knee, Right (Strength)  4-5  (Pended)   -RV     Ankle, Right (Strength)  4-/5  (Pended)   -     Row Name 10/02/21 1222          Bed Mobility    Comment (Bed Mobility)  pt declined bed mobility  (Pended)   -     Row Name 10/02/21 1222          Transfers    Comment (Transfers)  pt declined transfers  (Pended)   -     Row Name 10/02/21 1222          Motor Skills    Motor Skills  therapeutic exercise  (Pended)   -     Therapeutic Exercise  hip;knee;ankle  (Pended)   -     Row Name 10/02/21 1222          Hip (Therapeutic Exercise)    Hip (Therapeutic Exercise)  strengthening exercise  (Pended)   -     Hip Strengthening (Therapeutic Exercise)  bilateral;supine;flexion;2 sets;10 repetitions  (Pended)   -     Row Name 10/02/21 1222          Knee (Therapeutic Exercise)    Knee (Therapeutic Exercise)  strengthening exercise  (Pended)   -     Knee Strengthening (Therapeutic Exercise)  bilateral;supine;heel slides;2 sets;10 repetitions  (Pended)   -     Row Name 10/02/21 1222          Ankle (Therapeutic Exercise)     Ankle (Therapeutic Exercise)  strengthening exercise  (Pended)   -RV     Ankle Strengthening (Therapeutic Exercise)  bilateral;supine;dorsiflexion;plantarflexion;2 sets;10 repetitions  (Pended)   -RV     Row Name 10/02/21 1222          Physical Therapy Goals    Bed Mobility Goal Selection (PT)  bed mobility, PT goal 1  (Pended)   -RV     Transfer Goal Selection (PT)  transfer, PT goal 1  (Pended)   -RV     Gait Training Goal Selection (PT)  gait training, PT goal 1  (Pended)   -RV     Row Name 10/02/21 1222          Bed Mobility Goal 1 (PT)    Activity/Assistive Device (Bed Mobility Goal 1, PT)  bed mobility activities, all  (Pended)   -RV     [place] Level/Cues Needed (Bed Mobility Goal 1, PT)  modified independence  (Pended)   -RV     Time Frame (Bed Mobility Goal 1, PT)  long term goal (LTG);10 days  (Pended)   -RV     Progress/Outcomes (Bed Mobility Goal 1, PT)  goal not met;good progress toward goal;continuing progress toward goal  (Pended)   -RV     Row Name 10/02/21 1222          Transfer Goal 1 (PT)    Activity/Assistive Device (Transfer Goal 1, PT)  sit-to-stand/stand-to-sit;bed-to-chair/chair-to-bed;walker, rolling  (Pended)   -RV     [place] Level/Cues Needed (Transfer Goal 1, PT)  supervision required  (Pended)   -RV     Time Frame (Transfer Goal 1, PT)  long term goal (LTG);10 days  (Pended)   -RV     Progress/Outcome (Transfer Goal 1, PT)  goal not met;good progress toward goal;continuing progress toward goal  (Pended)   -RV     Row Name 10/02/21 1222          Gait Training Goal 1 (PT)    Activity/Assistive Device (Gait Training Goal 1, PT)  gait (walking locomotion);assistive device use;walker, rolling  (Pended)   -RV     [place] Level (Gait Training Goal 1, PT)  standby assist  (Pended)   -RV     Distance (Gait Training Goal 1, PT)  50  (Pended)   -RV     Time Frame (Gait Training Goal 1, PT)  long term goal (LTG);10 days  (Pended)   -RV     Progress/Outcome (Gait Training Goal 1, PT)   goal not met;good progress toward goal;continuing progress toward goal  (Pended)   -RV     Row Name 10/02/21 1222          Strength Goal 1 (PT)    Strength Goal 1 (PT)  Patient will demonstrate improvement with bilateral lower extremity strength to 4+/5.  (Pended)   -RV     Time Frame (Strength Goal 1, PT)  long term goal (LTG);10 days  (Pended)   -RV     Progress/Outcome (Strength Goal 1, PT)  goal not met;good progress toward goal;continuing progress toward goal  (Pended)   -RV     Row Name 10/02/21 1222          PT Evaluation Complexity    History, PT Evaluation Complexity  no personal factors and/or comorbidities  (Pended)   -RV     Examination of Body Systems (PT Eval Complexity)  total of 4 or more elements  (Pended)   -RV     Clinical Presentation (PT Evaluation Complexity)  stable  (Pended)   -RV     Clinical Decision Making (PT Evaluation Complexity)  low complexity  (Pended)   -RV     Overall Complexity (PT Evaluation Complexity)  low complexity  (Pended)   -RV     Row Name 10/02/21 1222          Progress Summary (PT)    Progress Toward Functional Goals (PT)  progress toward functional goals is fair  (Pended)   -RV     Daily Progress Summary (PT)  The patient continues to have decreased strength of bilateral lower extremities.  The patient would benefit from continues skilled physical therapy services to address this issue.  (Pended)   -RV     Row Name 10/02/21 1222          Therapy Plan Review/Discharge Plan (PT)    Therapy Plan Review (PT)  patient  (Pended)   -RV       User Key  (r) = Recorded By, (t) = Taken By, (c) = Cosigned By    Initials Name Provider Type    RV Aileen Henry, PT Student PT Student        Physical Therapy Education                 Title: PT OT SLP Therapies (Done)     Topic: Physical Therapy (Done)     Point: Mobility training (Done)     Learning Progress Summary           Patient Acceptance, E, VU by VB at 9/26/2021 1825    Acceptance, E,TB, VU by SADIE at 9/26/2021 0244     Acceptance, E, VU,NR by CS at 9/23/2021 1459                   Point: Home exercise program (Done)     Learning Progress Summary           Patient Acceptance, E, VU by VB at 9/26/2021 1825    Acceptance, E,TB, VU by RG at 9/26/2021 0248                   Point: Body mechanics (Done)     Learning Progress Summary           Patient Acceptance, E, VU by VB at 9/26/2021 1825    Acceptance, E,TB, VU by RG at 9/26/2021 0248                   Point: Precautions (Done)     Learning Progress Summary           Patient Acceptance, E, VU by VB at 9/26/2021 1825    Acceptance, E,TB, VU by RG at 9/26/2021 0248    Acceptance, E, VU,NR by CS at 9/23/2021 1459                               User Key     Initials Effective Dates Name Provider Type Discipline     06/16/21 -  Madonna Cordero, RN Registered Nurse Nurse     04/25/21 -  Shelby Damico, LINDA Physical Therapist PT    RG 05/28/21 -  Freddy Perez RNA Registered Nurse Nurse              PT Recommendation and Plan  Anticipated Discharge Disposition (PT): (P) inpatient rehabilitation facility, sub acute care setting, home with home health, home with 24/7 care  Progress Summary (PT)  Progress Toward Functional Goals (PT): (P) progress toward functional goals is fair  Daily Progress Summary (PT): (P) The patient continues to have decreased strength of bilateral lower extremities.  The patient would benefit from continues skilled physical therapy services to address this issue.  Outcome Measures     Row Name 10/02/21 1236 10/01/21 1237 09/30/21 0844       How much help from another person do you currently need...    Turning from your back to your side while in flat bed without using bedrails?  4  (Pended)   -RV  4  -WM  4  -DK    Moving from lying on back to sitting on the side of a flat bed without bedrails?  3  (Pended)   -RV  3  -WM  3  -DK    Moving to and from a bed to a chair (including a wheelchair)?  3  (Pended)   -RV  3  -WM  2  -DK    Standing up from a chair using  your arms (e.g., wheelchair, bedside chair)?  3  (Pended)   -RV  3  -WM  2  -DK    Climbing 3-5 steps with a railing?  2  (Pended)   -RV  2  -WM  2  -DK    To walk in hospital room?  3  (Pended)   -RV  3  -WM  2  -DK    AM-PAC 6 Clicks Score (PT)  18  (Pended)   -RV  18  -WM  15  -DK       Functional Assessment    Outcome Measure Options  AM-PAC 6 Clicks Basic Mobility (PT)  (Pended)   -RV  --  AM-PAC 6 Clicks Basic Mobility (PT)  -DK      User Key  (r) = Recorded By, (t) = Taken By, (c) = Cosigned By    Initials Name Provider Type    WM Conrado Johnson, PTA Physical Therapy Assistant    DK Karley Munguia, PTA Physical Therapy Assistant    RV Aileen Henry, PT Student PT Student           Time Calculation:   PT Charges     Row Name 10/02/21 1221             Timed Charges    26729 - PT Therapeutic Exercise Minutes  10  (Pended)   -RV         Untimed Charges    PT Eval/Re-eval Minutes  16  (Pended)   -RV         Total Minutes    Timed Charges Total Minutes  10  (Pended)   -RV      Untimed Charges Total Minutes  16  (Pended)   -RV       Total Minutes  26  (Pended)   -RV        User Key  (r) = Recorded By, (t) = Taken By, (c) = Cosigned By    Initials Name Provider Type    RV Aileen Henry, PT Student PT Student        Therapy Charges for Today     Code Description Service Date Service Provider Modifiers Qty    07070364392 HC PT THER PROC EA 15 MIN 10/2/2021 Aileen Henry, PT Student GP 1    [id number] HC PT RE-EVAL ESTABLISHED PLAN 2 10/2/2021 Aileen Henry, PT Student GP 1          PT G-Codes  Outcome Measure Options: (P) AM-PAC 6 Clicks Basic Mobility (PT)  AM-PAC 6 Clicks Score (PT): (P) 18  AM-PAC 6 Clicks Score (OT): 10    Aileen Henry PT Student  10/2/2021

## 2021-10-03 PROCEDURE — 94799 UNLISTED PULMONARY SVC/PX: CPT

## 2021-10-03 PROCEDURE — 63710000001 PREDNISONE PER 1 MG: Performed by: INTERNAL MEDICINE

## 2021-10-03 RX ADMIN — HYDROCODONE BITARTRATE AND ACETAMINOPHEN 1 TABLET: 10; 325 TABLET ORAL at 06:02

## 2021-10-03 RX ADMIN — PREDNISONE 60 MG: 20 TABLET ORAL at 09:31

## 2021-10-03 RX ADMIN — HYDROCODONE BITARTRATE AND ACETAMINOPHEN 1 TABLET: 10; 325 TABLET ORAL at 20:20

## 2021-10-03 RX ADMIN — LIDOCAINE HYDROCHLORIDE 5 ML: 20 SOLUTION ORAL; TOPICAL at 20:50

## 2021-10-03 RX ADMIN — NYSTATIN 500000 UNITS: 100000 SUSPENSION ORAL at 09:31

## 2021-10-03 RX ADMIN — APIXABAN 5 MG: 5 TABLET, FILM COATED ORAL at 20:20

## 2021-10-03 RX ADMIN — NYSTATIN 500000 UNITS: 100000 SUSPENSION ORAL at 17:48

## 2021-10-03 RX ADMIN — MEGESTROL ACETATE 800 MG: 40 SUSPENSION ORAL at 09:31

## 2021-10-03 RX ADMIN — ATORVASTATIN CALCIUM 20 MG: 40 TABLET, FILM COATED ORAL at 20:20

## 2021-10-03 RX ADMIN — NYSTATIN 500000 UNITS: 100000 SUSPENSION ORAL at 13:51

## 2021-10-03 RX ADMIN — NYSTATIN 500000 UNITS: 100000 SUSPENSION ORAL at 20:20

## 2021-10-03 RX ADMIN — LINACLOTIDE 145 MCG: 145 CAPSULE, GELATIN COATED ORAL at 06:01

## 2021-10-03 RX ADMIN — ARFORMOTEROL TARTRATE 15 MCG: 15 SOLUTION RESPIRATORY (INHALATION) at 08:02

## 2021-10-03 RX ADMIN — APIXABAN 5 MG: 5 TABLET, FILM COATED ORAL at 09:31

## 2021-10-03 RX ADMIN — ARFORMOTEROL TARTRATE 15 MCG: 15 SOLUTION RESPIRATORY (INHALATION) at 20:26

## 2021-10-03 RX ADMIN — BUDESONIDE 0.5 MG: 0.5 INHALANT ORAL at 08:00

## 2021-10-03 RX ADMIN — BUDESONIDE 0.5 MG: 0.5 INHALANT ORAL at 20:27

## 2021-10-03 RX ADMIN — HYDROXYZINE HYDROCHLORIDE 25 MG: 25 TABLET, FILM COATED ORAL at 20:20

## 2021-10-03 RX ADMIN — HYDROCODONE BITARTRATE AND ACETAMINOPHEN 1 TABLET: 10; 325 TABLET ORAL at 13:58

## 2021-10-03 NOTE — PROGRESS NOTES
[facility]     Progress Note    Patient Name: Christina Espinoza  : 1950  MRN: [id number]  Primary Care Physician:  Laurie Castro MD  Date of admission: 2021    Subjective   Subjective     Chief Complaint: COVID-19 pneumonia, hypoxia, interstitial lung disease, DVT, pulmonary emboli left lung    HPI:  Patient Reports oxygen drops when they move her to a chair.    Review of Systems   Review of Systems   Constitutional: Positive for fatigue. Activity change: up in chair.   HENT: Negative.    Eyes: Negative.    Respiratory: Shortness of breath: on exertion.    Cardiovascular: Negative.    Gastrointestinal: Negative.    Endocrine: Negative.    Genitourinary: Negative.    Musculoskeletal: Positive for back pain.   Skin: Negative.    Allergic/Immunologic: Negative.    Neurological: Positive for weakness.   Hematological: Negative.    Psychiatric/Behavioral: Negative.        Objective   Objective     Vitals:   Temp:  [97.3 °F (36.3 °C)-97.9 °F (36.6 °C)] 97.8 °F (36.6 °C)  Heart Rate:  [70-89] 86  Resp:  [16-24] 24  BP: (103-116)/(54-65) 113/57  Flow (L/min):  [2-5] 2  Physical Exam    Constitutional: Awake, alert, oriented cooperative   Eyes: PERRLA, sclerae anicteric, no conjunctival injection   HENT: NCAT, mucous membranes moist   Neck: Supple, no thyromegaly, no lymphadenopathy, trachea midline   Respiratory: Clear to auscultation bilaterally, nonlabored respirations    Cardiovascular: RRR, no murmurs, rubs, or gallops, palpable pedal pulses bilaterally   Gastrointestinal: Positive bowel sounds, soft, nontender, nondistended   Musculoskeletal: No bilateral ankle edema, no clubbing or cyanosis to extremities   Psychiatric: Appropriate affect, cooperative   Neurologic: Oriented x 3, strength symmetric in all extremities, Cranial Nerves grossly intact to confrontation, speech clear   Skin: No rashes     Result Review    Result Review:  I have personally reviewed the results from the time of this admission  to 10/3/2021 15:16 EDT and agree with these findings:  [x]  Laboratory  []  Microbiology  []  Radiology  []  EKG/Telemetry   []  Cardiology/Vascular   []  Pathology  []  Old records  []  Other:  Most notable findings include: Generalized weakness and hypoxia due to Covid pneumonia, cachexia    Assessment/Plan   Assessment / Plan   Covid pneumonia  Pulmonary interstitial disease  Pulmonary emboli  DVT  Anorexia  Dysphagia  Cachexia  Weakness    Brief Patient Summary:  Christina Espinoza is a 71 y.o. female who is weak and has not been eating well, mal nutrition, dysphagia    Active Hospital Problems:  Active Hospital Problems    Diagnosis    • COVID-19    • Severe malnutrition (CMS/HCC)      Plan:   PT working with patient  Speech therapy working with patient  Try to increase activity  [facility]    DVT prophylaxis:  Medical DVT prophylaxis orders are present.    CODE STATUS:   Level Of Support Discussed With: Patient  Code Status: CPR  Medical Interventions (Level of Support Prior to Arrest): Full      Electronically signed by Laurie Castro MD, 10/03/21, 3:16 PM EDT.

## 2021-10-03 NOTE — PLAN OF CARE
Goal Outcome Evaluation:  Plan of Care Reviewed With: patient        Progress: improving  Outcome Summary: Vital signs stable. Patient on 2L nasal cannula O2 saturation % while at rest. Patient experiencing anxiety with activity, does not want to take anxiety medications at this time. PRN pain medications administered for pain managment of back pain.

## 2021-10-03 NOTE — PLAN OF CARE
Goal Outcome Evaluation:              Outcome Summary: VSS on 2L while at rest; patient oxygen saturation droped to low 80's with ambulation to BSC due to coughing fit - recovered well. Complaint of chronic back pain - PRN norco given as needed. No other complaints this evening.

## 2021-10-04 LAB
ALBUMIN SERPL-MCNC: 3.3 G/DL (ref 3.5–5.2)
ALBUMIN/GLOB SERPL: 1.7 G/DL
ALP SERPL-CCNC: 84 U/L (ref 39–117)
ALT SERPL W P-5'-P-CCNC: 19 U/L (ref 1–33)
ANION GAP SERPL CALCULATED.3IONS-SCNC: 10.1 MMOL/L (ref 5–15)
AST SERPL-CCNC: 17 U/L (ref 1–32)
BASOPHILS # BLD AUTO: 0.01 10*3/MM3 (ref 0–0.2)
BASOPHILS NFR BLD AUTO: 0.1 % (ref 0–1.5)
BILIRUB SERPL-MCNC: 1 MG/DL (ref 0–1.2)
BUN SERPL-MCNC: 24 MG/DL (ref 8–23)
BUN/CREAT SERPL: 38.1 (ref 7–25)
CALCIUM SPEC-SCNC: 8.8 MG/DL (ref 8.6–10.5)
CHLORIDE SERPL-SCNC: 104 MMOL/L (ref 98–107)
CO2 SERPL-SCNC: 21.9 MMOL/L (ref 22–29)
CREAT SERPL-MCNC: 0.63 MG/DL (ref 0.57–1)
DEPRECATED RDW RBC AUTO: 49.2 FL (ref 37–54)
EOSINOPHIL # BLD AUTO: 0.01 10*3/MM3 (ref 0–0.4)
EOSINOPHIL NFR BLD AUTO: 0.1 % (ref 0.3–6.2)
ERYTHROCYTE [DISTWIDTH] IN BLOOD BY AUTOMATED COUNT: 15.6 % (ref 12.3–15.4)
GFR SERPL CREATININE-BSD FRML MDRD: 93 ML/MIN/1.73
GLOBULIN UR ELPH-MCNC: 1.9 GM/DL
GLUCOSE SERPL-MCNC: 86 MG/DL (ref 65–99)
HCT VFR BLD AUTO: 36.9 % (ref 34–46.6)
HGB BLD-MCNC: 11.9 G/DL (ref 12–15.9)
IMM GRANULOCYTES # BLD AUTO: 0.11 10*3/MM3 (ref 0–0.05)
IMM GRANULOCYTES NFR BLD AUTO: 0.9 % (ref 0–0.5)
LYMPHOCYTES # BLD AUTO: 0.89 10*3/MM3 (ref 0.7–3.1)
LYMPHOCYTES NFR BLD AUTO: 7.4 % (ref 19.6–45.3)
MCH RBC QN AUTO: 28.9 PG (ref 26.6–33)
MCHC RBC AUTO-ENTMCNC: 32.2 G/DL (ref 31.5–35.7)
MCV RBC AUTO: 89.6 FL (ref 79–97)
MONOCYTES # BLD AUTO: 1.15 10*3/MM3 (ref 0.1–0.9)
MONOCYTES NFR BLD AUTO: 9.5 % (ref 5–12)
NEUTROPHILS NFR BLD AUTO: 82 % (ref 42.7–76)
NEUTROPHILS NFR BLD AUTO: 9.88 10*3/MM3 (ref 1.7–7)
NRBC BLD AUTO-RTO: 0 /100 WBC (ref 0–0.2)
PLATELET # BLD AUTO: 218 10*3/MM3 (ref 140–450)
PMV BLD AUTO: 10.9 FL (ref 6–12)
POTASSIUM SERPL-SCNC: 4.7 MMOL/L (ref 3.5–5.2)
PROT SERPL-MCNC: 5.2 G/DL (ref 6–8.5)
RBC # BLD AUTO: 4.12 10*6/MM3 (ref 3.77–5.28)
SODIUM SERPL-SCNC: 136 MMOL/L (ref 136–145)
WBC # BLD AUTO: 12.05 10*3/MM3 (ref 3.4–10.8)

## 2021-10-04 PROCEDURE — 80053 COMPREHEN METABOLIC PANEL: CPT | Performed by: INTERNAL MEDICINE

## 2021-10-04 PROCEDURE — 97530 THERAPEUTIC ACTIVITIES: CPT

## 2021-10-04 PROCEDURE — 94799 UNLISTED PULMONARY SVC/PX: CPT

## 2021-10-04 PROCEDURE — 85025 COMPLETE CBC W/AUTO DIFF WBC: CPT | Performed by: INTERNAL MEDICINE

## 2021-10-04 PROCEDURE — 92610 EVALUATE SWALLOWING FUNCTION: CPT

## 2021-10-04 PROCEDURE — 63710000001 PREDNISONE PER 1 MG: Performed by: INTERNAL MEDICINE

## 2021-10-04 PROCEDURE — 92526 ORAL FUNCTION THERAPY: CPT

## 2021-10-04 RX ADMIN — ARFORMOTEROL TARTRATE 15 MCG: 15 SOLUTION RESPIRATORY (INHALATION) at 18:52

## 2021-10-04 RX ADMIN — NYSTATIN 500000 UNITS: 100000 SUSPENSION ORAL at 20:39

## 2021-10-04 RX ADMIN — LIDOCAINE HYDROCHLORIDE 5 ML: 20 SOLUTION ORAL; TOPICAL at 18:00

## 2021-10-04 RX ADMIN — PREDNISONE 60 MG: 20 TABLET ORAL at 08:50

## 2021-10-04 RX ADMIN — ATORVASTATIN CALCIUM 20 MG: 40 TABLET, FILM COATED ORAL at 20:40

## 2021-10-04 RX ADMIN — HYDROCODONE BITARTRATE AND ACETAMINOPHEN 1 TABLET: 10; 325 TABLET ORAL at 18:00

## 2021-10-04 RX ADMIN — HYDROCODONE BITARTRATE AND ACETAMINOPHEN 1 TABLET: 10; 325 TABLET ORAL at 08:50

## 2021-10-04 RX ADMIN — BUDESONIDE 0.5 MG: 0.5 INHALANT ORAL at 18:52

## 2021-10-04 RX ADMIN — ARFORMOTEROL TARTRATE 15 MCG: 15 SOLUTION RESPIRATORY (INHALATION) at 08:01

## 2021-10-04 RX ADMIN — BUDESONIDE 0.5 MG: 0.5 INHALANT ORAL at 08:01

## 2021-10-04 RX ADMIN — HYDROCODONE BITARTRATE AND ACETAMINOPHEN 1 TABLET: 10; 325 TABLET ORAL at 23:32

## 2021-10-04 RX ADMIN — MEGESTROL ACETATE 800 MG: 40 SUSPENSION ORAL at 08:50

## 2021-10-04 RX ADMIN — NYSTATIN 500000 UNITS: 100000 SUSPENSION ORAL at 08:50

## 2021-10-04 RX ADMIN — APIXABAN 5 MG: 5 TABLET, FILM COATED ORAL at 20:40

## 2021-10-04 RX ADMIN — NYSTATIN 500000 UNITS: 100000 SUSPENSION ORAL at 12:28

## 2021-10-04 RX ADMIN — HYDROXYZINE HYDROCHLORIDE 25 MG: 25 TABLET, FILM COATED ORAL at 20:40

## 2021-10-04 RX ADMIN — NYSTATIN 500000 UNITS: 100000 SUSPENSION ORAL at 18:00

## 2021-10-04 RX ADMIN — LIDOCAINE HYDROCHLORIDE 5 ML: 20 SOLUTION ORAL; TOPICAL at 20:40

## 2021-10-04 RX ADMIN — APIXABAN 5 MG: 5 TABLET, FILM COATED ORAL at 08:50

## 2021-10-04 RX ADMIN — LIDOCAINE HYDROCHLORIDE 5 ML: 20 SOLUTION ORAL; TOPICAL at 12:28

## 2021-10-04 NOTE — PLAN OF CARE
Goal Outcome Evaluation:  Plan of Care Reviewed With: patient      ASSESSMENT/ PLAN OF CARE:  Pt presents with limitations, noted below, that impede her ability to swallow safely maintain nutrition. The skills of a therapist will be required to safely and effectively implement the following treatment plan to restore maximal level of function.    PROBLEMS:  1. Swallow delay, oral pain, signs of aspiration, risk of aspiration                                             TREATMENT: Dysphagia therapy to address swallow function through exercises and education of strategies.     FREQUENCY/DURATION: Twice daily 5 times per week    RECOMMENDATIONS:   1.   DIET: Purée solid with nectar thickened liquid.    2.  POSITION: Positioning fully upright for all p.o. intake and 30 minutes following.    3.  COMPENSATORY STRATEGIES: Alternate small bites and small sips of solids and liquids at a slow rate, double swallow.

## 2021-10-04 NOTE — PROGRESS NOTES
[facility]     Progress Note    Patient Name: Christina Espinoza  : 1950  MRN: [id number]  Primary Care Physician:  Laurie Castro MD  Date of admission: 2021    Subjective   Subjective     Chief Complaint: Covid19 pneumonia,Interstitial lung disease,Hypoxia,weakness,cachexia,dysphagia    HPI:  Patient Reports she has been drinking thickened liquids . Seen by speech therapy and working with her.    Review of Systems   Review of Systems   Constitutional: Positive for fatigue. Activity change: in bed.   HENT: Positive for mouth sores.    Eyes: Negative.    Respiratory: Shortness of breath: hypoxia on exertion.    Cardiovascular: Negative.    Gastrointestinal: Negative.    Endocrine: Negative.    Genitourinary: Negative.    Allergic/Immunologic: Negative.    Neurological: Positive for weakness.   Hematological: Negative.    Psychiatric/Behavioral: The patient is nervous/anxious.        Objective   Objective     Vitals:   Temp:  [97.5 °F (36.4 °C)-98.1 °F (36.7 °C)] 97.5 °F (36.4 °C)  Heart Rate:  [76-90] 89  Resp:  [16-20] 18  BP: (102-123)/(53-71) 123/71  Flow (L/min):  [3-4] 3  Physical Exam    Constitutional: Awake, alert,oriented   Eyes: PERRLA, sclerae anicteric, no conjunctival injection   HENT: NCAT, mucous membranes moist,small sores on tongue   Neck: Supple, no thyromegaly, no lymphadenopathy, trachea midline   Respiratory: Clear to auscultation bilaterally, nonlabored respirations    Cardiovascular: RRR, no murmurs, rubs, or gallops, palpable pedal pulses bilaterally   Gastrointestinal: Positive bowel sounds, soft, nontender, nondistended   Musculoskeletal: No bilateral ankle edema, no clubbing or cyanosis to extremities   Psychiatric: Appropriate affect, cooperative   Neurologic: Oriented x 3, strength symmetric in all extremities, Cranial Nerves grossly intact to confrontation, speech clear   Skin: No rashes     Result Review    Result Review:  I have personally reviewed the results from the  time of this admission to 10/4/2021 17:47 EDT and agree with these findings:  [x]  Laboratory  []  Microbiology  []  Radiology  []  EKG/Telemetry   []  Cardiology/Vascular   []  Pathology  []  Old records  []  Other:  Most notable findings include: dysphagia,seen by Speech therapy,seen by PT and working with them    Assessment/Plan   Assessment / Plan   Covid 19 pneumonia  Hypoxia  Dysphagia  Cachexia  Malnutrition  Pulmonary embolism  DVT  Interstitial lung disease    Brief Patient Summary:  Christina Espinoza is a 71 y.o. female who has been drinking thickened liquids,working with Speech,may be advanced to puree per patient. Did not qualify for [facility],possible SNF or home care.Patient covid positive may be difficult.    Active Hospital Problems:  Active Hospital Problems    Diagnosis    • COVID-19    • Severe malnutrition (CMS/HCC)      Plan:    PT/OT/Speech working with patient  Possible SNF external or Home care    DVT prophylaxis:  Medical DVT prophylaxis orders are present.    CODE STATUS:   Level Of Support Discussed With: Patient  Code Status: CPR  Medical Interventions (Level of Support Prior to Arrest): Full      Electronically signed by Laurie Castro MD, 10/04/21, 5:47 PM EDT.

## 2021-10-04 NOTE — SIGNIFICANT NOTE
10/04/21 0935   Plan   Plan Comments Precert was denied at [facility] as pt meets for a more lower level of care. SW discussed rehab options with patient. She is agreeable to local referrals being sent out. SW discussed advanced care planning with patient as well. Pt would like to complete a Living Will and appoint her daughter and son as her health care surrogates. Pt is covid positive, SW notified  of this to see if they were able to assist. SW sent local SNF referrals.

## 2021-10-04 NOTE — THERAPY TREATMENT NOTE
Acute Care - Physical Therapy Treatment Note   [place]     Patient Name: Christina Espinoza  : 1950  MRN: [id number]  Today's Date: 10/4/2021      Visit Dx:     ICD-10-CM ICD-9-CM   1. Oropharyngeal dysphagia  R13.12 787.22   2. Hypoxia  R09.02 799.02   3. COVID-19  U07.1 079.89   4. Decreased activities of daily living (ADL)  Z78.9 V49.89   5. Difficulty walking  R26.2 719.7   6. S/P lumbar spinal fusion  Z98.1 V45.4   7. Pulmonary fibrosis (CMS/[facility])  J84.10 515   8. Spinal stenosis of lumbar region with neurogenic claudication  M48.062 724.03   9. COPD (chronic obstructive pulmonary disease) case management patient (CMS/[facility])  J44.9 496     Patient Active Problem List   Diagnosis   • Acquired spondylolisthesis   • Age related osteoporosis   • Asthma   • Chronic pain   • COPD (chronic obstructive pulmonary disease) case management patient ([facility])   • Degeneration of lumbar intervertebral disc   • Dizziness   • Hip pain   • Irritable bowel syndrome with constipation   • Neck pain   • Lumbago with sciatica   • Spinal stenosis of lumbar region with neurogenic claudication   • Mixed hyperlipidemia   • Overweight with body mass index (BMI) 25.0-29.9   • Pulmonary fibrosis ([facility])   • Rheumatoid arthritis ([facility])   • S/P lumbar spinal fusion   • Bronchiectasis without complication ([facility])   • Immunosuppressed status ([facility])   • 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[facility])   • Dyspnea   • Cough   • COVID-19   • Severe malnutrition (CMS/[facility])     Past Medical History:   Diagnosis Date   • Arthritis    • Asthma    • Cervicalgia 2020   • Leg pain    • Leg swelling    • Limb pain    • Lumbago 2020    low back pain    • Lumbar spinal stenosis 2020   • Lung disease    • Nausea    • Neurogenic claudication 2020   • Neuropathy    • Pulmonary fibrosis (CMS/[facility])    • Radiculopathy, lumbosacral region 2020   • Spondylolisthesis, lumbar region 2020    grade 1 at L3/4     Past Surgical History:   Procedure Laterality Date    • SPINE SURGERY  2020   • TONSILLECTOMY     • TUBAL ABDOMINAL LIGATION          PT Assessment (last 12 hours)      PT Evaluation and Treatment     Row Name 10/04/21 1300          Physical Therapy Time and Intention    Subjective Information  complains of;dyspnea  -AV     Document Type  therapy note (daily note)  -AV     Mode of Treatment  individual therapy;physical therapy  -AV     Patient Effort  adequate  -AV     Symptoms Noted During/After Treatment  shortness of breath;significant change in vital signs O2 sats dropped to 79% bed-[facility] tf. PLB- O2 increased to 93%  -AV     Row Name 10/04/21 1300          Bed Mobility    Bed Mobility  supine-sit-supine;rolling right  -AV     Rolling Right [place] (Bed Mobility)  minimum assist (75% patient effort)  -AV     Supine-Sit-Supine [place] (Bed Mobility)  minimum assist (75% patient effort)  -AV     Assistive Device (Bed Mobility)  bed rails  -AV     Row Name 10/04/21 1300          Transfers    Transfers  toilet transfer  -AV     Sit-Stand [place] (Transfers)  contact guard;1 person assist  -AV     [place] Level (Toilet Transfer)  contact guard;1 person assist  -AV     Assistive Device (Toilet Transfer)  commode, 3-in-1  -AV     Row Name 10/04/21 1300          Sit-Stand Transfer    Assistive Device (Sit-Stand Transfers)  -- HHA  -AV     Row Name 10/04/21 1300          Toilet Transfer    Type (Toilet Transfer)  stand pivot/stand step  -AV     Row Name 10/04/21 1300          Gait/Stairs (Locomotion)    [place] Level (Gait)  contact guard  -AV     Assistive Device (Gait)  -- HHA  -AV     Distance in Feet (Gait)  3 x2 Bed to/from [facility]  -AV     Row Name 10/04/21 1300          Safety Issues, Functional Mobility    Safety Issues Affecting Function (Mobility)  ability to follow commands  -AV     Row Name 10/04/21 1300          Balance    Balance Assessment  standing dynamic balance  -AV     Dynamic Standing Balance  mild impairment  -AV     Row Name  10/04/21 1300          Progress Summary (PT)    Progress Toward Functional Goals (PT)  progress toward functional goals is fair  -AV     Daily Progress Summary (PT)  Patient continues getting significantly SOA with minimal activity and transfers. Patient will continue to benefit from skilled PT services to address these deficits and decrease risk of falls.  -AV       User Key  (r) = Recorded By, (t) = Taken By, (c) = Cosigned By    Initials Name Provider Type    AV Danny Nur, PT Physical Therapist        Physical Therapy Education                 Title: PT OT SLP Therapies (Done)     Topic: Physical Therapy (Done)     Point: Mobility training (Done)     Learning Progress Summary           Patient Acceptance, E, VU by  at 9/26/2021 1825    Acceptance, E,TB, VU by  at 9/26/2021 0248    Acceptance, E, VU,NR by  at 9/23/2021 1459                   Point: Home exercise program (Done)     Learning Progress Summary           Patient Acceptance, E, VU by  at 9/26/2021 1825    Acceptance, E,TB, VU by  at 9/26/2021 0248                   Point: Body mechanics (Done)     Learning Progress Summary           Patient Acceptance, E, VU by  at 9/26/2021 1825    Acceptance, E,TB, VU by  at 9/26/2021 0248                   Point: Precautions (Done)     Learning Progress Summary           Patient Acceptance, E, VU by  at 9/26/2021 1825    Acceptance, E,TB, VU by  at 9/26/2021 0248    Acceptance, E, VU,NR by  at 9/23/2021 1459                               User Key     Initials Effective Dates Name Provider Type Discipline     06/16/21 -  Madonna Cordero, RN Registered Nurse Nurse     04/25/21 -  Shelby Damico PT Physical Therapist PT     05/28/21 -  Freddy Perez RNA Registered Nurse Nurse              PT Recommendation and Plan     Progress Summary (PT)  Progress Toward Functional Goals (PT): progress toward functional goals is fair  Daily Progress Summary (PT): Patient continues getting  significantly SOA with minimal activity and transfers. Patient will continue to benefit from skilled PT services to address these deficits and decrease risk of falls.  Outcome Measures     Row Name 10/04/21 1300 10/02/21 1236          How much help from another person do you currently need...    Turning from your back to your side while in flat bed without using bedrails?  4  -AV  4  -DP (r) RV (t) DP (c)     Moving from lying on back to sitting on the side of a flat bed without bedrails?  4  -AV  3  -DP (r) RV (t) DP (c)     Moving to and from a bed to a chair (including a wheelchair)?  3  -AV  3  -DP (r) RV (t) DP (c)     Standing up from a chair using your arms (e.g., wheelchair, bedside chair)?  3  -AV  3  -DP (r) RV (t) DP (c)     Climbing 3-5 steps with a railing?  3  -AV  2  -DP (r) RV (t) DP (c)     To walk in hospital room?  3  -AV  3  -DP (r) RV (t) DP (c)     AM-PAC 6 Clicks Score (PT)  20  -AV  18  -DP (r) RV (t)        Functional Assessment    Outcome Measure Options  AM-PAC 6 Clicks Basic Mobility (PT)  -AV  AM-PAC 6 Clicks Basic Mobility (PT)  -DP (r) RV (t) DP (c)       User Key  (r) = Recorded By, (t) = Taken By, (c) = Cosigned By    Initials Name Provider Type    Jase Hinson, PT Physical Therapist    AV Danny Nur, PT Physical Therapist    Aileen Harriosn, PT Student PT Student           Time Calculation:   PT Charges     Row Name 10/04/21 1310             Time Calculation    PT Received On  10/04/21  -AV      PT Goal Re-Cert Due Date  10/11/21  -AV         Timed Charges    91118 - PT Therapeutic Activity Minutes  12  -AV         Total Minutes    Timed Charges Total Minutes  12  -AV       Total Minutes  12  -AV        User Key  (r) = Recorded By, (t) = Taken By, (c) = Cosigned By    Initials Name Provider Type    AV Danny Nur, PT Physical Therapist        Therapy Charges for Today     Code Description Service Date Service Provider Modifiers Qty    02378322921  PT  THERAPEUTIC ACT EA 15 MIN 10/4/2021 Danny Nur, PT GP 1          PT G-Codes  Outcome Measure Options: AM-PAC 6 Clicks Basic Mobility (PT)  AM-PAC 6 Clicks Score (PT): 20  AM-PAC 6 Clicks Score (OT): 10    Danny Nur, PT  10/4/2021

## 2021-10-04 NOTE — THERAPY EVALUATION
Acute Care - Speech Language Pathology   Swallow Initial Evaluation  [facility]     Patient Name: Christina Espinoza  : 1950  MRN: [id number]  Today's Date: 10/4/2021               Admit Date: 2021    Visit Dx:     ICD-10-CM ICD-9-CM   1. Oropharyngeal dysphagia  R13.12 787.22   2. Hypoxia  R09.02 799.02   3. COVID-19  U07.1 079.89   4. Decreased activities of daily living (ADL)  Z78.9 V49.89   5. Difficulty walking  R26.2 719.7   6. S/P lumbar spinal fusion  Z98.1 V45.4   7. Pulmonary fibrosis (CMS/[facility])  J84.10 515   8. Spinal stenosis of lumbar region with neurogenic claudication  M48.062 724.03   9. COPD (chronic obstructive pulmonary disease) case management patient (CMS/[facility])  J44.9 496     Patient Active Problem List   Diagnosis   • Acquired spondylolisthesis   • Age related osteoporosis   • Asthma   • Chronic pain   • COPD (chronic obstructive pulmonary disease) case management patient ([facility])   • Degeneration of lumbar intervertebral disc   • Dizziness   • Hip pain   • Irritable bowel syndrome with constipation   • Neck pain   • Lumbago with sciatica   • Spinal stenosis of lumbar region with neurogenic claudication   • Mixed hyperlipidemia   • Overweight with body mass index (BMI) 25.0-29.9   • Pulmonary fibrosis ([facility])   • Rheumatoid arthritis ([facility])   • S/P lumbar spinal fusion   • Bronchiectasis without complication ([facility])   • Immunosuppressed status ([facility])   • 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[facility])   • Dyspnea   • Cough   • COVID-19   • Severe malnutrition (CMS/[facility])     Past Medical History:   Diagnosis Date   • Arthritis    • Asthma    • Cervicalgia 2020   • Leg pain    • Leg swelling    • Limb pain    • Lumbago 2020    low back pain    • Lumbar spinal stenosis 2020   • Lung disease    • Nausea    • Neurogenic claudication 2020   • Neuropathy    • Pulmonary fibrosis (CMS/[facility])    • Radiculopathy, lumbosacral region 2020   • Spondylolisthesis, lumbar region 2020    grade 1 at  L3/4     Past Surgical History:   Procedure Laterality Date   • SPINE SURGERY  2020   • TONSILLECTOMY     • TUBAL ABDOMINAL LIGATION             Inpatient Speech Pathology Dysphagia Evaluation        PAIN SCALE: Patient does complain of mouth pain, did not rate    PRECAUTIONS/CONTRAINDICATIONS: Enhanced airborne    SUSPECTED ABUSE/NEGLECT/EXPLOITATION: None indicated.    SOCIAL/PSYCHOLOGICAL NEEDS/BARRIERS: None indicated.    PAST SOCIAL HISTORY: 71-year-old female lives at home with family    PRIOR FUNCTION: On regular diet    PATIENT GOALS/EXPECTATIONS: Patient wanting to eat orally.    HISTORY: 71-year-old female with the above diagnosis is referred for speech therapy evaluation to assess for swallowing.  Patient has had poor p.o. intake.  Patient underwent modified barium swallow study 10/1/2021.  Aspiration with thin liquid was noted.    CURRENT DIET LEVEL: Full liquid with nectar thickened liquid    OBJECTIVE:    TEST ADMINISTERED: Clinical dysphagia evaluation    COGNITION/SAFETY AWARENESS: Patient follows directions and answers basic questions without difficulty    BEHAVIORAL OBSERVATIONS: Alert and cooperative    ORAL MOTOR EXAM: Patient is noted with sores on tongue, patient states complaint of other sores in the mouth.  Patient is edentulous and states is not able to wear her dentures.  Diadochokinetic rates were decreased with patient not utilizing complete lingual range.    VOICE QUALITY: Adequate    REFLEX EXAM: Patient demonstrated cough    POSTURE: Sitting upright in bed    FEEDING/SWALLOWING FUNCTION: Assessed with nectar liquid, thin liquids, puréed solids.    CLINICAL OBSERVATIONS: Nectar liquid by spoon with patient demonstrated facial grimace, swallow completed with laryngeal elevation noted.  Vocal quality remained clear.  Small sips of thin liquid with chin tuck x3 with coughing noted x1.  Loose purée with swallow completed, patient producing facial grimace.  Patient refusing further trials at  this time.  Patient states having mouth pain.  She states that thick medication she is taking in the evening helps with this.    DYSPHAGIA CRITERIA: Swallow delay, decreased oral motor coordination, decreased oral motor strength.  Aspiration of thin liquid was noted per modified barium swallow study.    FUNCTIONAL ASSESSMENT INSTRUMENT: Patient currently scored a level 4 of 7 on Functional Communication Measures for swallowing indicating a 40-59% limitation in function.    ASSESSMENT/ PLAN OF CARE:  Pt presents with limitations, noted below, that impede her ability to swallow safely maintain nutrition. The skills of a therapist will be required to safely and effectively implement the following treatment plan to restore maximal level of function.    PROBLEMS:  1. Swallow delay, oral pain, signs of aspiration, risk of aspiration                       LTG 1: 30 days: Patient will tolerate diet of soft solids and thin liquid utilizing appropriate positioning and strategies with minimal assistance.                       STG 1a: 14 days: Patient will tolerate diet of puréed solids and nectar thickened liquids with minimal assistance for strategies.                       STG 1b: 14 days: Patient will tolerate 8 of 10 trials of thin liquids with min to no signs or symptoms of aspiration.                       STG 1c: 14 days: Patient will demonstrate adequate chewing and swallow of soft solids with minimal assistance.                       STG 1d: 14 days: Patient/family education.                       TREATMENT: Dysphagia therapy to address swallow function through exercises and education of strategies.     FREQUENCY/DURATION: Twice daily 5 times per week    REHAB POTENTIAL:  Pt has fair to good rehab potential.  The following limitations may influence improvement/ length of tx: Medical status.    RECOMMENDATIONS:   1.   DIET: Purée solid with nectar thickened liquid.    2.  POSITION: Positioning fully upright for all  p.o. intake and 30 minutes following.    3.  COMPENSATORY STRATEGIES: Alternate small bites and small sips of solids and liquids at a slow rate, double swallow.     Pt/responsible party agrees with plan of care and has been informed of all alternatives, risks and benefits.                            Anticipated Discharge Disposition (SLP): skilled nursing facility, inpatient rehabilitation facility (10/04/21 1318)                                             EDUCATION  The patient has been educated in the following areas:   Dysphagia (Swallowing Impairment) Modified Diet Instruction.              Time Calculation:   Time Calculation- SLP     Row Name 10/04/21 1320 10/04/21 1318          Time Calculation- SLP    SLP Start Time  --  1130  -TB     SLP Stop Time  1300  -TB  1230  -TB     SLP Time Calculation (min)  --  60 min  -TB     SLP Received On  10/04/21  -TB  10/04/21  -TB        Untimed Charges    SLP Eval/Re-eval   --  ST Eval Oral Pharyng Swallow - 59406  -TB     06398-ED Eval Oral Pharyng Swallow Minutes  --  60  -TB     75261-CI Treatment Swallow Minutes  40  -TB  --        Total Minutes    Untimed Charges Total Minutes  40  -TB  60  -TB      Total Minutes  40  -TB  60  -TB       User Key  (r) = Recorded By, (t) = Taken By, (c) = Cosigned By    Initials Name Provider Type    TB Charlene Bowers SLP Speech and Language Pathologist          Therapy Charges for Today     Code Description Service Date Service Provider Modifiers Qty    41536269466 HC ST EVAL ORAL PHARYNG SWALLOW 4 10/4/2021 Charlene Bowers SLP GN 1    03539278887 HC ST TREATMENT SWALLOW 3 10/4/2021 Charlene Bowers SLP GN 1               ELIER Hendrix  10/4/2021

## 2021-10-04 NOTE — CONSULTS
Nutrition Services    Patient Name:  Christina Espinoza  YOB: 1950  MRN: [id number]  Admit Date:  9/22/2021    Following for length of stay. Pt w/ 50-75% meal intake. Wt stable x 1 year. Will monitor intake and follow per protocol.    Electronically signed by:  Susy Mcclendon RD  10/04/21 10:12 EDT

## 2021-10-04 NOTE — PLAN OF CARE
Problem: Adult Inpatient Plan of Care  Goal: Plan of Care Review  Outcome: Ongoing, Progressing  Flowsheets  Taken 10/4/2021 0605 by Jyothi Rebollar RN  Plan of Care Reviewed With: patient  Outcome Summary: VSS, Remains in 2lpm/nc. Pt took her anxiety and pain medications. PRN lidocaine was also given. Pt rested well.  Taken 10/3/2021 1537 by Aide Broderick RN  Progress: improving  Goal: Patient-Specific Goal (Individualized)  Outcome: Ongoing, Progressing  Goal: Absence of Hospital-Acquired Illness or Injury  Outcome: Ongoing, Progressing  Intervention: Identify and Manage Fall Risk  Recent Flowsheet Documentation  Taken 10/4/2021 0415 by Jyothi Rebollar RN  Safety Promotion/Fall Prevention: safety round/check completed  Taken 10/4/2021 0200 by Jyothi Rebollar RN  Safety Promotion/Fall Prevention: safety round/check completed  Taken 10/3/2021 2300 by Jyothi Rebollar RN  Safety Promotion/Fall Prevention: safety round/check completed  Taken 10/3/2021 2100 by Jyothi Rebollar RN  Safety Promotion/Fall Prevention: safety round/check completed  Taken 10/3/2021 2020 by Jyothi Rebollar RN  Safety Promotion/Fall Prevention:   safety round/check completed   room organization consistent   lighting adjusted  Intervention: Prevent Skin Injury  Recent Flowsheet Documentation  Taken 10/3/2021 2020 by Jyothi Rebollar RN  Skin Protection:   adhesive use limited   incontinence pads utilized   skin-to-device areas padded   skin-to-skin areas padded  Intervention: Prevent Infection  Recent Flowsheet Documentation  Taken 10/3/2021 2020 by Jyothi Rebollar RN  Infection Prevention:   hand hygiene promoted   personal protective equipment utilized  Goal: Optimal Comfort and Wellbeing  Outcome: Ongoing, Progressing  Goal: Readiness for Transition of Care  Outcome: Ongoing, Progressing     Problem: Fall Injury Risk  Goal: Absence of Fall and Fall-Related Injury  Outcome: Ongoing, Progressing  Intervention:  Promote Injury-Free Environment  Recent Flowsheet Documentation  Taken 10/4/2021 0415 by Jyotih Rebollar RN  Safety Promotion/Fall Prevention: safety round/check completed  Taken 10/4/2021 0200 by Jyothi Rebollar RN  Safety Promotion/Fall Prevention: safety round/check completed  Taken 10/3/2021 2300 by Jyothi Rebollar RN  Safety Promotion/Fall Prevention: safety round/check completed  Taken 10/3/2021 2100 by Jyothi Rebollar RN  Safety Promotion/Fall Prevention: safety round/check completed  Taken 10/3/2021 2020 by Jyothi Rebollar RN  Safety Promotion/Fall Prevention:   safety round/check completed   room organization consistent   lighting adjusted     Problem: Infection (Pneumonia)  Goal: Resolution of Infection Signs and Symptoms  Outcome: Ongoing, Progressing  Intervention: Prevent Infection Progression  Recent Flowsheet Documentation  Taken 10/3/2021 2020 by Jyothi Rebollar RN  Isolation Precautions:   airborne precautions maintained   contact precautions maintained     Problem: Respiratory Compromise (Pneumonia)  Goal: Effective Oxygenation and Ventilation  Outcome: Ongoing, Progressing     Problem: Skin Injury Risk Increased  Goal: Skin Health and Integrity  Outcome: Ongoing, Progressing  Intervention: Optimize Skin Protection  Recent Flowsheet Documentation  Taken 10/3/2021 2020 by Jyothi Rebollar RN  Pressure Reduction Techniques: frequent weight shift encouraged  Pressure Reduction Devices: positioning supports utilized  Skin Protection:   adhesive use limited   incontinence pads utilized   skin-to-device areas padded   skin-to-skin areas padded     Problem: Gas Exchange Impaired  Goal: Optimal Gas Exchange  Outcome: Ongoing, Progressing     Problem: Activity Intolerance  Goal: Enhanced Capacity and Energy  Outcome: Ongoing, Progressing  Intervention: Optimize Activity Tolerance  Recent Flowsheet Documentation  Taken 10/3/2021 2020 by Jyothi Reobllar RN  Environmental  Support:   calm environment promoted   rest periods encouraged   Goal Outcome Evaluation:  Plan of Care Reviewed With: patient           Outcome Summary: VSS, Remains in 2lpm/nc. Pt took her anxiety and pain medications. PRN lidocaine was also given. Pt rested well.

## 2021-10-04 NOTE — CONSULTS
Assisted pt in filling out Living Will/Advanced Directive. Pt wanted to designate her children, Cristal Womack, and son, Garrett Parson, to be her health surrogates together. At this time all she wished to do was pick her Healthcare Surrogates. Copy placed in chart and original given to pt.

## 2021-10-04 NOTE — PLAN OF CARE
Goal Outcome Evaluation:  Plan of Care Reviewed With: patient        Progress: improving  Outcome Summary: Vital signs stable, patient c/o mouth and back pain. Prn medications administered. Patient able to get up to bedside commode several times with assistance. Patient reports less anxiety about oxygenation today.

## 2021-10-04 NOTE — SIGNIFICANT NOTE
10/04/21 6569   Plan   Plan Comments Patient called [facility]JI wanting to discuss the option for going home with her daughter with home health. Pt requested to know if that was an option. SW and pt discussed the possibility of having a difficulty time finding a home health agency that could accept due to her being covid positive and agencies being short staff. Pt expressed understanding and would like us to try and find home health. [facility] sent referral to [facility].

## 2021-10-04 NOTE — THERAPY TREATMENT NOTE
Acute Care - Speech Language Pathology   Swallow Treatment Note NICOLE Duncan     Patient Name: Christina Espinoza  : 1950  MRN: [id number]  Today's Date: 10/4/2021               Admit Date: 2021    Visit Dx:     ICD-10-CM ICD-9-CM   1. Oropharyngeal dysphagia  R13.12 787.22   2. Hypoxia  R09.02 799.02   3. COVID-19  U07.1 079.89   4. Decreased activities of daily living (ADL)  Z78.9 V49.89   5. Difficulty walking  R26.2 719.7   6. S/P lumbar spinal fusion  Z98.1 V45.4   7. Pulmonary fibrosis (CMS/[facility])  J84.10 515   8. Spinal stenosis of lumbar region with neurogenic claudication  M48.062 724.03   9. COPD (chronic obstructive pulmonary disease) case management patient (CMS/[facility])  J44.9 496     Patient Active Problem List   Diagnosis   • Acquired spondylolisthesis   • Age related osteoporosis   • Asthma   • Chronic pain   • COPD (chronic obstructive pulmonary disease) case management patient ([facility])   • Degeneration of lumbar intervertebral disc   • Dizziness   • Hip pain   • Irritable bowel syndrome with constipation   • Neck pain   • Lumbago with sciatica   • Spinal stenosis of lumbar region with neurogenic claudication   • Mixed hyperlipidemia   • Overweight with body mass index (BMI) 25.0-29.9   • Pulmonary fibrosis ([facility])   • Rheumatoid arthritis ([facility])   • S/P lumbar spinal fusion   • Bronchiectasis without complication ([facility])   • Immunosuppressed status ([facility])   • 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[facility])   • Dyspnea   • Cough   • COVID-19   • Severe malnutrition (CMS/[facility])     Past Medical History:   Diagnosis Date   • Arthritis    • Asthma    • Cervicalgia 2020   • Leg pain    • Leg swelling    • Limb pain    • Lumbago 2020    low back pain    • Lumbar spinal stenosis 2020   • Lung disease    • Nausea    • Neurogenic claudication 2020   • Neuropathy    • Pulmonary fibrosis (CMS/[facility])    • Radiculopathy, lumbosacral region 2020   • Spondylolisthesis, lumbar region 2020    grade 1 at L3/4      Past Surgical History:   Procedure Laterality Date   • SPINE SURGERY  2020   • TONSILLECTOMY     • TUBAL ABDOMINAL LIGATION         SPEECH PATHOLOGY DYSPHAGIA TREATMENT    Subjective/Behavioral Observations: Patient alert and cooperative, sitting up in bed with lunch tray.  Patient states feeling better.      Day/time of Treatment: 10/4/2021: 13:00      Current Diet: Full liquid, nectar thick      Current Strategies:Alternate small bites and small sips of solids and liquids at a slow rate, double swallow      Treatment received: Dysphagia therapy to address swallow function through exercises and education of strategies.      Results of treatment: Patient treated with lidocaine prior to beginning p.o. intake.  Trials of thin liquid with beef broth, patient noted with coughing.  Patient drinking sips of thickened Ensure without difficulty noted.  Patient taking bites of honey thickened cream of chicken soup with swallows completed, vocal quality remaining clear.  Patient with trial of cracker, facial grimace noted, coughing noted.  Patient stating mouth beginning to burn with taking bites with soup.      Progress toward goals: Steady      Barriers to Achieving goals: Oral pain.  Medical status.      Plan of care:/changes in plan: Upgrade of diet to include puréed solid.  Positioning fully upright for all p.o. intake and 30 minutes following. Alternate small bites and small sips of solids and liquids at a slow rate, double swallow.  Recommend patient to utilize lidocaine prior to p.o. intake as per physician.                                        Anticipated Discharge Disposition (SLP): skilled nursing facility, inpatient rehabilitation facility (10/04/21 6006)                                  Plan of Care Reviewed With: patient          EDUCATION  The patient has been educated in the following areas:   Modified Diet Instruction.              Time Calculation:   Time Calculation- SLP     Row Name 10/04/21 3566  10/04/21 1318          Time Calculation- SLP    SLP Start Time  --  1130  -TB     SLP Stop Time  1300  -TB  1230  -TB     SLP Time Calculation (min)  --  60 min  -TB     SLP Received On  10/04/21  -TB  10/04/21  -TB        Untimed Charges    SLP Eval/Re-eval   --  ST Eval Oral Pharyng Swallow - 29689  -TB     47311-KA Eval Oral Pharyng Swallow Minutes  --  60  -TB     04585-ZE Treatment Swallow Minutes  40  -TB  --        Total Minutes    Untimed Charges Total Minutes  40  -TB  60  -TB      Total Minutes  40  -TB  60  -TB       User Key  (r) = Recorded By, (t) = Taken By, (c) = Cosigned By    Initials Name Provider Type    TB Charlene Bowers SLP Speech and Language Pathologist          Therapy Charges for Today     Code Description Service Date Service Provider Modifiers Qty    33173530596 HC ST EVAL ORAL PHARYNG SWALLOW 4 10/4/2021 Charlene Bowers SLP GN 1    20784570842 HC ST TREATMENT SWALLOW 3 10/4/2021 Charlene Bowers SLP GN 1               ELIER Hendrix  10/4/2021

## 2021-10-05 ENCOUNTER — TELEPHONE (OUTPATIENT)
Dept: PULMONOLOGY | Facility: CLINIC | Age: 71
End: 2021-10-05

## 2021-10-05 PROCEDURE — 92526 ORAL FUNCTION THERAPY: CPT

## 2021-10-05 PROCEDURE — 63710000001 PREDNISONE PER 1 MG: Performed by: INTERNAL MEDICINE

## 2021-10-05 PROCEDURE — 97530 THERAPEUTIC ACTIVITIES: CPT

## 2021-10-05 PROCEDURE — 94799 UNLISTED PULMONARY SVC/PX: CPT

## 2021-10-05 PROCEDURE — 94762 N-INVAS EAR/PLS OXIMTRY CONT: CPT

## 2021-10-05 PROCEDURE — 97110 THERAPEUTIC EXERCISES: CPT

## 2021-10-05 RX ADMIN — BUDESONIDE 0.5 MG: 0.5 INHALANT ORAL at 21:15

## 2021-10-05 RX ADMIN — BUDESONIDE 0.5 MG: 0.5 INHALANT ORAL at 08:17

## 2021-10-05 RX ADMIN — NYSTATIN 500000 UNITS: 100000 SUSPENSION ORAL at 08:23

## 2021-10-05 RX ADMIN — NYSTATIN 500000 UNITS: 100000 SUSPENSION ORAL at 20:22

## 2021-10-05 RX ADMIN — HYDROCODONE BITARTRATE AND ACETAMINOPHEN 1 TABLET: 10; 325 TABLET ORAL at 05:40

## 2021-10-05 RX ADMIN — APIXABAN 5 MG: 5 TABLET, FILM COATED ORAL at 20:23

## 2021-10-05 RX ADMIN — ATORVASTATIN CALCIUM 20 MG: 40 TABLET, FILM COATED ORAL at 20:23

## 2021-10-05 RX ADMIN — NYSTATIN 500000 UNITS: 100000 SUSPENSION ORAL at 17:06

## 2021-10-05 RX ADMIN — HYDROCODONE BITARTRATE AND ACETAMINOPHEN 1 TABLET: 10; 325 TABLET ORAL at 11:43

## 2021-10-05 RX ADMIN — HYDROCODONE BITARTRATE AND ACETAMINOPHEN 1 TABLET: 10; 325 TABLET ORAL at 18:02

## 2021-10-05 RX ADMIN — LIDOCAINE HYDROCHLORIDE 5 ML: 20 SOLUTION ORAL; TOPICAL at 08:23

## 2021-10-05 RX ADMIN — ARFORMOTEROL TARTRATE 15 MCG: 15 SOLUTION RESPIRATORY (INHALATION) at 08:17

## 2021-10-05 RX ADMIN — HYDROXYZINE HYDROCHLORIDE 25 MG: 25 TABLET, FILM COATED ORAL at 20:23

## 2021-10-05 RX ADMIN — APIXABAN 5 MG: 5 TABLET, FILM COATED ORAL at 08:23

## 2021-10-05 RX ADMIN — ARFORMOTEROL TARTRATE 15 MCG: 15 SOLUTION RESPIRATORY (INHALATION) at 21:15

## 2021-10-05 RX ADMIN — NYSTATIN 500000 UNITS: 100000 SUSPENSION ORAL at 11:43

## 2021-10-05 RX ADMIN — PREDNISONE 60 MG: 20 TABLET ORAL at 08:23

## 2021-10-05 RX ADMIN — LIDOCAINE HYDROCHLORIDE 5 ML: 20 SOLUTION ORAL; TOPICAL at 17:06

## 2021-10-05 NOTE — THERAPY TREATMENT NOTE
Acute Care - Speech Language Pathology   Swallow Treatment Note NICOLE Duncan     Patient Name: Christina Espinoza  : 1950  MRN: [id number]  Today's Date: 10/5/2021               Admit Date: 2021    Visit Dx:     ICD-10-CM ICD-9-CM   1. Oropharyngeal dysphagia  R13.12 787.22   2. Hypoxia  R09.02 799.02   3. COVID-19  U07.1 079.89   4. Decreased activities of daily living (ADL)  Z78.9 V49.89   5. Difficulty walking  R26.2 719.7   6. S/P lumbar spinal fusion  Z98.1 V45.4   7. Pulmonary fibrosis (CMS/[facility])  J84.10 515   8. Spinal stenosis of lumbar region with neurogenic claudication  M48.062 724.03   9. COPD (chronic obstructive pulmonary disease) case management patient (CMS/[facility])  J44.9 496     Patient Active Problem List   Diagnosis   • Acquired spondylolisthesis   • Age related osteoporosis   • Asthma   • Chronic pain   • COPD (chronic obstructive pulmonary disease) case management patient ([facility])   • Degeneration of lumbar intervertebral disc   • Dizziness   • Hip pain   • Irritable bowel syndrome with constipation   • Neck pain   • Lumbago with sciatica   • Spinal stenosis of lumbar region with neurogenic claudication   • Mixed hyperlipidemia   • Overweight with body mass index (BMI) 25.0-29.9   • Pulmonary fibrosis ([facility])   • Rheumatoid arthritis ([facility])   • S/P lumbar spinal fusion   • Bronchiectasis without complication ([facility])   • Immunosuppressed status ([facility])   • 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[facility])   • Dyspnea   • Cough   • COVID-19   • Severe malnutrition (CMS/[facility])     Past Medical History:   Diagnosis Date   • Arthritis    • Asthma    • Cervicalgia 2020   • Leg pain    • Leg swelling    • Limb pain    • Lumbago 2020    low back pain    • Lumbar spinal stenosis 2020   • Lung disease    • Nausea    • Neurogenic claudication 2020   • Neuropathy    • Pulmonary fibrosis (CMS/[facility])    • Radiculopathy, lumbosacral region 2020   • Spondylolisthesis, lumbar region 2020    grade 1 at L3/4      Past Surgical History:   Procedure Laterality Date   • SPINE SURGERY  2020   • TONSILLECTOMY     • TUBAL ABDOMINAL LIGATION         SPEECH PATHOLOGY DYSPHAGIA TREATMENT     Subjective/Behavioral Observations: Patient alert and cooperative, sitting up in bed with lunch tray.  Patient noted using drops of lidocaine prior to meal.  Patient stating saving the rest to use after the meal.  Educated for use to improve p.o. intake.        Day/time of Treatment: 10/5/2021: 12:30        Current Diet:  Purée, nectar thick        Current Strategies:Alternate small bites and small sips of solids and liquids at a slow rate, double swallow        Treatment received: Dysphagia therapy to address swallow function through exercises and education of strategies.        Results of treatment:  Patient taking sips of thin water utilizing chin tuck and single sips with minimal assistance.  No overt clinical signs or symptoms of aspiration noted.  P.o. intake with 25% of puréed diet.  Patient stating no concerns with taking puréed foods.     Progress toward goals: Steady        Barriers to Achieving goals: Oral pain.  Medical status.        Plan of care:/changes in plan:  Continue with diet of purée solid and nectar thickened liquid. Positioning fully upright for all p.o. intake and 30 minutes following. Alternate small bites and small sips of solids and liquids at a slow rate, double swallow.  Recommend patient to utilize lidocaine prior to p.o. intake as per physician.                                                             Plan of Care Reviewed With: patient          EDUCATION  The patient has been educated in the following areas:   Dysphagia (Swallowing Impairment) Modified Diet Instruction.              Time Calculation:   Time Calculation- SLP     Row Name 10/05/21 1337             Time Calculation- SLP    SLP Stop Time  1230  -TB      SLP Received On  10/05/21  -TB         Untimed Charges    96125-KM Treatment Swallow Minutes   40  -TB         Total Minutes    Untimed Charges Total Minutes  40  -TB       Total Minutes  40  -TB        User Key  (r) = Recorded By, (t) = Taken By, (c) = Cosigned By    Initials Name Provider Type    Charlene Noel SLP Speech and Language Pathologist          Therapy Charges for Today     Code Description Service Date Service Provider Modifiers Qty    95897958778 HC ST EVAL ORAL PHARYNG SWALLOW 4 10/4/2021 Charlene Bowers SLP GN 1    49604830016 HC ST TREATMENT SWALLOW 3 10/4/2021 Charlene Bowers SLP GN 1    [id number] HC ST TREATMENT SWALLOW 3 10/5/2021 Charlene Bowers SLP GN 1               ELIER Hendrix  10/5/2021

## 2021-10-05 NOTE — TELEPHONE ENCOUNTER
Received request from [employer] for prior authorization for Imuran 50mg po daily.  Submitted information and received response - current authorization is current.

## 2021-10-05 NOTE — PROGRESS NOTES
[facility]     Progress Note    Patient Name: Christina Espinoza  : 1950  MRN: [id number]  Primary Care Physician:  Laurie Castro MD  Date of admission: 2021    Subjective   Subjective     Chief Complaint: COVID-19 pneumonia, hypoxia, interstitial lung disease, left lung pulmonary emboli, cachexia, dysphagia, malnutrition    HPI:  Patient Reports she is eating a puréed diet now.  Seeing speech therapy, PT OT.    Review of Systems   Review of Systems   Constitutional: Activity change: seems a little better.   HENT: Negative.  Mouth sores: on tongue.    Eyes: Negative.    Respiratory: Negative.    Cardiovascular: Negative.    Gastrointestinal: Negative.    Genitourinary: Negative.  Enuresis: on exertion.   Musculoskeletal: Negative.    Skin: Negative.    Allergic/Immunologic: Negative.    Neurological: Positive for weakness.   Hematological: Negative.    Psychiatric/Behavioral: Negative.        Objective   Objective     Vitals:   Temp:  [97.6 °F (36.4 °C)-98.2 °F (36.8 °C)] 97.6 °F (36.4 °C)  Heart Rate:  [77-94] 90  Resp:  [18-24] 20  BP: ()/(54-65) 103/62  Flow (L/min):  [2-3] 2  Physical Exam    Constitutional: Awake, alert, oriented cooperative   Eyes: PERRLA, sclerae anicteric, no conjunctival injection   HENT: NCAT, mucous membranes moist   Neck: Supple, no thyromegaly, no lymphadenopathy, trachea midline   Respiratory: Clear to auscultation bilaterally, nonlabored respirations    Cardiovascular: RRR, no murmurs, rubs, or gallops, palpable pedal pulses bilaterally   Gastrointestinal: Positive bowel sounds, soft, nontender, nondistended   Musculoskeletal: No bilateral ankle edema, no clubbing or cyanosis to extremities   Psychiatric: Appropriate affect, cooperative   Neurologic: Oriented x 3, strength symmetric in all extremities, Cranial Nerves grossly intact to confrontation, speech clear   Skin: No rashes     Result Review    Result Review:  I have personally reviewed the results from the  time of this admission to 10/5/2021 15:15 EDT and agree with these findings:  [x]  Laboratory  []  Microbiology  []  Radiology  []  EKG/Telemetry   []  Cardiology/Vascular   []  Pathology  []  Old records  []  Other:  Most notable findings include: Hypoxia on exertion, dysphagia on puréed and thickened diet, tongue sores helped with lidocaine  Assessment/Plan   Assessment / Plan   COVID-19 pneumonia  Hypoxia  Cachexia  Anorexia  Malnutrition  Dysphagia  Mouth sores  Interstitial lung disease  Depression  Brief Patient Summary:  Christina Espinoza is a 71 y.o. female who is doing better in her diet with the puréed and thickened food.  She says the viscous lidocaine is helping her tongue pain.  Her depression seems a little better on medications    Active Hospital Problems:  Active Hospital Problems    Diagnosis    • COVID-19    • Severe malnutrition (CMS/HCC)      Plan:   Continue present care  Awaiting pre-CERT for SNF external care  Continue PT OT speech  Adjust depression medications as needed    DVT prophylaxis:  Medical DVT prophylaxis orders are present.    CODE STATUS:   Level Of Support Discussed With: Patient  Code Status: CPR  Medical Interventions (Level of Support Prior to Arrest): Full      Electronically signed by Laurie Castro MD, 10/05/21, 3:15 PM EDT.

## 2021-10-05 NOTE — PLAN OF CARE
Goal Outcome Evaluation:  Plan of Care Reviewed With: patient        Progress: improving  Outcome Summary: Vital signs stable. Patient on room air at rest, oxygen saturation %, supplemental oxygen needed with activity when oxygen saturation decreases to 85-90%. Patient able to recver quickly after activity. Patient up to chair and bedside commode throughout day. Patient reports less mouth discomfort and able to tolerate more at meal times. PRN medications used for mouth and back pain.

## 2021-10-05 NOTE — THERAPY TREATMENT NOTE
Acute Care - Physical Therapy Treatment Note   [place]     Patient Name: Christina Espinoza  : 1950  MRN: [id number]  Today's Date: 10/5/2021      Visit Dx:     ICD-10-CM ICD-9-CM   1. Oropharyngeal dysphagia  R13.12 787.22   2. Hypoxia  R09.02 799.02   3. COVID-19  U07.1 079.89   4. Decreased activities of daily living (ADL)  Z78.9 V49.89   5. Difficulty walking  R26.2 719.7   6. S/P lumbar spinal fusion  Z98.1 V45.4   7. Pulmonary fibrosis (CMS/[facility])  J84.10 515   8. Spinal stenosis of lumbar region with neurogenic claudication  M48.062 724.03   9. COPD (chronic obstructive pulmonary disease) case management patient (CMS/[facility])  J44.9 496     Patient Active Problem List   Diagnosis   • Acquired spondylolisthesis   • Age related osteoporosis   • Asthma   • Chronic pain   • COPD (chronic obstructive pulmonary disease) case management patient ([facility])   • Degeneration of lumbar intervertebral disc   • Dizziness   • Hip pain   • Irritable bowel syndrome with constipation   • Neck pain   • Lumbago with sciatica   • Spinal stenosis of lumbar region with neurogenic claudication   • Mixed hyperlipidemia   • Overweight with body mass index (BMI) 25.0-29.9   • Pulmonary fibrosis ([facility])   • Rheumatoid arthritis ([facility])   • S/P lumbar spinal fusion   • Bronchiectasis without complication ([facility])   • Immunosuppressed status ([facility])   • 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[facility])   • Dyspnea   • Cough   • COVID-19   • Severe malnutrition (CMS/[facility])     Past Medical History:   Diagnosis Date   • Arthritis    • Asthma    • Cervicalgia 2020   • Leg pain    • Leg swelling    • Limb pain    • Lumbago 2020    low back pain    • Lumbar spinal stenosis 2020   • Lung disease    • Nausea    • Neurogenic claudication 2020   • Neuropathy    • Pulmonary fibrosis (CMS/[facility])    • Radiculopathy, lumbosacral region 2020   • Spondylolisthesis, lumbar region 2020    grade 1 at L3/4     Past Surgical History:   Procedure Laterality Date    • SPINE SURGERY  2020   • TONSILLECTOMY     • TUBAL ABDOMINAL LIGATION          PT Assessment (last 12 hours)      PT Evaluation and Treatment     Row Name 10/05/21 0918          Physical Therapy Time and Intention    Subjective Information  complains of;weakness;fatigue  -DK     Document Type  therapy note (daily note)  -DK     Mode of Treatment  individual therapy;physical therapy  -DK     Patient Effort  good  -DK     Symptoms Noted During/After Treatment  fatigue  -DK     Row Name 10/05/21 0918          Cognition    Affect/Mental Status (Cognitive)  WFL  -DK     Orientation Status (Cognition)  oriented x 4  -DK     Follows Commands (Cognition)  WFL  -DK     Cognitive Function (Cognitive)  WFL  -DK     Personal Safety Interventions  nonskid shoes/slippers when out of bed;supervised activity  -DK     Row Name 10/05/21 0918          Pain Scale: Numbers Pre/Post-Treatment    Pretreatment Pain Rating  0/10 - no pain  -DK     Posttreatment Pain Rating  0/10 - no pain  -DK     Row Name 10/05/21 0918          Bed Mobility    Bed Mobility  supine-sit  -DK     Rolling Left [place] (Bed Mobility)  contact guard;minimum assist (75% patient effort);1 person assist  -DK     Rolling Right [place] (Bed Mobility)  contact guard;minimum assist (75% patient effort);1 person assist  -DK     Supine-Sit [place] (Bed Mobility)  contact guard;minimum assist (75% patient effort);1 person assist  -DK     Supine-Sit-Supine [place] (Bed Mobility)  contact guard;minimum assist (75% patient effort);1 person assist  -DK     Assistive Device (Bed Mobility)  bed rails  -DK     Row Name 10/05/21 0918          Transfers    Transfers  sit-stand transfer;stand-sit transfer  -DK     Bed-Chair [place] (Transfers)  contact guard;minimum assist (75% patient effort);1 person assist  -DK     Sit-Stand [place] (Transfers)  contact guard;minimum assist (75% patient effort);1 person assist  -DK     Stand-Sit [place]  (Transfers)  contact guard;minimum assist (75% patient effort);1 person assist  -DK     Row Name 10/05/21 0918          Gait/Stairs (Locomotion)    Gait/Stairs Locomotion  gait/ambulation independence;gait/ambulation assistive device;distance ambulated;gait pattern  -DK     [place] Level (Gait)  contact guard;minimum assist (75% patient effort);1 person assist  -DK     Assistive Device (Gait)  other (see comments) No assistive device  -DK     Distance in Feet (Gait)  8 5' forward/3' reverse  -DK     Pattern (Gait)  step-to  -DK     Deviations/Abnormal Patterns (Gait)  festinating/shuffling  -DK     Bilateral Gait Deviations  forward flexed posture  -DK     Row Name 10/05/21 0918          Safety Issues, Functional Mobility    Safety Issues Affecting Function (Mobility)  awareness of need for assistance;safety precaution awareness  -DK     Impairments Affecting Function (Mobility)  balance;endurance/activity tolerance;strength;shortness of breath  -DK     Cognitive Impairments, Mobility Safety/Performance  awareness, need for assistance;safety precaution awareness  -     Row Name 10/05/21 0918          Balance    Balance Assessment  standing dynamic balance  -DK     Dynamic Standing Balance  WFL  -DK     Balance Interventions  standing;dynamic;tandem gait  -DK     Row Name 10/05/21 0918          Motor Skills    Motor Skills  therapeutic exercise  -DK     Coordination  WFL  -DK     Therapeutic Exercise  hip;knee;ankle  -     Row Name 10/05/21 0918          Hip (Therapeutic Exercise)    Hip (Therapeutic Exercise)  AAROM (active assistive range of motion)  -DK     Hip AAROM (Therapeutic Exercise)  bilateral;flexion;extension;aBduction;supine;10 repetitions;2 sets  -     Row Name 10/05/21 0918          Knee (Therapeutic Exercise)    Knee (Therapeutic Exercise)  AAROM (active assistive range of motion)  -DK     Knee AAROM (Therapeutic Exercise)  bilateral;flexion;extension;supine;10 repetitions;2 sets  -DK      Row Name 10/05/21 0918          Ankle (Therapeutic Exercise)    Ankle (Therapeutic Exercise)  AAROM (active assistive range of motion)  -     Ankle AAROM (Therapeutic Exercise)  bilateral;dorsiflexion;plantarflexion;supine;10 repetitions;2 sets  -DK     Row Name 10/05/21 0918          Plan of Care Review    Plan of Care Reviewed With  patient  -DK     Progress  improving  -     Row Name 10/05/21 0918          Positioning and Restraints    Pre-Treatment Position  in bed  -DK     Post Treatment Position  chair  -DK     In Chair  reclined;call light within reach;encouraged to call for assist;with nsg;heels elevated;legs elevated  -DK     Row Name 10/05/21 0918          Therapy Assessment/Plan (PT)    Rehab Potential (PT)  good, to achieve stated therapy goals  -     Criteria for Skilled Interventions Met (PT)  skilled treatment is necessary  -     Problem List (PT)  problems related to;balance;mobility;strength;hearing breathing issues  -     Activity Limitations Related to Problem List (PT)  unable to ambulate safely  -     Row Name 10/05/21 0918          Progress Summary (PT)    Progress Toward Functional Goals (PT)  progress toward functional goals is good  -       User Key  (r) = Recorded By, (t) = Taken By, (c) = Cosigned By    Initials Name Provider Type    Karley Jett PTA Physical Therapy Assistant        Physical Therapy Education                 Title: PT OT SLP Therapies (Done)     Topic: Physical Therapy (Done)     Point: Mobility training (Done)     Learning Progress Summary           Patient Acceptance, E, VU by VB at 9/26/2021 1825    Acceptance, E,TB, VU by SADIE at 9/26/2021 0248    Acceptance, E, VU,NR by ELDER at 9/23/2021 1459                   Point: Home exercise program (Done)     Learning Progress Summary           Patient Acceptance, E, VU by VB at 9/26/2021 1825    Acceptance, E,TB, VU by SADIE at 9/26/2021 0248                   Point: Body mechanics (Done)     Learning Progress  Summary           Patient Acceptance, E, VU by VB at 9/26/2021 1825    Acceptance, E,TB, VU by RG at 9/26/2021 0248                   Point: Precautions (Done)     Learning Progress Summary           Patient Acceptance, E, VU by VB at 9/26/2021 1825    Acceptance, E,TB, VU by RG at 9/26/2021 0248    Acceptance, E, VU,NR by  at 9/23/2021 1459                               User Key     Initials Effective Dates Name Provider Type Discipline     06/16/21 -  Madonna Cordero, RN Registered Nurse Nurse     04/25/21 -  Shelby Damico, LINDA Physical Therapist PT    RG 05/28/21 -  Freddy Perez RNA Registered Nurse Nurse              PT Recommendation and Plan  Planned Therapy Interventions (PT): balance training, bed mobility training, gait training, strengthening, transfer training  Therapy Frequency (PT): daily  Progress Summary (PT)  Progress Toward Functional Goals (PT): progress toward functional goals is good  Plan of Care Reviewed With: patient  Progress: improving  Outcome Measures     Row Name 10/05/21 0917 10/04/21 1300 10/02/21 1236       How much help from another person do you currently need...    Turning from your back to your side while in flat bed without using bedrails?  4  -DK  4  -AV  4  -DP (r) RV (t) DP (c)    Moving from lying on back to sitting on the side of a flat bed without bedrails?  4  -DK  4  -AV  3  -DP (r) RV (t) DP (c)    Moving to and from a bed to a chair (including a wheelchair)?  3  -DK  3  -AV  3  -DP (r) RV (t) DP (c)    Standing up from a chair using your arms (e.g., wheelchair, bedside chair)?  3  -DK  3  -AV  3  -DP (r) RV (t) DP (c)    Climbing 3-5 steps with a railing?  3  -DK  3  -AV  2  -DP (r) RV (t) DP (c)    To walk in hospital room?  3  -DK  3  -AV  3  -DP (r) RV (t) DP (c)    AM-PAC 6 Clicks Score (PT)  20  -DK  20  -AV  18  -DP (r) RV (t)       Functional Assessment    Outcome Measure Options  AM-PAC 6 Clicks Basic Mobility (PT)  -RICH  AM-PAC 6 Clicks Basic Mobility  (PT)  -AV  AM-PAC 6 Clicks Basic Mobility (PT)  -DP (r) RV (t) DP (c)      User Key  (r) = Recorded By, (t) = Taken By, (c) = Cosigned By    Initials Name Provider Type    Karley Jett, SHAUNNA Physical Therapy Assistant    DP Jase Sanders, PT Physical Therapist    AV Danny Nur, PT Physical Therapist    RV Aileen Henry, PT Student PT Student           Time Calculation:   PT Charges     Row Name 10/05/21 0924             Time Calculation    PT Received On  10/05/21  -DK      PT Goal Re-Cert Due Date  10/11/21  -DK         Timed Charges    78654 - PT Therapeutic Exercise Minutes  14  -DK      36875 - Gait Training Minutes   3  -DK      44335 - PT Therapeutic Activity Minutes  8  -DK         Total Minutes    Timed Charges Total Minutes  25  -DK       Total Minutes  25  -DK        User Key  (r) = Recorded By, (t) = Taken By, (c) = Cosigned By    Initials Name Provider Type    Karley Jett PTA Physical Therapy Assistant        Therapy Charges for Today     Code Description Service Date Service Provider Modifiers Qty    40865602249 HC PT THER PROC EA 15 MIN 10/5/2021 Karley Munguia PTA GP 1    19341849468 HC PT THERAPEUTIC ACT EA 15 MIN 10/5/2021 Karley Munguia PTA GP 1          PT G-Codes  Outcome Measure Options: AM-PAC 6 Clicks Basic Mobility (PT)  AM-PAC 6 Clicks Score (PT): 20  AM-PAC 6 Clicks Score (OT): 10    Karley Munguia PTA  10/5/2021

## 2021-10-05 NOTE — SIGNIFICANT NOTE
10/05/21 1151   Plan   Plan Pt on 2L nc. Local rehab referrals made. [facility] can accpet pt. Referral sent for BSC and hospital bed.

## 2021-10-05 NOTE — PLAN OF CARE
Problem: Adult Inpatient Plan of Care  Goal: Absence of Hospital-Acquired Illness or Injury  Intervention: Identify and Manage Fall Risk  Recent Flowsheet Documentation  Taken 10/5/2021 0628 by Jyothi Rebollar RN  Safety Promotion/Fall Prevention: safety round/check completed  Taken 10/5/2021 0400 by Jyothi Rebollar RN  Safety Promotion/Fall Prevention: safety round/check completed  Taken 10/5/2021 0000 by Jyothi Rebollar RN  Safety Promotion/Fall Prevention: safety round/check completed  Taken 10/4/2021 2200 by Jyothi Rebollar RN  Safety Promotion/Fall Prevention: safety round/check completed  Taken 10/4/2021 2048 by Jyothi Rebollar RN  Safety Promotion/Fall Prevention:   safety round/check completed   clutter free environment maintained   fall prevention program maintained  Taken 10/4/2021 2030 by Jyothi Rebollar RN  Safety Promotion/Fall Prevention: safety round/check completed     Problem: Adult Inpatient Plan of Care  Goal: Plan of Care Review  Outcome: Ongoing, Progressing  Flowsheets (Taken 10/5/2021 0647)  Progress: improving  Plan of Care Reviewed With: patient  Outcome Summary: VSS. Prn pain meds given when asked for mouth and back pain. Pt is on RA at this time. Less anxiety noted.   Goal Outcome Evaluation:  Plan of Care Reviewed With: patient        Progress: improving  Outcome Summary: VSS. Prn pain meds given when asked for mouth and back pain. Pt is on RA at this time. Less anxiety noted.

## 2021-10-06 ENCOUNTER — READMISSION MANAGEMENT (OUTPATIENT)
Dept: CALL CENTER | Facility: HOSPITAL | Age: 71
End: 2021-10-06

## 2021-10-06 VITALS
TEMPERATURE: 98 F | BODY MASS INDEX: 26.78 KG/M2 | OXYGEN SATURATION: 94 % | DIASTOLIC BLOOD PRESSURE: 67 MMHG | SYSTOLIC BLOOD PRESSURE: 110 MMHG | RESPIRATION RATE: 22 BRPM | HEIGHT: 62 IN | HEART RATE: 93 BPM | WEIGHT: 145.5 LBS

## 2021-10-06 PROBLEM — D89.833 CYTOKINE RELEASE SYNDROME, GRADE 3: Status: ACTIVE | Noted: 2021-10-06

## 2021-10-06 PROBLEM — D89.834 CYTOKINE RELEASE SYNDROME, GRADE 4: Status: ACTIVE | Noted: 2021-10-06

## 2021-10-06 PROCEDURE — 92526 ORAL FUNCTION THERAPY: CPT

## 2021-10-06 PROCEDURE — 94799 UNLISTED PULMONARY SVC/PX: CPT

## 2021-10-06 PROCEDURE — 63710000001 PREDNISONE PER 1 MG: Performed by: INTERNAL MEDICINE

## 2021-10-06 RX ORDER — PREDNISONE 20 MG/1
60 TABLET ORAL
Qty: 90 TABLET | Refills: 0 | Status: SHIPPED | OUTPATIENT
Start: 2021-10-07 | End: 2021-11-06

## 2021-10-06 RX ORDER — LIDOCAINE HYDROCHLORIDE 20 MG/ML
5 SOLUTION OROPHARYNGEAL
Qty: 100 ML | Refills: 1 | Status: SHIPPED | OUTPATIENT
Start: 2021-10-06 | End: 2021-11-05

## 2021-10-06 RX ORDER — HYDROXYZINE HYDROCHLORIDE 25 MG/1
25 TABLET, FILM COATED ORAL NIGHTLY
Qty: 30 TABLET | Refills: 1 | Status: SHIPPED | OUTPATIENT
Start: 2021-10-06 | End: 2021-11-05

## 2021-10-06 RX ADMIN — ARFORMOTEROL TARTRATE 15 MCG: 15 SOLUTION RESPIRATORY (INHALATION) at 09:48

## 2021-10-06 RX ADMIN — LIDOCAINE HYDROCHLORIDE 5 ML: 20 SOLUTION ORAL; TOPICAL at 08:46

## 2021-10-06 RX ADMIN — BUDESONIDE 0.5 MG: 0.5 INHALANT ORAL at 09:48

## 2021-10-06 RX ADMIN — APIXABAN 5 MG: 5 TABLET, FILM COATED ORAL at 08:47

## 2021-10-06 RX ADMIN — PREDNISONE 60 MG: 20 TABLET ORAL at 08:47

## 2021-10-06 RX ADMIN — NYSTATIN 500000 UNITS: 100000 SUSPENSION ORAL at 08:47

## 2021-10-06 RX ADMIN — LINACLOTIDE 145 MCG: 145 CAPSULE, GELATIN COATED ORAL at 08:47

## 2021-10-06 RX ADMIN — HYDROCODONE BITARTRATE AND ACETAMINOPHEN 1 TABLET: 10; 325 TABLET ORAL at 08:47

## 2021-10-06 RX ADMIN — NYSTATIN 500000 UNITS: 100000 SUSPENSION ORAL at 11:44

## 2021-10-06 NOTE — SIGNIFICANT NOTE
10/06/21 1015   Plan   Plan Comments ALHAJI spoke with MD. MD will discharge patient home today with home health- [facility]. Daughter requested a hospital bed and a bedside commode. ALHAJI notified MD. [facility]   Final Discharge Disposition Code 06 - home with home health care   Final Note [facility] notified of pts discharge today.      [General Appearance - Alert] : alert [General Appearance - In No Acute Distress] : in no acute distress [Sclera] : the sclera and conjunctiva were normal [Extraocular Movements] : extraocular movements were intact [Jugular Venous Distention Increased] : there was no jugular-venous distention [Auscultation Breath Sounds / Voice Sounds] : lungs were clear to auscultation bilaterally [Heart Sounds Gallop] : no gallops [Heart Sounds Pericardial Friction Rub] : no pericardial rub [Arterial Pulses Carotid] : carotid pulses were normal with no bruits [Full Pulse] : the pedal pulses are present [Edema] : there was no peripheral edema [Abdomen Soft] : soft [Abdomen Tenderness] : non-tender [Cervical Lymph Nodes Enlarged Posterior Bilaterally] : posterior cervical [Cervical Lymph Nodes Enlarged Anterior Bilaterally] : anterior cervical [Supraclavicular Lymph Nodes Enlarged Bilaterally] : supraclavicular [Axillary Lymph Nodes Enlarged Bilaterally] : axillary [Abnormal Walk] : normal gait [Dialysis Catheter] : dialysis catheter [FreeTextEntry1] : Left I/J tunneled catheter [] : no rash [Motor Exam] : the motor exam was normal [No Focal Deficits] : no focal deficits [Oriented To Time, Place, And Person] : oriented to person, place, and time [Impaired Insight] : insight and judgment were intact [Affect] : the affect was normal [Mood] : the mood was normal

## 2021-10-06 NOTE — PLAN OF CARE
Goal Outcome Evaluation:  Plan of Care Reviewed With: patient     Pt has been resting quietly this shift. Still has some anxiety when she gets up to bsc and requires oxygen to be turned to 1L. Otherwise she is on room air.

## 2021-10-06 NOTE — DISCHARGE SUMMARY
[facility]         DISCHARGE SUMMARY    Patient Name: Christina Espinoza  : 1950  MRN: [id number]    Date of Admission: 2021  Date of Discharge:  10/6/21  Primary Care Physician: Laurie Castro MD    Consults       Date and Time Order Name Status Description    2021  2:35 PM Inpatient Pulmonology Consult      2021  1:05 PM General MD Inpatient Consult Completed     2021 10:48 AM Inpatient Hospitalist Consult Completed             Presenting Problem:   Hypoxia [R09.02]  COVID-19 [U07.1]    Active and Resolved Hospital Problems:  Active Hospital Problems    Diagnosis POA   • COVID-19 [U07.1] Yes     Priority: High   • Cytokine release syndrome, grade 4 [D89.834] Unknown   • Severe malnutrition (HCC) [E43] Yes      Resolved Hospital Problems   No resolved problems to display.         Hospital Course     Hospital Course:  Christina Espinoza is a 71 y.o. female who was admitted with Covid 19 pneumonia and Hypoxia. She was placed on Oxygen ,Remdesivir ,steroids. She had a CT Chest showing left lung emboli and bilateral DVT and was started on Lovenox. She was on cefepime. She was given a dose of Actemra. She has a history of pulmonary interstitial disease. She was changed to Eliquis.She was on large dose of steroids as she has been on it at home. Her oxygenation improved but she was anorexic,cachectic and malnourished. She had mouth sores treated with viscous lidocaine,modified barium swallow showed aspiration of thin liquids. Seen by speech,PT/OT. Diet gradually increased. Oxygen slowly decreased. She was to go to Rehab but Insurance precert failed. She has been eating a little better,mood better and able to get to bedside commode. She will be discharged home today 10/6/21,daughter who had covid the same time as her ,but recovered at home,will take care of her.        DISCHARGE Follow Up Recommendations for labs and diagnostics: pmd office in 2 weeks      Day of Discharge     Vital  Signs:  Temp:  [97.6 °F (36.4 °C)-98.9 °F (37.2 °C)] 97.9 °F (36.6 °C)  Heart Rate:  [] 93  Resp:  [14-24] 22  BP: ()/(56-72) 104/61  Flow (L/min):  [1] 1  Physical Exam:  Constitutional: Awake, alert,cooperative,pleasant female   Eyes: PERRLA, sclerae anicteric, no conjunctival injection   HENT: NCAT, mucous membranes moist   Neck: Supple, no thyromegaly, no lymphadenopathy, trachea midline   Respiratory: Clear to auscultation bilaterally, nonlabored respirations    Cardiovascular: RRR, no murmurs, rubs, or gallops, palpable pedal pulses bilaterally   Gastrointestinal: Positive bowel sounds, soft, nontender, nondistended   Musculoskeletal: No bilateral ankle edema, no clubbing or cyanosis to extremities   Psychiatric: Appropriate affect, cooperative   Neurologic: Oriented x 3, strength symmetric in all extremities, Cranial Nerves grossly intact to confrontation, speech clear   Skin: No rashes       Pertinent  and/or Most Recent Results     LAB RESULTS:      Lab 10/04/21  0525 10/01/21  0524   WBC 12.05* 14.51*   HEMOGLOBIN 11.9* 12.8   HEMATOCRIT 36.9 41.8   PLATELETS 218 180   NEUTROS ABS 9.88* 12.17*   IMMATURE GRANS (ABS) 0.11* 0.18*   LYMPHS ABS 0.89 0.98   MONOS ABS 1.15* 1.11*   EOS ABS 0.01 0.01   MCV 89.6 95.2         Lab 10/04/21  0525 10/01/21  0524   SODIUM 136 133*   POTASSIUM 4.7 5.1   CHLORIDE 104 100   CO2 21.9* 21.0*   ANION GAP 10.1 12.0   BUN 24* 40*   CREATININE 0.63 0.66   GLUCOSE 86 105*   CALCIUM 8.8 9.2         Lab 10/04/21  0525 10/01/21  0524   TOTAL PROTEIN 5.2* 5.6*   ALBUMIN 3.30* 3.20*   GLOBULIN 1.9 2.4   ALT (SGPT) 19 20   AST (SGOT) 17 19   BILIRUBIN 1.0 1.1   ALK PHOS 84 97                     Brief Urine Lab Results       None          Microbiology Results (last 10 days)       ** No results found for the last 240 hours. **                 Results for orders placed during the hospital encounter of 09/22/21    Duplex Venous Lower Extremity - Bilateral  CV-READ    Interpretation Summary  · Acute right lower extremity deep vein thrombosis noted in the posterior tibial and soleal.  · Acute left lower extremity deep vein thrombosis noted in the common femoral. Moderately large thrombus appears loosely adherent.  · All other veins appeared normal bilaterally.      Results for orders placed during the hospital encounter of 09/22/21    Duplex Venous Lower Extremity - Bilateral CV-READ    Interpretation Summary  · Acute right lower extremity deep vein thrombosis noted in the posterior tibial and soleal.  · Acute left lower extremity deep vein thrombosis noted in the common femoral. Moderately large thrombus appears loosely adherent.  · All other veins appeared normal bilaterally.      Results for orders placed during the hospital encounter of 09/22/21    Adult Transthoracic Echo Limited W/ Cont if Necessary Per Protocol    Interpretation Summary  · Left ventricular ejection fraction appears to be 56 - 60%.  · Left ventricular diastolic function was normal.  · Estimated right ventricular systolic pressure from tricuspid regurgitation is moderately elevated (45-55 mmHg).      Labs Pending at Discharge:         Discharge Details        Discharge Medications        New Medications        Instructions Start Date   apixaban 5 MG tablet tablet  Commonly known as: ELIQUIS   5 mg, Oral, Every 12 Hours Scheduled      hydrOXYzine 25 MG tablet  Commonly known as: ATARAX   25 mg, Oral, Nightly      Lidocaine Viscous HCl 2 % solution  Commonly known as: XYLOCAINE   5 mL, Oral, Every 3 Hours PRN             Changes to Medications        Instructions Start Date   predniSONE 20 MG tablet  Commonly known as: DELTASONE  What changed:   how much to take  when to take this   60 mg, Oral, Daily With Breakfast   Start Date: October 7, 2021            Continue These Medications        Instructions Start Date   albuterol (2.5 MG/3ML) 0.083% nebulizer solution  Commonly known as: PROVENTIL   2.5  mg, Nebulization, Every 4 Hours PRN      albuterol sulfate  (90 Base) MCG/ACT inhaler  Commonly known as: PROVENTIL HFA;VENTOLIN HFA;PROAIR HFA   2 puffs, Inhalation, Every 4 Hours PRN      alendronate 70 MG tablet  Commonly known as: FOSAMAX   70 mg, Oral, Every 7 Days, Every Tuesday      atorvastatin 20 MG tablet  Commonly known as: LIPITOR   20 mg, Oral, Daily      Breztri Aerosphere 160-9-4.8 MCG/ACT aerosol inhaler  Generic drug: Budeson-Glycopyrrol-Formoterol   INHALE TWO PUFFS BY MOUTH TWICE A DAY      Brovana 15 MCG/2ML nebulizer solution  Generic drug: arformoterol   15 mcg, Nebulization, 2 times daily      HYDROcodone-acetaminophen  MG per tablet  Commonly known as: NORCO   1 tablet, Oral, Every 6 Hours PRN      Linzess 145 MCG capsule capsule  Generic drug: linaclotide   145 mcg, Oral, Daily      ondansetron 4 MG tablet  Commonly known as: ZOFRAN   4 mg, Oral, Every 8 Hours PRN             Stop These Medications      doxycycline 100 MG capsule  Commonly known as: VIBRAMYCIN     ibuprofen 200 MG tablet  Commonly known as: ADVIL,MOTRIN     Imuran 50 MG tablet  Generic drug: azaTHIOprine              Allergies   Allergen Reactions   • Clindamycin Rash, Shortness Of Breath and Swelling   • Adalimumab Rash   • Azithromycin Rash and Nausea And Vomiting   • Erythromycin Rash and Unknown - High Severity         Discharge Disposition:  Home or Self Care    Patient Condition on discharge: stable,thin,on 1L/min Oxygen    Diet:  Hospital:  Diet Order   Procedures   • Diet Pureed; Thin         Discharge Activity: As Tolerated         CODE STATUS:  Code Status and Medical Interventions:   Ordered at: 09/22/21 1351     Level Of Support Discussed With:    Patient     Code Status:    CPR     Medical Interventions (Level of Support Prior to Arrest):    Full         Future Appointments   Date Time Provider Department Center   10/14/2021 10:45 AM [facility] Stahl [facility]   11/15/2021 11:00 AM [facility]  [place] [facility]        PMD office in 2 weeks      Electronically signed by Laurie Castro MD, 10/06/21, 2:08 PM EDT.

## 2021-10-06 NOTE — PLAN OF CARE
Goal Outcome Evaluation:  Pt on 1L o2 for comfort, can be on room air. Medicated for back pain with norco x1. Pt has orders to discharge with home health.

## 2021-10-06 NOTE — SIGNIFICANT NOTE
10/06/21 1037   Plan   Plan Comments Bedside commode and Hospital bed was ordered as of 9/28. Patient has a medical condition which required positioning of the body in ways not feasible in an ordinary bed. Patient requires frequent and immediate changes in body position.

## 2021-10-06 NOTE — SIGNIFICANT NOTE
10/06/21 2350   Plan   Plan Comments Patient to go home with oxygen. Referral sent to [facility] for them to be notified of oxygen, bedside commode, and hospital bed needs. Pt provided a portable tank to go home with and a pulse ox machine.   Final Discharge Disposition Code 06 - home with home health care

## 2021-10-06 NOTE — SIGNIFICANT NOTE
10/06/21 1440   Plan   Final Note Pts medications received at pharmacy. Pt has no copay for medications. Hospital follow up made with pcp.

## 2021-10-06 NOTE — THERAPY TREATMENT NOTE
Acute Care - Speech Language Pathology   Swallow Treatment Note [facility]     Patient Name: Christina Espinoza  : 1950  MRN: [id number]  Today's Date: 10/6/2021               Admit Date: 2021    Visit Dx:     ICD-10-CM ICD-9-CM   1. Oropharyngeal dysphagia  R13.12 787.22   2. Hypoxia  R09.02 799.02   3. COVID-19  U07.1 079.89   4. Decreased activities of daily living (ADL)  Z78.9 V49.89   5. Difficulty walking  R26.2 719.7   6. S/P lumbar spinal fusion  Z98.1 V45.4   7. Pulmonary fibrosis (CMS/Formerly [facility])  J84.10 515   8. Spinal stenosis of lumbar region with neurogenic claudication  M48.062 724.03   9. COPD (chronic obstructive pulmonary disease) case management patient (CMS/Formerly [facility])  J44.9 496     Patient Active Problem List   Diagnosis   • Acquired spondylolisthesis   • Age related osteoporosis   • Asthma   • Chronic pain   • COPD (chronic obstructive pulmonary disease) case management patient (Formerly [facility])   • Degeneration of lumbar intervertebral disc   • Dizziness   • Hip pain   • Irritable bowel syndrome with constipation   • Neck pain   • Lumbago with sciatica   • Spinal stenosis of lumbar region with neurogenic claudication   • Mixed hyperlipidemia   • Overweight with body mass index (BMI) 25.0-29.9   • Pulmonary fibrosis (Formerly [facility])   • Rheumatoid arthritis (Formerly [facility])   • S/P lumbar spinal fusion   • Bronchiectasis without complication (Formerly [facility])   • Immunosuppressed status (Formerly [facility])   • 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[facility])   • Dyspnea   • Cough   • COVID-19   • Severe malnutrition (CMS/Formerly [facility])     Past Medical History:   Diagnosis Date   • Arthritis    • Asthma    • Cervicalgia 2020   • Leg pain    • Leg swelling    • Limb pain    • Lumbago 2020    low back pain    • Lumbar spinal stenosis 2020   • Lung disease    • Nausea    • Neurogenic claudication 2020   • Neuropathy    • Pulmonary fibrosis (CMS/[facility])    • Radiculopathy, lumbosacral region 2020   • Spondylolisthesis, lumbar region 2020    grade 1 at L3/4      Past Surgical History:   Procedure Laterality Date   • SPINE SURGERY  2020   • TONSILLECTOMY     • TUBAL ABDOMINAL LIGATION         SPEECH PATHOLOGY DYSPHAGIA TREATMENT     Subjective/Behavioral Observations: Patient alert and cooperative, sitting up in chair with lunch tray.  Patient noted using drops of lidocaine prior to meal.  Patient stating saving the rest to use after the meal.        Day/time of Treatment: 10/6/2021: 1315        Current Diet:  Purée, nectar thick        Current Strategies:Alternate small bites and small sips of solids and liquids at a slow rate, double swallow        Treatment received: Dysphagia therapy to address swallow function through exercises and education of strategies.        Results of treatment:  Patient taking sips of thin water utilizing chin tuck and single sips with minimal assistance.  No overt clinical signs or symptoms of aspiration noted.  P.o. intake with 25% of puréed diet.  Patient stating no concerns with taking puréed foods.      Progress toward goals: Steady, upgrade to include thin liquid,        Barriers to Achieving goals: Oral pain.  Medical status.        Plan of care:/changes in plan:   Upgrade diet to include thin liquid with puréed solids.  Positioning fully upright for all p.o. intake and 30 minutes following. Alternate small bites and small sips of solids and liquids at a slow rate, double swallow.  Recommend patient to utilize lidocaine prior to p.o. intake as per physician.                                                             Plan of Care Reviewed With: patient          EDUCATION  The patient has been educated in the following areas:   Modified Diet Instruction.              Time Calculation:   Time Calculation- SLP     Row Name 10/06/21 1336             Time Calculation- SLP    SLP Stop Time  1315  -TB      SLP Received On  10/06/21  -TB         Untimed Charges    71642-CT Treatment Swallow Minutes  45  -TB         Total Minutes    Untimed  Charges Total Minutes  45  -TB       Total Minutes  45  -TB        User Key  (r) = Recorded By, (t) = Taken By, (c) = Cosigned By    Initials Name Provider Type    Charlene Noel SLP Speech and Language Pathologist          Therapy Charges for Today     Code Description Service Date Service Provider Modifiers Qty    64732453306 HC ST TREATMENT SWALLOW 3 10/5/2021 Charlene Bowers SLP GN 1    02969418406 HC ST TREATMENT SWALLOW 3 10/6/2021 Charlene Bowers SLP GN 1               ELIER Hendrix  10/6/2021

## 2021-10-06 NOTE — SIGNIFICANT NOTE
10/06/21 1003   Plan   Plan Comments Patient prefers to return home with her daughter with home health instead of going to inpatient rehab. ALHAJI discussed with daughter Cristal. Cristal is agreeable to discharge plan. ALHAJI notified MD. Pt has been set up with [facility]. Daughter requested pharmacy be changed to hospital pharmacy for discharge meds. Pharmacy has been updated by [facility].

## 2021-10-07 ENCOUNTER — READMISSION MANAGEMENT (OUTPATIENT)
Dept: CALL CENTER | Facility: HOSPITAL | Age: 71
End: 2021-10-07

## 2021-10-07 NOTE — OUTREACH NOTE
COVID-19 Week 1 Survey      Responses   [facility] patient discharged from?  [facility]   Does the patient have one of the following disease processes/diagnoses(primary or secondary)?  COVID-19   COVID-19 underlying condition?  None   Call Number  Call 1   Week 1 Call successful?  Yes   Call start time  1520   Call end time  1531   Discharge diagnosis  covid, PNA, severe malnutrition   Person spoke with today (if not patient) and relationship  Cristal, daughter   Meds reviewed with patient/caregiver?  Yes   Is the patient having any side effects they believe may be caused by any medication additions or changes?  No   Does the patient have all medications ordered at discharge?  Yes   Is the patient taking all medications as directed (includes completed medication regime)?  Yes   Does the patient have a primary care provider?   Yes   Does the patient have an appointment with their PCP or specialist within 7 days of discharge?  Greater than 7 days   What is preventing the patient from scheduling follow up appointments within 7 days of discharge?  Earlier appointment not available   Nursing Interventions  Verified appointment date/time/provider   Has the patient kept scheduled appointments due by today?  N/A   What is the Home health agency?   [facility]   Has home health visited the patient within 72 hours of discharge?  Yes   Psychosocial issues?  No   Comments  has sores on tongue, taking viscous Lidocaine solution for comfort, pt at risk for aspiration, taking thickened liquids, will be seen by  speech therapy   Did the patient receive a copy of their discharge instructions?  Yes   Did the patient receive a copy of COVID-19 specific instructions?  Yes   Nursing interventions  Reviewed instructions with patient   What is the patient's perception of their health status since discharge?  Improving   Does the patient have any of the following symptoms?  Shortness of breath, Cough   Nursing Interventions   Nurse provided patient education   Pulse Ox monitoring  Intermittent   Pulse Ox device source  Patient   O2 Sat comments  99% on 1L O2   O2 Sat: education provided  Sat levels, Monitoring frequency, When to seek care   O2 Sat education comments  states understanding to go ED for O2 sats <90% and not resolving   Is the patient/caregiver able to teach back steps to recovery at home?  Set small, achievable goals for return to baseline health, Rest and rebuild strength, gradually increase activity, Eat a well-balance diet   If the patient is a current smoker, are they able to teach back resources for cessation?  Not a smoker   Is the patient/caregiver able to teach back the hierarchy of who to call/visit for symptoms/problems? PCP, Specialist, Home health nurse, Urgent Care, ED, 911  Yes   COVID-19 call completed?  Yes          Fatuma Sampson RN

## 2021-10-07 NOTE — OUTREACH NOTE
Prep Survey      Responses   [facility]?  [place]   Is LACE score < 7 ?  No   Emergency Room discharge w/ pulse ox?  No   Eligibility  Readm Mgmt   Discharge diagnosis  covid, PNA, severe malnutrition   Does the patient have one of the following disease processes/diagnoses(primary or secondary)?  COVID-19   Does the patient have Home health ordered?  Yes   What is the Home health agency?   [facility]   Is there a DME ordered?  Yes   What DME was ordered?  [facility], [facility], Hosp Bed   Medication alerts for this patient  Meds to Beds   Prep survey completed?  Yes          Pratibha Churchill RN

## 2021-10-08 ENCOUNTER — READMISSION MANAGEMENT (OUTPATIENT)
Dept: CALL CENTER | Facility: HOSPITAL | Age: 71
End: 2021-10-08

## 2021-10-08 NOTE — OUTREACH NOTE
COVID-19 Week 1 Survey      Responses   [facility] patient discharged from?  [facility]   Does the patient have one of the following disease processes/diagnoses(primary or secondary)?  COVID-19   COVID-19 underlying condition?  None   Call Number  Call 2   Week 1 Call successful?  Yes   Call start time  1003   Call end time  1015   Discharge diagnosis  covid, PNA, severe malnutrition   Meds reviewed with patient/caregiver?  Yes   Is the patient having any side effects they believe may be caused by any medication additions or changes?  No   Does the patient have all medications ordered at discharge?  Yes   Is the patient taking all medications as directed (includes completed medication regime)?  Yes   Comments regarding appointments  Dr. Carrion-Pulmonologist 10/14/21 10/28 with PCP   Does the patient have a primary care provider?   Yes   Has all DME been delivered?  Yes   DME comments  Bedside commode not needed per patient. Patient states O2 is leaking water. R.N. instructed patient to call [facility]. Daughter provided with number.   Psychosocial issues?  No   What is the patient's perception of their health status since discharge?  Improving   Does the patient have any of the following symptoms?  Cough, Shortness of breath   Nursing Interventions  Nurse provided patient education   Pulse Ox monitoring  Intermittent   Pulse Ox device source  Patient   O2 Sat comments  89% on RA   O2 Sat: education provided  Sat levels, Monitoring frequency   O2 Sat education comments  89% on RA. R.N. instructed patient to put O2 on and importance of maintaining O2 sat 90 or above.   Is the patient/caregiver able to teach back the hierarchy of who to call/visit for symptoms/problems? PCP, Specialist, Home health nurse, Urgent Care, ED, 911  Yes   COVID-19 call completed?  Yes          Frances Muro RN

## 2021-10-09 ENCOUNTER — READMISSION MANAGEMENT (OUTPATIENT)
Dept: CALL CENTER | Facility: HOSPITAL | Age: 71
End: 2021-10-09

## 2021-10-09 NOTE — OUTREACH NOTE
COVID-19 Week 1 Survey      Responses   [facility] patient discharged from? [facility]   Does the patient have one of the following disease processes/diagnoses(primary or secondary)? COVID-19   COVID-19 underlying condition? None   Call Number Call 3   Week 1 Call successful? Yes   Call start time 0900   Call end time 0908   Discharge diagnosis covid, PNA, severe malnutrition   Meds reviewed with patient/caregiver? Yes   Is the patient having any side effects they believe may be caused by any medication additions or changes? No   Does the patient have all medications ordered at discharge? Yes   Is the patient taking all medications as directed (includes completed medication regime)? Yes   Comments regarding appointments Dr. Carrion-Pulmonologist 10/14/21 10/28 with PCP   Does the patient have a primary care provider?  Yes   Comments regarding PCP Laurie Castro MD   Does the patient have an appointment with their PCP or specialist within 7 days of discharge? Greater than 7 days   What is preventing the patient from scheduling follow up appointments within 7 days of discharge? Earlier appointment not available   Nursing Interventions Verified appointment date/time/provider   Has the patient kept scheduled appointments due by today? N/A   What is the Home health agency?  [facility]   Has home health visited the patient within 72 hours of discharge? Yes   What DME was ordered? [facility], [facility], Hosp Bed   Has all DME been delivered? Yes   DME comments 02 at 1 [facility]   Psychosocial issues? No   Did the patient receive a copy of their discharge instructions? Yes   Did the patient receive a copy of COVID-19 specific instructions? Yes   Nursing interventions Reviewed instructions with patient   What is the patient's perception of their health status since discharge? Improving   Does the patient have any of the following symptoms? Shortness of breath,  Cough   Nursing Interventions Nurse provided  patient education   Pulse Ox monitoring Intermittent   Pulse Ox device source Patient   O2 Sat comments 97 % with 02 on at 1 [facility]   O2 Sat: education provided Sat levels,  Monitoring frequency,  When to seek care   Is the patient/caregiver able to teach back steps to recovery at home? Set small, achievable goals for return to baseline health,  Rest and rebuild strength, gradually increase activity,  Make a list of questions for provider's appointment   If the patient is a current smoker, are they able to teach back resources for cessation? Not a smoker   Is the patient/caregiver able to teach back the hierarchy of who to call/visit for symptoms/problems? PCP, Specialist, Home health nurse, Urgent Care, ED, 911 Yes   COVID-19 call completed? Yes          Greg Mesa RN

## 2021-10-10 NOTE — PROGRESS NOTES
Primary Care Provider  Laurie Castro MD     Referring Provider  No ref. provider found     Chief Complaint  Follow-up (3 week f/u), Cough (Covid 9-14-21), Asthma, Wheezing, and Shortness of Breath    Subjective            The use of a video visit has been reviewed with the patient and verbal informed consent has been obtained.  You have chosen to receive care through a video visit. Do you consent to use a telephone visit for your medical care today? Yes.    History of Presenting Illness  Patient is a 71 y.o. female, patient of Dr. Carrion's who was recently hospitalized at [facility] from 9/22/2021 to 10/6/2021 for Covid 19 pneumonia and Hypoxia.  Patient is on video visit with her daughter, Cristal for follow-up visit.  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  The patient presented to our office and was seen by myself on 9/16/2021 with complaints of shortness of breath requesting a dose increase Imuran. Spoke with Dr. Carrion at that time and requested that she be checked for flu and COVID-19 before contributing her worsening shortness of breath to her interstitial lung disease.  Patient was checked for flu in the office and it came back negative for both flu A and flu B. Patient subsequently tested positive for COVID-19 on 9/16/2021.  The patient was at home she developed worsening shortness of breath and presented to the hospital.  Patient was placed on oxygen, Remdesivir, and steroids. Patient had a CT Chest showing left lung emboli and bilateral DVT and was started on Lovenox. Patient was on cefepime. She was given a dose of Actemra. She has a history of pulmonary interstitial disease. She was changed to Eliquis. Patient was on large dose of steroids as she has been on it at home. Her oxygenation improved but she was anorexic, cachectic and malnourished. Patient had mouth sores that was treated with  viscous lidocaine. Patient had a modified barium swallow study which showed aspiration of thin liquids. Patient was seen by speech and PT/OT. Patient's diet gradually increased. Oxygen slowly decreased. Patient was going to go to Rehab but Insurance precert failed. Patient was discharged home on 10/6/21 with her daughter who had covid at the same time as her, but recovered at home, and will take care of her.  Patient states that since hospital stay she is feeling better and she is eating more.  Patient states that she is still short of breath that is worse with exertion, moderate severity, and improved with rest.  Patient states that she is still having some weakness and feeling fatigued.  Patient states she is on 2 L of oxygen per minute via nasal cannula continuously. Patient is wanting a portable oxygen concentrator as current tanks are too heavy to carry and this would help her to perform her activities of daily living a lot easier, attend appointments a lot easier, and run errands a lot easier.  Patient states she is taking Eliquis every day as prescribed.  Patient reports that her leg swelling has improved.  Patient states she is currently taking prednisone at 60 mg once daily.  Patient states she is also taking Breztri every day as prescribed and needs a refill on inhaler.  Patient states that she is using albuterol inhaler and nebulizer treatments as needed.  Patient denies fever, chills, night sweats, swollen glands in the head and neck, unintentional weight loss, hemoptysis, purulent sputum production, dysphagia, chest pain, palpitations, chest tightness, abdominal pain, nausea, vomiting, and diarrhea. Patient denies any orthopnea and paroxysmal nocturnal dyspnea.  Patient denies any hematuria, hematochezia, and hematemesis.  Patient is able to perform activities of daily living with oxygen in place.  Patient states that she is currently staying with her daughter and her daughter is helping her out as  well.  Patient is requesting a refill on nystatin for thrush.          Review of Systems   Constitutional: Positive for fatigue. Negative for activity change, appetite change, chills, diaphoresis, fever, unexpected weight gain and unexpected weight loss.        Negative for Insomnia   HENT: Negative for congestion (Nasal), mouth sores, nosebleeds, postnasal drip, sore throat, swollen glands and trouble swallowing.         Negative for Thrush  Negative for Hoarseness  Negative for Allergies/Hay Fever  Negative for Recent Head injury  Negative for Ear Fullness  Negative for Nasal or Sinus pain  Negative for Dry lips  Negative for Nasal discharge   Respiratory: Positive for cough and shortness of breath. Negative for apnea, chest tightness and wheezing.         Negative for Hemoptysis  Negative for Pleuritic pain   Cardiovascular: Positive for leg swelling (improved per pt report). Negative for chest pain and palpitations.        Negative for Claudication  Negative for Cyanosis  Negative for Dyspnea on exertion   Gastrointestinal: Negative for abdominal pain, diarrhea, nausea, vomiting and GERD.   Musculoskeletal: Negative for joint swelling and myalgias.        Negative for Joint pain  Negative for Joint stiffness   Skin: Negative for color change, dry skin, pallor and rash.   Neurological: Positive for weakness. Negative for syncope and headache.   Hematological: Negative for adenopathy. Does not bruise/bleed easily.        Family History   Problem Relation Age of Onset   • Arthritis Mother    • Cancer Mother    • Osteoporosis Mother    • Cancer Father    • Heart disease Father    • Diabetes Father    • Arthritis Sister    • Heart disease Sister    • Bleeding Disorder Brother    • Stroke Brother    • Diabetes Maternal Grandfather    • Diabetes Son         Social History     Socioeconomic History   • Marital status:    Tobacco Use   • Smoking status: Never Smoker   • Smokeless tobacco: Never Used   Vaping Use    • Vaping Use: Never used   Substance and Sexual Activity   • Alcohol use: Never        Past Medical History:   Diagnosis Date   • Arthritis    • Asthma    • Cervicalgia 01/20/2020   • Leg pain    • Leg swelling    • Limb pain    • Lumbago 01/20/2020    low back pain    • Lumbar spinal stenosis 01/20/2020   • Lung disease    • Nausea    • Neurogenic claudication (HCC) 01/20/2020   • Neuropathy    • Pulmonary fibrosis (HCC)    • Radiculopathy, lumbosacral region 01/20/2020   • Spondylolisthesis, lumbar region 01/29/2020    grade 1 at L3/4        Immunization History   Administered Date(s) Administered   • Pneumococcal Conjugate 13-Valent (PCV13) 01/16/2017       Allergies   Allergen Reactions   • Clindamycin Rash, Shortness Of Breath and Swelling   • Clindamycin Hcl Unknown - High Severity   • Doxycycline Other (See Comments)     SORE ON TONGUE   • Adalimumab Rash   • Azithromycin Rash, Nausea And Vomiting and Unknown - High Severity   • Erythromycin Rash and Unknown - High Severity          Current Outpatient Medications:   •  albuterol (PROVENTIL) (2.5 MG/3ML) 0.083% nebulizer solution, Take 2.5 mg by nebulization Every 4 (Four) Hours As Needed., Disp: , Rfl:   •  albuterol sulfate  (90 Base) MCG/ACT inhaler, Inhale 2 puffs Every 4 (Four) Hours As Needed., Disp: , Rfl:   •  alendronate (FOSAMAX) 70 MG tablet, Take 70 mg by mouth Every 7 (Seven) Days. Every Tuesday, Disp: , Rfl:   •  apixaban (ELIQUIS) 5 MG tablet tablet, Take 1 tablet by mouth Every 12 (Twelve) Hours for 30 days. Indications: DVT/PE (active thrombosis), Treatment of Venous Thromboembolism, Disp: 60 tablet, Rfl: 1  •  atorvastatin (LIPITOR) 20 MG tablet, Take 20 mg by mouth Daily., Disp: , Rfl:   •  Budeson-Glycopyrrol-Formoterol (Breztri Aerosphere) 160-9-4.8 MCG/ACT aerosol inhaler, Inhale 2 puffs 2 (Two) Times a Day for 30 days. Rinse mouth out after each use, Disp: 10.7 g, Rfl: 11  •  gabapentin (NEURONTIN) 300 MG capsule, Take 300 mg by  "mouth 3 (Three) Times a Day., Disp: , Rfl:   •  HYDROcodone-acetaminophen (NORCO)  MG per tablet, Take 1 tablet by mouth Every 6 (Six) Hours As Needed for Moderate Pain ., Disp: , Rfl:   •  Lidocaine Viscous HCl (XYLOCAINE) 2 % solution, Take 5 mL by mouth Every 3 (Three) Hours As Needed for Moderate Pain  for up to 30 days., Disp: 100 mL, Rfl: 1  •  linaclotide (Linzess) 145 MCG capsule capsule, Take 145 mcg by mouth Daily., Disp: , Rfl:   •  ondansetron (ZOFRAN) 4 MG tablet, Take 4 mg by mouth Every 8 (Eight) Hours As Needed for Nausea or Vomiting., Disp: , Rfl:   •  predniSONE (DELTASONE) 20 MG tablet, Take 3 tablets by mouth Daily With Breakfast for 30 days., Disp: 90 tablet, Rfl: 0  •  hydrOXYzine (ATARAX) 25 MG tablet, Take 1 tablet by mouth Every Night for 30 days., Disp: 30 tablet, Rfl: 1  •  nystatin (MYCOSTATIN) 100,000 unit/mL suspension, Take 5 mL by mouth 4 (Four) Times a Day for 10 days., Disp: 280 mL, Rfl: 0     Objective   Physical exam limited due to video visit.  Physical Exam  Constitutional:       Appearance: Normal appearance.   HENT:      Head: Normocephalic.   Eyes:      Extraocular Movements: Extraocular movements intact.      Pupils: Pupils are equal, round, and reactive to light.   Pulmonary:      Effort: Pulmonary effort is normal.   Neurological:      Mental Status: She is alert and oriented to person, place, and time.   Psychiatric:         Mood and Affect: Mood normal.         Behavior: Behavior normal.         Thought Content: Thought content normal.         Judgment: Judgment normal.           Vital Signs: Per patient report  Pulse 99   Ht 157.5 cm (62\")   Wt 61.2 kg (135 lb)   SpO2 95%   BMI 24.69 kg/m²         Result Review :   I have personally reviewed my last office visit note.  I also reviewed discharge summary from recent hospital stay and imaging studies reports from recent hospital stay.  See scanned documents.         Assessment and Plan      Assessment:  1. Covid " 19 pneumonia with recent hospitalization.  2. ARDS secondary to Covid.  3. Acute hypoxic respiratory failure secondary to Covid.  4. Multifocal pneumonia secondary to Covid.  5. Bilateral proximal right and left pulmonary artery pulmonary emboli.  6. Bilateral lower extremity deep venous thrombosis.  7. Bilateral pulmonary emboli   coagulopathy secondary to Covid.  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       9. Bronchiectasis with acute exacerbation.      10. Asthma.       11. Antisynthetase syndrome.        12. Chronic immune suppression with imuran and chronic prednisone.        13. Cough.  14. Never smoker.     Plan:  1.  Complete 6 months of Eliquis as prescribed.   2.  Continue prednisone taper as prescribed.  Patient is advised to decrease by 10 mg every 7 days back down to dose of chronic daily prednisone  3.    Continue to hold Imuran for now.  4.  Will repeat chest CT scan, duplex study,  and echocardiogram again in 3 months.  Orders placed today.  5.    Continue oxygen keep SPO2 89% above.  Patient is wanting a portable oxygen concentrator as current tanks are too heavy to carry and this would help her to perform her activities of daily living a lot easier, attend appointments a lot easier, and run errands a lot easier.  I will notify her clinical coordinator to see if we can have patient set up with a portable oxygen concentrator.  6.    Patient prefers Breztri inhaler.  Stop Brovana.  Continue Breztri every day as prescribed and rinse mouth out after each use.  7.  Continue albuterol inhaler and albuterol nebulizer treatments as needed.  8.  Patient reports they are up-to-date with her pneumonia vaccine.  Patient is advised to receive the flu vaccine when it comes out this fall and to receive the Covid vaccine 90 days after diagnosis.  Risks of not receiving vaccines discussed with patient and patient verbalized understanding.  9. Patient to call the office, 911, or go to the ER with  new or worsening symptoms.   10.  Nystatin refilled today.  11. Follow up in 2 weeks, sooner if needed.    The use of a video visit has been reviewed with the patient and verbal informed consent has been obtained.      Follow Up   Return for 2 weeks with MD. MUST SEE MD.  Patient was given instructions and counseling regarding her condition or for health maintenance advice. Please see specific information pulled into the AVS if appropriate.

## 2021-10-10 NOTE — PATIENT INSTRUCTIONS
Things to Know about the COVID-19 Pandemic  Things to Know about the COVID-19 Pandemic  Important Ways to Slow the Spread  · Wear a mask that covers your nose and mouth to help protect yourself and others.  · Stay 6 feet apart from others who don't live with you.  · Get a COVID-19 vaccine when it is available to you.  · Avoid crowds and poorly ventilated indoor spaces.  · Wash your hands often with soap and water. Use hand  if soap and water aren't available.  If you are at risk of getting very sick  · People of any age, even healthy young adults and children, can get COVID-19.  · People who are older or have certain underlying medical conditions are at higher risk of getting very sick from COVID-19.  · Other groups may be at higher risk for getting COVID-19 or having more severe illness.  Getting a COVID-19 Vaccine  · Authorized COVID-19 vaccines can help protect you from COVID-19.  · You should get a COVID-19 vaccine when it is available to you.  · Once you are fully vaccinated, you may be able to start doing some things that you had stopped doing because of the pandemic.  What to do if you're sick  · Stay home except to get medical care. If you have symptoms of COVID-19, contact your healthcare provider and get tested.  · Isolate yourself from others, including those living in your household, to prevent spread to them and the people that they may have contact with, like grandparents.  · Call 911 if you are having emergency warning signs, like trouble breathing, pain or pressure in chest.  How to get a test for current infection  · Visit your state, [place], local, and territorial health department'swebsite to look for the latest local information on testing.  · Talk to your healthcare provider about getting tested. You and your healthcare provider might consider either in-person testing, an at-home collection kit, or an at-home test.  · If you have symptoms of COVID-19, or if you have not been vaccinated  and have been in close contact with someone with COVID-19, it is still important to stay home even if you are not tested.  What symptoms to watch for  The most common symptoms of COVID-19 are  · Fever  · Cough  · Headaches  · Fatigue  · Muscle or body aches  · Loss of taste or smell  · Sore throat  · Nausea  · Diarrhea  Other symptoms are signs of serious illness. If someone has trouble breathing, chest pain or pressure, or difficulty staying awake, get medical care immediately.  I wear a mask because...  CDC staff give their reasons for wearing a mask.  Wear a mask because  Content source: National Center for Immunization and Respiratory Diseases (NCIRD), Division of Viral Diseases  03/17/2021  This information is not intended to replace advice given to you by your health care provider. Make sure you discuss any questions you have with your health care provider.  Document Revised: 04/19/2021 Document Reviewed: 04/19/2021  Elsevier Patient Education © 2021 [employer]

## 2021-10-12 ENCOUNTER — READMISSION MANAGEMENT (OUTPATIENT)
Dept: CALL CENTER | Facility: HOSPITAL | Age: 71
End: 2021-10-12

## 2021-10-12 NOTE — OUTREACH NOTE
COVID-19 Week 2 Survey      Responses   [facility] patient discharged from? [place]   Does the patient have one of the following disease processes/diagnoses(primary or secondary)? COVID-19   COVID-19 underlying condition? None   Call Number Call 1   COVID-19 Week 2: Call 1 attempt successful? Yes   Call start time 1019   Call end time 1027   Discharge diagnosis covid, PNA, severe malnutrition   Person spoke with today (if not patient) and relationship Cristal, daughter   Meds reviewed with patient/caregiver? Yes   Is the patient having any side effects they believe may be caused by any medication additions or changes? No   Does the patient have all medications ordered at discharge? Yes   Is the patient taking all medications as directed (includes completed medication regime)? Yes   Does the patient have a primary care provider?  Yes   Comments regarding PCP Laurie Castro MD Patient has a followup today    Does the patient have an appointment with their PCP or specialist within 7 days of discharge? Yes   Has the patient kept scheduled appointments due by today? N/A   What is the Home health agency?  [facility]   Has home health visited the patient within 72 hours of discharge? Yes   What DME was ordered? [facility], BSC, Hosp Bed   Has all DME been delivered? Yes   DME comments 02 at 1 LBNC sometimes with activity increases to 2 LBNC then turns back down.    Psychosocial issues? No   Did the patient receive a copy of their discharge instructions? Yes   Did the patient receive a copy of COVID-19 specific instructions? Yes   Nursing interventions Reviewed instructions with patient   What is the patient's perception of their health status since discharge? Same   Does the patient have any of the following symptoms? Shortness of breath,  Cough   Nursing Interventions Nurse provided patient education   Pulse Ox monitoring Intermittent   Pulse Ox device source Patient   O2 Sat comments 96 % with 02  on at 1 LBNC   O2 Sat: education provided Sat levels,  Monitoring frequency,  When to seek care   Is the patient/caregiver able to teach back steps to recovery at home? Set small, achievable goals for return to baseline health,  Rest and rebuild strength, gradually increase activity,  Make a list of questions for provider's appointment   If the patient is a current smoker, are they able to teach back resources for cessation? Not a smoker   Is the patient/caregiver able to teach back the hierarchy of who to call/visit for symptoms/problems? PCP, Specialist, Home health nurse, Urgent Care, ED, 911 Yes   COVID-19 call completed? Yes   Wrap up additional comments Patient reports she feels anxious at times with her breathing when she is going to bathroom or trying to ambulate. Encouraged her to do cough and deep breathing exercises. She will also discuss with doctor today.           Greg Mesa RN

## 2021-10-14 ENCOUNTER — TELEMEDICINE (OUTPATIENT)
Dept: PULMONOLOGY | Facility: CLINIC | Age: 71
End: 2021-10-14

## 2021-10-14 VITALS — HEIGHT: 62 IN | HEART RATE: 99 BPM | BODY MASS INDEX: 24.84 KG/M2 | OXYGEN SATURATION: 95 % | WEIGHT: 135 LBS

## 2021-10-14 DIAGNOSIS — I82.403 ACUTE DEEP VEIN THROMBOSIS (DVT) OF BOTH LOWER EXTREMITIES, UNSPECIFIED VEIN (HCC): ICD-10-CM

## 2021-10-14 DIAGNOSIS — M60.9 MYOSITIS, UNSPECIFIED MYOSITIS TYPE, UNSPECIFIED SITE: ICD-10-CM

## 2021-10-14 DIAGNOSIS — J96.01 ACUTE RESPIRATORY FAILURE WITH HYPOXIA (HCC): ICD-10-CM

## 2021-10-14 DIAGNOSIS — J47.9 BRONCHIECTASIS WITHOUT COMPLICATION (HCC): ICD-10-CM

## 2021-10-14 DIAGNOSIS — U07.1 COVID-19: ICD-10-CM

## 2021-10-14 DIAGNOSIS — J44.9 COPD (CHRONIC OBSTRUCTIVE PULMONARY DISEASE) CASE MANAGEMENT PATIENT (HCC): ICD-10-CM

## 2021-10-14 DIAGNOSIS — J80 ARDS (ADULT RESPIRATORY DISTRESS SYNDROME) (HCC): ICD-10-CM

## 2021-10-14 DIAGNOSIS — J45.909 ASTHMA, UNSPECIFIED ASTHMA SEVERITY, UNSPECIFIED WHETHER COMPLICATED, UNSPECIFIED WHETHER PERSISTENT: ICD-10-CM

## 2021-10-14 DIAGNOSIS — U07.1 PNEUMONIA DUE TO COVID-19 VIRUS: ICD-10-CM

## 2021-10-14 DIAGNOSIS — D89.89 ANTISYNTHETASE SYNDROME (HCC): ICD-10-CM

## 2021-10-14 DIAGNOSIS — I26.09 ACUTE PULMONARY EMBOLISM WITH ACUTE COR PULMONALE, UNSPECIFIED PULMONARY EMBOLISM TYPE (HCC): ICD-10-CM

## 2021-10-14 DIAGNOSIS — D84.9 IMMUNOSUPPRESSED STATUS (HCC): Primary | ICD-10-CM

## 2021-10-14 DIAGNOSIS — J12.82 PNEUMONIA DUE TO COVID-19 VIRUS: ICD-10-CM

## 2021-10-14 DIAGNOSIS — J84.10 PULMONARY FIBROSIS (HCC): ICD-10-CM

## 2021-10-14 DIAGNOSIS — M79.89 LEG SWELLING: ICD-10-CM

## 2021-10-14 PROCEDURE — 99214 OFFICE O/P EST MOD 30 MIN: CPT | Performed by: NURSE PRACTITIONER

## 2021-10-14 RX ORDER — BUDESONIDE, GLYCOPYRROLATE, AND FORMOTEROL FUMARATE 160; 9; 4.8 UG/1; UG/1; UG/1
2 AEROSOL, METERED RESPIRATORY (INHALATION) 2 TIMES DAILY
Qty: 10.7 G | Refills: 11 | Status: SHIPPED | OUTPATIENT
Start: 2021-10-14 | End: 2021-10-29 | Stop reason: SDUPTHER

## 2021-10-14 RX ORDER — GABAPENTIN 300 MG/1
100 CAPSULE ORAL 3 TIMES DAILY
COMMUNITY
End: 2023-03-16 | Stop reason: SDUPTHER

## 2021-10-15 ENCOUNTER — READMISSION MANAGEMENT (OUTPATIENT)
Dept: CALL CENTER | Facility: HOSPITAL | Age: 71
End: 2021-10-15

## 2021-10-15 NOTE — OUTREACH NOTE
COVID-19 Week 2 Survey      Responses   [facility] patient discharged from? [place]   Does the patient have one of the following disease processes/diagnoses(primary or secondary)? COVID-19   COVID-19 underlying condition? None   Call Number Call 1   COVID-19 Week 2: Call 1 attempt successful? Yes   Call start time 1156   Call end time 1200   Discharge diagnosis covid, PNA, severe malnutrition   Medication alerts for this patient Meds to Beds   Meds reviewed with patient/caregiver? Yes   Is the patient having any side effects they believe may be caused by any medication additions or changes? No   Does the patient have all medications ordered at discharge? Yes   Is the patient taking all medications as directed (includes completed medication regime)? Yes   Comments regarding appointments 10/28 with PCP   Does the patient have a primary care provider?  Yes   Does the patient have an appointment with their PCP or specialist within 7 days of discharge? Yes   Has the patient kept scheduled appointments due by today? N/A   What is the Home health agency?  [facility]   Has home health visited the patient within 72 hours of discharge? Yes   What DME was ordered? [facility], BSC, Hosp Bed   Has all DME been delivered? Yes   DME comments 02 at 1.5 LBNC sometimes with activity increases to 2 LBNC then turns back down.    Psychosocial issues? No   Did the patient receive a copy of their discharge instructions? Yes   Did the patient receive a copy of COVID-19 specific instructions? Yes   Nursing interventions Reviewed instructions with patient   What is the patient's perception of their health status since discharge? Improving   Does the patient have any of the following symptoms? Cough,  Shortness of breath  [soa with activity]   Nursing Interventions Nurse provided patient education   Pulse Ox monitoring Intermittent   Pulse Ox device source Patient   O2 Sat comments 94% with 02 on at 1.5 LBNC   O2 Sat:  education provided Sat levels,  Monitoring frequency,  When to seek care   O2 Sat education comments O2 sat <90 seek emergent care, or call 911   Is the patient/caregiver able to teach back steps to recovery at home? Set small, achievable goals for return to baseline health,  Rest and rebuild strength, gradually increase activity,  Eat a well-balance diet   If the patient is a current smoker, are they able to teach back resources for cessation? Not a smoker   Is the patient/caregiver able to teach back the hierarchy of who to call/visit for symptoms/problems? PCP, Specialist, Home health nurse, Urgent Care, ED, 911 Yes   COVID-19 call completed? Yes   Wrap up additional comments Patient reports improvement, remains soa with actvity, reports still anxious at times, discussed with PCP, tolerating PO intake, resting well. Denies questions or concerns.          Katheryn Leary RN

## 2021-10-16 ENCOUNTER — TRANSCRIBE ORDERS (OUTPATIENT)
Dept: ADMINISTRATIVE | Facility: HOSPITAL | Age: 71
End: 2021-10-16

## 2021-10-16 DIAGNOSIS — M79.89 LEG SWELLING: ICD-10-CM

## 2021-10-16 DIAGNOSIS — I82.403 ACUTE DEEP VEIN THROMBOSIS (DVT) OF BOTH LOWER EXTREMITIES, UNSPECIFIED VEIN (HCC): Primary | ICD-10-CM

## 2021-10-22 ENCOUNTER — READMISSION MANAGEMENT (OUTPATIENT)
Dept: CALL CENTER | Facility: HOSPITAL | Age: 71
End: 2021-10-22

## 2021-10-22 NOTE — OUTREACH NOTE
COVID-19 Week 3 Survey      Responses   [facility] [place] patient discharged from? [place]   Does the patient have one of the following disease processes/diagnoses(primary or secondary)? COVID-19   COVID-19 underlying condition? None   Call Number Call 1   COVID-19 Week 3: Call 1 attempt successful? Yes   Call start time 0912   Call end time 0920   Discharge diagnosis covid, PNA, severe malnutrition   Meds reviewed with patient/caregiver? Yes   Comments regarding appointments 10/28 with PCP   What is the Home health agency?  [facility]   Has home health visited the patient within 72 hours of discharge? Yes   DME comments Pt continues to use 02 @ 2L at all times.   Comments Pt continues to have painful mouth sores. PT continues to do swish and swallow.   What is the patient's perception of their health status since discharge? Improving   Pulse Ox monitoring Intermittent   COVID-19 call completed? Yes   Wrap up additional comments Pt continues to use 02 @ 2L at all times. HH is visiting. Pt has very slow recovery.          Gardenia Maurer RN

## 2021-10-29 ENCOUNTER — OFFICE VISIT (OUTPATIENT)
Dept: PULMONOLOGY | Facility: CLINIC | Age: 71
End: 2021-10-29

## 2021-10-29 ENCOUNTER — READMISSION MANAGEMENT (OUTPATIENT)
Dept: CALL CENTER | Facility: HOSPITAL | Age: 71
End: 2021-10-29

## 2021-10-29 VITALS
BODY MASS INDEX: 24.48 KG/M2 | WEIGHT: 133 LBS | SYSTOLIC BLOOD PRESSURE: 93 MMHG | RESPIRATION RATE: 21 BRPM | HEIGHT: 62 IN | HEART RATE: 104 BPM | TEMPERATURE: 98 F | OXYGEN SATURATION: 97 % | DIASTOLIC BLOOD PRESSURE: 54 MMHG

## 2021-10-29 DIAGNOSIS — U09.9 POST-ACUTE COVID-19 SYNDROME: Primary | ICD-10-CM

## 2021-10-29 DIAGNOSIS — F41.9 ANXIETY DISORDER, UNSPECIFIED TYPE: ICD-10-CM

## 2021-10-29 DIAGNOSIS — I26.94 MULTIPLE SUBSEGMENTAL PULMONARY EMBOLI WITHOUT ACUTE COR PULMONALE (HCC): ICD-10-CM

## 2021-10-29 DIAGNOSIS — R09.02 HYPOXIA: ICD-10-CM

## 2021-10-29 DIAGNOSIS — J84.10 PULMONARY FIBROSIS (HCC): ICD-10-CM

## 2021-10-29 DIAGNOSIS — J18.9 PNEUMONIA OF BOTH LUNGS DUE TO INFECTIOUS ORGANISM, UNSPECIFIED PART OF LUNG: ICD-10-CM

## 2021-10-29 PROCEDURE — 99214 OFFICE O/P EST MOD 30 MIN: CPT | Performed by: SPECIALIST

## 2021-10-29 RX ORDER — ALBUTEROL SULFATE 90 UG/1
2 AEROSOL, METERED RESPIRATORY (INHALATION) EVERY 4 HOURS PRN
Qty: 1 G | Refills: 5 | Status: SHIPPED | OUTPATIENT
Start: 2021-10-29 | End: 2022-11-04

## 2021-10-29 RX ORDER — ALPRAZOLAM 0.5 MG/1
0.25 TABLET ORAL 2 TIMES DAILY PRN
Status: SHIPPED | OUTPATIENT
Start: 2021-10-29 | End: 2021-11-28

## 2021-10-29 RX ORDER — FUROSEMIDE 20 MG/1
20 TABLET ORAL
COMMUNITY
Start: 2021-10-28

## 2021-10-29 NOTE — PATIENT INSTRUCTIONS
"171, P1-P2. Online version updated July 2020.Retrieved from [email or web address]">   Hypoxemia    Hypoxemia happens when the blood does not have enough oxygen in it. Every part of the body needs oxygen to work well. Oxygen enters the lungs when a person breathes in and then it travels to all parts of the body through the blood.  Hypoxemia can develop suddenly or slowly and can be mild to severe.  What are the causes?  Causes of hypoxemia may include:  · Lung conditions. These may include:  ? Long-term (chronic) lung disease, such as:  § Asthma.  § Chronic obstructive pulmonary disease (COPD).  § Interstitial lung disease.  ? Problems that affect breathing at night, such as sleep apnea.  ? Fluid buildup in the lungs.  ? Lung infection (pneumonia).  ? Lung or throat cancer.  ? A collapsed lung.  · Heart or blood vessel (vascular) conditions, such as:  ? A blood clot in the lungs (pulmonary embolus).  ? Certain types of heart disease.  · Other causes may include:  ? Certain diseases that affect nerves or muscles.  ? Slow or shallow breathing due to being very overweight (obesity hypoventilation).  ? High altitudes, as there is less oxygen in the air.  ? Toxic chemicals, smoke, and gases.  What are the signs or symptoms?  In some cases, there may be no symptoms of this condition. If you do have symptoms, they may include:  · Shortness of breath.  · Breathing that is fast, noisy, or shallow.  · Bluish color of the skin, lips, or nail beds.  · A fast heartbeat.  · Feeling tired or sleepy.  · Feeling confused or agitated.  If hypoxemia develops quickly, it is likely you will suddenly have trouble breathing. If hypoxemia develops slowly over months or years, you may not notice any symptoms.  How is this diagnosed?  This condition is diagnosed by:  · A physical exam.  · A blood test that measures the amount of oxygen in your blood.  · A test that measures the " percentage of oxygen in your blood (pulse oximetry). This is done by placing a sensor on your finger, toe, or earlobe.  How is this treated?  Treatment for this condition depends on the cause or the severity of your hypoxemia.  · You will likely be treated with oxygen therapy to restore your blood oxygen level.  · You may need oxygen therapy for a short time, such as weeks or months, or you may need it for the rest of your life.  · You may be asked to lay on your stomach (prone). This may help bring your oxygen level up or help you feel less short of breath.  Your health care provider may also recommend other therapies to treat the underlying cause of your hypoxemia.  Follow these instructions at home:    · Take over-the-counter and prescription medicines only as told by your health care provider.  · If you are on oxygen therapy, follow oxygen safety precautions as directed by your health care provider. Precautions may include:  ? Always have a backup supply of oxygen.  ? Do not let anyone smoke or have a fire near your oxygen supply.  ? Handle oxygen tanks carefully as told by your health care provider.  · Do not use any products that contain nicotine or tobacco, such as cigarettes, e-cigarettes, and chewing tobacco. If you need help quitting, ask your health care provider. Stay away from people who smoke.  · Keep all follow-up visits as told by your health care provider. This is important.  Contact a health care provider if:  · You have any concerns about your oxygen therapy.  · You have trouble breathing, even while wearing an oxygen supply.  · You become short of breath when you exercise.  · You are still tired or have a headache when you wake up.  Get help right away if:  · Your shortness of breath gets worse, especially with normal activity or only a little bit of activity.  · Your skin, lips, or nail beds are a bluish color.  · You become confused or you cannot think properly.  · You have chest pain.  · You  "have a fever.  These symptoms may represent a serious problem that is an emergency. Do not wait to see if the symptoms will go away. Get medical help right away. Call your local emergency services (911 in the [place] Do not drive yourself to the hospital.  Summary  · Hypoxemia occurs when the blood does not contain enough oxygen.  · Hypoxemia may or may not cause symptoms. Often, the main symptom is shortness of breath..  · Depending on the cause of your hypoxemia, you may need oxygen therapy for a short time, such as weeks or months, or you may need it for the rest of your life.  · If you are on oxygen therapy, follow oxygen safety precautions as directed by your health care provider.  This information is not intended to replace advice given to you by your health care provider. Make sure you discuss any questions you have with your health care provider.  Document Revised: 01/07/2021 Document Reviewed: 01/07/2021  [employer] Patient Education © 2021 [employer].    [email or web address]">   Pulmonary Embolism    A pulmonary embolism (PE) is a sudden blockage or decrease of blood flow in one or both lungs that happens when a clot travels into the arteries of the lung (pulmonary arteries). Most blockages come from a blood clot that forms in the vein of a leg or arm (deep vein thrombosis, DVT) and travels to the lungs. A clot is blood that has thickened into a gel or solid. PE is a dangerous and life-threatening condition that needs to be treated right away.  What are the causes?  This condition is usually caused by a blood clot that forms in a vein and moves to the lungs. In rare cases, it may be caused by air, fat, part of a tumor, or other tissue that moves through the veins and into the lungs.  What increases the risk?  The following factors may make you more likely to develop this condition:  · Experiencing a traumatic injury, such " as breaking a hip or leg.  · Having:  ? A spinal cord injury.  ? Major surgery, especially hip or knee replacement, or surgery on parts of the nervous system or on the abdomen.  ? A stroke.  ? A blood clotting disease.  ? Long-term (chronic) lung or heart disease.  ? Cancer, especially if you are being treated with chemotherapy.  ? A central venous catheter.  · Taking medicines that contain estrogen. These include birth control pills and hormone replacement therapy.  · Being:  ? Pregnant.  ? In the period of time after your baby is delivered (postpartum).  ? Older than age 60.  ? Overweight.  ? A smoker, especially if you have other risks.  ? Not very active (sedentary), not being able to move at all, or spending long periods sitting, such as travel over 6 hours. You are also at a greater risk if you have a leg in a cast or splint.  What are the signs or symptoms?  Symptoms of this condition usually start suddenly and include:  · Shortness of breath during activity or at rest.  · Coughing, coughing up blood, or coughing up bloody mucus.  · Chest pain, back pain, or shoulder blade pain that gets worse with deep breaths.  · Rapid or irregular heartbeat.  · Feeling light-headed or dizzy, or fainting.  · Feeling anxious.  · Pain and swelling in a leg. This is a symptom of DVT, which can lead to PE.  How is this diagnosed?  This condition may be diagnosed based on your medical history, a physical exam, and tests. Tests may include:  · Blood tests.  · An ECG (electrocardiogram) of the heart.  · A CT pulmonary angiogram. This test checks blood flow in and around your lungs.  · A ventilation-perfusion scan, also called a lung VQ scan. This test measures air flow and blood flow to the lungs.  · An ultrasound to check for a DVT.  How is this treated?  Treatment for this condition depends on many factors, such as the cause of your PE, your risk for bleeding or developing more clots, and other medical conditions you may have.  Treatment aims to stop blood clots from forming or growing larger. In some cases, treatment may be aimed at breaking apart or removing the blood clot. Treatment may include:  · Medicines, such as:  ? Blood thinning medicines, also called anticoagulants, to stop clots from forming and growing.  ? Medicines that break apart clots (thrombolytics).  · Procedures, such as:  ? Using a flexible tube to remove a blood clot (embolectomy) or to deliver medicine to destroy it (catheter-directed thrombolysis).  ? Surgery to remove the clot (surgical embolectomy). This is rare.  You may need a combination of immediate, long-term, and extended treatments. Your treatment may continue for several months (maintenance therapy) or longer depending on your medical conditions. You and your health care provider will work together to choose the treatment program that is best for you.  Follow these instructions at home:  Medicines  · Take over-the-counter and prescription medicines only as told by your health care provider.  · If you are taking blood thinners:  ? Talk with your health care provider before you take any medicines that contain aspirin or NSAIDs, such as ibuprofen. These medicines increase your risk for dangerous bleeding.  ? Take your medicine exactly as told, at the same time every day.  ? Avoid activities that could cause injury or bruising, and follow instructions about how to prevent falls.  ? Wear a medical alert bracelet or carry a card that lists what medicines you take.  · Understand what foods and drugs interact with any medicines that you are taking.  General instructions  · Ask your health care provider when you may return to your normal activities. Avoid sitting or lying for a long time without moving.  · Maintain a healthy weight. Ask your health care provider what weight is healthy for you.  · Do not use any products that contain nicotine or tobacco, such as cigarettes, e-cigarettes, and chewing tobacco. If you  need help quitting, ask your health care provider.  · Talk with your health care provider about any travel plans. It is important to make sure that you are still able to take your medicine while traveling.  · Keep all follow-up visits as told by your health care provider. This is important.  Where to find more information  · American Lung Association: www.lung.org  · Centers for Disease Control and Prevention: www.cdc.gov  Contact a health care provider if:  · You missed a dose of your blood thinner medicine.  Get help right away if you:  · Have:  ? New or increased pain, swelling, warmth, or redness in an arm or leg.  ? Shortness of breath that gets worse during activity or at rest.  ? A fever.  ? Worsening chest pain.  ? A rapid or irregular heartbeat.  ? A severe headache.  ? Vision changes.  ? A serious fall or accident, or you hit your head.  ? Stomach pain.  ? Blood in your vomit, stool, or urine.  ? A cut that will not stop bleeding.  · Cough up blood.  · Feel light-headed or dizzy, and that feeling does not go away.  · Cannot move your arms or legs.  · Are confused or have memory loss.  These symptoms may represent a serious problem that is an emergency. Do not wait to see if the symptoms will go away. Get medical help right away. Call your local emergency services (911 in the [place] Do not drive yourself to the hospital.  Summary  · A pulmonary embolism (PE) is a serious and potentially life-threatening condition, in which a blood clot from one part of the body (deep vein thrombosis, DVT) travels to the arteries of the lung, causing a sudden blockage or decrease of blood flow to the lungs. This may result in shortness of breath, chest pain, dizziness, and fainting.  · Treatments for this condition usually include medicines to thin your blood (anticoagulants) or medicines to break apart blood clots (thrombolytics).  · If you are given blood thinners, take your medicine exactly as told by your health care  provider, at the same time every day. This is important.  · Understand what foods and drugs interact with any medicines that you are taking.  · If you have signs of PE or DVT, call your local emergency services (911 in the [place]  This information is not intended to replace advice given to you by your health care provider. Make sure you discuss any questions you have with your health care provider.  Document Revised: 10/30/2020 Document Reviewed: 10/30/2020  Elsevier Patient Education © 2021 [employer]    Pulmonary Fibrosis    Pulmonary fibrosis is a type of lung disease that causes scarring. Over time, the scar tissue builds up in the air sacs of your lungs (alveoli). This makes it hard for you to breathe. Less oxygen can get into your blood.  Scarring from pulmonary fibrosis gets worse over time. This damage is permanent and may lead to other serious health problems.  What are the causes?  There are many different causes of pulmonary fibrosis. Sometimes the cause is not known. This is called idiopathic pulmonary fibrosis. Other causes include:  · Exposure to chemicals and substances found in agricultural, farm, construction, or factory work. These include mold, asbestos, silica, metal dusts, and toxic fumes.  · Sarcoidosis. In this disease, areas of inflammatory cells (granulomas) form and most often affect the lungs.  · Autoimmune diseases. These include diseases such as rheumatoid arthritis, systemic sclerosis, or connective tissue disease.  · Taking certain medicines. These include drugs used in radiation therapy or used to treat seizures, heart problems, and some infections.  What increases the risk?  You are more likely to develop this condition if:  · You have a family history of the disease.  · You are older. The condition is more common in older adults.  · You have a history of smoking.  · You have a job that exposes you to certain chemicals.  · You have gastroesophageal reflux disease (GERD).  What are  the signs or symptoms?  Symptoms of this condition include:  · Difficulty breathing that gets worse with activity.  · Shortness of breath (dyspnea).  · Dry, hacking cough.  · Rapid, shallow breathing during exercise or while at rest.  · Bluish skin and lips.  · Loss of appetite.  · Weakness.  · Weight loss and fatigue.  · Rounded and enlarged fingertips (clubbing).  How is this diagnosed?  This condition may be diagnosed based on:  · Your symptoms and medical history.  · A physical exam.  You may also have tests, including:  · A test that involves looking inside your lungs with an instrument (bronchoscopy).  · Imaging studies of your lungs and heart.  · Tests to measure how well you are breathing (pulmonary function tests).  · Blood tests.  · Tests to see how well your lungs work while you are walking (pulmonary stress test).  · A procedure to remove a lung tissue sample to look at it under a microscope (biopsy).  How is this treated?  There is no cure for pulmonary fibrosis. Treatment focuses on managing symptoms and preventing scarring from getting worse. This may include:  · Medicines, such as:  ? Steroids to prevent permanent lung changes.  ? Medicines to suppress your body's defense system (immune system).  ? Medicines to help with lung function by reducing inflammation or scarring.  · Ongoing monitoring with X-rays and lab work.  · Oxygen therapy.  · Pulmonary rehabilitation.  · Surgery. In some cases, a lung transplant is possible.  Follow these instructions at home:         Medicines  · Take over-the-counter and prescription medicines only as told by your health care provider.  · Keep your vaccinations up to date as recommended by your health care provider.  General instructions  · Do not use any products that contain nicotine or tobacco, such as cigarettes and e-cigarettes. If you need help quitting, ask your health care provider.  · Get regular exercise, but do not overexert yourself. Ask your health care  provider to suggest some activities that are safe for you to do.  ? If you have physical limitations, you may get exercise by walking, using a stationary bike, or doing chair exercises.  ? Ask your health care provider about using oxygen while exercising.  · If you are exposed to chemicals and substances at work, make sure that you wear a mask or respirator at all times.  · Join a pulmonary rehabilitation program or a support group for people with pulmonary fibrosis.  · Eat small meals often so you do not get too full. Overeating can make breathing trouble worse.  · Maintain a healthy weight. Lose weight if you need to.  · Do breathing exercises as directed by your health care provider.  · Keep all follow-up visits as told by your health care provider. This is important.  Contact a health care provider if you:  · Have symptoms that do not get better with medicines.  · Are not able to be as active as usual.  · Have trouble taking a deep breath.  · Have a fever or chills.  · Have blue lips or skin.  · Have clubbing of your fingers.  Get help right away if you:  · Have a sudden worsening of your symptoms.  · Have chest pain.  · Cough up mucus that is dark in color.  · Have a lot of headaches.  · Get very confused or sleepy.  Summary  · Pulmonary fibrosis is a type of lung disease that causes scar tissue to build up in the air sacs of your lungs (alveoli) over time. Less oxygen can get into your blood. This makes it hard for you to breathe.  · Scarring from pulmonary fibrosis gets worse over time. This damage is permanent and may lead to other serious health problems.  · You are more likely to develop this condition if you have a family history of the condition or a job that exposes you to certain chemicals.  · There is no cure for pulmonary fibrosis. Treatment focuses on managing symptoms and preventing scarring from getting worse.  This information is not intended to replace advice given to you by your health care  provider. Make sure you discuss any questions you have with your health care provider.  Document Revised: 01/23/2019 Document Reviewed: 01/23/2019  [employer] Patient Education © 2021 [employer]    Nonspecific Chest Pain  Chest pain can be caused by many different conditions. Some causes of chest pain can be life-threatening. These will require treatment right away. Serious causes of chest pain include:  · Heart attack.  · A tear in the body's main blood vessel.  · Redness and swelling (inflammation) around your heart.  · Blood clot in your lungs.  Other causes of chest pain may not be so serious. These include:  · Heartburn.  · Anxiety or stress.  · Damage to bones or muscles in your chest.  · Lung infections.  Chest pain can feel like:  · Pain or discomfort in your chest.  · Crushing, pressure, aching, or squeezing pain.  · Burning or tingling.  · Dull or sharp pain that is worse when you move, cough, or take a deep breath.  · Pain or discomfort that is also felt in your back, neck, jaw, shoulder, or arm, or pain that spreads to any of these areas.  It is hard to know whether your pain is caused by something that is serious or something that is not so serious. So it is important to see your doctor right away if you have chest pain.  Follow these instructions at home:  Medicines  · Take over-the-counter and prescription medicines only as told by your doctor.  · If you were prescribed an antibiotic medicine, take it as told by your doctor. Do not stop taking the antibiotic even if you start to feel better.  Lifestyle    · Rest as told by your doctor.  · Do not use any products that contain nicotine or tobacco, such as cigarettes, e-cigarettes, and chewing tobacco. If you need help quitting, ask your doctor.  · Do not drink alcohol.  · Make lifestyle changes as told by your doctor. These may include:  ? Getting regular exercise. Ask your doctor what activities are safe for you.  ? Eating a heart-healthy diet. A diet  and nutrition specialist (dietitian) can help you to learn healthy eating options.  ? Staying at a healthy weight.  ? Treating diabetes or high blood pressure, if needed.  ? Lowering your stress. Activities such as yoga and relaxation techniques can help.    General instructions  · Pay attention to any changes in your symptoms. Tell your doctor about them or any new symptoms.  · Avoid any activities that cause chest pain.  · Keep all follow-up visits as told by your doctor. This is important. You may need more testing if your chest pain does not go away.  Contact a doctor if:  · Your chest pain does not go away.  · You feel depressed.  · You have a fever.  Get help right away if:  · Your chest pain is worse.  · You have a cough that gets worse, or you cough up blood.  · You have very bad (severe) pain in your belly (abdomen).  · You pass out (faint).  · You have either of these for no clear reason:  ? Sudden chest discomfort.  ? Sudden discomfort in your arms, back, neck, or jaw.  · You have shortness of breath at any time.  · You suddenly start to sweat, or your skin gets clammy.  · You feel sick to your stomach (nauseous).  · You throw up (vomit).  · You suddenly feel lightheaded or dizzy.  · You feel very weak or tired.  · Your heart starts to beat fast, or it feels like it is skipping beats.  These symptoms may be an emergency. Do not wait to see if the symptoms will go away. Get medical help right away. Call your local emergency services (911 in the [place] Do not drive yourself to the hospital.  Summary  · Chest pain can be caused by many different conditions. The cause may be serious and need treatment right away. If you have chest pain, see your doctor right away.  · Follow your doctor's instructions for taking medicines and making lifestyle changes.  · Keep all follow-up visits as told by your doctor. This includes visits for any further testing if your chest pain does not go away.  · Be sure to know the  signs that show that your condition has become worse. Get help right away if you have these symptoms.  This information is not intended to replace advice given to you by your health care provider. Make sure you discuss any questions you have with your health care provider.  Document Revised: 06/20/2019 Document Reviewed: 06/20/2019  Elsevier Patient Education © 2021 [employer] Inc.  `

## 2021-10-29 NOTE — OUTREACH NOTE
COVID-19 Week 4 Survey      Responses   [facility] patient discharged from? [facility]   Does the patient have one of the following disease processes/diagnoses(primary or secondary)? COVID-19   COVID-19 underlying condition? None   Call Number Call 1   COVID-19 Week 4: Call 1 attempt successful? No   Discharge diagnosis covid, PNA, severe malnutrition          Germania Cooper, JENNIFERN

## 2021-10-29 NOTE — PROGRESS NOTES
CONSULT NOTE     CHIEF COMPLAINT  Chief Complaint   Patient presents with   • Follow-up     2 week    • Shortness of Breath   • Cough        Primary Care Provider  aLurie Castro MD     Referring Provider  No ref. provider found    Patient Complaint    ICD-10-CM ICD-9-CM   1. 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 Pulmonary fibrosis (HCC)  J84.10 515   3. Hypoxia  R09.02 799.02   4. Multiple subsegmental pulmonary emboli without acute cor pulmonale (HCC)  I26.94 415.19   5. Pneumonia of both lungs due to infectious organism, unspecified part of lung  J18.9 483.8   6. Anxiety disorder, unspecified type  F41.9 300.00            Subjective          History of Presenting Illness  Christina Espinoza is a 71 y.o. female with a history of multiple medical problems and has a follow-up visit today for her pulmonary problems.  Patient had COVID-19 syndrome followed by multiple pulmonary embolism and DVT for which patient was started on Eliquis for the past 1 month and she did not have any prescription and she is requiring to get the Eliquis prescription.  Patient is extremely anxious and has been having intermittent cough.  Patient was started on Brezetry and albuterol by our nurse practitioner and the patient is feeling better.  However patient continues to have this chronic cough which get worse with anxiety and causing the chest discomfort.  Shortness of breath on any exertion patient is taking the oxygen 2 L via nasal cannula.  She could not sleep very well with the anxiety.  This makes her get more short of breath and getting the palpitation.  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  At present Imuran was stopped because of all the problems for the patient has been having at this time.  She was wondering whether is there any other medication for the pulmonary fibrosis.  He has had shortness of breath is not  getting any better she was wondering whether she has any other underlying lung problems.  Patient had the CT scan of the chest done on September 22, 2021 showed multiple pulmonary emboli in the left pulmonary artery and the left upper lobe and left lower lobe and segmental pulmonary artery branches and also severe bilateral airspace disease likely representing pneumonia and also pulmonary edema is also a possibility with the small bilateral pleural effusions           patient had the pulmonary function testing done on January 12, 2021 showed no obstruction lung volumes showed presence of mild restrictive lung defect and significantly reduced diffusion at that point.  At present patient denies of having any loss of taste or smell.  Denied any vomiting, diarrhea constipation or other related.  Chest discomfort and palpitation as noted above.  No diaphoretic symptoms.  Patient is generalized weak.  Patient is retired from property tax work and has a dog in the house do not sleep on the bed and never smoked.      I have personally reviewed the review of systems, past family, social, medical and surgical histories; and agree with their findings.  HISTORY    Family History   Problem Relation Age of Onset   • Arthritis Mother    • Cancer Mother    • Osteoporosis Mother    • Cancer Father    • Heart disease Father    • Diabetes Father    • Arthritis Sister    • Heart disease Sister    • Bleeding Disorder Brother    • Stroke Brother    • Diabetes Maternal Grandfather    • Diabetes Son         Social History     Socioeconomic History   • Marital status:    Tobacco Use   • Smoking status: Never Smoker   • Smokeless tobacco: Never Used   Vaping Use   • Vaping Use: Never used   Substance and Sexual Activity   • Alcohol use: Never        Past Medical History:   Diagnosis Date   • Arthritis    • Asthma    • Cervicalgia 01/20/2020   • Leg pain    • Leg swelling    • Limb pain    • Lumbago 01/20/2020    low back pain    •  Lumbar spinal stenosis 01/20/2020   • Lung disease    • Nausea    • Neurogenic claudication (HCC) 01/20/2020   • Neuropathy    • Pulmonary fibrosis (HCC)    • Radiculopathy, lumbosacral region 01/20/2020   • Spondylolisthesis, lumbar region 01/29/2020    grade 1 at L3/4        Immunization History   Administered Date(s) Administered   • Pneumococcal Conjugate 13-Valent (PCV13) 01/16/2017       Allergies   Allergen Reactions   • Clindamycin Rash, Shortness Of Breath and Swelling   • Clindamycin Hcl Unknown - High Severity   • Doxycycline Other (See Comments)     SORE ON TONGUE   • Adalimumab Rash   • Azithromycin Rash, Nausea And Vomiting and Unknown - High Severity   • Erythromycin Rash and Unknown - High Severity          Current Outpatient Medications:   •  albuterol (PROVENTIL) (2.5 MG/3ML) 0.083% nebulizer solution, Take 2.5 mg by nebulization Every 4 (Four) Hours As Needed., Disp: , Rfl:   •  alendronate (FOSAMAX) 70 MG tablet, Take 70 mg by mouth Every 7 (Seven) Days. Every Tuesday, Disp: , Rfl:   •  atorvastatin (LIPITOR) 20 MG tablet, Take 20 mg by mouth Daily., Disp: , Rfl:   •  furosemide (LASIX) 20 MG tablet, , Disp: , Rfl:   •  gabapentin (NEURONTIN) 300 MG capsule, Take 300 mg by mouth 3 (Three) Times a Day., Disp: , Rfl:   •  HYDROcodone-acetaminophen (NORCO)  MG per tablet, Take 1 tablet by mouth Every 6 (Six) Hours As Needed for Moderate Pain ., Disp: , Rfl:   •  hydrOXYzine (ATARAX) 25 MG tablet, Take 1 tablet by mouth Every Night for 30 days., Disp: 30 tablet, Rfl: 1  •  Lidocaine Viscous HCl (XYLOCAINE) 2 % solution, Take 5 mL by mouth Every 3 (Three) Hours As Needed for Moderate Pain  for up to 30 days., Disp: 100 mL, Rfl: 1  •  linaclotide (Linzess) 145 MCG capsule capsule, Take 145 mcg by mouth Daily., Disp: , Rfl:   •  nystatin (MYCOSTATIN) 100,000 unit/mL suspension, Take 5 mL by mouth 4 (Four) Times a Day for 10 days., Disp: 200 mL, Rfl: 0  •  ondansetron (ZOFRAN) 4 MG tablet, Take 4  mg by mouth Every 8 (Eight) Hours As Needed for Nausea or Vomiting., Disp: , Rfl:   •  predniSONE (DELTASONE) 20 MG tablet, Take 3 tablets by mouth Daily With Breakfast for 30 days., Disp: 90 tablet, Rfl: 0  •  albuterol sulfate  (90 Base) MCG/ACT inhaler, Inhale 2 puffs Every 4 (Four) Hours As Needed for Wheezing., Disp: 1 g, Rfl: 5  •  apixaban (ELIQUIS) 5 MG tablet tablet, Take 1 tablet by mouth 2 (Two) Times a Day for 30 days., Disp: 60 tablet, Rfl: 5  •  Budeson-Glycopyrrol-Formoterol (BREZTRI) 160-9-4.8 MCG/ACT aerosol inhaler, Inhale 2 puffs 2 (Two) Times a Day., Disp: 1 each, Rfl: 5    Current Facility-Administered Medications:   •  ALPRAZolam (XANAX) tablet 0.25 mg, 0.25 mg, Oral, BID PRN, Ammon Tuttle MD     Smoking status: Never smoked.    Objective     Vital Signs:   Vitals:    10/29/21 1139   BP: 93/54   Pulse: 104   Resp: 21   Temp: 98 °F (36.7 °C)   SpO2: 97%        Physical Exam: Very pleasant female accompanied by her daughter.  Patient is very anxious and 2 L of oxygen per nasal cannula.  Alert and oriented x3.  HEENT: Patient is using the nasal cannula with 2 L.  Result ingesting Benadryhoward Varner and evaluated Dr.James Varner the gluteal using the face mask for COVID-19 precautions.  No showed nasal congestion.  Neck: Supple, no JVD.  Trachea central.  Chest and lungs: Decreased breath sounds with scattered rales bilaterally heard best posteriorly and towards the basis.  Cardiovascular system patient blood pressure is on the low side.  Mild tachycardia otherwise regular.  Abdomen: Soft and benign.  Extremities: Grade 1 clubbing and also showed some rheumatoid arthritis changes.  No clubbing/no cyanosis.     Result Review :   I have personally reviewed the previous medical records and the previous doctors evaluation and management is reviewed and as documented.            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  History of antisynthetase syndrome  Anxiety.  Pneumonia.  Bilateral pulmonary embolism with a DVT.  Persistent shortness of breath.  Never smoker.  Persistent cough.  Patient with history of asthma.    Plan:  Renewed the prescription for the Eliquis 5 mg take twice daily for a total of 6 months and may need to reevaluate at that time.  Patient with echocardiogram with a Doppler to rule out any post COVID-19 syndrome affecting the heart causing any cardiomyopathy and other related.  May also need to look into pulmonary hypertension.  Renew the prescription for the Brezetry and Ventolin.  As the patient is significantly anxious I took the liberty in order for the Xanax 0.25 mg tablet to take half a tablet twice daily as needed  If she requires more dose she need to contact.  CT scan of the chest as ordered.  Any problems in between if she is not getting any better she is going to go to the emergency room and call us at that point.    Follow Up   Return in about 3 months (around 1/29/2022).  Patient was given instructions and counseling regarding her condition or for health maintenance advice. Please see specific information pulled into the AVS if appropriate.     Ammon Tuttle MD

## 2021-11-01 DIAGNOSIS — F41.9 ANXIETY DISORDER, UNSPECIFIED TYPE: Primary | ICD-10-CM

## 2021-11-01 RX ORDER — ALPRAZOLAM 0.25 MG/1
0.12 TABLET ORAL 2 TIMES DAILY
Qty: 7 TABLET | Refills: 0 | OUTPATIENT
Start: 2021-11-01 | End: 2022-05-15

## 2021-11-05 ENCOUNTER — TELEPHONE (OUTPATIENT)
Dept: PULMONOLOGY | Facility: CLINIC | Age: 71
End: 2021-11-05

## 2021-11-05 NOTE — TELEPHONE ENCOUNTER
Pt called and canceled appt 11/9/21 but wanted to know if she could get an rx for a cough medication. Stated she could not make it to her appt Tuesday because she already had multiple appts that week. Please call pt and advise

## 2021-11-11 ENCOUNTER — HOSPITAL ENCOUNTER (OUTPATIENT)
Dept: CT IMAGING | Facility: HOSPITAL | Age: 71
Discharge: HOME | End: 2021-11-11
Admitting: SPECIALIST

## 2021-11-11 DIAGNOSIS — J18.9 PNEUMONIA OF BOTH LUNGS DUE TO INFECTIOUS ORGANISM, UNSPECIFIED PART OF LUNG: ICD-10-CM

## 2021-11-11 DIAGNOSIS — J84.10 PULMONARY FIBROSIS (HCC): ICD-10-CM

## 2021-11-11 DIAGNOSIS — I26.94 MULTIPLE SUBSEGMENTAL PULMONARY EMBOLI WITHOUT ACUTE COR PULMONALE (HCC): ICD-10-CM

## 2021-11-11 PROCEDURE — 0 IOPAMIDOL PER 1 ML: Performed by: SPECIALIST

## 2021-11-11 PROCEDURE — 71275 CT ANGIOGRAPHY CHEST: CPT

## 2021-11-11 RX ADMIN — IOPAMIDOL 100 ML: 755 INJECTION, SOLUTION INTRAVENOUS at 17:24

## 2021-11-15 ENCOUNTER — HOSPITAL ENCOUNTER (OUTPATIENT)
Dept: CT IMAGING | Facility: HOSPITAL | Age: 71
Discharge: HOME | End: 2021-11-15

## 2021-11-15 DIAGNOSIS — U07.1 PNEUMONIA DUE TO COVID-19 VIRUS: ICD-10-CM

## 2021-11-15 DIAGNOSIS — J12.82 PNEUMONIA DUE TO COVID-19 VIRUS: ICD-10-CM

## 2021-11-18 ENCOUNTER — OFFICE VISIT (OUTPATIENT)
Dept: PULMONOLOGY | Facility: CLINIC | Age: 71
End: 2021-11-18

## 2021-11-18 VITALS
HEART RATE: 93 BPM | DIASTOLIC BLOOD PRESSURE: 74 MMHG | SYSTOLIC BLOOD PRESSURE: 112 MMHG | HEIGHT: 62 IN | BODY MASS INDEX: 25.03 KG/M2 | OXYGEN SATURATION: 98 % | TEMPERATURE: 97.8 F | WEIGHT: 136 LBS

## 2021-11-18 DIAGNOSIS — R53.83 FATIGUE, UNSPECIFIED TYPE: ICD-10-CM

## 2021-11-18 DIAGNOSIS — I26.99 PULMONARY EMBOLISM, UNSPECIFIED CHRONICITY, UNSPECIFIED PULMONARY EMBOLISM TYPE, UNSPECIFIED WHETHER ACUTE COR PULMONALE PRESENT (HCC): ICD-10-CM

## 2021-11-18 DIAGNOSIS — D84.9 IMMUNOSUPPRESSED STATUS (HCC): ICD-10-CM

## 2021-11-18 DIAGNOSIS — K13.79 MOUTH SORES: ICD-10-CM

## 2021-11-18 DIAGNOSIS — J47.9 BRONCHIECTASIS WITHOUT COMPLICATION (HCC): ICD-10-CM

## 2021-11-18 DIAGNOSIS — J18.9 PNEUMONIA DUE TO INFECTIOUS ORGANISM, UNSPECIFIED LATERALITY, UNSPECIFIED PART OF LUNG: ICD-10-CM

## 2021-11-18 DIAGNOSIS — D89.89 ANTISYNTHETASE SYNDROME (HCC): ICD-10-CM

## 2021-11-18 DIAGNOSIS — J45.909 ASTHMA, UNSPECIFIED ASTHMA SEVERITY, UNSPECIFIED WHETHER COMPLICATED, UNSPECIFIED WHETHER PERSISTENT: Primary | ICD-10-CM

## 2021-11-18 DIAGNOSIS — R05.9 COUGH: ICD-10-CM

## 2021-11-18 DIAGNOSIS — J96.01 ACUTE RESPIRATORY FAILURE WITH HYPOXIA (HCC): ICD-10-CM

## 2021-11-18 DIAGNOSIS — J84.10 PULMONARY FIBROSIS (HCC): ICD-10-CM

## 2021-11-18 PROCEDURE — 99215 OFFICE O/P EST HI 40 MIN: CPT | Performed by: NURSE PRACTITIONER

## 2021-11-18 RX ORDER — AMOXICILLIN AND CLAVULANATE POTASSIUM 500; 125 MG/1; MG/1
TABLET, FILM COATED ORAL
COMMUNITY
Start: 2021-11-02 | End: 2022-05-15

## 2021-11-18 RX ORDER — LEVOFLOXACIN 750 MG/1
750 TABLET ORAL DAILY
Qty: 7 TABLET | Refills: 0 | Status: SHIPPED | OUTPATIENT
Start: 2021-11-18 | End: 2021-11-25

## 2021-11-18 RX ORDER — PREDNISONE 10 MG/1
TABLET ORAL
COMMUNITY
Start: 2021-09-07 | End: 2022-08-02 | Stop reason: SDUPTHER

## 2021-11-18 RX ORDER — PIRFENIDONE 267 MG/1
3 CAPSULE ORAL 3 TIMES DAILY
Qty: 270 CAPSULE | Refills: 3 | Status: SHIPPED | OUTPATIENT
Start: 2022-01-01 | End: 2022-01-31

## 2021-11-18 RX ORDER — PIRFENIDONE 267 MG/1
CAPSULE ORAL
Qty: 333 CAPSULE | Refills: 0 | Status: SHIPPED | OUTPATIENT
Start: 2021-11-18 | End: 2022-01-01

## 2021-11-18 NOTE — PROGRESS NOTES
Primary Care Provider  Laurie Castro MD     Referring Provider  No ref. provider found     Chief Complaint  Asthma (Acute- Persistant cough ), Cough (productive sometimes but mostly dry ), Hoarse, Fatigue, and Shortness of Breath    Subjective          Christina Espinoza presents to [facility]  History of Present Illness  Christina Espinoza is a 71 y.o. female patient of Dr. Carrion with a history of pulmonary fibrosis, bronchiectasis, and antisynthetase syndrome.  She is here for an acute visit.    Patient states that she has been okay since her last visit.  She did recently have a chest CT on 11/11/2021 which shows interval decrease in the amount of pulmonary embolism on the left, as compared to 9/23/2021.  Daughter also shows underlying emphysematous chest changes and fibrosis.  There is also evolving bilateral infiltrates.  Her pleural effusions that were previously and have resolved.  Patient states that she does have a productive cough at times.  She was prescribed amoxicillin by her primary care provider a few weeks ago for a productive cough.  She states that it did help her symptoms but she does not feel that she is completely back to normal.  She continues to wear 1 to 2 L of oxygen.  She is on Breztri and uses her albuterol inhaler daily and her nebulizer treatments twice a day.  She was taking Imuran prior to abdi Covid and is wondering if she would benefit from starting Esbriet for her pulmonary fibrosis.  Her shortness of breath is moderate in severity, worse with exertion and improved with rest.  She is able to form her ADLs without difficulty, but takes frequent breaks.  She is up-to-date with her pneumonia vaccine but has not received a flu or Covid vaccine.     Her history of smoking is      Tobacco Use: Low Risk    • Smoking Tobacco Use: Never Smoker   • Smokeless Tobacco Use: Never Used   .    Review of Systems   Constitutional: Positive for  fatigue. Negative for chills, fever, unexpected weight gain and unexpected weight loss.   HENT: Negative for congestion (Nasal), hearing loss, mouth sores, nosebleeds, postnasal drip, sore throat and trouble swallowing.    Eyes: Negative for blurred vision and visual disturbance.   Respiratory: Positive for cough and shortness of breath. Negative for apnea and wheezing.         Negative for Hemoptysis     Cardiovascular: Negative for chest pain, palpitations and leg swelling.   Gastrointestinal: Negative for abdominal pain, constipation, diarrhea, nausea, vomiting and GERD.        Negative for Jaundice  Negative for Bloating  Negative for Melena   Musculoskeletal: Negative for joint swelling and myalgias.        Negative for Joint pain  Negative for Joint stiffness   Skin: Negative for color change.        Negative for cyanosis   Neurological: Negative for syncope, weakness, numbness and headache.      Sleep: Negative for Excessive daytime sleepiness  Negative for morning headaches  Negative for Snoring    Family History   Problem Relation Age of Onset   • Arthritis Mother    • Cancer Mother    • Osteoporosis Mother    • Cancer Father    • Heart disease Father    • Diabetes Father    • Arthritis Sister    • Heart disease Sister    • Bleeding Disorder Brother    • Stroke Brother    • Diabetes Maternal Grandfather    • Diabetes Son         Social History     Socioeconomic History   • Marital status:    Tobacco Use   • Smoking status: Never Smoker   • Smokeless tobacco: Never Used   Vaping Use   • Vaping Use: Never used   Substance and Sexual Activity   • Alcohol use: Never   • Drug use: Defer   • Sexual activity: Defer        Past Medical History:   Diagnosis Date   • Arthritis    • Asthma    • Cervicalgia 01/20/2020   • Leg pain    • Leg swelling    • Limb pain    • Lumbago 01/20/2020    low back pain    • Lumbar spinal stenosis 01/20/2020   • Lung disease    • Nausea    • Neurogenic claudication (HCC)  01/20/2020   • Neuropathy    • Pulmonary fibrosis (HCC)    • Radiculopathy, lumbosacral region 01/20/2020   • Spondylolisthesis, lumbar region 01/29/2020    grade 1 at L3/4        Immunization History   Administered Date(s) Administered   • Pneumococcal Conjugate 13-Valent (PCV13) 01/16/2017         Allergies   Allergen Reactions   • Clindamycin Rash, Shortness Of Breath and Swelling   • Clindamycin Hcl Unknown - High Severity   • Doxycycline Other (See Comments)     SORE ON TONGUE   • Adalimumab Rash   • Azithromycin Rash, Nausea And Vomiting and Unknown - High Severity   • Erythromycin Rash and Unknown - High Severity          Current Outpatient Medications:   •  albuterol (PROVENTIL) (2.5 MG/3ML) 0.083% nebulizer solution, Take 2.5 mg by nebulization Every 4 (Four) Hours As Needed., Disp: , Rfl:   •  albuterol sulfate  (90 Base) MCG/ACT inhaler, Inhale 2 puffs Every 4 (Four) Hours As Needed for Wheezing., Disp: 1 g, Rfl: 5  •  alendronate (FOSAMAX) 70 MG tablet, Take 70 mg by mouth Every 7 (Seven) Days. Every Tuesday, Disp: , Rfl:   •  apixaban (ELIQUIS) 5 MG tablet tablet, Take 1 tablet by mouth 2 (Two) Times a Day for 30 days., Disp: 60 tablet, Rfl: 5  •  atorvastatin (LIPITOR) 20 MG tablet, Take 20 mg by mouth Daily., Disp: , Rfl:   •  Budeson-Glycopyrrol-Formoterol (BREZTRI) 160-9-4.8 MCG/ACT aerosol inhaler, Inhale 2 puffs 2 (Two) Times a Day., Disp: 1 each, Rfl: 5  •  furosemide (LASIX) 20 MG tablet, 20 mg. As needed, Disp: , Rfl:   •  gabapentin (NEURONTIN) 300 MG capsule, Take 300 mg by mouth 3 (Three) Times a Day., Disp: , Rfl:   •  HYDROcodone-acetaminophen (NORCO)  MG per tablet, Take 1 tablet by mouth Every 6 (Six) Hours As Needed for Moderate Pain ., Disp: , Rfl:   •  linaclotide (Linzess) 145 MCG capsule capsule, Take 145 mcg by mouth Daily., Disp: , Rfl:   •  O2 (OXYGEN), Inhale 2 L/min Continuous., Disp: , Rfl:   •  ondansetron (ZOFRAN) 4 MG tablet, Take 4 mg by mouth Every 8 (Eight)  Hours As Needed for Nausea or Vomiting., Disp: , Rfl:   •  predniSONE (DELTASONE) 10 MG tablet, , Disp: , Rfl:   •  ALPRAZolam (Xanax) 0.25 MG tablet, Take 0.5 tablets by mouth 2 (Two) Times a Day., Disp: 7 tablet, Rfl: 0  •  amoxicillin-clavulanate (AUGMENTIN) 500-125 MG per tablet, , Disp: , Rfl:   •  levoFLOXacin (Levaquin) 750 MG tablet, Take 1 tablet by mouth Daily for 7 days., Disp: 7 tablet, Rfl: 0  •  Pirfenidone 267 MG capsule, Take 267 mg by mouth 3 (Three) Times a Day for 7 days, THEN 534 mg 3 (Three) Times a Day for 7 days, THEN 801 mg 3 (Three) Times a Day for 30 days., Disp: 333 capsule, Rfl: 0  •  [START ON 1/1/2022] Pirfenidone 267 MG capsule, Take 801 mg by mouth 3 (Three) Times a Day for 30 days., Disp: 270 capsule, Rfl: 3    Current Facility-Administered Medications:   •  ALPRAZolam (XANAX) tablet 0.25 mg, 0.25 mg, Oral, BID PRN, Ammon Tuttle MD     Objective   Physical Exam  Constitutional:       General: She is not in acute distress.     Appearance: Normal appearance. She is normal weight.   HENT:      Right Ear: Hearing normal.      Left Ear: Hearing normal.      Nose: No nasal tenderness or congestion.      Mouth/Throat:      Mouth: Mucous membranes are moist. No oral lesions.   Eyes:      Extraocular Movements: Extraocular movements intact.      Pupils: Pupils are equal, round, and reactive to light.   Neck:      Thyroid: No thyroid mass or thyromegaly.   Cardiovascular:      Rate and Rhythm: Normal rate and regular rhythm.      Pulses: Normal pulses.      Heart sounds: Normal heart sounds. No murmur heard.      Pulmonary:      Effort: Pulmonary effort is normal.      Breath sounds: Decreased breath sounds present. No wheezing, rhonchi or rales.      Comments: Patient on 1 L of oxygen.  She is able to speak full sentences without difficulty.  Chest:   Breasts:      Right: No axillary adenopathy.       Abdominal:      General: Bowel sounds are normal. There is no distension.       "Palpations: Abdomen is soft.      Tenderness: There is no abdominal tenderness.   Musculoskeletal:      Cervical back: Neck supple.      Right lower leg: No edema.      Left lower leg: No edema.   Lymphadenopathy:      Cervical: No cervical adenopathy.      Upper Body:      Right upper body: No axillary adenopathy.   Skin:     General: Skin is warm and dry.      Findings: No lesion or rash.   Neurological:      General: No focal deficit present.      Mental Status: She is alert and oriented to person, place, and time.      Cranial Nerves: Cranial nerves are intact.   Psychiatric:         Mood and Affect: Affect normal. Mood is not anxious or depressed.         Vital Signs:   /74 (BP Location: Left arm, Patient Position: Sitting, Cuff Size: Adult)   Pulse 93   Temp 97.8 °F (36.6 °C) (Tympanic)   Ht 157.5 cm (62\")   Wt 61.7 kg (136 lb)   SpO2 98% Comment: 1L Oxygen  BMI 24.87 kg/m²        Result Review :     CMP    CMP 9/28/21 10/1/21 10/4/21   Glucose 162 (A) 105 (A) 86   BUN 53 (A) 40 (A) 24 (A)   Creatinine 0.78 0.66 0.63   eGFR Non  Am 73 88 93   Sodium 136 133 (A) 136   Potassium 4.6 5.1 4.7   Chloride 96 (A) 100 104   Calcium 9.6 9.2 8.8   Albumin 3.70 3.20 (A) 3.30 (A)   Total Bilirubin 0.8 1.1 1.0   Alkaline Phosphatase 130 (A) 97 84   AST (SGOT) 20 19 17   ALT (SGPT) 27 20 19   (A) Abnormal value       Comments are available for some flowsheets but are not being displayed.           CBC w/diff    CBC w/Diff 9/28/21 10/1/21 10/4/21   WBC 16.82 (A) 14.51 (A) 12.05 (A)   RBC 4.42 4.39 4.12   Hemoglobin 12.6 12.8 11.9 (A)   Hematocrit 38.7 41.8 36.9   MCV 87.6 95.2 89.6   MCH 28.5 29.2 28.9   MCHC 32.6 30.6 (A) 32.2   RDW 14.5 15.7 (A) 15.6 (A)   Platelets 206 180 218   Neutrophil Rel % 90.6 (A) 83.9 (A) 82.0 (A)   Immature Granulocyte Rel % 2.6 (A) 1.2 (A) 0.9 (A)   Lymphocyte Rel % 2.9 (A) 6.8 (A) 7.4 (A)   Monocyte Rel % 3.7 (A) 7.6 9.5   Eosinophil Rel % 0.0 (A) 0.1 (A) 0.1 (A)   Basophil " Rel % 0.2 0.4 0.1   (A) Abnormal value             Data reviewed: Radiologic studies Chest CT 11/11/2021, pulmonary function test 3/3/2020, pulmonary function test 1/12/2021 and Dr. Tuttle last office note   Procedures        Assessment and Plan    Diagnoses and all orders for this visit:    1. Asthma, unspecified asthma severity, unspecified whether complicated, unspecified whether persistent (Primary)    2. Acute respiratory failure with hypoxia (HCC)    3. Cough    4. Pulmonary fibrosis (HCC)  -     Pirfenidone 267 MG capsule; Take 267 mg by mouth 3 (Three) Times a Day for 7 days, THEN 534 mg 3 (Three) Times a Day for 7 days, THEN 801 mg 3 (Three) Times a Day for 30 days.  Dispense: 333 capsule; Refill: 0  -     Pirfenidone 267 MG capsule; Take 801 mg by mouth 3 (Three) Times a Day for 30 days.  Dispense: 270 capsule; Refill: 3    5. Bronchiectasis without complication (HCC)    6. 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HCC)    7. Immunosuppressed status (HCC)    8. Pneumonia due to infectious organism, unspecified laterality, unspecified part of lung  -     levoFLOXacin (Levaquin) 750 MG tablet; Take 1 tablet by mouth Daily for 7 days.  Dispense: 7 tablet; Refill: 0    9. Pulmonary embolism, unspecified chronicity, unspecified pulmonary embolism type, unspecified whether acute cor pulmonale present (HCC)    10. Fatigue, unspecified type    11. Mouth sores    12.  Continue Breztri as prescribed.  Rinse mouth after each use.  13.  Continue albuterol nebulizer treatments as needed.  14.  Continue Eliquis.  15.  After speaking with Dr. Carrion, we will try to start the patient on Esbriet.  16.  Continue supplemental oxygen to maintain saturations at or above 89%.  17.  Follow-up with Gosia as scheduled in January.    I spent 40 minutes caring for Christina on this date of service. This time includes time spent by me in the following activities:preparing for the visit, reviewing tests, obtaining and/or reviewing a separately  obtained history, performing a medically appropriate examination and/or evaluation , counseling and educating the patient/family/caregiver, ordering medications, tests, or procedures, referring and communicating with other health care professionals  and documenting information in the medical record    Follow Up   Return for Next scheduled follow up.  Patient was given instructions and counseling regarding her condition or for health maintenance advice. Please see specific information pulled into the AVS if appropriate.

## 2021-11-18 NOTE — PATIENT INSTRUCTIONS
Pulmonary Fibrosis    Pulmonary fibrosis is a type of lung disease that causes scarring. Over time, the scar tissue builds up in the air sacs of your lungs (alveoli). This makes it hard for you to breathe. Less oxygen can get into your blood.  Scarring from pulmonary fibrosis gets worse over time. This damage is permanent and may lead to other serious health problems.  What are the causes?  There are many different causes of pulmonary fibrosis. Sometimes the cause is not known. This is called idiopathic pulmonary fibrosis. Other causes include:  · Exposure to chemicals and substances found in agricultural, farm, construction, or factory work. These include mold, asbestos, silica, metal dusts, and toxic fumes.  · Sarcoidosis. In this disease, areas of inflammatory cells (granulomas) form and most often affect the lungs.  · Autoimmune diseases. These include diseases such as rheumatoid arthritis, systemic sclerosis, or connective tissue disease.  · Taking certain medicines. These include drugs used in radiation therapy or used to treat seizures, heart problems, and some infections.  What increases the risk?  You are more likely to develop this condition if:  · You have a family history of the disease.  · You are older. The condition is more common in older adults.  · You have a history of smoking.  · You have a job that exposes you to certain chemicals.  · You have gastroesophageal reflux disease (GERD).  What are the signs or symptoms?  Symptoms of this condition include:  · Difficulty breathing that gets worse with activity.  · Shortness of breath (dyspnea).  · Dry, hacking cough.  · Rapid, shallow breathing during exercise or while at rest.  · Bluish skin and lips.  · Loss of appetite.  · Weakness.  · Weight loss and fatigue.  · Rounded and enlarged fingertips (clubbing).  How is this diagnosed?  This condition may be diagnosed based on:  · Your symptoms and medical history.  · A physical exam.  You may also have  tests, including:  · A test that involves looking inside your lungs with an instrument (bronchoscopy).  · Imaging studies of your lungs and heart.  · Tests to measure how well you are breathing (pulmonary function tests).  · Blood tests.  · Tests to see how well your lungs work while you are walking (pulmonary stress test).  · A procedure to remove a lung tissue sample to look at it under a microscope (biopsy).  How is this treated?  There is no cure for pulmonary fibrosis. Treatment focuses on managing symptoms and preventing scarring from getting worse. This may include:  · Medicines, such as:  ? Steroids to prevent permanent lung changes.  ? Medicines to suppress your body's defense system (immune system).  ? Medicines to help with lung function by reducing inflammation or scarring.  · Ongoing monitoring with X-rays and lab work.  · Oxygen therapy.  · Pulmonary rehabilitation.  · Surgery. In some cases, a lung transplant is possible.  Follow these instructions at home:         Medicines  · Take over-the-counter and prescription medicines only as told by your health care provider.  · Keep your vaccinations up to date as recommended by your health care provider.  General instructions  · Do not use any products that contain nicotine or tobacco, such as cigarettes and e-cigarettes. If you need help quitting, ask your health care provider.  · Get regular exercise, but do not overexert yourself. Ask your health care provider to suggest some activities that are safe for you to do.  ? If you have physical limitations, you may get exercise by walking, using a stationary bike, or doing chair exercises.  ? Ask your health care provider about using oxygen while exercising.  · If you are exposed to chemicals and substances at work, make sure that you wear a mask or respirator at all times.  · Join a pulmonary rehabilitation program or a support group for people with pulmonary fibrosis.  · Eat small meals often so you do not  get too full. Overeating can make breathing trouble worse.  · Maintain a healthy weight. Lose weight if you need to.  · Do breathing exercises as directed by your health care provider.  · Keep all follow-up visits as told by your health care provider. This is important.  Contact a health care provider if you:  · Have symptoms that do not get better with medicines.  · Are not able to be as active as usual.  · Have trouble taking a deep breath.  · Have a fever or chills.  · Have blue lips or skin.  · Have clubbing of your fingers.  Get help right away if you:  · Have a sudden worsening of your symptoms.  · Have chest pain.  · Cough up mucus that is dark in color.  · Have a lot of headaches.  · Get very confused or sleepy.  Summary  · Pulmonary fibrosis is a type of lung disease that causes scar tissue to build up in the air sacs of your lungs (alveoli) over time. Less oxygen can get into your blood. This makes it hard for you to breathe.  · Scarring from pulmonary fibrosis gets worse over time. This damage is permanent and may lead to other serious health problems.  · You are more likely to develop this condition if you have a family history of the condition or a job that exposes you to certain chemicals.  · There is no cure for pulmonary fibrosis. Treatment focuses on managing symptoms and preventing scarring from getting worse.  This information is not intended to replace advice given to you by your health care provider. Make sure you discuss any questions you have with your health care provider.  Document Revised: 01/23/2019 Document Reviewed: 01/23/2019  [facility] Patient Education © 2021 [employer]

## 2021-11-29 ENCOUNTER — TELEPHONE (OUTPATIENT)
Dept: PULMONOLOGY | Facility: CLINIC | Age: 71
End: 2021-11-29

## 2021-11-29 NOTE — TELEPHONE ENCOUNTER
[employer]rin[employer] approval #[id number]  Effective 08/30/2021 - 11/29/2022.    Per Zoila @ [phone]

## 2021-12-17 ENCOUNTER — HOSPITAL ENCOUNTER (OUTPATIENT)
Dept: CARDIOLOGY | Facility: HOSPITAL | Age: 71
Setting detail: HOSPITAL OUTPATIENT SURGERY
Discharge: HOME | End: 2021-12-17

## 2021-12-17 ENCOUNTER — HOSPITAL ENCOUNTER (OUTPATIENT)
Dept: CT IMAGING | Facility: HOSPITAL | Age: 71
Discharge: HOME | End: 2021-12-17

## 2021-12-17 ENCOUNTER — HOSPITAL ENCOUNTER (OUTPATIENT)
Dept: CARDIOLOGY | Facility: HOSPITAL | Age: 71
Discharge: HOME | End: 2021-12-17

## 2021-12-17 DIAGNOSIS — I26.09 ACUTE PULMONARY EMBOLISM WITH ACUTE COR PULMONALE, UNSPECIFIED PULMONARY EMBOLISM TYPE (HCC): ICD-10-CM

## 2021-12-17 DIAGNOSIS — M79.89 LEG SWELLING: ICD-10-CM

## 2021-12-17 DIAGNOSIS — I82.403 ACUTE DEEP VEIN THROMBOSIS (DVT) OF BOTH LOWER EXTREMITIES, UNSPECIFIED VEIN (HCC): ICD-10-CM

## 2021-12-17 LAB
BH CV LOW VAS RIGHT SOLEAL VESSEL: 1
BH CV LOWER VASCULAR LEFT COMMON FEMORAL AUGMENT: NORMAL
BH CV LOWER VASCULAR LEFT COMMON FEMORAL COMPETENT: NORMAL
BH CV LOWER VASCULAR LEFT COMMON FEMORAL COMPRESS: NORMAL
BH CV LOWER VASCULAR LEFT COMMON FEMORAL PHASIC: NORMAL
BH CV LOWER VASCULAR LEFT COMMON FEMORAL SPONT: NORMAL
BH CV LOWER VASCULAR LEFT DISTAL FEMORAL COMPRESS: NORMAL
BH CV LOWER VASCULAR LEFT GREATER SAPH AK COMPRESS: NORMAL
BH CV LOWER VASCULAR LEFT GREATER SAPH BK COMPRESS: NORMAL
BH CV LOWER VASCULAR LEFT LESSER SAPH COMPRESS: NORMAL
BH CV LOWER VASCULAR LEFT MID FEMORAL AUGMENT: NORMAL
BH CV LOWER VASCULAR LEFT MID FEMORAL COMPETENT: NORMAL
BH CV LOWER VASCULAR LEFT MID FEMORAL COMPRESS: NORMAL
BH CV LOWER VASCULAR LEFT MID FEMORAL PHASIC: NORMAL
BH CV LOWER VASCULAR LEFT MID FEMORAL SPONT: NORMAL
BH CV LOWER VASCULAR LEFT PERONEAL COMPRESS: NORMAL
BH CV LOWER VASCULAR LEFT POPLITEAL AUGMENT: NORMAL
BH CV LOWER VASCULAR LEFT POPLITEAL COMPETENT: NORMAL
BH CV LOWER VASCULAR LEFT POPLITEAL COMPRESS: NORMAL
BH CV LOWER VASCULAR LEFT POPLITEAL PHASIC: NORMAL
BH CV LOWER VASCULAR LEFT POPLITEAL SPONT: NORMAL
BH CV LOWER VASCULAR LEFT POSTERIOR TIBIAL COMPRESS: NORMAL
BH CV LOWER VASCULAR LEFT PROXIMAL FEMORAL COMPRESS: NORMAL
BH CV LOWER VASCULAR LEFT SAPHENOFEMORAL JUNCTION COMPRESS: NORMAL
BH CV LOWER VASCULAR RIGHT COMMON FEMORAL AUGMENT: NORMAL
BH CV LOWER VASCULAR RIGHT COMMON FEMORAL COMPETENT: NORMAL
BH CV LOWER VASCULAR RIGHT COMMON FEMORAL COMPRESS: NORMAL
BH CV LOWER VASCULAR RIGHT COMMON FEMORAL PHASIC: NORMAL
BH CV LOWER VASCULAR RIGHT COMMON FEMORAL SPONT: NORMAL
BH CV LOWER VASCULAR RIGHT DISTAL FEMORAL COMPRESS: NORMAL
BH CV LOWER VASCULAR RIGHT GREATER SAPH AK COMPRESS: NORMAL
BH CV LOWER VASCULAR RIGHT GREATER SAPH BK COMPRESS: NORMAL
BH CV LOWER VASCULAR RIGHT LESSER SAPH COMPRESS: NORMAL
BH CV LOWER VASCULAR RIGHT MID FEMORAL AUGMENT: NORMAL
BH CV LOWER VASCULAR RIGHT MID FEMORAL COMPETENT: NORMAL
BH CV LOWER VASCULAR RIGHT MID FEMORAL COMPRESS: NORMAL
BH CV LOWER VASCULAR RIGHT MID FEMORAL PHASIC: NORMAL
BH CV LOWER VASCULAR RIGHT MID FEMORAL SPONT: NORMAL
BH CV LOWER VASCULAR RIGHT PERONEAL COMPRESS: NORMAL
BH CV LOWER VASCULAR RIGHT POPLITEAL AUGMENT: NORMAL
BH CV LOWER VASCULAR RIGHT POPLITEAL COMPETENT: NORMAL
BH CV LOWER VASCULAR RIGHT POPLITEAL COMPRESS: NORMAL
BH CV LOWER VASCULAR RIGHT POPLITEAL PHASIC: NORMAL
BH CV LOWER VASCULAR RIGHT POPLITEAL SPONT: NORMAL
BH CV LOWER VASCULAR RIGHT POSTERIOR TIBIAL COMPRESS: NORMAL
BH CV LOWER VASCULAR RIGHT PROXIMAL FEMORAL COMPRESS: NORMAL
BH CV LOWER VASCULAR RIGHT SAPHENOFEMORAL JUNCTION COMPRESS: NORMAL
BH CV LOWER VASCULAR RIGHT SOLEAL COMPRESS: NORMAL
BH CV LOWER VASCULAR RIGHT SOLEAL THROMBUS: NORMAL
CREAT BLDA-MCNC: 0.7 MG/DL
MAXIMAL PREDICTED HEART RATE: 149 BPM
STRESS TARGET HR: 127 BPM

## 2021-12-17 PROCEDURE — 93306 TTE W/DOPPLER COMPLETE: CPT | Performed by: INTERNAL MEDICINE

## 2021-12-17 PROCEDURE — 93306 TTE W/DOPPLER COMPLETE: CPT

## 2021-12-17 PROCEDURE — 71275 CT ANGIOGRAPHY CHEST: CPT

## 2021-12-17 PROCEDURE — 93970 EXTREMITY STUDY: CPT

## 2021-12-17 PROCEDURE — 93970 EXTREMITY STUDY: CPT | Performed by: SURGERY

## 2021-12-17 PROCEDURE — 0 IOPAMIDOL PER 1 ML: Performed by: NURSE PRACTITIONER

## 2021-12-17 PROCEDURE — 82565 ASSAY OF CREATININE: CPT

## 2021-12-17 RX ADMIN — IOPAMIDOL 100 ML: 755 INJECTION, SOLUTION INTRAVENOUS at 09:45

## 2021-12-21 LAB
ASCENDING AORTA: 3.2 CM
BH CV ECHO MEAS - AO ROOT DIAM: 3 CM
BH CV ECHO MEAS - EDV(MOD-SP2): 45.4 ML
BH CV ECHO MEAS - EDV(MOD-SP4): 50.5 ML
BH CV ECHO MEAS - EF(MOD-BP): 54 %
BH CV ECHO MEAS - ESV(MOD-SP2): 19.6 ML
BH CV ECHO MEAS - ESV(MOD-SP4): 23 ML
BH CV ECHO MEAS - IVSD: 1.1 CM
BH CV ECHO MEAS - LA DIMENSION(2D): 3 CM
BH CV ECHO MEAS - LAT PEAK E' VEL: 7.9 CM/SEC
BH CV ECHO MEAS - LVIDD: 4.1 CM
BH CV ECHO MEAS - LVIDS: 3 CM
BH CV ECHO MEAS - LVOT DIAM: 1.9 CM
BH CV ECHO MEAS - LVPWD: 1.1 CM
BH CV ECHO MEAS - MED PEAK E' VEL: 6.2 CM/SEC
BH CV ECHO MEAS - MV A MAX VEL: 68 CM/SEC
BH CV ECHO MEAS - MV DEC TIME: 261 MSEC
BH CV ECHO MEAS - MV E MAX VEL: 53 CM/SEC
BH CV ECHO MEAS - MV E/A: 0.8
BH CV ECHO MEAS - RVDD: 2 CM
BH CV ECHO MEAS - RVSP: 40 MMHG
BH CV ECHO MEAS - TAPSE (>1.6): 1.9 CM
BH CV ECHO MEAS - TR MAX PG: 35 MMHG
BH CV ECHO MEAS - TR MAX VEL: 296 CM/SEC
BH CV ECHO MEASUREMENTS AVERAGE E/E' RATIO: 7.52
IVRT: 92 MSEC
LEFT ATRIUM VOLUME INDEX: 18.4 ML/M2
MAXIMAL PREDICTED HEART RATE: 149 BPM
STRESS TARGET HR: 127 BPM

## 2022-01-10 ENCOUNTER — OFFICE VISIT (OUTPATIENT)
Dept: PULMONOLOGY | Facility: CLINIC | Age: 72
End: 2022-01-10

## 2022-01-10 ENCOUNTER — TELEPHONE (OUTPATIENT)
Dept: PULMONOLOGY | Facility: CLINIC | Age: 72
End: 2022-01-10

## 2022-01-10 VITALS
SYSTOLIC BLOOD PRESSURE: 100 MMHG | HEIGHT: 63 IN | WEIGHT: 139.2 LBS | OXYGEN SATURATION: 93 % | DIASTOLIC BLOOD PRESSURE: 43 MMHG | TEMPERATURE: 97.8 F | BODY MASS INDEX: 24.66 KG/M2 | HEART RATE: 80 BPM | RESPIRATION RATE: 16 BRPM

## 2022-01-10 DIAGNOSIS — Z79.899 HIGH RISK MEDICATION USE: ICD-10-CM

## 2022-01-10 DIAGNOSIS — D89.89 ANTISYNTHETASE SYNDROME: Primary | ICD-10-CM

## 2022-01-10 DIAGNOSIS — I82.403 ACUTE DEEP VEIN THROMBOSIS (DVT) OF BOTH LOWER EXTREMITIES, UNSPECIFIED VEIN: ICD-10-CM

## 2022-01-10 DIAGNOSIS — I27.20 PULMONARY HYPERTENSION: ICD-10-CM

## 2022-01-10 PROCEDURE — 99214 OFFICE O/P EST MOD 30 MIN: CPT | Performed by: INTERNAL MEDICINE

## 2022-01-10 RX ORDER — AZATHIOPRINE 50 MG/1
50 TABLET ORAL DAILY
Qty: 30 TABLET | Refills: 6 | Status: SHIPPED | OUTPATIENT
Start: 2022-01-10 | End: 2022-08-02 | Stop reason: SDUPTHER

## 2022-01-10 RX ORDER — APIXABAN 5 MG/1
TABLET, FILM COATED ORAL
COMMUNITY
Start: 2021-12-07

## 2022-01-10 NOTE — ASSESSMENT & PLAN NOTE
Patient with a CT scan showed significant improvement in the prior blood clots in the lung and in the legs    Patient currently on Eliquis 5 mg daily    Given the persistent left lower lobe clots and the persistent clots in her legs suspect she will likely need lifelong anticoagulation as she has developed chronic thromboembolic disease with persistent CT scan finding of clot in the left lower lobe despite anticoagulation    Spoke with hematologist  while in the room with the patient today

## 2022-01-10 NOTE — PROGRESS NOTES
"Chief Complaint  Asthma (3mo f/ u), Shortness of Breath, and Cough    Subjective          Christina Espinoza presents to [facility] PULMONARY & CRITICAL CARE MEDICINE  History of Present Illness  Patient is a 72-year-old female very well-known to me she has history of antisynthetase syndrome, recent infection back in the fall 2021 with COVID-19 located by numerous pulmonary emboli here today for follow-up.  The patient has been off Imuran for a few months that she was given Actemra in the hospital, her cough is now came back she had a CT scan recently from December 2021 showing numerous areas of groundglass consolidation.  Prior to the Covid the patient's ILD was very well controlled on Imuran.  Her cough is dry it is nonproductive, she complains of no muscle weakness at this time shortness of breath is improving since her infection with Covid but not back to her baseline  Objective   Vital Signs:   /43 (BP Location: Left arm, Patient Position: Sitting, Cuff Size: Adult)   Pulse 80   Temp 97.8 °F (36.6 °C) (Infrared)   Resp 16   Ht 158.8 cm (62.5\")   Wt 63.1 kg (139 lb 3.2 oz)   SpO2 93% Comment: room air  BMI 25.05 kg/m²     Physical Exam   Vital Signs Reviewed  General WDWN, Alert, NAD.    HEENT:  PERRL, EOMI.  OP, nares clear, no sinus tenderness  Neck:  Supple, no JVD, no thyromegaly  Lymph: no axillary, cervical, supraclavicular lymphadenopathy noted bilaterally  Chest: Crackles present both bases there is Velcro  CV: RRR, no MGR, pulses 2+, equal.  EXT:  no clubbing, no cyanosis, no edema, no joint tenderness  Neuro:  A&Ox3, CN grossly intact, no focal deficits.  Skin: No rashes or lesions noted  Result Review :     Common labs    Common Labsle 10/1/21 10/1/21 10/4/21 10/4/21 12/17/21    0524 0524 0525 0525    Glucose  105 (A)  86    BUN  40 (A)  24 (A)    Creatinine  0.66  0.63 0.70   eGFR Non African Am  88  93    Sodium  133 (A)  136    Potassium  5.1  4.7    Chloride  100  104  "   Calcium  9.2  8.8    Albumin  3.20 (A)  3.30 (A)    Total Bilirubin  1.1  1.0    Alkaline Phosphatase  97  84    AST (SGOT)  19  17    ALT (SGPT)  20  19    WBC 14.51 (A)  12.05 (A)     Hemoglobin 12.8  11.9 (A)     Hematocrit 41.8  36.9     Platelets 180  218     (A) Abnormal value       Comments are available for some flowsheets but are not being displayed.           Data reviewed: CT scan of the chest showing progressive fibrosis with groundglass opacities and residual blood clot in the left lower lobe          Assessment and Plan    Diagnoses and all orders for this visit:    1. 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HCC) (Primary)  Assessment & Plan:  At this time we will start patient back on Imuran 50 mg daily  Chronically in the past she has been on Imuran  CT scan does show progressive fibrosis  Esbriet has been initiated given CT scan findings  PFTs are scheduled and pending    Patient understands the high risk nature of Imuran in regards to increased risk for infection she has been on this medication for years      2. Acute deep vein thrombosis (DVT) of both lower extremities, unspecified vein (HCC)  Assessment & Plan:  Patient with a CT scan showed significant improvement in the prior blood clots in the lung and in the legs    Patient currently on Eliquis 5 mg daily    Given the persistent left lower lobe clots and the persistent clots in her legs suspect she will likely need lifelong anticoagulation as she has developed chronic thromboembolic disease with persistent CT scan finding of clot in the left lower lobe despite anticoagulation    Spoke with hematologist  while in the room with the patient today      3. High risk medication use  Assessment & Plan:  Continue to follow with serial CBCs for cytopenias      4. Pulmonary hypertension (HCC)  Assessment & Plan:  Patient does indeed have elevated PA pressure seen on recent echocardiogram suspect this is likely related to the patient's underlying  interstitial lung disease and residual clot    Continue with anticoagulation will likely need lifelong anticoagulation      Other orders  -     azaTHIOprine (Imuran) 50 MG tablet; Take 1 tablet by mouth Daily.  Dispense: 30 tablet; Refill: 6    Reviewed CT scan of the chest showing residual clot on left lower lobe, resume reviewed imaging labs, personally spoke with the patient's hematology oncologist while in the room with the patient    Follow Up   Return in about 3 months (around 4/10/2022).  Patient was given instructions and counseling regarding her condition or for health maintenance advice. Please see specific information pulled into the AVS if appropriate.

## 2022-01-10 NOTE — TELEPHONE ENCOUNTER
Patient wanted to know if you are starting her back on the Imuran? Patient stated she forgot to ask while here at her appt. Please advise?

## 2022-01-10 NOTE — ASSESSMENT & PLAN NOTE
At this time we will start patient back on Imuran 50 mg daily  Chronically in the past she has been on Imuran  CT scan does show progressive fibrosis  Esbriet has been initiated given CT scan findings  PFTs are scheduled and pending    Patient understands the high risk nature of Imuran in regards to increased risk for infection she has been on this medication for years

## 2022-01-10 NOTE — ASSESSMENT & PLAN NOTE
Patient does indeed have elevated PA pressure seen on recent echocardiogram suspect this is likely related to the patient's underlying interstitial lung disease and residual clot    Continue with anticoagulation will likely need lifelong anticoagulation

## 2022-01-12 ENCOUNTER — TELEPHONE (OUTPATIENT)
Dept: PULMONOLOGY | Facility: CLINIC | Age: 72
End: 2022-01-12

## 2022-01-12 NOTE — TELEPHONE ENCOUNTER
pt called and she said dr jang said if her cough continues to worsen to call back and get her an RX for it, she said [facility]

## 2022-01-17 ENCOUNTER — TRANSCRIBE ORDERS (OUTPATIENT)
Dept: ADMINISTRATIVE | Facility: HOSPITAL | Age: 72
End: 2022-01-17

## 2022-01-17 ENCOUNTER — LAB (OUTPATIENT)
Dept: LAB | Facility: HOSPITAL | Age: 72
End: 2022-01-17

## 2022-01-17 DIAGNOSIS — Z51.81 MEDICATION MONITORING ENCOUNTER: ICD-10-CM

## 2022-01-17 DIAGNOSIS — U07.1 COVID-19 VIRUS DETECTED: ICD-10-CM

## 2022-01-17 DIAGNOSIS — I82.4Y1 DEEP VEIN THROMBOSIS (DVT) OF PROXIMAL VEIN OF RIGHT LOWER EXTREMITY, UNSPECIFIED CHRONICITY: ICD-10-CM

## 2022-01-17 DIAGNOSIS — I26.99 BILATERAL PULMONARY EMBOLISM: ICD-10-CM

## 2022-01-17 DIAGNOSIS — I26.99 BILATERAL PULMONARY EMBOLISM: Primary | ICD-10-CM

## 2022-01-17 LAB
ALBUMIN SERPL-MCNC: 4.2 G/DL (ref 3.5–5.2)
ALBUMIN/GLOB SERPL: 1.6 G/DL
ALP SERPL-CCNC: 112 U/L (ref 39–117)
ALT SERPL W P-5'-P-CCNC: 17 U/L (ref 1–33)
ANION GAP SERPL CALCULATED.3IONS-SCNC: 10.9 MMOL/L (ref 5–15)
AST SERPL-CCNC: 21 U/L (ref 1–32)
AT III PPP CHRO-ACNC: 144 % (ref 80–120)
BASOPHILS # BLD AUTO: 0.04 10*3/MM3 (ref 0–0.2)
BASOPHILS NFR BLD AUTO: 0.3 % (ref 0–1.5)
BILIRUB SERPL-MCNC: 0.2 MG/DL (ref 0–1.2)
BUN SERPL-MCNC: 12 MG/DL (ref 8–23)
BUN/CREAT SERPL: 14.6 (ref 7–25)
CALCIUM SPEC-SCNC: 10.5 MG/DL (ref 8.6–10.5)
CHLORIDE SERPL-SCNC: 107 MMOL/L (ref 98–107)
CO2 SERPL-SCNC: 23.1 MMOL/L (ref 22–29)
CREAT SERPL-MCNC: 0.82 MG/DL (ref 0.57–1)
DEPRECATED RDW RBC AUTO: 40.1 FL (ref 37–54)
EOSINOPHIL # BLD AUTO: 0.03 10*3/MM3 (ref 0–0.4)
EOSINOPHIL NFR BLD AUTO: 0.2 % (ref 0.3–6.2)
ERYTHROCYTE [DISTWIDTH] IN BLOOD BY AUTOMATED COUNT: 12.4 % (ref 12.3–15.4)
GFR SERPL CREATININE-BSD FRML MDRD: 69 ML/MIN/1.73
GLOBULIN UR ELPH-MCNC: 2.7 GM/DL
GLUCOSE SERPL-MCNC: 109 MG/DL (ref 65–99)
HCT VFR BLD AUTO: 38.5 % (ref 34–46.6)
HGB BLD-MCNC: 12.3 G/DL (ref 12–15.9)
IMM GRANULOCYTES # BLD AUTO: 0.06 10*3/MM3 (ref 0–0.05)
IMM GRANULOCYTES NFR BLD AUTO: 0.5 % (ref 0–0.5)
LYMPHOCYTES # BLD AUTO: 1.24 10*3/MM3 (ref 0.7–3.1)
LYMPHOCYTES NFR BLD AUTO: 9.9 % (ref 19.6–45.3)
MCH RBC QN AUTO: 28.7 PG (ref 26.6–33)
MCHC RBC AUTO-ENTMCNC: 31.9 G/DL (ref 31.5–35.7)
MCV RBC AUTO: 89.7 FL (ref 79–97)
MONOCYTES # BLD AUTO: 0.4 10*3/MM3 (ref 0.1–0.9)
MONOCYTES NFR BLD AUTO: 3.2 % (ref 5–12)
NEUTROPHILS NFR BLD AUTO: 10.73 10*3/MM3 (ref 1.7–7)
NEUTROPHILS NFR BLD AUTO: 85.9 % (ref 42.7–76)
NRBC BLD AUTO-RTO: 0 /100 WBC (ref 0–0.2)
PLATELET # BLD AUTO: 290 10*3/MM3 (ref 140–450)
PMV BLD AUTO: 11.3 FL (ref 6–12)
POTASSIUM SERPL-SCNC: 4.3 MMOL/L (ref 3.5–5.2)
PROT SERPL-MCNC: 6.9 G/DL (ref 6–8.5)
RBC # BLD AUTO: 4.29 10*6/MM3 (ref 3.77–5.28)
SODIUM SERPL-SCNC: 141 MMOL/L (ref 136–145)
WBC NRBC COR # BLD: 12.5 10*3/MM3 (ref 3.4–10.8)

## 2022-01-17 PROCEDURE — 85613 RUSSELL VIPER VENOM DILUTED: CPT

## 2022-01-17 PROCEDURE — 85306 CLOT INHIBIT PROT S FREE: CPT

## 2022-01-17 PROCEDURE — 85705 THROMBOPLASTIN INHIBITION: CPT

## 2022-01-17 PROCEDURE — 85670 THROMBIN TIME PLASMA: CPT

## 2022-01-17 PROCEDURE — 85025 COMPLETE CBC W/AUTO DIFF WBC: CPT

## 2022-01-17 PROCEDURE — 81241 F5 GENE: CPT

## 2022-01-17 PROCEDURE — 80053 COMPREHEN METABOLIC PANEL: CPT

## 2022-01-17 PROCEDURE — 85303 CLOT INHIBIT PROT C ACTIVITY: CPT

## 2022-01-17 PROCEDURE — 85300 ANTITHROMBIN III ACTIVITY: CPT

## 2022-01-17 PROCEDURE — 36415 COLL VENOUS BLD VENIPUNCTURE: CPT

## 2022-01-17 PROCEDURE — 81240 F2 GENE: CPT

## 2022-01-17 PROCEDURE — 86147 CARDIOLIPIN ANTIBODY EA IG: CPT

## 2022-01-17 PROCEDURE — 85732 THROMBOPLASTIN TIME PARTIAL: CPT

## 2022-01-18 LAB
CARDIOLIPIN IGA SER IA-ACNC: <9 APL U/ML (ref 0–11)
CARDIOLIPIN IGG SER IA-ACNC: <9 GPL U/ML (ref 0–14)
CARDIOLIPIN IGM SER IA-ACNC: 9 MPL U/ML (ref 0–12)

## 2022-01-19 LAB
APTT SCREEN TO CONFIRM RATIO: 0.95 RATIO (ref 0–1.34)
CONFIRM APTT/NORMAL: 36.4 SEC (ref 0–47.6)
DRVVT SCREEN TO CONFIRM RATIO: 1 RATIO (ref 0.8–1.2)
F5 GENE MUT ANL BLD/T: NORMAL
FACTOR II, DNA ANALYSIS: NORMAL
LA 2 SCREEN W REFLEX-IMP: ABNORMAL
MIXING DRVVT: 45.6 SEC (ref 0–40.4)
PROT C ACT/NOR PPP: 179 % (ref 73–180)
PROT S ACT/NOR PPP: 106 % (ref 63–140)
SCREEN APTT: 34.3 SEC (ref 0–51.9)
SCREEN DRVVT: 52.6 SEC (ref 0–47)
THROMBIN TIME: 18.4 SEC (ref 0–23)

## 2022-03-07 ENCOUNTER — TELEPHONE (OUTPATIENT)
Dept: PULMONOLOGY | Facility: CLINIC | Age: 72
End: 2022-03-07

## 2022-03-09 DIAGNOSIS — B37.31 VAGINAL YEAST INFECTION: Primary | ICD-10-CM

## 2022-03-09 RX ORDER — FLUCONAZOLE 200 MG/1
200 TABLET ORAL DAILY
Qty: 3 TABLET | Refills: 0 | Status: SHIPPED | OUTPATIENT
Start: 2022-03-09 | End: 2022-03-09 | Stop reason: SDUPTHER

## 2022-03-09 RX ORDER — FLUCONAZOLE 200 MG/1
200 TABLET ORAL DAILY
Qty: 3 TABLET | Refills: 0 | Status: SHIPPED | OUTPATIENT
Start: 2022-03-09 | End: 2022-03-12

## 2022-03-11 ENCOUNTER — TELEPHONE (OUTPATIENT)
Dept: PULMONOLOGY | Facility: CLINIC | Age: 72
End: 2022-03-11

## 2022-03-11 NOTE — TELEPHONE ENCOUNTER
Pt called stating that she was very Fatigued. Called pt to see what's going on and she states that she can barely get around the house and that she believes that the only thing that could help her is an increase in her Imuran. Please advise, thank you!

## 2022-03-14 DIAGNOSIS — R53.83 FATIGUE, UNSPECIFIED TYPE: Primary | ICD-10-CM

## 2022-03-14 RX ORDER — AZATHIOPRINE 75 MG/1
75 TABLET ORAL DAILY
Qty: 30 TABLET | Refills: 0 | Status: SHIPPED | OUTPATIENT
Start: 2022-03-14 | End: 2022-08-02 | Stop reason: SDUPTHER

## 2022-03-18 ENCOUNTER — TELEPHONE (OUTPATIENT)
Dept: PULMONOLOGY | Facility: CLINIC | Age: 72
End: 2022-03-18

## 2022-03-18 NOTE — TELEPHONE ENCOUNTER
Pt called and still is having vaginal discomfort and was wondering if there is anything else you could give her. I did advise pt to call her primary care provider or go to an urgent care if it becomes unbearable.   I did mention to pt that she may not get a reply today. She does use [facility] so she will be able to see if anything gets send over. Please advise, thank you!

## 2022-03-24 DIAGNOSIS — J47.9 BRONCHIECTASIS WITHOUT COMPLICATION: Primary | ICD-10-CM

## 2022-03-24 DIAGNOSIS — J45.909 ASTHMA, UNSPECIFIED ASTHMA SEVERITY, UNSPECIFIED WHETHER COMPLICATED, UNSPECIFIED WHETHER PERSISTENT: ICD-10-CM

## 2022-03-24 RX ORDER — PREDNISONE 10 MG/1
TABLET ORAL
Qty: 31 TABLET | Refills: 0 | OUTPATIENT
Start: 2022-03-24 | End: 2022-05-15

## 2022-03-24 NOTE — TELEPHONE ENCOUNTER
Patient is needing a refill of her predniSONE (DELTASONE) 10 MG tablet [facility] [address]. Thank you.

## 2022-03-28 ENCOUNTER — TELEPHONE (OUTPATIENT)
Dept: PULMONOLOGY | Facility: CLINIC | Age: 72
End: 2022-03-28

## 2022-03-28 DIAGNOSIS — J47.9 BRONCHIECTASIS WITHOUT COMPLICATION: Primary | ICD-10-CM

## 2022-03-28 RX ORDER — PREDNISONE 10 MG/1
10 TABLET ORAL DAILY
Qty: 30 TABLET | Refills: 2 | Status: SHIPPED | OUTPATIENT
Start: 2022-03-28 | End: 2022-07-15

## 2022-03-28 NOTE — TELEPHONE ENCOUNTER
Patient called with questions regarding her Prednisone, answered her questions and sent in a refill of her 10mg of prednisone that she takes daily per . Pt also stated that she is having stomach upset due to taking the Esbriet. Spoke to  and pt was given two options, to either stop the medicine or to take something to help with stomach upset. Pt decided to stop the medicine at this time.

## 2022-04-15 ENCOUNTER — TELEPHONE (OUTPATIENT)
Dept: PULMONOLOGY | Facility: CLINIC | Age: 72
End: 2022-04-15

## 2022-04-15 NOTE — TELEPHONE ENCOUNTER
Called patient to confirm follow up appointment with Dr. Carrion on 05/31/2022 @ 10:30am.  Patient states she knew it was in May but did not remember the date and time.  Also, inquired how patient is feeling since stopping Esbriet.  Patient states she had to stop because of the nausea and did not wish to take any additional medications to assist with the nausea.  She reports no new or worsening symptoms at this time.

## 2022-05-31 ENCOUNTER — OFFICE VISIT (OUTPATIENT)
Dept: PULMONOLOGY | Facility: CLINIC | Age: 72
End: 2022-05-31

## 2022-05-31 ENCOUNTER — LAB (OUTPATIENT)
Dept: LAB | Facility: HOSPITAL | Age: 72
End: 2022-05-31

## 2022-05-31 VITALS
WEIGHT: 139 LBS | DIASTOLIC BLOOD PRESSURE: 49 MMHG | RESPIRATION RATE: 18 BRPM | HEIGHT: 63 IN | OXYGEN SATURATION: 98 % | SYSTOLIC BLOOD PRESSURE: 107 MMHG | HEART RATE: 84 BPM | BODY MASS INDEX: 24.63 KG/M2 | TEMPERATURE: 98 F

## 2022-05-31 DIAGNOSIS — R06.02 SOB (SHORTNESS OF BREATH): ICD-10-CM

## 2022-05-31 DIAGNOSIS — I82.403 ACUTE DEEP VEIN THROMBOSIS (DVT) OF BOTH LOWER EXTREMITIES, UNSPECIFIED VEIN: ICD-10-CM

## 2022-05-31 DIAGNOSIS — D89.89 ANTISYNTHETASE SYNDROME: ICD-10-CM

## 2022-05-31 DIAGNOSIS — J45.909 ASTHMA, UNSPECIFIED ASTHMA SEVERITY, UNSPECIFIED WHETHER COMPLICATED, UNSPECIFIED WHETHER PERSISTENT: ICD-10-CM

## 2022-05-31 DIAGNOSIS — D84.9 IMMUNOSUPPRESSED STATUS: ICD-10-CM

## 2022-05-31 DIAGNOSIS — J84.9 ILD (INTERSTITIAL LUNG DISEASE): ICD-10-CM

## 2022-05-31 DIAGNOSIS — I27.24 CTEPH (CHRONIC THROMBOEMBOLIC PULMONARY HYPERTENSION): Primary | ICD-10-CM

## 2022-05-31 LAB
ALBUMIN SERPL-MCNC: 4 G/DL (ref 3.5–5.2)
ALBUMIN/GLOB SERPL: 1.7 G/DL
ALP SERPL-CCNC: 125 U/L (ref 39–117)
ALT SERPL W P-5'-P-CCNC: 25 U/L (ref 1–33)
ANION GAP SERPL CALCULATED.3IONS-SCNC: 10.6 MMOL/L (ref 5–15)
AST SERPL-CCNC: 25 U/L (ref 1–32)
BASOPHILS # BLD AUTO: 0.04 10*3/MM3 (ref 0–0.2)
BASOPHILS NFR BLD AUTO: 0.4 % (ref 0–1.5)
BILIRUB SERPL-MCNC: 0.4 MG/DL (ref 0–1.2)
BUN SERPL-MCNC: 15 MG/DL (ref 8–23)
BUN/CREAT SERPL: 20 (ref 7–25)
CALCIUM SPEC-SCNC: 10.6 MG/DL (ref 8.6–10.5)
CHLORIDE SERPL-SCNC: 105 MMOL/L (ref 98–107)
CO2 SERPL-SCNC: 22.4 MMOL/L (ref 22–29)
CREAT SERPL-MCNC: 0.75 MG/DL (ref 0.57–1)
DEPRECATED RDW RBC AUTO: 42.3 FL (ref 37–54)
EGFRCR SERPLBLD CKD-EPI 2021: 84.7 ML/MIN/1.73
EOSINOPHIL # BLD AUTO: 0.05 10*3/MM3 (ref 0–0.4)
EOSINOPHIL NFR BLD AUTO: 0.4 % (ref 0.3–6.2)
ERYTHROCYTE [DISTWIDTH] IN BLOOD BY AUTOMATED COUNT: 12.9 % (ref 12.3–15.4)
GLOBULIN UR ELPH-MCNC: 2.4 GM/DL
GLUCOSE SERPL-MCNC: 85 MG/DL (ref 65–99)
HCT VFR BLD AUTO: 36.7 % (ref 34–46.6)
HGB BLD-MCNC: 11.9 G/DL (ref 12–15.9)
IMM GRANULOCYTES # BLD AUTO: 0.05 10*3/MM3 (ref 0–0.05)
IMM GRANULOCYTES NFR BLD AUTO: 0.4 % (ref 0–0.5)
LYMPHOCYTES # BLD AUTO: 0.85 10*3/MM3 (ref 0.7–3.1)
LYMPHOCYTES NFR BLD AUTO: 7.5 % (ref 19.6–45.3)
MCH RBC QN AUTO: 29.1 PG (ref 26.6–33)
MCHC RBC AUTO-ENTMCNC: 32.4 G/DL (ref 31.5–35.7)
MCV RBC AUTO: 89.7 FL (ref 79–97)
MONOCYTES # BLD AUTO: 0.4 10*3/MM3 (ref 0.1–0.9)
MONOCYTES NFR BLD AUTO: 3.5 % (ref 5–12)
NEUTROPHILS NFR BLD AUTO: 87.8 % (ref 42.7–76)
NEUTROPHILS NFR BLD AUTO: 9.96 10*3/MM3 (ref 1.7–7)
NRBC BLD AUTO-RTO: 0 /100 WBC (ref 0–0.2)
PLATELET # BLD AUTO: 310 10*3/MM3 (ref 140–450)
PMV BLD AUTO: 10.6 FL (ref 6–12)
POTASSIUM SERPL-SCNC: 4.5 MMOL/L (ref 3.5–5.2)
PROT SERPL-MCNC: 6.4 G/DL (ref 6–8.5)
RBC # BLD AUTO: 4.09 10*6/MM3 (ref 3.77–5.28)
SODIUM SERPL-SCNC: 138 MMOL/L (ref 136–145)
WBC NRBC COR # BLD: 11.35 10*3/MM3 (ref 3.4–10.8)

## 2022-05-31 PROCEDURE — 80053 COMPREHEN METABOLIC PANEL: CPT

## 2022-05-31 PROCEDURE — 85025 COMPLETE CBC W/AUTO DIFF WBC: CPT

## 2022-05-31 PROCEDURE — 99214 OFFICE O/P EST MOD 30 MIN: CPT | Performed by: INTERNAL MEDICINE

## 2022-05-31 PROCEDURE — 36415 COLL VENOUS BLD VENIPUNCTURE: CPT

## 2022-05-31 RX ORDER — PIRFENIDONE 267 MG/1
CAPSULE ORAL
COMMUNITY
Start: 2022-03-17 | End: 2023-03-24

## 2022-05-31 RX ORDER — AZATHIOPRINE 50 MG/1
75 TABLET ORAL DAILY
Qty: 60 TABLET | Refills: 4 | Status: SHIPPED | OUTPATIENT
Start: 2022-05-31 | End: 2022-08-02 | Stop reason: SDUPTHER

## 2022-05-31 RX ORDER — DULOXETIN HYDROCHLORIDE 30 MG/1
CAPSULE, DELAYED RELEASE ORAL
COMMUNITY
Start: 2022-03-24 | End: 2023-03-16

## 2022-05-31 RX ORDER — PANTOPRAZOLE SODIUM 40 MG/1
TABLET, DELAYED RELEASE ORAL
COMMUNITY
Start: 2022-03-24 | End: 2023-03-24

## 2022-05-31 RX ORDER — CEPHALEXIN 500 MG/1
CAPSULE ORAL
COMMUNITY
Start: 2022-04-14 | End: 2023-03-16

## 2022-06-01 PROBLEM — I27.24 CTEPH (CHRONIC THROMBOEMBOLIC PULMONARY HYPERTENSION): Status: ACTIVE | Noted: 2022-06-01

## 2022-06-01 NOTE — ASSESSMENT & PLAN NOTE
Patient is a chronically immune suppressed on Imuran and prednisone    Continue to follow with serial CBCs

## 2022-06-01 NOTE — ASSESSMENT & PLAN NOTE
Patient does have significant asthmatic component seen on prior PFTs we will continue triple therapy with Breztri

## 2022-06-01 NOTE — ASSESSMENT & PLAN NOTE
Concerned patient could have some CHF given her worsening shortness of breath and history of recent clot with failure of the clot tube completely resolved    At this time we will rescan patient and obtain CT scan of the chest with contrast    Obtain echocardiogram to assess for high PA pressures

## 2022-06-01 NOTE — PROGRESS NOTES
"Chief Complaint  Follow-up, Asthma, Pulmonary fibrosis, Cough, and Shortness of Breath (Has gotten worse. )    Subjective        Christina Espinoza presents to [facility] PULMONARY & CRITICAL CARE MEDICINE  History of Present Illness  Patient with Antocin today syndrome here for follow-up  Feels breathing has gotten worse  Is concerned that her blood clots have not gone completely away  Did have CT scan showing persistent clots after initiation of Eliquis  No lower extremity edema  Denies chest pain  Does have severe shortness of breath with exertional activities worse with exertion better with rest  Has been bad since having her diagnosis of COVID and her blood clots back in November  Objective   Vital Signs:  /49 (BP Location: Left arm, Patient Position: Sitting, Cuff Size: Adult)   Pulse 84   Temp 98 °F (36.7 °C)   Resp 18   Ht 158.8 cm (62.5\")   Wt 63 kg (139 lb)   SpO2 98%   BMI 25.02 kg/m²           Physical Exam   Vital Signs Reviewed  General thin, Alert, NAD.    HEENT:  PERRL, EOMI.  OP, nares clear, no sinus tenderness  Neck:  Supple, no JVD, no thyromegaly  Lymph: no axillary, cervical, supraclavicular lymphadenopathy noted bilaterally  Chest: Scattered Velcro-like crackles   CV: RRR, no MGR, pulses 2+, equal.  Abd:  Soft, NT, ND, + BS, no HSM  EXT:  no clubbing, no cyanosis, no edema, no joint tenderness  Neuro:  A&Ox3, CN grossly intact, no focal deficits.  Skin: No rashes or lesions noted  Result Review :                Assessment and Plan   Diagnoses and all orders for this visit:    1. CTEPH (chronic thromboembolic pulmonary hypertension) (HCC) (Primary)  Assessment & Plan:  Concerned patient could have some CHF given her worsening shortness of breath and history of recent clot with failure of the clot tube completely resolved    At this time we will rescan patient and obtain CT scan of the chest with contrast    Obtain echocardiogram to assess for high PA " pressures    Orders:  -     CT Chest With Contrast; Future  -     Adult Transthoracic Echo Complete w/ Color, Spectral and Contrast if necessary per protocol; Future    2. ILD (interstitial lung disease) ([facility])  -     Full Pulmonary Function Test With Bronchodilator; Future    3. SOB (shortness of breath)  -     CBC & Differential; Future  -     Comprehensive Metabolic Panel; Future    4. Acute deep vein thrombosis (DVT) of both lower extremities, unspecified vein ([facility])  Assessment & Plan:  Continue Eliquis      5. 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[facility])  Assessment & Plan:  Continue prednisone  Continue Imuran  Check CMP and CBC      6. Asthma, unspecified asthma severity, unspecified whether complicated, unspecified whether persistent  Assessment & Plan:  Patient does have significant asthmatic component seen on prior PFTs we will continue triple therapy with Breztri        7. Immunosuppressed status ([facility])  Assessment & Plan:  Patient is a chronically immune suppressed on Imuran and prednisone    Continue to follow with serial CBCs      Other orders  -     azaTHIOprine (Imuran) 50 MG tablet; Take 1.5 tablets by mouth Daily.  Dispense: 60 tablet; Refill: 4           Follow Up   No follow-ups on file.  Patient was given instructions and counseling regarding her condition or for health maintenance advice. Please see specific information pulled into the AVS if appropriate.

## 2022-06-14 ENCOUNTER — HOSPITAL ENCOUNTER (OUTPATIENT)
Dept: RESPIRATORY THERAPY | Facility: HOSPITAL | Age: 72
Discharge: HOME | End: 2022-06-14
Admitting: INTERNAL MEDICINE

## 2022-06-14 DIAGNOSIS — J84.9 ILD (INTERSTITIAL LUNG DISEASE): ICD-10-CM

## 2022-06-14 PROCEDURE — 94060 EVALUATION OF WHEEZING: CPT | Performed by: INTERNAL MEDICINE

## 2022-06-14 PROCEDURE — 94726 PLETHYSMOGRAPHY LUNG VOLUMES: CPT | Performed by: INTERNAL MEDICINE

## 2022-06-14 PROCEDURE — 94726 PLETHYSMOGRAPHY LUNG VOLUMES: CPT

## 2022-06-14 PROCEDURE — 94729 DIFFUSING CAPACITY: CPT | Performed by: INTERNAL MEDICINE

## 2022-06-14 PROCEDURE — 94729 DIFFUSING CAPACITY: CPT

## 2022-06-14 PROCEDURE — 94060 EVALUATION OF WHEEZING: CPT

## 2022-06-14 RX ORDER — ALBUTEROL SULFATE 2.5 MG/3ML
2.5 SOLUTION RESPIRATORY (INHALATION) ONCE
Status: COMPLETED | OUTPATIENT
Start: 2022-06-14 | End: 2022-06-14

## 2022-06-14 RX ADMIN — ALBUTEROL SULFATE 2.5 MG: 2.5 SOLUTION RESPIRATORY (INHALATION) at 15:26

## 2022-06-27 ENCOUNTER — HOSPITAL ENCOUNTER (OUTPATIENT)
Dept: CT IMAGING | Facility: HOSPITAL | Age: 72
Discharge: HOME | End: 2022-06-27

## 2022-06-27 ENCOUNTER — HOSPITAL ENCOUNTER (OUTPATIENT)
Dept: CARDIOLOGY | Facility: HOSPITAL | Age: 72
Discharge: HOME | End: 2022-06-27

## 2022-06-27 DIAGNOSIS — I27.24 CTEPH (CHRONIC THROMBOEMBOLIC PULMONARY HYPERTENSION): ICD-10-CM

## 2022-06-27 PROCEDURE — 0 IOPAMIDOL PER 1 ML: Performed by: INTERNAL MEDICINE

## 2022-06-27 PROCEDURE — 71260 CT THORAX DX C+: CPT

## 2022-06-27 PROCEDURE — 93306 TTE W/DOPPLER COMPLETE: CPT | Performed by: INTERNAL MEDICINE

## 2022-06-27 PROCEDURE — 93306 TTE W/DOPPLER COMPLETE: CPT

## 2022-06-27 RX ADMIN — IOPAMIDOL 100 ML: 755 INJECTION, SOLUTION INTRAVENOUS at 15:17

## 2022-06-28 LAB
ASCENDING AORTA: 3.1 CM
BH CV ECHO MEAS - AO ROOT DIAM: 2.8 CM
BH CV ECHO MEAS - EDV(MOD-SP2): 44 ML
BH CV ECHO MEAS - EDV(MOD-SP4): 70 ML
BH CV ECHO MEAS - EF(MOD-BP): 58 %
BH CV ECHO MEAS - ESV(MOD-SP2): 23 ML
BH CV ECHO MEAS - ESV(MOD-SP4): 24 ML
BH CV ECHO MEAS - IVSD: 1.1 CM
BH CV ECHO MEAS - LA DIMENSION: 3 CM
BH CV ECHO MEAS - LAT PEAK E' VEL: 9 CM/SEC
BH CV ECHO MEAS - LVIDD: 4 CM
BH CV ECHO MEAS - LVIDS: 2.4 CM
BH CV ECHO MEAS - LVOT DIAM: 2 CM
BH CV ECHO MEAS - LVPWD: 1 CM
BH CV ECHO MEAS - MED PEAK E' VEL: 6.71 CM/SEC
BH CV ECHO MEAS - MR MAX PG: 91 MMHG
BH CV ECHO MEAS - MR MAX VEL: 477 CM/SEC
BH CV ECHO MEAS - MR MEAN PG: 57 MMHG
BH CV ECHO MEAS - MR MEAN VEL: 353 CM/SEC
BH CV ECHO MEAS - MR VTI: 138 CM
BH CV ECHO MEAS - MV A MAX VEL: 100 CM/SEC
BH CV ECHO MEAS - MV DEC TIME: 216 MSEC
BH CV ECHO MEAS - MV E MAX VEL: 76.7 CM/SEC
BH CV ECHO MEAS - MV E/A: 0.8
BH CV ECHO MEAS - RVDD: 2.3 CM
BH CV ECHO MEAS - RVSP: 44 MMHG
BH CV ECHO MEAS - TR MAX PG: 39 MMHG
BH CV ECHO MEAS - TR MAX VEL: 314 CM/SEC
BH CV ECHO MEASUREMENTS AVERAGE E/E' RATIO: 9.76
IVRT: 108 MSEC
LEFT ATRIUM VOLUME INDEX: 22.4 ML/M2
LV EF 2D ECHO EST: 60 %
MAXIMAL PREDICTED HEART RATE: 148 BPM
STRESS TARGET HR: 126 BPM

## 2022-07-01 DIAGNOSIS — J47.9 BRONCHIECTASIS WITHOUT ACUTE EXACERBATION: Primary | ICD-10-CM

## 2022-07-01 NOTE — TELEPHONE ENCOUNTER
Patient stated she is needing a refill on her Budeson-Glycopyrrol-Formoterol (BREZTRI) 160-9-4.8 MCG/ACT aerosol inhaler, she states that she has already contacted her pharmacy which is [facility] who infrommed her that she is out of refills. Please advise.

## 2022-07-15 DIAGNOSIS — J47.9 BRONCHIECTASIS WITHOUT COMPLICATION: ICD-10-CM

## 2022-07-15 RX ORDER — PREDNISONE 10 MG/1
TABLET ORAL
Qty: 30 TABLET | Refills: 0 | Status: SHIPPED | OUTPATIENT
Start: 2022-07-15 | End: 2022-08-02 | Stop reason: SDUPTHER

## 2022-08-02 ENCOUNTER — OFFICE VISIT (OUTPATIENT)
Dept: PULMONOLOGY | Facility: CLINIC | Age: 72
End: 2022-08-02

## 2022-08-02 VITALS
OXYGEN SATURATION: 96 % | HEIGHT: 63 IN | HEART RATE: 95 BPM | TEMPERATURE: 97.5 F | WEIGHT: 134 LBS | BODY MASS INDEX: 23.74 KG/M2 | DIASTOLIC BLOOD PRESSURE: 54 MMHG | RESPIRATION RATE: 16 BRPM | SYSTOLIC BLOOD PRESSURE: 108 MMHG

## 2022-08-02 DIAGNOSIS — Z79.899 HIGH RISK MEDICATION USE: ICD-10-CM

## 2022-08-02 DIAGNOSIS — I27.24 CTEPH (CHRONIC THROMBOEMBOLIC PULMONARY HYPERTENSION): ICD-10-CM

## 2022-08-02 DIAGNOSIS — J47.9 BRONCHIECTASIS WITHOUT COMPLICATION: ICD-10-CM

## 2022-08-02 DIAGNOSIS — J84.10 PULMONARY FIBROSIS: ICD-10-CM

## 2022-08-02 DIAGNOSIS — R05.9 COUGH: ICD-10-CM

## 2022-08-02 DIAGNOSIS — I27.20 PULMONARY HYPERTENSION: ICD-10-CM

## 2022-08-02 DIAGNOSIS — J98.4 RESTRICTIVE LUNG DISEASE: Primary | ICD-10-CM

## 2022-08-02 DIAGNOSIS — J45.909 ASTHMA, UNSPECIFIED ASTHMA SEVERITY, UNSPECIFIED WHETHER COMPLICATED, UNSPECIFIED WHETHER PERSISTENT: ICD-10-CM

## 2022-08-02 PROCEDURE — 99214 OFFICE O/P EST MOD 30 MIN: CPT | Performed by: NURSE PRACTITIONER

## 2022-08-02 RX ORDER — AZATHIOPRINE 50 MG/1
75 TABLET ORAL DAILY
Qty: 60 TABLET | Refills: 4 | Status: SHIPPED | OUTPATIENT
Start: 2022-08-02 | End: 2022-10-20 | Stop reason: SDUPTHER

## 2022-08-02 RX ORDER — PREDNISONE 10 MG/1
10 TABLET ORAL DAILY
Qty: 30 TABLET | Refills: 2 | Status: SHIPPED | OUTPATIENT
Start: 2022-08-02 | End: 2022-08-09 | Stop reason: SDUPTHER

## 2022-08-09 ENCOUNTER — TELEPHONE (OUTPATIENT)
Dept: PULMONOLOGY | Facility: CLINIC | Age: 72
End: 2022-08-09

## 2022-08-09 DIAGNOSIS — J45.909 ASTHMA, UNSPECIFIED ASTHMA SEVERITY, UNSPECIFIED WHETHER COMPLICATED, UNSPECIFIED WHETHER PERSISTENT: Primary | ICD-10-CM

## 2022-08-09 DIAGNOSIS — J47.9 BRONCHIECTASIS WITHOUT ACUTE EXACERBATION: ICD-10-CM

## 2022-08-09 DIAGNOSIS — J47.9 BRONCHIECTASIS WITHOUT COMPLICATION: ICD-10-CM

## 2022-08-09 RX ORDER — ALBUTEROL SULFATE 2.5 MG/3ML
2.5 SOLUTION RESPIRATORY (INHALATION) EVERY 4 HOURS PRN
Qty: 120 EACH | Refills: 5 | Status: SHIPPED | OUTPATIENT
Start: 2022-08-09

## 2022-08-09 RX ORDER — PREDNISONE 10 MG/1
10 TABLET ORAL DAILY
Qty: 30 TABLET | Refills: 3 | Status: SHIPPED | OUTPATIENT
Start: 2022-08-09 | End: 2022-11-04 | Stop reason: SDUPTHER

## 2022-08-09 NOTE — TELEPHONE ENCOUNTER
Patient is needing a printed out rx for albuterol (PROVENTIL) (2.5 MG/3ML) 0.083% nebulizer solution,Budeson-Glycopyrrol-Formoterol (BREZTRI) 160-9-4.8 MCG/ACT aerosol inhaler,predniSONE (DELTASONE) 10 MG tablet. Please contact her when this is ready.

## 2022-08-10 ENCOUNTER — TRANSCRIBE ORDERS (OUTPATIENT)
Dept: ADMINISTRATIVE | Facility: HOSPITAL | Age: 72
End: 2022-08-10

## 2022-08-10 DIAGNOSIS — M81.0 OSTEOPOROSIS, POSTMENOPAUSAL: ICD-10-CM

## 2022-08-10 DIAGNOSIS — Z12.31 ENCOUNTER FOR SCREENING MAMMOGRAM FOR MALIGNANT NEOPLASM OF BREAST: Primary | ICD-10-CM

## 2022-08-24 ENCOUNTER — TELEPHONE (OUTPATIENT)
Dept: PULMONOLOGY | Facility: CLINIC | Age: 72
End: 2022-08-24

## 2022-09-09 ENCOUNTER — TELEPHONE (OUTPATIENT)
Dept: PULMONOLOGY | Facility: CLINIC | Age: 72
End: 2022-09-09

## 2022-09-09 NOTE — TELEPHONE ENCOUNTER
Called patient to follow up on Esbriet therapy.  Patient states she stopped taking it about 4 months ago because she was taking so many medications.  She states she will make an appointment to come and see about restarting the Esbriet again, after her visit with her family out of town.

## 2022-10-03 ENCOUNTER — TRANSCRIBE ORDERS (OUTPATIENT)
Dept: GENERAL RADIOLOGY | Facility: HOSPITAL | Age: 72
End: 2022-10-03

## 2022-10-03 ENCOUNTER — HOSPITAL ENCOUNTER (OUTPATIENT)
Dept: GENERAL RADIOLOGY | Facility: HOSPITAL | Age: 72
Discharge: HOME | End: 2022-10-03

## 2022-10-03 ENCOUNTER — APPOINTMENT (OUTPATIENT)
Dept: RESPIRATORY THERAPY | Facility: HOSPITAL | Age: 72
End: 2022-10-03

## 2022-10-03 DIAGNOSIS — M06.9 RHEUMATOID ARTHRITIS, INVOLVING UNSPECIFIED SITE, UNSPECIFIED WHETHER RHEUMATOID FACTOR PRESENT: ICD-10-CM

## 2022-10-03 DIAGNOSIS — M25.50 ARTHRALGIA, UNSPECIFIED JOINT: ICD-10-CM

## 2022-10-03 DIAGNOSIS — M25.50 ARTHRALGIA, UNSPECIFIED JOINT: Primary | ICD-10-CM

## 2022-10-03 PROCEDURE — 73130 X-RAY EXAM OF HAND: CPT

## 2022-10-03 PROCEDURE — 73110 X-RAY EXAM OF WRIST: CPT

## 2022-10-03 PROCEDURE — 73630 X-RAY EXAM OF FOOT: CPT

## 2022-10-10 DIAGNOSIS — J45.909 ASTHMA, UNSPECIFIED ASTHMA SEVERITY, UNSPECIFIED WHETHER COMPLICATED, UNSPECIFIED WHETHER PERSISTENT: ICD-10-CM

## 2022-10-20 DIAGNOSIS — J84.10 PULMONARY FIBROSIS: ICD-10-CM

## 2022-10-21 RX ORDER — AZATHIOPRINE 50 MG/1
75 TABLET ORAL DAILY
Qty: 60 TABLET | Refills: 4 | Status: SHIPPED | OUTPATIENT
Start: 2022-10-21

## 2022-11-04 ENCOUNTER — OFFICE VISIT (OUTPATIENT)
Dept: PULMONOLOGY | Facility: CLINIC | Age: 72
End: 2022-11-04

## 2022-11-04 VITALS
BODY MASS INDEX: 23.74 KG/M2 | TEMPERATURE: 98.1 F | OXYGEN SATURATION: 92 % | RESPIRATION RATE: 18 BRPM | HEIGHT: 63 IN | DIASTOLIC BLOOD PRESSURE: 65 MMHG | SYSTOLIC BLOOD PRESSURE: 117 MMHG | WEIGHT: 134 LBS | HEART RATE: 80 BPM

## 2022-11-04 DIAGNOSIS — J84.10 PULMONARY FIBROSIS: ICD-10-CM

## 2022-11-04 DIAGNOSIS — I27.24 CTEPH (CHRONIC THROMBOEMBOLIC PULMONARY HYPERTENSION): Primary | ICD-10-CM

## 2022-11-04 DIAGNOSIS — J45.909 ASTHMA, UNSPECIFIED ASTHMA SEVERITY, UNSPECIFIED WHETHER COMPLICATED, UNSPECIFIED WHETHER PERSISTENT: ICD-10-CM

## 2022-11-04 DIAGNOSIS — R06.09 DYSPNEA ON EXERTION: ICD-10-CM

## 2022-11-04 DIAGNOSIS — R05.2 SUBACUTE COUGH: ICD-10-CM

## 2022-11-04 PROCEDURE — 99214 OFFICE O/P EST MOD 30 MIN: CPT | Performed by: NURSE PRACTITIONER

## 2022-11-04 RX ORDER — ALBUTEROL SULFATE 90 UG/1
2 AEROSOL, METERED RESPIRATORY (INHALATION) EVERY 4 HOURS PRN
Qty: 18 G | Refills: 5 | Status: SHIPPED | OUTPATIENT
Start: 2022-11-04

## 2022-11-04 RX ORDER — PREDNISONE 20 MG/1
40 TABLET ORAL DAILY
Qty: 14 TABLET | Refills: 0 | Status: SHIPPED | OUTPATIENT
Start: 2022-11-04 | End: 2022-11-11

## 2022-11-04 RX ORDER — GENTAMICIN SULFATE 3 MG/ML
SOLUTION/ DROPS OPHTHALMIC
COMMUNITY
Start: 2022-09-05 | End: 2023-03-24

## 2022-11-04 RX ORDER — PREDNISONE 10 MG/1
10 TABLET ORAL DAILY
Qty: 30 TABLET | Refills: 4 | Status: SHIPPED | OUTPATIENT
Start: 2022-11-04

## 2022-11-04 RX ORDER — GABAPENTIN 100 MG/1
CAPSULE ORAL
COMMUNITY
Start: 2022-10-25

## 2022-11-04 NOTE — PROGRESS NOTES
Primary Care Provider  Laurie Castro MD     Referring Provider  No ref. provider found     Chief Complaint  Shortness of Breath, Cough (Clear thick mucus ), and Follow-up (3 mo f/up )    Subjective          Christina Espinoza presents to [facility]ULMON[facility] MEDICINE  History of Present Illness  Christina Espinoza is a 72 y.o. female patient of Dr. Carrion here for management of chronic thromboembolic pulmonary hypertension, ILD, shortness of breath, asthma and immunosuppression with chronic imuran and prednisone use.    Patient states she is doing well since her last visit.  She denies using antibiotics or steroids for her lungs.  She denies any fevers or chills.  She has noticed an increase in her cough and states that her sputum has been thick but clear.  She continues to use Breztri as prescribed.  She has been using her albuterol inhaler 3 times a day and states that she uses her albuterol nebulizer 3-4 times a week.  She states that she does feel that the nebulizer treatments help her shortness of breath more than the inhaler.  Her shortness breath is moderate in severity, worse with exertion and improved with rest.  She continues to take Imuran and prednisone as prescribed.  She continues to wear 2 L of oxygen at night and occasionally during the day if needed.  Overall, patient is doing well and has no additional concerns at this time.  She is able to perform her ADLs without difficulty.  She is up-to-date with her flu and pneumonia vaccines.  She refuses COVID vaccines at this time.     Her history of smoking is   Tobacco Use: Low Risk    • Smoking Tobacco Use: Never   • Smokeless Tobacco Use: Never   • Passive Exposure: Not on file   .    Review of Systems   Constitutional: Negative for chills, fatigue, fever, unexpected weight gain and unexpected weight loss.   HENT: Negative for congestion (Nasal), hearing loss, mouth sores, nosebleeds, postnasal drip, sore throat and trouble  swallowing.    Eyes: Negative for blurred vision and visual disturbance.   Respiratory: Positive for cough and shortness of breath. Negative for apnea and wheezing.         Negative for Hemoptysis     Cardiovascular: Negative for chest pain, palpitations and leg swelling.   Gastrointestinal: Negative for abdominal pain, constipation, diarrhea, nausea, vomiting and GERD.        Negative for Jaundice  Negative for Bloating  Negative for Melena   Musculoskeletal: Negative for joint swelling and myalgias.        Negative for Joint pain  Negative for Joint stiffness   Skin: Negative for color change.        Negative for cyanosis   Neurological: Negative for syncope, weakness, numbness and headache.      Sleep: Negative for Excessive daytime sleepiness  Negative for morning headaches  Negative for Snoring    Family History   Problem Relation Age of Onset   • Arthritis Mother    • Cancer Mother    • Osteoporosis Mother    • Cancer Father    • Heart disease Father    • Diabetes Father    • Arthritis Sister    • Heart disease Sister    • Bleeding Disorder Brother    • Stroke Brother    • Diabetes Maternal Grandfather    • Diabetes Son         Social History     Socioeconomic History   • Marital status:    Tobacco Use   • Smoking status: Never   • Smokeless tobacco: Never   Vaping Use   • Vaping Use: Never used   Substance and Sexual Activity   • Alcohol use: Never   • Drug use: Defer   • Sexual activity: Defer        Past Medical History:   Diagnosis Date   • Arthritis    • Asthma    • Cervicalgia 01/20/2020   • Leg pain    • Leg swelling    • Limb pain    • Lumbago 01/20/2020    low back pain    • Lumbar spinal stenosis 01/20/2020   • Lung disease    • Nausea    • Neurogenic claudication (HCC) 01/20/2020   • Neuropathy    • Pulmonary fibrosis (HCC)    • Radiculopathy, lumbosacral region 01/20/2020   • Spondylolisthesis, lumbar region 01/29/2020    grade 1 at L3/4        Immunization History   Administered Date(s)  Administered   • Fluzone High Dose =>65 Years ([facility] ONLY) 10/25/2022   • Pneumococcal Conjugate 13-Valent (PCV13) 01/16/2017         Allergies   Allergen Reactions   • Clindamycin Rash, Shortness Of Breath and Swelling   • Clindamycin Hcl Unknown - High Severity   • Doxycycline Other (See Comments)     SORE ON TONGUE   • Adalimumab Rash   • Azithromycin Rash, Nausea And Vomiting and Unknown - High Severity   • Erythromycin Rash and Unknown - High Severity          Current Outpatient Medications:   •  albuterol (PROVENTIL) (2.5 MG/3ML) 0.083% nebulizer solution, Take 2.5 mg by nebulization Every 4 (Four) Hours As Needed for Shortness of Air., Disp: 120 each, Rfl: 5  •  albuterol sulfate  (90 Base) MCG/ACT inhaler, Inhale 2 puffs Every 4 (Four) Hours As Needed for Wheezing., Disp: 18 g, Rfl: 5  •  alendronate (FOSAMAX) 70 MG tablet, Take 70 mg by mouth Every 7 (Seven) Days. Every Tuesday, Disp: , Rfl:   •  atorvastatin (LIPITOR) 20 MG tablet, Take 20 mg by mouth Daily., Disp: , Rfl:   •  azaTHIOprine (Imuran) 50 MG tablet, Take 1.5 tablets by mouth Daily., Disp: 60 tablet, Rfl: 4  •  Budeson-Glycopyrrol-Formoterol (BREZTRI) 160-9-4.8 MCG/ACT aerosol inhaler, Inhale 2 puffs 2 (Two) Times a Day., Disp: 1 each, Rfl: 5  •  cyclobenzaprine (FLEXERIL) 10 MG tablet, Take 0.5 tablets by mouth 3 (Three) Times a Day As Needed for Muscle Spasms., Disp: 12 tablet, Rfl: 0  •  DULoxetine (CYMBALTA) 30 MG capsule, , Disp: , Rfl:   •  Eliquis 5 MG tablet tablet, , Disp: , Rfl:   •  Esbriet 267 MG capsule, , Disp: , Rfl:   •  furosemide (LASIX) 20 MG tablet, 20 mg. As needed, Disp: , Rfl:   •  gabapentin (NEURONTIN) 100 MG capsule, , Disp: , Rfl:   •  gentamicin (GARAMYCIN) 0.3 % ophthalmic solution, , Disp: , Rfl:   •  HYDROcodone-acetaminophen (NORCO)  MG per tablet, Take 1 tablet by mouth Every 6 (Six) Hours As Needed for Moderate Pain ., Disp: , Rfl:   •  linaclotide (LINZESS) 145 MCG capsule capsule, Take 145 mcg  by mouth Daily., Disp: , Rfl:   •  nystatin (MYCOSTATIN) 100,000 unit/mL suspension, , Disp: , Rfl:   •  O2 (OXYGEN), Inhale 2 L/min Continuous., Disp: , Rfl:   •  ondansetron (ZOFRAN) 4 MG tablet, Take 4 mg by mouth Every 8 (Eight) Hours As Needed for Nausea or Vomiting., Disp: , Rfl:   •  pantoprazole (PROTONIX) 40 MG EC tablet, , Disp: , Rfl:   •  predniSONE (DELTASONE) 10 MG tablet, Take 1 tablet by mouth Daily., Disp: 30 tablet, Rfl: 4  •  cephalexin (KEFLEX) 500 MG capsule, , Disp: , Rfl:   •  gabapentin (NEURONTIN) 300 MG capsule, Take 300 mg by mouth 3 (Three) Times a Day., Disp: , Rfl:   •  predniSONE (DELTASONE) 20 MG tablet, Take 2 tablets by mouth Daily for 7 days., Disp: 14 tablet, Rfl: 0     Objective   Physical Exam  Constitutional:       General: She is not in acute distress.     Appearance: Normal appearance. She is normal weight.   HENT:      Right Ear: Hearing normal.      Left Ear: Hearing normal.      Nose: No nasal tenderness or congestion.      Mouth/Throat:      Mouth: Mucous membranes are moist. No oral lesions.   Eyes:      Extraocular Movements: Extraocular movements intact.      Pupils: Pupils are equal, round, and reactive to light.   Neck:      Thyroid: No thyroid mass or thyromegaly.   Cardiovascular:      Rate and Rhythm: Normal rate and regular rhythm.      Pulses: Normal pulses.      Heart sounds: Normal heart sounds. No murmur heard.  Pulmonary:      Effort: Pulmonary effort is normal.      Breath sounds: Normal breath sounds. No wheezing, rhonchi or rales.      Comments: Scattered Velcro-like crackles  Abdominal:      General: Bowel sounds are normal. There is no distension.      Palpations: Abdomen is soft.      Tenderness: There is no abdominal tenderness.   Musculoskeletal:      Cervical back: Neck supple.      Right lower leg: No edema.      Left lower leg: No edema.   Lymphadenopathy:      Cervical: No cervical adenopathy.      Upper Body:      Right upper body: No axillary  "adenopathy.   Skin:     General: Skin is warm and dry.      Findings: No lesion or rash.   Neurological:      General: No focal deficit present.      Mental Status: She is alert and oriented to person, place, and time.      Cranial Nerves: Cranial nerves are intact.   Psychiatric:         Mood and Affect: Affect normal. Mood is not anxious or depressed.         Vital Signs:   /65 (BP Location: Right arm, Patient Position: Sitting, Cuff Size: Small Adult)   Pulse 80   Temp 98.1 °F (36.7 °C) (Temporal)   Resp 18   Ht 158.8 cm (62.5\")   Wt 60.8 kg (134 lb)   SpO2 92% Comment: rom air; PRN  BMI 24.12 kg/m²        Result Review :     CMP    CMP 12/17/21 1/17/22 5/31/22   Glucose  109 (A) 85   BUN  12 15   Creatinine 0.70 0.82 0.75   eGFR Non African Am  69    Sodium  141 138   Potassium  4.3 4.5   Chloride  107 105   Calcium  10.5 10.6 (A)   Albumin  4.20 4.00   Total Bilirubin  0.2 0.4   Alkaline Phosphatase  112 125 (A)   AST (SGOT)  21 25   ALT (SGPT)  17 25   (A) Abnormal value       Comments are available for some flowsheets but are not being displayed.           CBC w/diff    CBC w/Diff 1/17/22 5/31/22   WBC 12.50 (A) 11.35 (A)   RBC 4.29 4.09   Hemoglobin 12.3 11.9 (A)   Hematocrit 38.5 36.7   MCV 89.7 89.7   MCH 28.7 29.1   MCHC 31.9 32.4   RDW 12.4 12.9   Platelets 290 310   Neutrophil Rel % 85.9 (A) 87.8 (A)   Immature Granulocyte Rel % 0.5 0.4   Lymphocyte Rel % 9.9 (A) 7.5 (A)   Monocyte Rel % 3.2 (A) 3.5 (A)   Eosinophil Rel % 0.2 (A) 0.4   Basophil Rel % 0.3 0.4   (A) Abnormal value            Data reviewed: Radiologic studies Chest x-ray 5/15/2022, chest CT 6/27/2022 and My last office note   Procedures        Assessment and Plan    Diagnoses and all orders for this visit:    1. CTEPH (chronic thromboembolic pulmonary hypertension) (HCC) (Primary)    2. Pulmonary fibrosis (HCC)  -     predniSONE (DELTASONE) 20 MG tablet; Take 2 tablets by mouth Daily for 7 days.  Dispense: 14 tablet; Refill: " 0    3. Asthma, unspecified asthma severity, unspecified whether complicated, unspecified whether persistent  -     predniSONE (DELTASONE) 10 MG tablet; Take 1 tablet by mouth Daily.  Dispense: 30 tablet; Refill: 4  -     predniSONE (DELTASONE) 20 MG tablet; Take 2 tablets by mouth Daily for 7 days.  Dispense: 14 tablet; Refill: 0  -     albuterol sulfate  (90 Base) MCG/ACT inhaler; Inhale 2 puffs Every 4 (Four) Hours As Needed for Wheezing.  Dispense: 18 g; Refill: 5    4. Dyspnea on exertion  -     albuterol sulfate  (90 Base) MCG/ACT inhaler; Inhale 2 puffs Every 4 (Four) Hours As Needed for Wheezing.  Dispense: 18 g; Refill: 5    5. Subacute cough    6.  Continue Breztri as prescribed.  Rinse mouth out after each use.  7.  Continue albuterol and nebulizer treatments as needed.  8.  Continue Imuran and prednisone as scheduled.  9.  Continue supplemental oxygen to maintain saturations at or above 89%.  10.  Follow-up with Dr. Carrion in 4 months, sooner if needed.        Follow Up   Return in about 4 months (around 3/4/2023) for Recheck with Sheyla.  Patient was given instructions and counseling regarding her condition or for health maintenance advice. Please see specific information pulled into the AVS if appropriate.

## 2022-11-10 ENCOUNTER — HOSPITAL ENCOUNTER (OUTPATIENT)
Dept: MAMMOGRAPHY | Facility: HOSPITAL | Age: 72
Discharge: HOME | End: 2022-11-10

## 2022-11-10 ENCOUNTER — HOSPITAL ENCOUNTER (OUTPATIENT)
Dept: BONE DENSITY | Facility: HOSPITAL | Age: 72
Discharge: HOME | End: 2022-11-10

## 2022-11-10 DIAGNOSIS — Z12.31 ENCOUNTER FOR SCREENING MAMMOGRAM FOR MALIGNANT NEOPLASM OF BREAST: ICD-10-CM

## 2022-11-10 DIAGNOSIS — M81.0 OSTEOPOROSIS, POSTMENOPAUSAL: ICD-10-CM

## 2022-11-10 PROCEDURE — 77067 SCR MAMMO BI INCL CAD: CPT

## 2022-11-10 PROCEDURE — 77063 BREAST TOMOSYNTHESIS BI: CPT

## 2022-11-10 PROCEDURE — 77080 DXA BONE DENSITY AXIAL: CPT

## 2022-12-21 DIAGNOSIS — J45.909 ASTHMA, UNSPECIFIED ASTHMA SEVERITY, UNSPECIFIED WHETHER COMPLICATED, UNSPECIFIED WHETHER PERSISTENT: ICD-10-CM

## 2022-12-21 RX ORDER — PREDNISONE 10 MG/1
TABLET ORAL
Qty: 31 TABLET | Refills: 0 | OUTPATIENT
Start: 2022-12-21

## 2022-12-23 DIAGNOSIS — J45.909 ASTHMA, UNSPECIFIED ASTHMA SEVERITY, UNSPECIFIED WHETHER COMPLICATED, UNSPECIFIED WHETHER PERSISTENT: ICD-10-CM

## 2022-12-27 DIAGNOSIS — J45.909 ASTHMA, UNSPECIFIED ASTHMA SEVERITY, UNSPECIFIED WHETHER COMPLICATED, UNSPECIFIED WHETHER PERSISTENT: ICD-10-CM

## 2022-12-27 RX ORDER — PREDNISONE 10 MG/1
TABLET ORAL
Qty: 30 TABLET | Refills: 0 | OUTPATIENT
Start: 2022-12-27

## 2023-01-26 ENCOUNTER — TRANSCRIBE ORDERS (OUTPATIENT)
Dept: LAB | Facility: HOSPITAL | Age: 73
End: 2023-01-26
Payer: MEDICARE

## 2023-01-26 ENCOUNTER — LAB (OUTPATIENT)
Dept: LAB | Facility: HOSPITAL | Age: 73
End: 2023-01-26
Payer: MEDICARE

## 2023-01-26 DIAGNOSIS — Z79.899 ENCOUNTER FOR LONG-TERM (CURRENT) USE OF OTHER MEDICATIONS: ICD-10-CM

## 2023-01-26 DIAGNOSIS — E78.5 HYPERLIPIDEMIA, UNSPECIFIED HYPERLIPIDEMIA TYPE: ICD-10-CM

## 2023-01-26 DIAGNOSIS — E55.9 VITAMIN D DEFICIENCY: ICD-10-CM

## 2023-01-26 DIAGNOSIS — E78.5 HYPERLIPIDEMIA, UNSPECIFIED HYPERLIPIDEMIA TYPE: Primary | ICD-10-CM

## 2023-01-26 LAB
25(OH)D3 SERPL-MCNC: 40.8 NG/ML (ref 30–100)
ALBUMIN SERPL-MCNC: 4.4 G/DL (ref 3.5–5.2)
ALBUMIN/GLOB SERPL: 1.8 G/DL
ALP SERPL-CCNC: 86 U/L (ref 39–117)
ALT SERPL W P-5'-P-CCNC: 16 U/L (ref 1–33)
ANION GAP SERPL CALCULATED.3IONS-SCNC: 10.2 MMOL/L (ref 5–15)
AST SERPL-CCNC: 18 U/L (ref 1–32)
BASOPHILS # BLD AUTO: 0.03 10*3/MM3 (ref 0–0.2)
BASOPHILS NFR BLD AUTO: 0.2 % (ref 0–1.5)
BILIRUB SERPL-MCNC: 0.7 MG/DL (ref 0–1.2)
BUN SERPL-MCNC: 18 MG/DL (ref 8–23)
BUN/CREAT SERPL: 22.8 (ref 7–25)
CALCIUM SPEC-SCNC: 10.4 MG/DL (ref 8.6–10.5)
CHLORIDE SERPL-SCNC: 106 MMOL/L (ref 98–107)
CHOLEST SERPL-MCNC: 208 MG/DL (ref 0–200)
CO2 SERPL-SCNC: 25.8 MMOL/L (ref 22–29)
CREAT SERPL-MCNC: 0.79 MG/DL (ref 0.57–1)
DEPRECATED RDW RBC AUTO: 42.2 FL (ref 37–54)
EGFRCR SERPLBLD CKD-EPI 2021: 79.1 ML/MIN/1.73
EOSINOPHIL # BLD AUTO: 0.11 10*3/MM3 (ref 0–0.4)
EOSINOPHIL NFR BLD AUTO: 0.8 % (ref 0.3–6.2)
ERYTHROCYTE [DISTWIDTH] IN BLOOD BY AUTOMATED COUNT: 12.6 % (ref 12.3–15.4)
ERYTHROCYTE [SEDIMENTATION RATE] IN BLOOD: 10 MM/HR (ref 0–30)
GLOBULIN UR ELPH-MCNC: 2.4 GM/DL
GLUCOSE SERPL-MCNC: 79 MG/DL (ref 65–99)
HCT VFR BLD AUTO: 41.4 % (ref 34–46.6)
HDLC SERPL-MCNC: 117 MG/DL (ref 40–60)
HGB BLD-MCNC: 13.8 G/DL (ref 12–15.9)
IMM GRANULOCYTES # BLD AUTO: 0.09 10*3/MM3 (ref 0–0.05)
IMM GRANULOCYTES NFR BLD AUTO: 0.7 % (ref 0–0.5)
LDLC SERPL CALC-MCNC: 73 MG/DL (ref 0–100)
LDLC/HDLC SERPL: 0.6 {RATIO}
LYMPHOCYTES # BLD AUTO: 1.54 10*3/MM3 (ref 0.7–3.1)
LYMPHOCYTES NFR BLD AUTO: 11.5 % (ref 19.6–45.3)
MCH RBC QN AUTO: 30.6 PG (ref 26.6–33)
MCHC RBC AUTO-ENTMCNC: 33.3 G/DL (ref 31.5–35.7)
MCV RBC AUTO: 91.8 FL (ref 79–97)
MONOCYTES # BLD AUTO: 0.63 10*3/MM3 (ref 0.1–0.9)
MONOCYTES NFR BLD AUTO: 4.7 % (ref 5–12)
NEUTROPHILS NFR BLD AUTO: 11.03 10*3/MM3 (ref 1.7–7)
NEUTROPHILS NFR BLD AUTO: 82.1 % (ref 42.7–76)
NRBC BLD AUTO-RTO: 0 /100 WBC (ref 0–0.2)
PLATELET # BLD AUTO: 284 10*3/MM3 (ref 140–450)
PMV BLD AUTO: 10.6 FL (ref 6–12)
POTASSIUM SERPL-SCNC: 4.1 MMOL/L (ref 3.5–5.2)
PROT SERPL-MCNC: 6.8 G/DL (ref 6–8.5)
RBC # BLD AUTO: 4.51 10*6/MM3 (ref 3.77–5.28)
SODIUM SERPL-SCNC: 142 MMOL/L (ref 136–145)
TRIGL SERPL-MCNC: 106 MG/DL (ref 0–150)
VLDLC SERPL-MCNC: 18 MG/DL (ref 5–40)
WBC NRBC COR # BLD: 13.43 10*3/MM3 (ref 3.4–10.8)

## 2023-01-26 PROCEDURE — 80061 LIPID PANEL: CPT

## 2023-01-26 PROCEDURE — 85652 RBC SED RATE AUTOMATED: CPT

## 2023-01-26 PROCEDURE — 85025 COMPLETE CBC W/AUTO DIFF WBC: CPT

## 2023-01-26 PROCEDURE — 82306 VITAMIN D 25 HYDROXY: CPT

## 2023-01-26 PROCEDURE — 80053 COMPREHEN METABOLIC PANEL: CPT

## 2023-01-26 PROCEDURE — 36415 COLL VENOUS BLD VENIPUNCTURE: CPT

## 2023-02-14 DIAGNOSIS — J84.10 PULMONARY FIBROSIS: ICD-10-CM

## 2023-02-14 DIAGNOSIS — J45.909 ASTHMA, UNSPECIFIED ASTHMA SEVERITY, UNSPECIFIED WHETHER COMPLICATED, UNSPECIFIED WHETHER PERSISTENT: ICD-10-CM

## 2023-02-15 RX ORDER — PREDNISONE 20 MG/1
TABLET ORAL
Qty: 14 TABLET | Refills: 0 | OUTPATIENT
Start: 2023-02-15

## 2023-02-17 ENCOUNTER — OFFICE VISIT (OUTPATIENT)
Dept: PULMONOLOGY | Facility: CLINIC | Age: 73
End: 2023-02-17
Payer: MEDICARE

## 2023-02-17 VITALS
DIASTOLIC BLOOD PRESSURE: 61 MMHG | SYSTOLIC BLOOD PRESSURE: 128 MMHG | TEMPERATURE: 98.2 F | HEART RATE: 78 BPM | OXYGEN SATURATION: 99 % | HEIGHT: 63 IN | WEIGHT: 143 LBS | RESPIRATION RATE: 16 BRPM | BODY MASS INDEX: 25.34 KG/M2

## 2023-02-17 DIAGNOSIS — J47.9 BRONCHIECTASIS WITHOUT COMPLICATION: Primary | ICD-10-CM

## 2023-02-17 DIAGNOSIS — R05.1 ACUTE COUGH: ICD-10-CM

## 2023-02-17 DIAGNOSIS — I27.20 PULMONARY HYPERTENSION: ICD-10-CM

## 2023-02-17 DIAGNOSIS — J84.10 PULMONARY FIBROSIS: ICD-10-CM

## 2023-02-17 DIAGNOSIS — R09.81 NASAL CONGESTION: ICD-10-CM

## 2023-02-17 DIAGNOSIS — R06.2 WHEEZING: ICD-10-CM

## 2023-02-17 PROCEDURE — 99214 OFFICE O/P EST MOD 30 MIN: CPT | Performed by: NURSE PRACTITIONER

## 2023-02-17 RX ORDER — PREDNISONE 20 MG/1
20 TABLET ORAL DAILY
Qty: 7 TABLET | Refills: 0 | Status: SHIPPED | OUTPATIENT
Start: 2023-02-17 | End: 2023-02-24

## 2023-02-17 RX ORDER — TOCILIZUMAB 180 MG/ML
INJECTION, SOLUTION SUBCUTANEOUS
COMMUNITY
End: 2023-03-24

## 2023-02-17 RX ORDER — TOCILIZUMAB 180 MG/ML
INJECTION, SOLUTION SUBCUTANEOUS
COMMUNITY
Start: 2023-01-31

## 2023-02-17 RX ORDER — AMOXICILLIN AND CLAVULANATE POTASSIUM 875; 125 MG/1; MG/1
1 TABLET, FILM COATED ORAL 2 TIMES DAILY
Qty: 14 TABLET | Refills: 0 | Status: SHIPPED | OUTPATIENT
Start: 2023-02-17 | End: 2023-02-24

## 2023-02-17 RX ORDER — DULOXETIN HYDROCHLORIDE 30 MG/1
CAPSULE, DELAYED RELEASE ORAL EVERY 24 HOURS
COMMUNITY
End: 2023-03-16 | Stop reason: SDUPTHER

## 2023-02-17 RX ORDER — CHLORHEXIDINE GLUCONATE 0.12 MG/ML
RINSE ORAL
COMMUNITY
Start: 2023-02-14 | End: 2023-03-24

## 2023-02-17 RX ORDER — MECLIZINE HYDROCHLORIDE 25 MG/1
TABLET ORAL
COMMUNITY
Start: 2023-01-25

## 2023-02-17 RX ORDER — BROMPHENIRAMINE MALEATE, PSEUDOEPHEDRINE HYDROCHLORIDE, AND DEXTROMETHORPHAN HYDROBROMIDE 2; 30; 10 MG/5ML; MG/5ML; MG/5ML
5 SYRUP ORAL 4 TIMES DAILY PRN
Qty: 473 ML | Refills: 0 | Status: SHIPPED | OUTPATIENT
Start: 2023-02-17 | End: 2023-03-16 | Stop reason: SDUPTHER

## 2023-02-17 NOTE — PROGRESS NOTES
"Primary Care Provider  Laurie Castro MD     Referring Provider  No ref. provider found     Chief Complaint  Cough (Thick clear mucus ) and Acute Visit (X2 months; congestion, hoarseness. \"Rattling in chest\" x4 days)    Subjective    Christina Espinoza presents to [facility]  History of Present Illness  Christina Espinoza is a 73 y.o. female patient of Dr. aCrrion here for acute nasal congestion. Past medical history includes chronic thromboembolic pulmonary hypertension, ILD, shortness of breath, asthma and immunosuppression with chronic Imuran and prednisone use.    Today, patient presents with hoarseness and nasal congestion for the past 2 months.  Symptoms have been mild but in the past 3 to 4 days she feels that the infection has started to go down into her chest.  She reports a \"rattle\" feeling and occasional wheezing.  Her nasal mucus and sputum is thick and clear.  She denies using antibiotics for this acute illness.  She denies any fevers or chills.  She has noticed an increase in her cough as well.  She continues to use Breztri 2 puffs twice daily as prescribed.  She has been using her albuterol inhaler 2 to 3 times a day and states that she has not been using her albuterol nebulizer treatments.  Her shortness breath is moderate in severity, worse with exertion and improved with rest.  It has not gotten any worse with this acute illness.  She continues to take Imuran and chronic prednisone 10 mg daily as prescribed.  She continues to wear 2 L of oxygen at night and occasionally during the day if needed.  She is able to perform her ADLs without difficulty.  She is up-to-date with her flu and pneumonia vaccines.  She refuses COVID vaccines at this time.     Her history of smoking is   Tobacco Use: Low Risk    • Smoking Tobacco Use: Never   • Smokeless Tobacco Use: Never   • Passive Exposure: Not on file   .    Review of Systems   Constitutional: Negative for " chills, fatigue, fever, unexpected weight gain and unexpected weight loss.   HENT: Positive for congestion (Nasal) and voice change (hoarseness). Negative for hearing loss, mouth sores, nosebleeds, postnasal drip, sore throat and trouble swallowing.    Eyes: Negative for blurred vision and visual disturbance.   Respiratory: Positive for cough (productive of thick clear sputum), shortness of breath (chronic, no worse than normal) and wheezing. Negative for apnea.         Negative for Hemoptysis     Cardiovascular: Negative for chest pain, palpitations and leg swelling.   Gastrointestinal: Negative for abdominal pain, constipation, diarrhea, nausea, vomiting and GERD.        Negative for Jaundice  Negative for Bloating  Negative for Melena   Musculoskeletal: Negative for joint swelling and myalgias.        Negative for Joint pain  Negative for Joint stiffness   Skin: Negative for color change.        Negative for cyanosis   Neurological: Negative for syncope, weakness, numbness and headache.      Sleep: Negative for Excessive daytime sleepiness  Negative for morning headaches  Negative for Snoring    Family History   Problem Relation Age of Onset   • Arthritis Mother    • Cancer Mother    • Osteoporosis Mother    • Cancer Father    • Heart disease Father    • Diabetes Father    • Arthritis Sister    • Heart disease Sister    • Bleeding Disorder Brother    • Stroke Brother    • Diabetes Maternal Grandfather    • Diabetes Son         Social History     Socioeconomic History   • Marital status:    Tobacco Use   • Smoking status: Never   • Smokeless tobacco: Never   Vaping Use   • Vaping Use: Never used   Substance and Sexual Activity   • Alcohol use: Never   • Drug use: Defer   • Sexual activity: Defer        Past Medical History:   Diagnosis Date   • Arthritis    • Asthma    • Cervicalgia 01/20/2020   • Leg pain    • Leg swelling    • Limb pain    • Lumbago 01/20/2020    low back pain    • Lumbar spinal stenosis  01/20/2020   • Lung disease    • Nausea    • Neurogenic claudication (HCC) 01/20/2020   • Neuropathy    • Pulmonary fibrosis (HCC)    • Radiculopathy, lumbosacral region 01/20/2020   • Spondylolisthesis, lumbar region 01/29/2020    grade 1 at L3/4        Immunization History   Administered Date(s) Administered   • Fluzone High Dose =>65 Years ([employer] ONLY) 10/25/2022   • Pneumococcal Conjugate 13-Valent (PCV13) 01/16/2017         Allergies   Allergen Reactions   • Clindamycin Rash, Shortness Of Breath and Swelling   • Clindamycin Hcl Unknown - High Severity   • Doxycycline Other (See Comments)     SORE ON TONGUE   • Adalimumab Rash   • Azithromycin Rash, Nausea And Vomiting and Unknown - High Severity   • Erythromycin Rash and Unknown - High Severity          Current Outpatient Medications:   •  Actemra ACTPen 162 MG/0.9ML solution auto-injector injection, , Disp: , Rfl:   •  albuterol sulfate  (90 Base) MCG/ACT inhaler, Inhale 2 puffs Every 4 (Four) Hours As Needed for Wheezing., Disp: 18 g, Rfl: 5  •  alendronate (FOSAMAX) 70 MG tablet, Take 70 mg by mouth Every 7 (Seven) Days. Every Tuesday, Disp: , Rfl:   •  atorvastatin (LIPITOR) 20 MG tablet, Take 20 mg by mouth Daily., Disp: , Rfl:   •  azaTHIOprine (Imuran) 50 MG tablet, Take 1.5 tablets by mouth Daily., Disp: 60 tablet, Rfl: 4  •  Budeson-Glycopyrrol-Formoterol (BREZTRI) 160-9-4.8 MCG/ACT aerosol inhaler, Inhale 2 puffs 2 (Two) Times a Day., Disp: 1 each, Rfl: 5  •  chlorhexidine (PERIDEX) 0.12 % solution, , Disp: , Rfl:   •  cyclobenzaprine (FLEXERIL) 10 MG tablet, Take 0.5 tablets by mouth 3 (Three) Times a Day As Needed for Muscle Spasms., Disp: 12 tablet, Rfl: 0  •  DULoxetine (CYMBALTA) 30 MG capsule, , Disp: , Rfl:   •  Eliquis 5 MG tablet tablet, , Disp: , Rfl:   •  furosemide (LASIX) 20 MG tablet, 20 mg. As needed, Disp: , Rfl:   •  gabapentin (NEURONTIN) 100 MG capsule, , Disp: , Rfl:   •  gabapentin (NEURONTIN) 300 MG capsule, Take 300 mg  by mouth 3 (Three) Times a Day., Disp: , Rfl:   •  gentamicin (GARAMYCIN) 0.3 % ophthalmic solution, , Disp: , Rfl:   •  HYDROcodone-acetaminophen (NORCO)  MG per tablet, Take 1 tablet by mouth Every 6 (Six) Hours As Needed for Moderate Pain ., Disp: , Rfl:   •  linaclotide (LINZESS) 145 MCG capsule capsule, Take 145 mcg by mouth Daily., Disp: , Rfl:   •  meclizine (ANTIVERT) 25 MG tablet, , Disp: , Rfl:   •  nystatin (MYCOSTATIN) 100,000 unit/mL suspension, , Disp: , Rfl:   •  O2 (OXYGEN), Inhale 2 L/min Continuous., Disp: , Rfl:   •  ondansetron (ZOFRAN) 4 MG tablet, Take 4 mg by mouth Every 8 (Eight) Hours As Needed for Nausea or Vomiting., Disp: , Rfl:   •  pantoprazole (PROTONIX) 40 MG EC tablet, , Disp: , Rfl:   •  predniSONE (DELTASONE) 10 MG tablet, Take 1 tablet by mouth Daily., Disp: 30 tablet, Rfl: 4  •  Tocilizumab (Actemra ACTPen) 162 MG/0.9ML solution auto-injector injection, Actemra ACTPen 162 mg/0.9 mL subcutaneous pen injector, Disp: , Rfl:   •  albuterol (PROVENTIL) (2.5 MG/3ML) 0.083% nebulizer solution, Take 2.5 mg by nebulization Every 4 (Four) Hours As Needed for Shortness of Air., Disp: 120 each, Rfl: 5  •  amoxicillin-clavulanate (Augmentin) 875-125 MG per tablet, Take 1 tablet by mouth 2 (Two) Times a Day for 7 days., Disp: 14 tablet, Rfl: 0  •  brompheniramine-pseudoephedrine-DM 30-2-10 MG/5ML syrup, Take 5 mL by mouth 4 (Four) Times a Day As Needed for Congestion, Cough or Allergies., Disp: 473 mL, Rfl: 0  •  cephalexin (KEFLEX) 500 MG capsule, , Disp: , Rfl:   •  DULoxetine (CYMBALTA) 30 MG capsule, Daily., Disp: , Rfl:   •  Esbriet 267 MG capsule, , Disp: , Rfl:   •  predniSONE (DELTASONE) 20 MG tablet, Take 1 tablet by mouth Daily for 7 days., Disp: 7 tablet, Rfl: 0     Objective   Physical Exam  Vitals reviewed.   Constitutional:       General: She is not in acute distress.     Appearance: Normal appearance. She is normal weight.   HENT:      Right Ear: Hearing normal.      Left  "Ear: Hearing normal.      Nose: No nasal tenderness or congestion.      Mouth/Throat:      Mouth: Mucous membranes are moist. No oral lesions.      Comments: Voice is hoarse  Eyes:      Extraocular Movements: Extraocular movements intact.      Pupils: Pupils are equal, round, and reactive to light.   Neck:      Thyroid: No thyroid mass or thyromegaly.   Cardiovascular:      Rate and Rhythm: Normal rate and regular rhythm.      Pulses: Normal pulses.      Heart sounds: Normal heart sounds. No murmur heard.  Pulmonary:      Effort: Pulmonary effort is normal. No respiratory distress.      Breath sounds: Rhonchi (diffuse throughout) present. No wheezing or rales.      Comments: Scattered Velcro-like crackles  Abdominal:      General: Bowel sounds are normal. There is no distension.      Palpations: Abdomen is soft.      Tenderness: There is no abdominal tenderness.   Musculoskeletal:      Cervical back: Neck supple.      Right lower leg: No edema.      Left lower leg: No edema.   Lymphadenopathy:      Cervical: No cervical adenopathy.      Upper Body:      Right upper body: No axillary adenopathy.   Skin:     General: Skin is warm and dry.      Findings: No lesion or rash.   Neurological:      General: No focal deficit present.      Mental Status: She is alert and oriented to person, place, and time.   Psychiatric:         Mood and Affect: Affect normal. Mood is not anxious or depressed.         Vital Signs:   /61 (BP Location: Right arm, Patient Position: Sitting, Cuff Size: Adult)   Pulse 78   Temp 98.2 °F (36.8 °C) (Temporal)   Resp 16   Ht 158.8 cm (62.5\")   Wt 64.9 kg (143 lb)   SpO2 99% Comment: room air  BMI 25.74 kg/m²        Result Review :     CMP    CMP 5/31/22 1/26/23   Glucose 85 79   BUN 15 18   Creatinine 0.75 0.79   eGFR 84.7 79.1   Sodium 138 142   Potassium 4.5 4.1   Chloride 105 106   Calcium 10.6 (A) 10.4   Total Protein 6.4 6.8   Albumin 4.00 4.4   Globulin 2.4 2.4   Total Bilirubin " 0.4 0.7   Alkaline Phosphatase 125 (A) 86   AST (SGOT) 25 18   ALT (SGPT) 25 16   Albumin/Globulin Ratio 1.7 1.8   BUN/Creatinine Ratio 20.0 22.8   Anion Gap 10.6 10.2   (A) Abnormal value       Comments are available for some flowsheets but are not being displayed.           CBC w/diff    CBC w/Diff 1/17/22 5/31/22   WBC 12.50 (A) 11.35 (A)   RBC 4.29 4.09   Hemoglobin 12.3 11.9 (A)   Hematocrit 38.5 36.7   MCV 89.7 89.7   MCH 28.7 29.1   MCHC 31.9 32.4   RDW 12.4 12.9   Platelets 290 310   Neutrophil Rel % 85.9 (A) 87.8 (A)   Immature Granulocyte Rel % 0.5 0.4   Lymphocyte Rel % 9.9 (A) 7.5 (A)   Monocyte Rel % 3.2 (A) 3.5 (A)   Eosinophil Rel % 0.2 (A) 0.4   Basophil Rel % 0.3 0.4   (A) Abnormal value            Data reviewed: Radiologic studies Chest x-ray 5/15/2022, chest CT 6/27/2022 and My last office note   Procedures        Assessment and Plan    Diagnoses and all orders for this visit:    1. Bronchiectasis without complication (HCC) (Primary)  -     amoxicillin-clavulanate (Augmentin) 875-125 MG per tablet; Take 1 tablet by mouth 2 (Two) Times a Day for 7 days.  Dispense: 14 tablet; Refill: 0  -     predniSONE (DELTASONE) 20 MG tablet; Take 1 tablet by mouth Daily for 7 days.  Dispense: 7 tablet; Refill: 0  -     brompheniramine-pseudoephedrine-DM 30-2-10 MG/5ML syrup; Take 5 mL by mouth 4 (Four) Times a Day As Needed for Congestion, Cough or Allergies.  Dispense: 473 mL; Refill: 0    2. Nasal congestion    3. Pulmonary fibrosis (HCC)    4. Pulmonary hypertension (HCC)    5. Acute cough  -     amoxicillin-clavulanate (Augmentin) 875-125 MG per tablet; Take 1 tablet by mouth 2 (Two) Times a Day for 7 days.  Dispense: 14 tablet; Refill: 0  -     predniSONE (DELTASONE) 20 MG tablet; Take 1 tablet by mouth Daily for 7 days.  Dispense: 7 tablet; Refill: 0  -     brompheniramine-pseudoephedrine-DM 30-2-10 MG/5ML syrup; Take 5 mL by mouth 4 (Four) Times a Day As Needed for Congestion, Cough or Allergies.   Dispense: 473 mL; Refill: 0    6. Wheezing  -     amoxicillin-clavulanate (Augmentin) 875-125 MG per tablet; Take 1 tablet by mouth 2 (Two) Times a Day for 7 days.  Dispense: 14 tablet; Refill: 0  -     predniSONE (DELTASONE) 20 MG tablet; Take 1 tablet by mouth Daily for 7 days.  Dispense: 7 tablet; Refill: 0  -     brompheniramine-pseudoephedrine-DM 30-2-10 MG/5ML syrup; Take 5 mL by mouth 4 (Four) Times a Day As Needed for Congestion, Cough or Allergies.  Dispense: 473 mL; Refill: 0    7.  We will order prednisone burst 20 mg daily for 7 days, patient instructed to go back to usual 10 mg daily after completing burst.  8.  We will order Augmentin x7 days for acute respiratory infection.  9.  We will order Bromfed cough syrup for patient's acute cough  10.  Continue Breztri 2 puffs twice daily.  Rinse mouth out after each use.  11.  Continue using albuterol rescue inhaler and nebulizer treatments as needed.  12.  Continue Imuran as prescribed.  13.  Continue supplemental oxygen to maintain O2 saturation at or above 89%.  14.  Patient is up-to-date with flu and pneumonia vaccines.  Declined COVID vaccine.  Encourage patient to wash hands frequently, wear mask in public, and avoid sick contacts.  15. Keep appointment with Dr. Carrion 3/7/2023, can be seen sooner if needed.    Follow Up   Return for Next scheduled follow up.  Patient was given instructions and counseling regarding her condition or for health maintenance advice. Please see specific information pulled into the AVS if appropriate.

## 2023-03-16 ENCOUNTER — OFFICE VISIT (OUTPATIENT)
Dept: PULMONOLOGY | Facility: CLINIC | Age: 73
End: 2023-03-16
Payer: MEDICARE

## 2023-03-16 VITALS
WEIGHT: 147 LBS | OXYGEN SATURATION: 96 % | HEART RATE: 107 BPM | SYSTOLIC BLOOD PRESSURE: 111 MMHG | TEMPERATURE: 97.8 F | HEIGHT: 63 IN | RESPIRATION RATE: 18 BRPM | BODY MASS INDEX: 26.05 KG/M2 | DIASTOLIC BLOOD PRESSURE: 93 MMHG

## 2023-03-16 DIAGNOSIS — R05.1 ACUTE COUGH: ICD-10-CM

## 2023-03-16 DIAGNOSIS — I27.24 CTEPH (CHRONIC THROMBOEMBOLIC PULMONARY HYPERTENSION): ICD-10-CM

## 2023-03-16 DIAGNOSIS — J47.9 BRONCHIECTASIS WITHOUT COMPLICATION: Primary | ICD-10-CM

## 2023-03-16 DIAGNOSIS — R06.2 WHEEZING: ICD-10-CM

## 2023-03-16 DIAGNOSIS — I27.20 PULMONARY HYPERTENSION: ICD-10-CM

## 2023-03-16 DIAGNOSIS — J84.10 PULMONARY FIBROSIS: ICD-10-CM

## 2023-03-16 PROCEDURE — 99214 OFFICE O/P EST MOD 30 MIN: CPT

## 2023-03-16 PROCEDURE — 1159F MED LIST DOCD IN RCRD: CPT

## 2023-03-16 PROCEDURE — 1160F RVW MEDS BY RX/DR IN RCRD: CPT

## 2023-03-16 RX ORDER — PREDNISONE 20 MG/1
20 TABLET ORAL DAILY
Qty: 7 TABLET | Refills: 0 | Status: SHIPPED | OUTPATIENT
Start: 2023-03-16 | End: 2023-03-24

## 2023-03-16 RX ORDER — BROMPHENIRAMINE MALEATE, PSEUDOEPHEDRINE HYDROCHLORIDE, AND DEXTROMETHORPHAN HYDROBROMIDE 2; 30; 10 MG/5ML; MG/5ML; MG/5ML
5 SYRUP ORAL 4 TIMES DAILY PRN
Qty: 473 ML | Refills: 0 | Status: SHIPPED | OUTPATIENT
Start: 2023-03-16 | End: 2023-03-24

## 2023-03-16 RX ORDER — AMOXICILLIN AND CLAVULANATE POTASSIUM 875; 125 MG/1; MG/1
1 TABLET, FILM COATED ORAL 2 TIMES DAILY
Qty: 14 TABLET | Refills: 0 | Status: SHIPPED | OUTPATIENT
Start: 2023-03-16 | End: 2023-03-24

## 2023-03-16 NOTE — PROGRESS NOTES
"Primary Care Provider  Laurie Castro MD   Referring Provider  No ref. provider found      Patient Complaint  Asthma, Pneumonia, Post covid, Shortness of Breath, Wheezing, Cough (Acute visit, cough, wheezing), and Acute visit      Subjective          Christina Espinoza presents to [facility] PULMONARY & CRITICAL CARE MEDICINE      History of Presenting Illness  Christina Espinoza is a 73 y.o. female patient of Dr. Carrion with history of CTEPH, ILD, pulmonary hypertension, asthma, immunosuppression on chronic Imuran and prednisone use, here for acute visit.    Today, patient presents with productive cough, wheezing, and \"rattling\" feeling in chest for the past week.  Cough is productive of thick clear yellow sputum.  She denies using antibiotics for this acute illness.  She denies any fevers or chills.  She reports she recently had a similar infection and antibiotics, prednisone, and cough syrup helped relieve her symptoms.  She continues to use Breztri 2 puffs twice daily as prescribed.  She has been using her albuterol inhaler as needed and states that she has not been using her albuterol nebulizer treatments.  Her shortness breath is mild to moderate in severity, worse with exertion and improved with rest.  It has not gotten any worse with this acute illness.  She continues to take Imuran and chronic prednisone 10 mg daily as prescribed.  She continues to wear 2 L of oxygen at night and occasionally during the day if needed.  She is able to perform her ADLs without difficulty.  She is up-to-date with her flu and pneumonia vaccines.  She refuses COVID vaccines at this time.  I have personally reviewed the review of systems, past family, social, medical and surgical histories; and agree with their findings.      Review of Systems    Review of Systems   Constitutional: Negative for activity change, chills, fatigue, fever, unexpected weight gain and unexpected weight loss.   HENT: Negative for congestion, " ear discharge, ear pain, mouth sores, postnasal drip, rhinorrhea, sinus pressure, sore throat, swollen glands and trouble swallowing.    Eyes: Negative for blurred vision, pain, discharge, itching and visual disturbance.   Respiratory: Positive for cough (productive) and wheezing. Negative for apnea, chest tightness, shortness of breath and stridor.    Cardiovascular: Negative for chest pain, palpitations and leg swelling.   Gastrointestinal: Negative for abdominal distention, abdominal pain, constipation, diarrhea, nausea, vomiting, GERD and indigestion.   Musculoskeletal: Negative for arthralgias, joint swelling and myalgias.   Skin: Negative for color change.   Neurological: Negative for dizziness, weakness, light-headedness and headache.      Sleep: Negative for Excessive daytime sleepiness  Negative for morning headaches  Negative for Snoring      Family History   Problem Relation Age of Onset   • Arthritis Mother    • Cancer Mother    • Osteoporosis Mother    • Cancer Father    • Heart disease Father    • Diabetes Father    • Arthritis Sister    • Heart disease Sister    • Bleeding Disorder Brother    • Stroke Brother    • Diabetes Maternal Grandfather    • Diabetes Son         Social History     Socioeconomic History   • Marital status:    Tobacco Use   • Smoking status: Never   • Smokeless tobacco: Never   Vaping Use   • Vaping Use: Never used   Substance and Sexual Activity   • Alcohol use: Never   • Drug use: Defer   • Sexual activity: Defer        Past Medical History:   Diagnosis Date   • Arthritis    • Asthma    • Cervicalgia 01/20/2020   • Leg pain    • Leg swelling    • Limb pain    • Lumbago 01/20/2020    low back pain    • Lumbar spinal stenosis 01/20/2020   • Lung disease    • Nausea    • Neurogenic claudication (HCC) 01/20/2020   • Neuropathy    • Pulmonary fibrosis (HCC)    • Radiculopathy, lumbosacral region 01/20/2020   • Spondylolisthesis, lumbar region 01/29/2020    grade 1 at L3/4         Immunization History   Administered Date(s) Administered   • Fluzone High Dose =>65 Years ([facility] ONLY) 10/25/2022   • Pneumococcal Conjugate 13-Valent (PCV13) 01/16/2017       Allergies   Allergen Reactions   • Clindamycin Rash, Shortness Of Breath and Swelling   • Doxycycline Other (See Comments)     SORE ON TONGUE   • Adalimumab Rash   • Azithromycin Rash, Nausea And Vomiting and Unknown - High Severity   • Clindamycin Hcl Unknown - High Severity and Rash   • Erythromycin Rash and Unknown - High Severity          Current Outpatient Medications:   •  albuterol (PROVENTIL) (2.5 MG/3ML) 0.083% nebulizer solution, Take 2.5 mg by nebulization Every 4 (Four) Hours As Needed for Shortness of Air., Disp: 120 each, Rfl: 5  •  albuterol sulfate  (90 Base) MCG/ACT inhaler, Inhale 2 puffs Every 4 (Four) Hours As Needed for Wheezing., Disp: 18 g, Rfl: 5  •  alendronate (FOSAMAX) 70 MG tablet, Take 1 tablet by mouth Every 7 (Seven) Days. Every Tuesday, Disp: , Rfl:   •  atorvastatin (LIPITOR) 20 MG tablet, Take 1 tablet by mouth Daily., Disp: , Rfl:   •  azaTHIOprine (Imuran) 50 MG tablet, Take 1.5 tablets by mouth Daily., Disp: 60 tablet, Rfl: 4  •  brompheniramine-pseudoephedrine-DM 30-2-10 MG/5ML syrup, Take 5 mL by mouth 4 (Four) Times a Day As Needed for Congestion, Cough or Allergies., Disp: 473 mL, Rfl: 0  •  Budeson-Glycopyrrol-Formoterol (BREZTRI) 160-9-4.8 MCG/ACT aerosol inhaler, Inhale 2 puffs 2 (Two) Times a Day., Disp: 1 each, Rfl: 5  •  chlorhexidine (PERIDEX) 0.12 % solution, , Disp: , Rfl:   •  Eliquis 5 MG tablet tablet, , Disp: , Rfl:   •  gabapentin (NEURONTIN) 100 MG capsule, , Disp: , Rfl:   •  gentamicin (GARAMYCIN) 0.3 % ophthalmic solution, , Disp: , Rfl:   •  HYDROcodone-acetaminophen (NORCO)  MG per tablet, Take 1 tablet by mouth Every 6 (Six) Hours As Needed for Moderate Pain., Disp: , Rfl:   •  linaclotide (LINZESS) 145 MCG capsule capsule, Take 1 capsule by mouth Daily., Disp: ,  "Rfl:   •  meclizine (ANTIVERT) 25 MG tablet, , Disp: , Rfl:   •  ondansetron (ZOFRAN) 4 MG tablet, Take 1 tablet by mouth Every 8 (Eight) Hours As Needed for Nausea or Vomiting., Disp: , Rfl:   •  predniSONE (DELTASONE) 10 MG tablet, Take 1 tablet by mouth Daily., Disp: 30 tablet, Rfl: 4  •  Actemra ACTPen 162 MG/0.9ML solution auto-injector injection, , Disp: , Rfl:   •  amoxicillin-clavulanate (Augmentin) 875-125 MG per tablet, Take 1 tablet by mouth 2 (Two) Times a Day for 7 days., Disp: 14 tablet, Rfl: 0  •  Esbriet 267 MG capsule, , Disp: , Rfl:   •  furosemide (LASIX) 20 MG tablet, 20 mg. As needed (Patient not taking: Reported on 3/16/2023), Disp: , Rfl:   •  nystatin (MYCOSTATIN) 100,000 unit/mL suspension, , Disp: , Rfl:   •  O2 (OXYGEN), Inhale 2 L/min Continuous. (Patient not taking: Reported on 3/16/2023), Disp: , Rfl:   •  pantoprazole (PROTONIX) 40 MG EC tablet, , Disp: , Rfl:   •  predniSONE (DELTASONE) 20 MG tablet, Take 1 tablet by mouth Daily for 7 days., Disp: 7 tablet, Rfl: 0  •  Tocilizumab (Actemra ACTPen) 162 MG/0.9ML solution auto-injector injection, Actemra ACTPen 162 mg/0.9 mL subcutaneous pen injector (Patient not taking: Reported on 3/16/2023), Disp: , Rfl:      Objective     Vital Signs:   /93 (BP Location: Left arm, Patient Position: Sitting, Cuff Size: Adult)   Pulse 107   Temp 97.8 °F (36.6 °C) (Tympanic)   Resp 18   Ht 160 cm (63\")   Wt 66.7 kg (147 lb)   SpO2 96% Comment: room air/ Pt has oygen 2 liters/PRN  BMI 26.04 kg/m²     Objective   Physical Exam  Constitutional:       General: She is not in acute distress.     Appearance: Normal appearance. She is normal weight. She is not ill-appearing.   HENT:      Right Ear: Tympanic membrane and ear canal normal.      Left Ear: Tympanic membrane and ear canal normal.      Nose: Nose normal.      Mouth/Throat:      Mouth: Mucous membranes are moist.      Pharynx: Oropharynx is clear.   Eyes:      Extraocular Movements: " Extraocular movements intact.      Conjunctiva/sclera: Conjunctivae normal.      Pupils: Pupils are equal, round, and reactive to light.   Cardiovascular:      Rate and Rhythm: Normal rate and regular rhythm.      Pulses: Normal pulses.      Heart sounds: Normal heart sounds.   Pulmonary:      Effort: Pulmonary effort is normal. No respiratory distress.      Breath sounds: No stridor. Wheezing and rhonchi (left worse than right) present. No rales.   Abdominal:      General: Bowel sounds are normal.      Palpations: Abdomen is soft.   Musculoskeletal:         General: No swelling. Normal range of motion.      Cervical back: Normal range of motion and neck supple.      Right lower leg: No edema.      Left lower leg: No edema.   Skin:     General: Skin is warm and dry.   Neurological:      General: No focal deficit present.      Mental Status: She is alert and oriented to person, place, and time.      Motor: No weakness.   Psychiatric:         Mood and Affect: Mood normal.         Behavior: Behavior normal.          Result Review :   I have personally reviewed patient's labs and images.  I also reviewed Maddy's last office note 2/17/2023.            Diagnoses and all orders for this visit:    1. Bronchiectasis without complication (HCC) (Primary)  -     amoxicillin-clavulanate (Augmentin) 875-125 MG per tablet; Take 1 tablet by mouth 2 (Two) Times a Day for 7 days.  Dispense: 14 tablet; Refill: 0  -     brompheniramine-pseudoephedrine-DM 30-2-10 MG/5ML syrup; Take 5 mL by mouth 4 (Four) Times a Day As Needed for Congestion, Cough or Allergies.  Dispense: 473 mL; Refill: 0  -     predniSONE (DELTASONE) 20 MG tablet; Take 1 tablet by mouth Daily for 7 days.  Dispense: 7 tablet; Refill: 0    2. Pulmonary fibrosis (HCC)    3. Pulmonary hypertension (HCC)    4. CTEPH (chronic thromboembolic pulmonary hypertension) (HCC)    5. Acute cough  -     amoxicillin-clavulanate (Augmentin) 875-125 MG per tablet; Take 1 tablet by  mouth 2 (Two) Times a Day for 7 days.  Dispense: 14 tablet; Refill: 0  -     brompheniramine-pseudoephedrine-DM 30-2-10 MG/5ML syrup; Take 5 mL by mouth 4 (Four) Times a Day As Needed for Congestion, Cough or Allergies.  Dispense: 473 mL; Refill: 0  -     predniSONE (DELTASONE) 20 MG tablet; Take 1 tablet by mouth Daily for 7 days.  Dispense: 7 tablet; Refill: 0    6. Wheezing  -     amoxicillin-clavulanate (Augmentin) 875-125 MG per tablet; Take 1 tablet by mouth 2 (Two) Times a Day for 7 days.  Dispense: 14 tablet; Refill: 0  -     brompheniramine-pseudoephedrine-DM 30-2-10 MG/5ML syrup; Take 5 mL by mouth 4 (Four) Times a Day As Needed for Congestion, Cough or Allergies.  Dispense: 473 mL; Refill: 0  -     predniSONE (DELTASONE) 20 MG tablet; Take 1 tablet by mouth Daily for 7 days.  Dispense: 7 tablet; Refill: 0         Impression and Plan    -PFT done 6/14/2022 showed mild restrictive defect with severe reduction in DLCO 40%.  -Echocardiogram 6/28/2022 showed EF 60%, mildly elevated right ventricular systolic pressure 44 mmHg.  -We will order prednisone burst 20 mg daily for 7 days, patient instructed to go back to usual 10 mg daily after completing burst.  -We will order Augmentin x7 days for acute respiratory infection.  -We will refill Bromfed cough syrup for patient's acute cough, patient said it has worked really well for her in the past.  -Continue Breztri 2 puffs twice daily.  Rinse mouth out after each use.  -Continue using albuterol rescue inhaler and nebulizer treatments as needed.  -Continue Imuran as prescribed.  -Continue Eliquis as prescribed.  -Continue supplemental oxygen as needed to maintain O2 saturation at or above 89%.  -Patient is up-to-date with flu and pneumonia vaccines.  Declined COVID vaccine.  Encourage patient to wash hands frequently, wear mask in public, and avoid sick contacts.  -Follow-up with Dr. Carrion or Maddy in 3 months, can be seen sooner if needed.       Smoking  status: Reviewed, patient is a never smoker  Vaccination status: Patient reports she is up-to-date with her flu and pneumonia vaccines, declines Covid vaccine.  Patient is advised to continue to follow CDC recommendations such as social distancing wearing a mask and washing hands for at least 20 seconds.  Medications personally reviewed    Follow Up   Return in about 3 months (around 6/16/2023).  Patient was given instructions and counseling regarding her condition or for health maintenance advice. Please see specific information pulled into the AVS if appropriate.

## 2023-03-24 ENCOUNTER — OFFICE VISIT (OUTPATIENT)
Dept: PULMONOLOGY | Facility: CLINIC | Age: 73
End: 2023-03-24
Payer: MEDICARE

## 2023-03-24 ENCOUNTER — HOSPITAL ENCOUNTER (OUTPATIENT)
Dept: GENERAL RADIOLOGY | Facility: HOSPITAL | Age: 73
Discharge: HOME | End: 2023-03-24
Admitting: NURSE PRACTITIONER
Payer: MEDICARE

## 2023-03-24 VITALS
DIASTOLIC BLOOD PRESSURE: 93 MMHG | HEART RATE: 77 BPM | SYSTOLIC BLOOD PRESSURE: 125 MMHG | RESPIRATION RATE: 18 BRPM | OXYGEN SATURATION: 98 % | BODY MASS INDEX: 25.39 KG/M2 | WEIGHT: 143.3 LBS | TEMPERATURE: 98.6 F | HEIGHT: 63 IN

## 2023-03-24 DIAGNOSIS — J47.9 BRONCHIECTASIS WITHOUT COMPLICATION: Primary | ICD-10-CM

## 2023-03-24 DIAGNOSIS — R05.1 ACUTE COUGH: ICD-10-CM

## 2023-03-24 DIAGNOSIS — R06.2 WHEEZING: ICD-10-CM

## 2023-03-24 DIAGNOSIS — R06.09 DYSPNEA ON EXERTION: ICD-10-CM

## 2023-03-24 DIAGNOSIS — J47.9 BRONCHIECTASIS WITHOUT COMPLICATION: ICD-10-CM

## 2023-03-24 PROCEDURE — 1160F RVW MEDS BY RX/DR IN RCRD: CPT | Performed by: NURSE PRACTITIONER

## 2023-03-24 PROCEDURE — 71046 X-RAY EXAM CHEST 2 VIEWS: CPT

## 2023-03-24 PROCEDURE — 99213 OFFICE O/P EST LOW 20 MIN: CPT | Performed by: NURSE PRACTITIONER

## 2023-03-24 PROCEDURE — 1159F MED LIST DOCD IN RCRD: CPT | Performed by: NURSE PRACTITIONER

## 2023-03-24 NOTE — PROGRESS NOTES
Primary Care Provider  Laurie Castro MD   Referring Provider  No ref. provider found    Patient Complaint  Follow-up, Cough, Asthma, Wheezing, Bronchitis, and Shortness of Breath      SUBJECTIVE    History of Presenting Illness  Christina Espinoza is a pleasant 73 y.o. female who presents to [facility] PULMONARY [facility] CRITICAL CARE MEDICINE for acute visit for cough.  Patient last saw [facility] NP 2/17/2023.  Patient has a diagnosis of pulmonary fibrosis.  Patient had been seen previously for cough and has been treated with 2 rounds of antibiotics and steroids.  Patient states her cough is persistent she does have some production at times of white to yellow sputum.  She is now back on her routine daily prednisone of 10 mg.  She continues to be on Breztri and albuterol inhaler as needed.  Patient does have albuterol nebulizer but she does not use it.  Patient has had no recent diagnostics.  At present time patient's O2 is 98% on room air.    She is not having any headaches, chest pain, weight loss or hemoptysis. Denies fevers, chills and night sweats. Christina Espinoza is able to perform ADLs without difficulties and denies any swollen glands/lymph nodes in the head or neck.    In addition patient states she did hit her leg back first part of March and she has a hematoma to the left anterior shin of her lower extremity.  Skin is intact but it does have a hard bruised area.  Patient does take Eliquis daily.    I have personally reviewed the review of systems, past family, social, medical and surgical histories; and agree with their findings.    Review of Systems  Constitutional symptoms:  Denied complaints   Ear, nose, throat: Denied complaints  Cardiovascular:  Denied complaints  Respiratory: Cough with white-yellow sputum production, wheeze, shortness of air with exertional activities  Gastrointestinal: Denied complaints  Musculoskeletal: Denied complaints    Family History   Problem Relation Age of Onset    • Arthritis Mother    • Cancer Mother    • Osteoporosis Mother    • Cancer Father    • Heart disease Father    • Diabetes Father    • Arthritis Sister    • Heart disease Sister    • Bleeding Disorder Brother    • Stroke Brother    • Diabetes Maternal Grandfather    • Diabetes Son         Social History     Socioeconomic History   • Marital status:    Tobacco Use   • Smoking status: Never   • Smokeless tobacco: Never   Vaping Use   • Vaping Use: Never used   Substance and Sexual Activity   • Alcohol use: Never   • Drug use: Defer   • Sexual activity: Defer        Past Medical History:   Diagnosis Date   • Arthritis    • Asthma    • Cervicalgia 01/20/2020   • Leg pain    • Leg swelling    • Limb pain    • Lumbago 01/20/2020    low back pain    • Lumbar spinal stenosis 01/20/2020   • Lung disease    • Nausea    • Neurogenic claudication (HCC) 01/20/2020   • Neuropathy    • Pulmonary fibrosis (HCC)    • Radiculopathy, lumbosacral region 01/20/2020   • Spondylolisthesis, lumbar region 01/29/2020    grade 1 at L3/4        Immunization History   Administered Date(s) Administered   • Fluzone High Dose =>65 Years ([facility] ONLY) 10/25/2022   • Pneumococcal Conjugate 13-Valent (PCV13) 01/16/2017       Allergies   Allergen Reactions   • Clindamycin Rash, Shortness Of Breath and Swelling   • Doxycycline Other (See Comments)     SORE ON TONGUE   • Adalimumab Rash   • Azithromycin Rash, Nausea And Vomiting and Unknown - High Severity   • Clindamycin Hcl Unknown - High Severity and Rash   • Erythromycin Rash and Unknown - High Severity          Current Outpatient Medications:   •  albuterol (PROVENTIL) (2.5 MG/3ML) 0.083% nebulizer solution, Take 2.5 mg by nebulization Every 4 (Four) Hours As Needed for Shortness of Air., Disp: 120 each, Rfl: 5  •  albuterol sulfate  (90 Base) MCG/ACT inhaler, Inhale 2 puffs Every 4 (Four) Hours As Needed for Wheezing., Disp: 18 g, Rfl: 5  •  alendronate (FOSAMAX) 70 MG tablet,  "Take 1 tablet by mouth Every 7 (Seven) Days. Every Tuesday, Disp: , Rfl:   •  atorvastatin (LIPITOR) 20 MG tablet, Take 1 tablet by mouth Daily., Disp: , Rfl:   •  azaTHIOprine (Imuran) 50 MG tablet, Take 1.5 tablets by mouth Daily., Disp: 60 tablet, Rfl: 4  •  Budeson-Glycopyrrol-Formoterol (BREZTRI) 160-9-4.8 MCG/ACT aerosol inhaler, Inhale 2 puffs 2 (Two) Times a Day., Disp: 1 each, Rfl: 5  •  Eliquis 5 MG tablet tablet, , Disp: , Rfl:   •  furosemide (LASIX) 20 MG tablet, 1 tablet. As needed, Disp: , Rfl:   •  gabapentin (NEURONTIN) 100 MG capsule, , Disp: , Rfl:   •  HYDROcodone-acetaminophen (NORCO)  MG per tablet, Take 1 tablet by mouth Every 6 (Six) Hours As Needed for Moderate Pain., Disp: , Rfl:   •  linaclotide (LINZESS) 145 MCG capsule capsule, Take 1 capsule by mouth Daily., Disp: , Rfl:   •  meclizine (ANTIVERT) 25 MG tablet, , Disp: , Rfl:   •  O2 (OXYGEN), Inhale 2 L/min Continuous., Disp: , Rfl:   •  ondansetron (ZOFRAN) 4 MG tablet, Take 1 tablet by mouth Every 8 (Eight) Hours As Needed for Nausea or Vomiting., Disp: , Rfl:   •  predniSONE (DELTASONE) 10 MG tablet, Take 1 tablet by mouth Daily., Disp: 30 tablet, Rfl: 4  •  Actemra ACTPen 162 MG/0.9ML solution auto-injector injection, , Disp: , Rfl:            Vital Signs   /93 (BP Location: Left arm, Patient Position: Sitting, Cuff Size: Adult)   Pulse 77   Temp 98.6 °F (37 °C) (Tympanic)   Resp 18   Ht 160 cm (63\")   Wt 65 kg (143 lb 4.8 oz)   SpO2 98% Comment: room air  BMI 25.38 kg/m²       OBJECTIVE    Physical Exam  Vital Signs Reviewed   WDWN, Alert, NAD.    HEENT:  PERRL, EOMI.  OP, nares clear  Neck:  Supple, no JVD, no thyromegaly  Chest:   Occasional inspiratory wheeze, crackles in the bases, no work of breathing noted  CV: RRR, no MGR, pulses 2+, equal.  Abd:  Soft, NT, ND, + BS, no HSM  EXT:  no clubbing, no cyanosis, no edema  Neuro:  A&Ox3, CN grossly intact, no focal deficits.  Skin: Hematoma to her left anterior " leg    Results Review  I have personally reviewed the prior office notes and no recent diagnostics.    ASSESSMENT         Patient Active Problem List   Diagnosis   • Acquired spondylolisthesis   • Age related osteoporosis   • Asthma   • Chronic pain   • COPD (chronic obstructive pulmonary disease) case management patient (HCC)   • Degeneration of lumbar intervertebral disc   • Dizziness   • Hip pain   • Irritable bowel syndrome with constipation   • Neck pain   • Lumbago with sciatica   • Spinal stenosis of lumbar region with neurogenic claudication   • Mixed hyperlipidemia   • Overweight with body mass index (BMI) 25.0-29.9   • Pulmonary fibrosis (HCC)   • Rheumatoid arthritis (HCC)   • S/P lumbar spinal fusion   • Bronchiectasis without complication ([facility])   • Immunosuppressed status ([facility])   • 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[facility])   • Dyspnea   • Cough   • Pneumonia due to COVID-19 virus   • Severe malnutrition ([facility])   • Cytokine release syndrome, grade 3   • Cytokine release syndrome, grade 4   • Acute pulmonary embolism with acute cor pulmonale ([facility])   • Myositis   • Acute deep vein thrombosis (DVT) of both lower extremities ([facility])   • ARDS (adult respiratory distress syndrome) ([facility])   • Acute respiratory failure with hypoxia ([facility])   • High risk medication use   • Pulmonary hypertension ([facility])   • CTEPH (chronic thromboembolic pulmonary hypertension) ([facility])       Encounter Diagnoses   Name Primary?   • Bronchiectasis without complication ([facility]) Yes   • Acute cough    • Wheezing    • Dyspnea on exertion       PLAN  I instructed patient to follow-up with her PCP regarding hematoma on her left lower anterior extremity.  At this time is sending patient over to get a chest x-ray will notify of chest x-ray results when available.  I have encouraged patient to use her albuterol nebulizer machine up to 4 times a day for shortness of air or wheeze.  Patient to continue with her Breztri as prescribed.  Patient to rinse her mouth out  after each use of Breztri to prevent oral thrush.  I explained to patient that she may be having a bronchitis/viral episode as to why antibiotics have not been effective in resolving her cough.  Patient verbalizes understanding.  Patient to continue with her daily prednisone 10 mg.  If persistence or worsening of symptoms patient is instructed to call 911 or go to emergency room.  Diagnoses and all orders for this visit:    1. Bronchiectasis without complication (HCC) (Primary)  -     XR Chest 2 View; Future    2. Acute cough  -     XR Chest 2 View; Future    3. Wheezing  -     XR Chest 2 View; Future    4. Dyspnea on exertion  -     XR Chest 2 View; Future           Smoking status: Never  Vaccination status: Declines COVID up-to-date with flu  Medications personally reviewed    Follow Up  Return for Next scheduled follow up.    Patient was given instructions and counseling regarding her condition or for health maintenance advice. Please see specific information pulled into the AVS if appropriate.

## 2023-03-27 DIAGNOSIS — J47.9 BRONCHIECTASIS WITHOUT COMPLICATION: Primary | ICD-10-CM

## 2023-03-27 RX ORDER — AMOXICILLIN AND CLAVULANATE POTASSIUM 875; 125 MG/1; MG/1
1 TABLET, FILM COATED ORAL 2 TIMES DAILY
Qty: 14 TABLET | Refills: 0 | Status: SHIPPED | OUTPATIENT
Start: 2023-03-27 | End: 2023-04-06

## 2023-04-06 ENCOUNTER — OFFICE VISIT (OUTPATIENT)
Dept: OTOLARYNGOLOGY | Facility: CLINIC | Age: 73
End: 2023-04-06
Payer: MEDICARE

## 2023-04-06 VITALS
SYSTOLIC BLOOD PRESSURE: 109 MMHG | BODY MASS INDEX: 25.48 KG/M2 | TEMPERATURE: 97 F | HEIGHT: 63 IN | HEART RATE: 103 BPM | DIASTOLIC BLOOD PRESSURE: 68 MMHG | WEIGHT: 143.8 LBS

## 2023-04-06 DIAGNOSIS — R49.0 VOICE HOARSENESS: Primary | ICD-10-CM

## 2023-04-06 DIAGNOSIS — H61.21 IMPACTED CERUMEN OF RIGHT EAR: ICD-10-CM

## 2023-04-06 DIAGNOSIS — R05.3 CHRONIC COUGH: ICD-10-CM

## 2023-04-06 PROCEDURE — 1159F MED LIST DOCD IN RCRD: CPT | Performed by: NURSE PRACTITIONER

## 2023-04-06 PROCEDURE — 99214 OFFICE O/P EST MOD 30 MIN: CPT | Performed by: NURSE PRACTITIONER

## 2023-04-06 PROCEDURE — 1160F RVW MEDS BY RX/DR IN RCRD: CPT | Performed by: NURSE PRACTITIONER

## 2023-04-06 PROCEDURE — 69210 REMOVE IMPACTED EAR WAX UNI: CPT | Performed by: NURSE PRACTITIONER

## 2023-04-06 PROCEDURE — 31575 DIAGNOSTIC LARYNGOSCOPY: CPT | Performed by: NURSE PRACTITIONER

## 2023-04-06 NOTE — PROGRESS NOTES
Patient Name: Christina Espinoza   Visit Date: 04/06/2023   Patient ID: [id number]  Provider: CATY Patricio    Sex: female  Location: [facility]   YOB: 1950  Location Address: [address] [place] [address], [address],?[place]?[address]    Primary Care Provider Laurie Castro MD  Location Phone: [phone]    Referring Provider: No ref. provider found        Chief Complaint  Hoarse New Problem    Subjective          Christina Espinoza is a 73 y.o. female who presents to [facility] for a follow-up visit of a new complaint, voice hoarseness.  She states that this started in December 2022.  She states for about a month she had a hard time speaking at all but it has somewhat improved.  She states that she has a hard time singing at [employer] because of the changes in her voice.  She reports a few days of sore throat but typically not a painful throat.  She does have some issues with getting choked on foods and this occurs usually 2-3 times per week.  She reports a chronic cough since 2015 at which time she was diagnosed with pulmonary fibrosis.  She states that she is on several inhaled medications.  She feels like over the last couple weeks her hoarseness has gotten better.  She is not a smoker and has had no unintended weight loss.      Current Outpatient Medications on File Prior to Visit   Medication Sig   • Actemra ACTPen 162 MG/0.9ML solution auto-injector injection    • albuterol (PROVENTIL) (2.5 MG/3ML) 0.083% nebulizer solution Take 2.5 mg by nebulization Every 4 (Four) Hours As Needed for Shortness of Air.   • albuterol sulfate  (90 Base) MCG/ACT inhaler Inhale 2 puffs Every 4 (Four) Hours As Needed for Wheezing.   • alendronate (FOSAMAX) 70 MG tablet Take 1 tablet by mouth Every 7 (Seven) Days. Every Tuesday   • atorvastatin (LIPITOR) 20 MG tablet Take 1 tablet by mouth Daily.   • azaTHIOprine (Imuran) 50 MG tablet Take 1.5 tablets by mouth  "Daily.   • Budeson-Glycopyrrol-Formoterol (BREZTRI) 160-9-4.8 MCG/ACT aerosol inhaler Inhale 2 puffs 2 (Two) Times a Day.   • Eliquis 5 MG tablet tablet    • gabapentin (NEURONTIN) 100 MG capsule    • HYDROcodone-acetaminophen (NORCO)  MG per tablet Take 1 tablet by mouth Every 6 (Six) Hours As Needed for Moderate Pain.   • linaclotide (LINZESS) 145 MCG capsule capsule Take 1 capsule by mouth Daily.   • ondansetron (ZOFRAN) 4 MG tablet Take 1 tablet by mouth Every 8 (Eight) Hours As Needed for Nausea or Vomiting.   • predniSONE (DELTASONE) 10 MG tablet Take 1 tablet by mouth Daily.   • furosemide (LASIX) 20 MG tablet 1 tablet. As needed (Patient not taking: Reported on 4/6/2023)   • meclizine (ANTIVERT) 25 MG tablet  (Patient not taking: Reported on 4/6/2023)   • O2 (OXYGEN) Inhale 2 L/min Continuous. (Patient not taking: Reported on 4/6/2023)   • [DISCONTINUED] amoxicillin-clavulanate (Augmentin) 875-125 MG per tablet Take 1 tablet by mouth 2 (Two) Times a Day.     No current facility-administered medications on file prior to visit.        Social History     Tobacco Use   • Smoking status: Never   • Smokeless tobacco: Never   Vaping Use   • Vaping Use: Never used   Substance Use Topics   • Alcohol use: Never   • Drug use: Defer        Objective     Vital Signs:   /68 (BP Location: Left arm, Patient Position: Sitting, Cuff Size: Adult)   Pulse 103   Temp 97 °F (36.1 °C) (Temporal)   Ht 160 cm (63\")   Wt 65.2 kg (143 lb 12.8 oz)   BMI 25.47 kg/m²       Physical Exam  Constitutional:       General: She is not in acute distress.     Appearance: Normal appearance. She is not ill-appearing.   HENT:      Head: Normocephalic and atraumatic.      Jaw: There is normal jaw occlusion. No tenderness or pain on movement.      Salivary Glands: Right salivary gland is not diffusely enlarged or tender. Left salivary gland is not diffusely enlarged or tender.      Right Ear: Tympanic membrane, ear canal and external " ear normal. There is impacted cerumen.      Left Ear: Tympanic membrane, ear canal and external ear normal.      Nose: Nose normal. No septal deviation.      Right Sinus: No maxillary sinus tenderness or frontal sinus tenderness.      Left Sinus: No maxillary sinus tenderness or frontal sinus tenderness.      Mouth/Throat:      Lips: Pink. No lesions.      Mouth: Mucous membranes are moist. No oral lesions.      Dentition: Normal dentition.      Tongue: No lesions.      Palate: No mass and lesions.      Pharynx: Oropharynx is clear. Uvula midline.      Tonsils: No tonsillar exudate.   Eyes:      Extraocular Movements: Extraocular movements intact.      Conjunctiva/sclera: Conjunctivae normal.      Pupils: Pupils are equal, round, and reactive to light.   Neck:      Thyroid: No thyroid mass, thyromegaly or thyroid tenderness.      Trachea: Trachea normal.   Pulmonary:      Effort: Pulmonary effort is normal. No respiratory distress.   Musculoskeletal:         General: Normal range of motion.      Cervical back: Normal range of motion and neck supple. No tenderness.   Lymphadenopathy:      Cervical: No cervical adenopathy.   Skin:     General: Skin is warm and dry.   Neurological:      General: No focal deficit present.      Mental Status: She is alert and oriented to person, place, and time.      Cranial Nerves: No cranial nerve deficit.      Motor: No weakness.      Gait: Gait normal.   Psychiatric:         Mood and Affect: Mood normal.         Behavior: Behavior normal.         Thought Content: Thought content normal.         Judgment: Judgment normal.          Ear Cerumen Removal    Date/Time: 4/6/2023 3:15 PM  Performed by: Amanda Garvey APRN  Authorized by: Amanda Garvey APRN     Anesthesia:  Local Anesthetic: none  Location details: right ear  Patient tolerance: patient tolerated the procedure well with no immediate complications  Procedure type: instrumentation, alligator forceps   Sedation:  Patient  sedated: no      $ Laryngoscopy    Date/Time: 4/6/2023 3:30 PM  Performed by: Amanda Garvey APRN  Authorized by: Amanda Garvey APRN     Procedure details:     Indications: hoarseness, dysphagia, or aspiration      Scope location: right nare    Nasal cavity:     Right inferior turbinates: normal    Septum:     Findings: normal    Sinus/ Nasopharynx:     Right middle meatus: normal and patent      Right nasopharynx: normal and patent      Left nasopharynx: normal and patent      Right eustachian tube: normal and patent      Left eustachian tube: patent    Oropharynx/ Supraglottis:     Posterior pharyngeal wall: normal      Oropharynx: normal      Vallecula: normal      Base of tongue: normal      Epiglottis: inflammation    Larynx/ Hypopharynx:     Arytenoids: inflammation      Hypopharynx: inflammation      Pyriform sinus: normal      False vocal cords: normal      True vocal cords: inflammation    Post-procedure details:     Patient tolerance of procedure:  Tolerated well         Result Review :               Assessment and Plan    Diagnoses and all orders for this visit:    1. Voice hoarseness (Primary)    2. Chronic cough    3. Impacted cerumen of right ear    Other orders  -     Ear Cerumen Removal  -     $ Laryngoscopy    I did perform flexible laryngoscopy today.  I do not see any lesions or masses.  She does have overall inflammation of the larynx likely from her chronic cough.  She does feel like her voice hoarseness is mildly improved.  We have discussed trying to drink cold water or warm tea to help sooth when she begins to cough.       Follow Up   No follow-ups on file.  Patient was given instructions and counseling regarding her condition or for health maintenance advice. Please see specific information pulled into the AVS if appropriate.     CATY Patricio

## 2023-05-23 NOTE — PROGRESS NOTES
Primary Care Provider  Laurie Castro MD   Referring Provider  No ref. provider found    Patient Complaint  Cough, Follow-up, Asthma, PULMONARY FIBROSIS, Shortness of Breath, Wheezing, and ARDS      SUBJECTIVE    History of Presenting Illness  Christina Espinoza is a pleasant 73 y.o. female who presents to [facility] for follow-up appointment I last saw the patient 3/24/2023.  Patient has a diagnosis of pulmonary fibrosis, chronic thromboembolic pulmonary hypertension, and bronchiectasis.  She has had a persistent dry cough.  Patient states that every now and then she might get up some white thick sputum.  Patient takes routine prednisone 10 mg daily.  She continues to be on Breztri albuterol inhaler.  She also takes Eliquis daily.  Patient was recently seen in urgent care on 4/29/2023 for upper respiratory infection.  COVID and flu test were negative.  She was given antibiotic and cough medication.  Patient was unable to afford the cough medication.  She has completed her antibiotic.  Patient has shortness of air with exertional activities but recovers easily with rest.  She does use O2 as needed at 2 L per nasal cannula. Denies wheezing, headaches, chest pain, weight loss or hemoptysis. Denies fevers, chills and night sweats. Christina Espinoza is able to perform ADLs without difficulties and denies any swollen glands/lymph nodes in the head or neck.    I have personally reviewed the review of systems, past family, social, medical and surgical histories; and agree with their findings.    Review of Systems  Constitutional symptoms:  Denied complaints   Ear, nose, throat: Denied complaints  Cardiovascular:  Denied complaints  Respiratory: cough, shortness of air  Gastrointestinal: Denied complaints  Musculoskeletal: Denied complaints    Family History   Problem Relation Age of Onset   • Arthritis Mother    • Cancer Mother    • Osteoporosis Mother    • Cancer Father    •  Heart disease Father    • Diabetes Father    • Arthritis Sister    • Heart disease Sister    • Bleeding Disorder Brother    • Stroke Brother    • Diabetes Maternal Grandfather    • Diabetes Son         Social History     Socioeconomic History   • Marital status:    Tobacco Use   • Smoking status: Never   • Smokeless tobacco: Never   Vaping Use   • Vaping Use: Never used   Substance and Sexual Activity   • Alcohol use: Never   • Drug use: Never   • Sexual activity: Defer        Past Medical History:   Diagnosis Date   • Arthritis    • Asthma    • Cervicalgia 01/20/2020   • Leg pain    • Leg swelling    • Limb pain    • Lumbago 01/20/2020    low back pain    • Lumbar spinal stenosis 01/20/2020   • Lung disease    • Nausea    • Neurogenic claudication 01/20/2020   • Neuropathy    • Pulmonary fibrosis    • Radiculopathy, lumbosacral region 01/20/2020   • Spondylolisthesis, lumbar region 01/29/2020    grade 1 at L3/4        Immunization History   Administered Date(s) Administered   • Fluzone High Dose =>65 Years ([employer] ONLY) 10/25/2022   • Pneumococcal Conjugate 13-Valent (PCV13) 01/16/2017       Allergies   Allergen Reactions   • Clindamycin Rash, Shortness Of Breath and Swelling   • Doxycycline Other (See Comments)     SORE ON TONGUE   • Adalimumab Rash   • Azithromycin Rash, Nausea And Vomiting and Unknown - High Severity   • Clindamycin Hcl Unknown - High Severity and Rash   • Erythromycin Rash and Unknown - High Severity          Current Outpatient Medications:   •  albuterol (PROVENTIL) (2.5 MG/3ML) 0.083% nebulizer solution, Take 2.5 mg by nebulization Every 4 (Four) Hours As Needed for Shortness of Air., Disp: 120 each, Rfl: 5  •  albuterol sulfate  (90 Base) MCG/ACT inhaler, Inhale 2 puffs Every 4 (Four) Hours As Needed for Wheezing., Disp: 18 g, Rfl: 5  •  alendronate (FOSAMAX) 70 MG tablet, Take 1 tablet by mouth Every 7 (Seven) Days. Every Tuesday, Disp: , Rfl:   •  atorvastatin (LIPITOR) 20  "MG tablet, Take 1 tablet by mouth Daily., Disp: , Rfl:   •  azaTHIOprine (Imuran) 50 MG tablet, Take 1.5 tablets by mouth Daily., Disp: 60 tablet, Rfl: 4  •  Budeson-Glycopyrrol-Formoterol (BREZTRI) 160-9-4.8 MCG/ACT aerosol inhaler, Inhale 2 puffs 2 (Two) Times a Day., Disp: 1 each, Rfl: 5  •  Eliquis 5 MG tablet tablet, , Disp: , Rfl:   •  gabapentin (NEURONTIN) 100 MG capsule, , Disp: , Rfl:   •  HYDROcodone-acetaminophen (NORCO)  MG per tablet, Take 1 tablet by mouth Every 6 (Six) Hours As Needed for Moderate Pain., Disp: , Rfl:   •  linaclotide (LINZESS) 145 MCG capsule capsule, Take 1 capsule by mouth Daily., Disp: , Rfl:   •  O2 (OXYGEN), Inhale 2 L/min Continuous., Disp: , Rfl:   •  ondansetron (ZOFRAN) 4 MG tablet, Take 1 tablet by mouth Every 8 (Eight) Hours As Needed for Nausea or Vomiting., Disp: , Rfl:   •  predniSONE (DELTASONE) 10 MG tablet, Take 1 tablet by mouth Daily., Disp: 30 tablet, Rfl: 4           Vital Signs   /65 (BP Location: Left arm, Patient Position: Sitting, Cuff Size: Adult)   Pulse 87   Temp 98.7 °F (37.1 °C) (Tympanic)   Resp 18   Ht 160 cm (63\")   Wt 64 kg (141 lb 3.2 oz)   SpO2 97% Comment: ROOM AIR  BMI 25.01 kg/m²       OBJECTIVE    Physical Exam  Vital Signs Reviewed   WDWN, Alert, NAD.    HEENT:  PERRL, EOMI.  OP, nares clear  Neck:  Supple, no JVD, no thyromegaly  Chest:  good aeration, clear to auscultation bilaterally, tympanic to percussion bilaterally, no work of breathing noted  CV: RRR, no MGR, pulses 2+, equal.  Abd:  Soft, NT, ND, + BS, no HSM  EXT:  no clubbing, no cyanosis, no edema  Neuro:  A&Ox3, CN grossly intact, no focal deficits.  Skin: No rashes or lesions noted    Results Review  I have personally reviewed the prior office notes, hospital records, labs, and diagnostics.  XR Chest 2 View [[id number]] (Order [id number])  Order  Status: Final result     Appointment Information    PACS Images     Radiology Images  Study Result    Narrative & " Impression   PROCEDURE:  XR CHEST 2 VW     COMPARISON: 05/15/2022     INDICATIONS:  COUGH, DYSPNEA, WHEEZING X 1 MONTH.  PULMONARY FIBROSIS.     FINDINGS:          Basilar predominant pulmonary fibrosis is again noted.  No new focal consolidations are evident.    No pleural fluid is seen.  Proximal pulmonary vascularity appears prominent which may indicate   pulmonary hypertension.  Heart size is mildly prominent but stable.  No pneumothorax is seen.    Aortic vascular calcification is noted.  There is degenerative change in the spine.     IMPRESSION:                    1. Chronic pulmonary fibrosis is again noted, similar to prior.  It would be difficult to rule out   superimposed edema or infectious process given the background of significant chronic lung disease.     2. Cardiomegaly with mild prominence of the proximal pulmonary arteries which may indicate   pulmonary hypertension.            ALAN COX MD         Electronically Signed and Approved By: ALAN COX MD on 3/24/2023 at 16:54      Results  Full Pulmonary Function Test With Bronchodilator (Order [id number])  Order-Level Documents:    Scan on 2022 by Conrado Carrion DO: PULMONARY FUNCTION TEST, [facility], 2022         Author: -- Service: -- Author Type: --   Filed:  Date of Service:  Creation Time:    Status: (Other)   Spirometry  FEV1 FEC ratio 77%  FEV1 84%  Forced vital capacity 83%     Volumes  Total lung capacity 77%     DLCO 40%     Impression  Mild restrictive defect with severe reduction in DLCO  When compared with PFTs from 2021 there is been no statistically significant change      Christina Espinoza  Complete Transthoracic Echocardiogram with Complete Doppler and Color Flow  Order# [id number]  Reading physician: Juan Luis Khalil MD Ordering physician: Conrado Carrion DO Study date: 22     Patient Information    Patient Name   Christina Espinoza MRN   [id number] Legal Sex   Female  (Age)   1950 (73  y.o.)     Patient Hx Of Height, Weight, and Vitals    Height Weight BSA (Calculated - sq m) BMI (Calculated) Retired BMI (kg/m2) Pulse BP              [facility] PACS Images     Show images for Adult Transthoracic Echo Complete w/ Color, Spectral and Contrast if necessary per protocol [facility] PACS Images     Show images for Adult Transthoracic Echo Complete w/ Color, Spectral and Contrast if necessary per protocol   Clinical Indication    Dyspnea   Dx: CTEPH (chronic thromboembolic pulmonary hypertension) [I27.24 (ICD-10-CM)]     Interpretation Summary    Normal left ventricular systolic function with an estimated ejection fraction of 60%.  No regional wall motion abnormalities were observed.  Left ventricular diastolic function is consistent with impaired relaxation (grade I diastolic dysfunction).  Mildly thickened mitral leaflets with mild-moderate mitral regurgitation.  Trace aortic regurgitation.  Mild-moderate tricuspid regurgitation.  Estimated right ventricular systolic pressure was mildly elevated at 44 mmHg.        ASSESSMENT         Patient Active Problem List   Diagnosis   • Acquired spondylolisthesis   • Age related osteoporosis   • Asthma   • Chronic pain   • COPD (chronic obstructive pulmonary disease) case management patient   • Degeneration of lumbar intervertebral disc   • Dizziness   • Hip pain   • Irritable bowel syndrome with constipation   • Neck pain   • Lumbago with sciatica   • Spinal stenosis of lumbar region with neurogenic claudication   • Mixed hyperlipidemia   • Overweight with body mass index (BMI) 25.0-29.9   • Pulmonary fibrosis   • Rheumatoid arthritis   • S/P lumbar spinal fusion   • Bronchiectasis without complication   • Immunosuppressed status   • Antisynthetase syndrome   • Dyspnea   • Cough   • Pneumonia due to COVID-19 virus   • Severe malnutrition   • Cytokine release syndrome, grade 3   • Cytokine release syndrome, grade 4   • Acute pulmonary embolism with acute cor pulmonale   •  Myositis   • Acute deep vein thrombosis (DVT) of both lower extremities   • ARDS (adult respiratory distress syndrome)   • Acute respiratory failure with hypoxia   • High risk medication use   • Pulmonary hypertension   • CTEPH (chronic thromboembolic pulmonary hypertension)       Encounter Diagnoses   Name Primary?   • CTEPH (chronic thromboembolic pulmonary hypertension) Yes   • Pulmonary fibrosis    • Subacute cough    • Bronchiectasis without complication    • Dyspnea on exertion       PLAN  Samples of Delsym provided to patient today.  We will proceed with getting a CT scan and PFT at this time.  Discussed with patient possible bronchoscopy.  We will revisit possible bronchoscopy after her CT scan results.  Patient does not think she can produce a sputum specimen as her cough is mostly dry.  Patient encouraged in using her incentive spirometer and we will send in a flutter valve for her with instructions in use today.  Diagnoses and all orders for this visit:    1. CTEPH (chronic thromboembolic pulmonary hypertension) (Primary)  -     Pulmonary Function Test; Future  -     Oscillating Positive Expiratory Pressure (OPEP); Future  -     CT Chest Without Contrast; Future  -     Discontinue: Budeson-Glycopyrrol-Formoterol (BREZTRI) 160-9-4.8 MCG/ACT aerosol inhaler; Inhale 2 puffs 2 (Two) Times a Day.  Dispense: 1 each; Refill: 5  -     Budeson-Glycopyrrol-Formoterol (BREZTRI) 160-9-4.8 MCG/ACT aerosol inhaler; Inhale 2 puffs 2 (Two) Times a Day.  Dispense: 1 each; Refill: 5    2. Pulmonary fibrosis  -     Pulmonary Function Test; Future  -     Oscillating Positive Expiratory Pressure (OPEP); Future  -     CT Chest Without Contrast; Future  -     Discontinue: Budeson-Glycopyrrol-Formoterol (BREZTRI) 160-9-4.8 MCG/ACT aerosol inhaler; Inhale 2 puffs 2 (Two) Times a Day.  Dispense: 1 each; Refill: 5  -     Budeson-Glycopyrrol-Formoterol (BREZTRI) 160-9-4.8 MCG/ACT aerosol inhaler; Inhale 2 puffs 2 (Two) Times a Day.   Dispense: 1 each; Refill: 5    3. Subacute cough  -     Pulmonary Function Test; Future  -     Oscillating Positive Expiratory Pressure (OPEP); Future  -     CT Chest Without Contrast; Future  -     Discontinue: Budeson-Glycopyrrol-Formoterol (BREZTRI) 160-9-4.8 MCG/ACT aerosol inhaler; Inhale 2 puffs 2 (Two) Times a Day.  Dispense: 1 each; Refill: 5  -     Budeson-Glycopyrrol-Formoterol (BREZTRI) 160-9-4.8 MCG/ACT aerosol inhaler; Inhale 2 puffs 2 (Two) Times a Day.  Dispense: 1 each; Refill: 5    4. Bronchiectasis without complication  -     Pulmonary Function Test; Future  -     Oscillating Positive Expiratory Pressure (OPEP); Future  -     CT Chest Without Contrast; Future  -     Discontinue: Budeson-Glycopyrrol-Formoterol (BREZTRI) 160-9-4.8 MCG/ACT aerosol inhaler; Inhale 2 puffs 2 (Two) Times a Day.  Dispense: 1 each; Refill: 5  -     Budeson-Glycopyrrol-Formoterol (BREZTRI) 160-9-4.8 MCG/ACT aerosol inhaler; Inhale 2 puffs 2 (Two) Times a Day.  Dispense: 1 each; Refill: 5    5. Dyspnea on exertion  -     Pulmonary Function Test; Future  -     Oscillating Positive Expiratory Pressure (OPEP); Future  -     CT Chest Without Contrast; Future  -     Discontinue: Budeson-Glycopyrrol-Formoterol (BREZTRI) 160-9-4.8 MCG/ACT aerosol inhaler; Inhale 2 puffs 2 (Two) Times a Day.  Dispense: 1 each; Refill: 5  -     Budeson-Glycopyrrol-Formoterol (BREZTRI) 160-9-4.8 MCG/ACT aerosol inhaler; Inhale 2 puffs 2 (Two) Times a Day.  Dispense: 1 each; Refill: 5           Smoking status:never  Vaccination status:declines COVID vaccine, up to date with flu and pneumonia vaccines  Medications personally reviewed    Follow Up  Return in about 6 weeks (around 7/7/2023) for after Ct and PFT with Maddy Torres or dr. Carrion.    Patient was given instructions and counseling regarding her condition or for health maintenance advice. Please see specific information pulled into the AVS if appropriate.

## 2023-05-26 ENCOUNTER — OFFICE VISIT (OUTPATIENT)
Dept: PULMONOLOGY | Facility: CLINIC | Age: 73
End: 2023-05-26
Payer: MEDICARE

## 2023-05-26 VITALS
HEIGHT: 63 IN | WEIGHT: 141.2 LBS | BODY MASS INDEX: 25.02 KG/M2 | DIASTOLIC BLOOD PRESSURE: 65 MMHG | SYSTOLIC BLOOD PRESSURE: 118 MMHG | TEMPERATURE: 98.7 F | OXYGEN SATURATION: 97 % | RESPIRATION RATE: 18 BRPM | HEART RATE: 87 BPM

## 2023-05-26 DIAGNOSIS — R06.09 DYSPNEA ON EXERTION: ICD-10-CM

## 2023-05-26 DIAGNOSIS — I27.24 CTEPH (CHRONIC THROMBOEMBOLIC PULMONARY HYPERTENSION): Primary | ICD-10-CM

## 2023-05-26 DIAGNOSIS — J47.9 BRONCHIECTASIS WITHOUT COMPLICATION: ICD-10-CM

## 2023-05-26 DIAGNOSIS — R05.2 SUBACUTE COUGH: ICD-10-CM

## 2023-05-26 DIAGNOSIS — J84.10 PULMONARY FIBROSIS: ICD-10-CM

## 2023-05-26 RX ORDER — DULOXETIN HYDROCHLORIDE 30 MG/1
CAPSULE, DELAYED RELEASE ORAL EVERY 24 HOURS
COMMUNITY
End: 2023-05-26 | Stop reason: SDUPTHER

## 2023-05-26 RX ORDER — BUDESONIDE, GLYCOPYRROLATE, AND FORMOTEROL FUMARATE 160; 9; 4.8 UG/1; UG/1; UG/1
2 AEROSOL, METERED RESPIRATORY (INHALATION) 2 TIMES DAILY
Qty: 4 EACH | Refills: 0 | COMMUNITY
Start: 2023-05-26 | End: 2023-05-27

## 2023-07-25 DIAGNOSIS — J84.10 PULMONARY FIBROSIS: ICD-10-CM

## 2023-07-25 DIAGNOSIS — J47.9 BRONCHIECTASIS WITHOUT COMPLICATION: ICD-10-CM

## 2023-07-25 DIAGNOSIS — I27.24 CTEPH (CHRONIC THROMBOEMBOLIC PULMONARY HYPERTENSION): ICD-10-CM

## 2023-07-25 DIAGNOSIS — R06.09 DYSPNEA ON EXERTION: ICD-10-CM

## 2023-07-25 DIAGNOSIS — R05.2 SUBACUTE COUGH: ICD-10-CM

## 2023-07-25 RX ORDER — BUDESONIDE, GLYCOPYRROLATE, AND FORMOTEROL FUMARATE 160; 9; 4.8 UG/1; UG/1; UG/1
AEROSOL, METERED RESPIRATORY (INHALATION)
Qty: 11 G | Refills: 3 | Status: SHIPPED | OUTPATIENT
Start: 2023-07-25

## 2023-07-27 ENCOUNTER — LAB (OUTPATIENT)
Dept: LAB | Facility: HOSPITAL | Age: 73
End: 2023-07-27
Payer: MEDICARE

## 2023-07-27 ENCOUNTER — TRANSCRIBE ORDERS (OUTPATIENT)
Dept: LAB | Facility: HOSPITAL | Age: 73
End: 2023-07-27
Payer: MEDICARE

## 2023-07-27 DIAGNOSIS — M62.838 SPASM OF MUSCLE: ICD-10-CM

## 2023-07-27 DIAGNOSIS — K58.1 IRRITABLE BOWEL SYNDROME WITH CONSTIPATION: ICD-10-CM

## 2023-07-27 DIAGNOSIS — M53.86 SCIATICA ASSOCIATED WITH DISORDER OF LUMBAR SPINE: Primary | ICD-10-CM

## 2023-07-27 DIAGNOSIS — M53.86 SCIATICA ASSOCIATED WITH DISORDER OF LUMBAR SPINE: ICD-10-CM

## 2023-07-27 DIAGNOSIS — G62.9 NEUROPATHY: ICD-10-CM

## 2023-07-27 DIAGNOSIS — E78.2 MIXED HYPERLIPIDEMIA: ICD-10-CM

## 2023-07-27 DIAGNOSIS — M05.9 RHEUMATOID ARTHRITIS WITH POSITIVE RHEUMATOID FACTOR, INVOLVING UNSPECIFIED SITE: ICD-10-CM

## 2023-07-27 PROCEDURE — 80053 COMPREHEN METABOLIC PANEL: CPT

## 2023-07-27 PROCEDURE — 83735 ASSAY OF MAGNESIUM: CPT

## 2023-07-27 PROCEDURE — 36415 COLL VENOUS BLD VENIPUNCTURE: CPT

## 2023-07-27 PROCEDURE — 85025 COMPLETE CBC W/AUTO DIFF WBC: CPT

## 2023-07-27 PROCEDURE — 80061 LIPID PANEL: CPT

## 2023-07-28 LAB
ALBUMIN SERPL-MCNC: 4.3 G/DL (ref 3.5–5.2)
ALBUMIN/GLOB SERPL: 1.8 G/DL
ALP SERPL-CCNC: 76 U/L (ref 39–117)
ALT SERPL W P-5'-P-CCNC: 10 U/L (ref 1–33)
ANION GAP SERPL CALCULATED.3IONS-SCNC: 12.4 MMOL/L (ref 5–15)
AST SERPL-CCNC: 12 U/L (ref 1–32)
BASOPHILS # BLD AUTO: 0.04 10*3/MM3 (ref 0–0.2)
BASOPHILS NFR BLD AUTO: 0.4 % (ref 0–1.5)
BILIRUB SERPL-MCNC: 0.4 MG/DL (ref 0–1.2)
BUN SERPL-MCNC: 15 MG/DL (ref 8–23)
BUN/CREAT SERPL: 17.6 (ref 7–25)
CALCIUM SPEC-SCNC: 10.7 MG/DL (ref 8.6–10.5)
CHLORIDE SERPL-SCNC: 107 MMOL/L (ref 98–107)
CHOLEST SERPL-MCNC: 256 MG/DL (ref 0–200)
CO2 SERPL-SCNC: 22.6 MMOL/L (ref 22–29)
CREAT SERPL-MCNC: 0.85 MG/DL (ref 0.57–1)
DEPRECATED RDW RBC AUTO: 48.6 FL (ref 37–54)
EGFRCR SERPLBLD CKD-EPI 2021: 72.4 ML/MIN/1.73
EOSINOPHIL # BLD AUTO: 0 10*3/MM3 (ref 0–0.4)
EOSINOPHIL NFR BLD AUTO: 0 % (ref 0.3–6.2)
ERYTHROCYTE [DISTWIDTH] IN BLOOD BY AUTOMATED COUNT: 14.3 % (ref 12.3–15.4)
GLOBULIN UR ELPH-MCNC: 2.4 GM/DL
GLUCOSE SERPL-MCNC: 98 MG/DL (ref 65–99)
HCT VFR BLD AUTO: 43.6 % (ref 34–46.6)
HDLC SERPL-MCNC: 106 MG/DL (ref 40–60)
HGB BLD-MCNC: 13.9 G/DL (ref 12–15.9)
IMM GRANULOCYTES # BLD AUTO: 0.06 10*3/MM3 (ref 0–0.05)
IMM GRANULOCYTES NFR BLD AUTO: 0.6 % (ref 0–0.5)
LDLC SERPL CALC-MCNC: 129 MG/DL (ref 0–100)
LDLC/HDLC SERPL: 1.17 {RATIO}
LYMPHOCYTES # BLD AUTO: 0.72 10*3/MM3 (ref 0.7–3.1)
LYMPHOCYTES NFR BLD AUTO: 6.7 % (ref 19.6–45.3)
MAGNESIUM SERPL-MCNC: 2.2 MG/DL (ref 1.6–2.4)
MCH RBC QN AUTO: 30.3 PG (ref 26.6–33)
MCHC RBC AUTO-ENTMCNC: 31.9 G/DL (ref 31.5–35.7)
MCV RBC AUTO: 95 FL (ref 79–97)
MONOCYTES # BLD AUTO: 0.54 10*3/MM3 (ref 0.1–0.9)
MONOCYTES NFR BLD AUTO: 5 % (ref 5–12)
NEUTROPHILS NFR BLD AUTO: 87.3 % (ref 42.7–76)
NEUTROPHILS NFR BLD AUTO: 9.41 10*3/MM3 (ref 1.7–7)
NRBC BLD AUTO-RTO: 0 /100 WBC (ref 0–0.2)
PLATELET # BLD AUTO: 363 10*3/MM3 (ref 140–450)
PMV BLD AUTO: 10.8 FL (ref 6–12)
POTASSIUM SERPL-SCNC: 4.4 MMOL/L (ref 3.5–5.2)
PROT SERPL-MCNC: 6.7 G/DL (ref 6–8.5)
RBC # BLD AUTO: 4.59 10*6/MM3 (ref 3.77–5.28)
SODIUM SERPL-SCNC: 142 MMOL/L (ref 136–145)
TRIGL SERPL-MCNC: 128 MG/DL (ref 0–150)
VLDLC SERPL-MCNC: 21 MG/DL (ref 5–40)
WBC NRBC COR # BLD: 10.77 10*3/MM3 (ref 3.4–10.8)

## 2023-08-26 DIAGNOSIS — J84.10 PULMONARY FIBROSIS: ICD-10-CM

## 2023-08-28 RX ORDER — AZATHIOPRINE 50 MG/1
TABLET ORAL
Qty: 60 TABLET | Refills: 0 | Status: SHIPPED | OUTPATIENT
Start: 2023-08-28

## 2023-09-12 ENCOUNTER — HOSPITAL ENCOUNTER (EMERGENCY)
Facility: HOSPITAL | Age: 73
Discharge: HOME | End: 2023-09-13
Attending: EMERGENCY MEDICINE
Payer: MEDICARE

## 2023-09-12 VITALS
WEIGHT: 141.98 LBS | TEMPERATURE: 98 F | OXYGEN SATURATION: 98 % | DIASTOLIC BLOOD PRESSURE: 63 MMHG | BODY MASS INDEX: 25.16 KG/M2 | HEIGHT: 63 IN | RESPIRATION RATE: 22 BRPM | HEART RATE: 90 BPM | SYSTOLIC BLOOD PRESSURE: 110 MMHG

## 2023-09-12 DIAGNOSIS — S81.811A LACERATION OF RIGHT LOWER EXTREMITY EXCLUDING THIGH, INITIAL ENCOUNTER: Primary | ICD-10-CM

## 2023-09-12 PROCEDURE — 99282 EMERGENCY DEPT VISIT SF MDM: CPT

## 2023-09-12 PROCEDURE — 90471 IMMUNIZATION ADMIN: CPT | Performed by: NURSE PRACTITIONER

## 2023-09-12 PROCEDURE — 90715 TDAP VACCINE 7 YRS/> IM: CPT | Performed by: NURSE PRACTITIONER

## 2023-09-12 PROCEDURE — 99283 EMERGENCY DEPT VISIT LOW MDM: CPT

## 2023-09-12 PROCEDURE — 25010000002 TETANUS-DIPHTH-ACELL PERTUSSIS 5-2.5-18.5 LF-MCG/0.5 SUSPENSION PREFILLED SYRINGE: Performed by: NURSE PRACTITIONER

## 2023-09-12 RX ORDER — LIDOCAINE HYDROCHLORIDE AND EPINEPHRINE 10; 10 MG/ML; UG/ML
10 INJECTION, SOLUTION INFILTRATION; PERINEURAL ONCE
Status: COMPLETED | OUTPATIENT
Start: 2023-09-12 | End: 2023-09-12

## 2023-09-12 RX ADMIN — TETANUS TOXOID, REDUCED DIPHTHERIA TOXOID AND ACELLULAR PERTUSSIS VACCINE, ADSORBED 0.5 ML: 5; 2.5; 8; 8; 2.5 SUSPENSION INTRAMUSCULAR at 23:39

## 2023-09-12 RX ADMIN — LIDOCAINE HYDROCHLORIDE,EPINEPHRINE BITARTRATE 10 ML: 10; .01 INJECTION, SOLUTION INFILTRATION; PERINEURAL at 23:46

## 2023-09-13 NOTE — DISCHARGE INSTRUCTIONS
Keep wound clean and dry.  Wash with soap and water daily.  Pat dry.    Tylenol or Motrin as needed for pain.    Sutures out in 7 to 10 days.  Follow-up with PCP for removal or return here.    Return for any new or worsening symptoms

## 2023-09-13 NOTE — ED PROVIDER NOTES
Time: 9:14 PM EDT  Date of encounter:  9/12/2023  Independent Historian/Clinical History and Information was obtained by:   Patient    History is limited by: N/A    Chief Complaint   Patient presents with    Laceration         History of Present Illness:  Patient is a 73 y.o. year old female who presents to the emergency department for evaluation of RLE wound d/t dog. Daily ASA. Wound by paw.  Patient states her dog jumped up on her and ended up cutting her leg prior to arrival.  Patient is not on blood thinners.  No numbness tingling or weakness.  Patient able to ambulate without difficulty.  Patient unsure if her tetanus is up-to-date    HPI    Patient Care Team  Primary Care Provider: Laurie Castro MD    Past Medical History:     Allergies   Allergen Reactions    Clindamycin Rash, Shortness Of Breath and Swelling    Doxycycline Other (See Comments)     SORE ON TONGUE    Adalimumab Rash    Azithromycin Rash, Nausea And Vomiting and Unknown - High Severity    Clindamycin Hcl Unknown - High Severity and Rash    Erythromycin Rash and Unknown - High Severity     Past Medical History:   Diagnosis Date    Arthritis     Asthma     Cervicalgia 01/20/2020    Leg pain     Leg swelling     Limb pain     Lumbago 01/20/2020    low back pain     Lumbar spinal stenosis 01/20/2020    Lung disease     Nausea     Neurogenic claudication 01/20/2020    Neuropathy     Pulmonary fibrosis     Radiculopathy, lumbosacral region 01/20/2020    Spondylolisthesis, lumbar region 01/29/2020    grade 1 at L3/4     Past Surgical History:   Procedure Laterality Date    SPINE SURGERY  2020    TONSILLECTOMY      TUBAL ABDOMINAL LIGATION       Family History   Problem Relation Age of Onset    Arthritis Mother     Cancer Mother     Osteoporosis Mother     Cancer Father     Heart disease Father     Diabetes Father     Arthritis Sister     Heart disease Sister     Bleeding Disorder Brother     Stroke Brother     Diabetes Maternal Grandfather      Diabetes Son        Home Medications:  Prior to Admission medications    Medication Sig Start Date End Date Taking? Authorizing Provider   albuterol (PROVENTIL) (2.5 MG/3ML) 0.083% nebulizer solution Take 2.5 mg by nebulization Every 4 (Four) Hours As Needed for Shortness of Air. 8/9/22   Maddy Torres APRN   albuterol sulfate  (90 Base) MCG/ACT inhaler Inhale 2 puffs Every 4 (Four) Hours As Needed for Wheezing. 7/7/23   Maddy Torres APRN   alendronate (FOSAMAX) 70 MG tablet Take 1 tablet by mouth Every 7 (Seven) Days. Every Tuesday    Yoseph Garcia MD   atorvastatin (LIPITOR) 20 MG tablet Take 1 tablet by mouth Daily. 9/15/21   Yoseph Garcia MD   azaTHIOprine (IMURAN) 50 MG tablet TAKE 1 & 1/2 (ONE & ONE-HALF) TABLETS BY MOUTH ONCE DAILY 8/28/23   Conrado Carrion,    brompheniramine-pseudoephedrine-DM 30-2-10 MG/5ML syrup Take 5 mL by mouth 4 (Four) Times a Day As Needed for Allergies. 7/7/23   Maddy Torres APRN   Budeson-Glycopyrrol-Formoterol (Breztri Aerosphere) 160-9-4.8 MCG/ACT aerosol inhaler INHALE 2 PUFFS TWICE DAILY 7/25/23   Maddy Torres APRN   DULoxetine (CYMBALTA) 30 MG capsule  6/19/23   Yoseph Garcia MD   Eliquis 5 MG tablet tablet  12/7/21   Yoseph Garcia MD   gabapentin (NEURONTIN) 100 MG capsule  10/25/22   Yoseph Garcia MD   HYDROcodone-acetaminophen (NORCO)  MG per tablet Take 1 tablet by mouth Every 6 (Six) Hours As Needed for Moderate Pain.    Yoseph Garcia MD   ibuprofen (ADVIL,MOTRIN) 800 MG tablet Take 1 tablet by mouth Every 8 (Eight) Hours As Needed for Mild Pain. 7/7/23   Maddy Torres APRN   linaclotide (LINZESS) 145 MCG capsule capsule Take 1 capsule by mouth Daily.    Yoseph Garcia MD   O2 (OXYGEN) Inhale 2 L/min Continuous.    Yoseph Garcia MD   ondansetron (ZOFRAN) 4 MG tablet Take 1 tablet by mouth Every 8 (Eight) Hours As Needed for Nausea or Vomiting.    Provider, Historical,  "MD   predniSONE (DELTASONE) 10 MG tablet Take 1 tablet by mouth Daily. 7/7/23   Maddy Torrse APRN   predniSONE (DELTASONE) 20 MG tablet Take 1 tablet by mouth Daily. 7/7/23   Maddy Torres APRN        Social History:   Social History     Tobacco Use    Smoking status: Never     Passive exposure: Never    Smokeless tobacco: Never   Vaping Use    Vaping Use: Never used   Substance Use Topics    Alcohol use: Never    Drug use: Never         Review of Systems:  Review of Systems   Musculoskeletal:  Negative for arthralgias, gait problem and joint swelling.   Skin:  Positive for wound.   Neurological:  Negative for weakness and numbness.   Hematological: Negative.    Psychiatric/Behavioral: Negative.     All other systems reviewed and are negative.     Physical Exam:  /63   Pulse 90   Temp 98 °F (36.7 °C) (Oral)   Resp 22   Ht 160 cm (63\")   Wt 64.4 kg (141 lb 15.6 oz)   SpO2 98%   BMI 25.15 kg/m²         Physical Exam  Vitals and nursing note reviewed.   Constitutional:       Appearance: Normal appearance.   HENT:      Head: Normocephalic and atraumatic.      Mouth/Throat:      Mouth: Mucous membranes are moist.   Eyes:      Conjunctiva/sclera: Conjunctivae normal.   Cardiovascular:      Pulses: Normal pulses.   Pulmonary:      Effort: Pulmonary effort is normal.   Abdominal:      General: There is no distension.   Musculoskeletal:         General: Tenderness (Tissue tenderness right anterior shin at laceration site.  No bony tenderness) present. No swelling. Normal range of motion.      Cervical back: Normal range of motion.   Skin:     General: Skin is warm.      Capillary Refill: Capillary refill takes less than 2 seconds.             Comments: V shaped flap with central irregular lacerated area noted to right anterior lower shin measuring a total of 10 cm in entirety   Neurological:      General: No focal deficit present.      Mental Status: She is alert and oriented to person, place, and time. "      Sensory: No sensory deficit.      Motor: No weakness.   Psychiatric:         Mood and Affect: Mood normal.         Behavior: Behavior normal.              Procedures:  Laceration Repair    Date/Time: 9/12/2023 10:56 PM  Performed by: Yesi Cheung APRN  Authorized by: Ferdinand Bejarano DO     Consent:     Consent obtained:  Verbal    Consent given by:  Patient    Risks, benefits, and alternatives were discussed: yes      Risks discussed:  Infection, pain, poor cosmetic result and poor wound healing    Alternatives discussed:  No treatment  Universal protocol:     Procedure explained and questions answered to patient or proxy's satisfaction: yes      Imaging studies available: no      Patient identity confirmed:  Verbally with patient  Anesthesia:     Anesthesia method:  Local infiltration    Local anesthetic:  Lidocaine 1% WITH epi  Laceration details:     Location:  Leg    Leg location:  R lower leg    Length (cm):  10    Depth (mm):  2  Pre-procedure details:     Preparation:  Patient was prepped and draped in usual sterile fashion  Exploration:     Imaging outcome: foreign body not noted      Wound exploration: entire depth of wound visualized      Wound extent: no foreign bodies/material noted      Contaminated: no    Treatment:     Area cleansed with:  Povidone-iodine    Amount of cleaning:  Standard    Irrigation solution:  Sterile saline    Irrigation volume:  500    Irrigation method:  Syringe  Skin repair:     Repair method:  Sutures    Suture size:  4-0    Suture material:  Nylon    Suture technique:  Simple interrupted    Number of sutures:  17  Approximation:     Approximation:  Close  Repair type:     Repair type:  Intermediate (Multiple flaps)  Post-procedure details:     Dressing:  Non-adherent dressing    Procedure completion:  Tolerated well, no immediate complications      Medical Decision Making:      Comorbidities that affect care:    Asthma    External Notes reviewed:    Previous Clinic  Note: Patient's last clinic visit was on 28 August with PCP for unrelated issue      The following orders were placed and all results were independently analyzed by me:  Orders Placed This Encounter   Procedures    Laceration Repair       Medications Given in the Emergency Department:  Medications   lidocaine 1% - EPINEPHrine 1:224623 (XYLOCAINE W/EPI) 1 %-1:188198 injection 10 mL (10 mL Injection Given by Other 9/12/23 0867)   Tetanus-Diphth-Acell Pertussis (BOOSTRIX) injection 0.5 mL (0.5 mL Intramuscular Given 9/12/23 3257)        ED Course:    The patient was initially evaluated in the triage area where orders were placed. The patient was later dispositioned by CATY Farrar.      The patient was advised to stay for completion of workup which includes but is not limited to communication of labs and radiological results, reassessment and plan. The patient was advised that leaving prior to disposition by a provider could result in critical findings that are not communicated to the patient.     ED Course as of 09/13/23 0418   Tue Sep 12, 2023   2115 --- PROVIDER IN TRIAGE NOTE ---    The patient was evaluated by Natasha alvarez in triage. Orders were placed and the patient is currently awaiting disposition.   [AJ]      ED Course User Index  [AJ] Natasha Contreras PA-C       Labs:    Lab Results (last 24 hours)       ** No results found for the last 24 hours. **             Imaging:    No Radiology Exams Resulted Within Past 24 Hours      Differential Diagnosis and Discussion:      Laceration: Laceration was evaluated for arterial injury, ligamentous damage, and other neurovascular injury.        MDM  Number of Diagnoses or Management Options  Laceration of right lower extremity excluding thigh, initial encounter  Diagnosis management comments: The patient presented with a laceration in need of repair. See laceration repair note for details. The wound was irrigated with copious normal saline irrigation.  17 sutures were used to approximate the wound edges. Tetanus was given. The patient tolerated the procedure well. Acute bleeding has ceased and the wound was approximated in the emergency department. Patient was counseled to keep the wound clean, dry, and out of the sun. Patient was counseled to change dressings daily. Patient was advised to return to the ED for worsening erythema, pain, swelling, fever, excessive drainage or signs of infection. They were counseled to follow up for suture removal as described in the discharge instructions. Patient verbalizes understanding and agrees to follow up as instructed.        Amount and/or Complexity of Data Reviewed  Tests in the medicine section of CPT®: ordered and reviewed  Obtain history from someone other than the patient: yes (Family at bedside)    Risk of Complications, Morbidity, and/or Mortality  Presenting problems: low  Management options: low    Patient Progress  Patient progress: stable         Patient Care Considerations:    I considered imaging but patient has no bony tenderness and wound is only in the soft tissue      Consultants/Shared Management Plan:    None    Social Determinants of Health:    Patient has presented with family members who are responsible, reliable and will ensure follow up care.      Disposition and Care Coordination:    Discharged: The patient is suitable and stable for discharge with no need for consideration of observation or admission.    I have explained the patient´s condition, diagnoses and treatment plan based on the information available to me at this time. I have answered questions and addressed any concerns. The patient has a good  understanding of the patient´s diagnosis, condition, and treatment plan as can be expected at this point. The vital signs have been stable. The patient´s condition is stable and appropriate for discharge from the emergency department.      The patient will pursue further outpatient evaluation with  the primary care physician or other designated or consulting physician as outlined in the discharge instructions. They are agreeable to this plan of care and follow-up instructions have been explained in detail. The patient has received these instructions in written format and have expressed an understanding of the discharge instructions. The patient is aware that any significant change in condition or worsening of symptoms should prompt an immediate return to this or the closest emergency department or call to 911.    Final diagnoses:   Laceration of right lower extremity excluding thigh, initial encounter        ED Disposition       ED Disposition   Discharge    Condition   Stable    Comment   --               This medical record created using voice recognition software.             Yesi Cheung, APRN  09/13/23 0418

## 2023-09-28 NOTE — TELEPHONE ENCOUNTER
Caller: Christina Espinoza    Relationship: Self    Best call back number: [phone]    Requested Prescriptions:   Requested Prescriptions     Pending Prescriptions Disp Refills    alendronate (FOSAMAX) 70 MG tablet       Sig: Take 1 tablet by mouth Every 7 (Seven) Days. Every Tuesday        Pharmacy where request should be sent: [facility] - [place] [address] [address] [facility] - [phone] [facility] [phone] [facility]     Last office visit with prescribing clinician: 5/31/2022   Last telemedicine visit with prescribing clinician: Visit date not found   Next office visit with prescribing clinician: Visit date not found     Additional details provided by patient: PT HAS NONE    Does the patient have less than a 3 day supply:  [x] Yes  [] No    Would you like a call back once the refill request has been completed: [x] Yes [] No    If the office needs to give you a call back, can they leave a voicemail: [x] Yes [] No    Raphael Krishnamurthy Rep   09/28/23 09:55 EDT

## 2023-10-09 ENCOUNTER — TELEPHONE (OUTPATIENT)
Dept: PULMONOLOGY | Facility: CLINIC | Age: 73
End: 2023-10-09
Payer: MEDICARE

## 2023-10-09 NOTE — TELEPHONE ENCOUNTER
Patient called the [facility] and stated that she is out of her Fosamax and would like an RX sent to [facility] at [facility]. Thanks

## 2023-10-10 DIAGNOSIS — M81.0 OSTEOPOROSIS, UNSPECIFIED OSTEOPOROSIS TYPE, UNSPECIFIED PATHOLOGICAL FRACTURE PRESENCE: Primary | ICD-10-CM

## 2023-10-10 RX ORDER — ALENDRONATE SODIUM 70 MG/1
70 TABLET ORAL
Qty: 4 TABLET | Refills: 3 | Status: SHIPPED | OUTPATIENT
Start: 2023-10-10

## 2023-10-11 RX ORDER — ALENDRONATE SODIUM 70 MG/1
70 TABLET ORAL
OUTPATIENT
Start: 2023-10-11

## 2023-10-14 PROCEDURE — 87186 SC STD MICRODIL/AGAR DIL: CPT | Performed by: STUDENT IN AN ORGANIZED HEALTH CARE EDUCATION/TRAINING PROGRAM

## 2023-10-14 PROCEDURE — 87070 CULTURE OTHR SPECIMN AEROBIC: CPT | Performed by: STUDENT IN AN ORGANIZED HEALTH CARE EDUCATION/TRAINING PROGRAM

## 2023-10-14 PROCEDURE — 87205 SMEAR GRAM STAIN: CPT | Performed by: STUDENT IN AN ORGANIZED HEALTH CARE EDUCATION/TRAINING PROGRAM

## 2023-10-14 PROCEDURE — 87147 CULTURE TYPE IMMUNOLOGIC: CPT | Performed by: STUDENT IN AN ORGANIZED HEALTH CARE EDUCATION/TRAINING PROGRAM

## 2023-10-14 PROCEDURE — 87077 CULTURE AEROBIC IDENTIFY: CPT | Performed by: STUDENT IN AN ORGANIZED HEALTH CARE EDUCATION/TRAINING PROGRAM

## 2023-10-17 ENCOUNTER — TELEPHONE (OUTPATIENT)
Dept: URGENT CARE | Facility: CLINIC | Age: 73
End: 2023-10-17
Payer: MEDICARE

## 2023-10-17 DIAGNOSIS — A49.8 INFECTION DUE TO STENOTROPHOMONAS MALTOPHILIA: Primary | ICD-10-CM

## 2023-10-17 RX ORDER — LEVOFLOXACIN 750 MG/1
750 TABLET ORAL DAILY
Qty: 7 TABLET | Refills: 0 | Status: SHIPPED | OUTPATIENT
Start: 2023-10-17 | End: 2023-10-24

## 2023-10-17 NOTE — TELEPHONE ENCOUNTER
Called patient, patient answered and confirmed her date of birth, relayed test result from Wound culture, positive for Staphylococcus aureus, and Stenotrophomonas maltophilia, relayed that the Bactrim will cover for the Staphylococcus aureus infection, but will not cover for the Stenotrophomonas maltophilia skin infection. Recommended adding in Levofloxacin 750 mg once daily to treat this type of skin infection, and to follow-up with wound care for her scheduled appointment tomorrow. Recommended going to the pharmacy today to  this antibiotic and to start taking it today. Patient expressed understanding and agreement. She reports no further questions at this time, stated she could call us back if she has any, patient expresses understanding.     -John Cooksey, PA-C

## 2023-10-18 ENCOUNTER — HOSPITAL ENCOUNTER (OUTPATIENT)
Dept: INFUSION THERAPY | Facility: HOSPITAL | Age: 73
Discharge: HOME | End: 2023-10-18
Admitting: STUDENT IN AN ORGANIZED HEALTH CARE EDUCATION/TRAINING PROGRAM
Payer: MEDICARE

## 2023-10-18 VITALS
TEMPERATURE: 97.9 F | OXYGEN SATURATION: 94 % | DIASTOLIC BLOOD PRESSURE: 66 MMHG | RESPIRATION RATE: 20 BRPM | SYSTOLIC BLOOD PRESSURE: 132 MMHG | HEART RATE: 89 BPM

## 2023-10-18 PROCEDURE — G0463 HOSPITAL OUTPT CLINIC VISIT: HCPCS

## 2023-10-18 NOTE — SIGNIFICANT NOTE
Wound Eval / Progress Noted    NICOLE Duncan     Patient Name: Christina Espinoza  : 1950  MRN: [id number]  Today's Date: 10/18/2023                 Admit Date: 10/18/2023    Visit Dx:  No diagnosis found.      * No active hospital problems. *        Past Medical History:   Diagnosis Date    Arthritis     Asthma     Cervicalgia 2020    Leg pain     Leg swelling     Limb pain     Lumbago 2020    low back pain     Lumbar spinal stenosis 2020    Lung disease     Nausea     Neurogenic claudication 2020    Neuropathy     Pulmonary fibrosis     Radiculopathy, lumbosacral region 2020    Spondylolisthesis, lumbar region 2020    grade 1 at L3/4        Past Surgical History:   Procedure Laterality Date    SPINE SURGERY      TONSILLECTOMY      TUBAL ABDOMINAL LIGATION           Physical Assessment:  Wound 10/18/23 Right lower leg Traumatic (Active)   Wound Image   10/18/23 112   Dressing Appearance dry;intact 10/18/23 112   Closure None 10/18/23 1129   Base moist;red;yellow 10/18/23 1129   Periwound dry;intact;redness 10/18/23 112   Periwound Temperature warm 10/18/23 1129   Periwound Skin Turgor soft 10/18/23 112   Edges open 10/18/23 1129   Wound Length (cm) 2.3 cm 10/18/23 1129   Wound Width (cm) 1.5 cm 10/18/23 112   Wound Surface Area (cm^2) 3.45 cm^2 10/18/23 112   Drainage Characteristics/Odor serous 10/18/23 1129   Drainage Amount scant 10/18/23 1129   Care, Wound cleansed with;sterile normal saline 10/18/23 1129   Dressing Care dressing applied;hydrofiber;silver impregnated;silicone;border dressing 10/18/23 1129   Periwound Care absorptive dressing applied 10/18/23 1129      Wound Check / Follow-up: Patient seen today for wound evaluation and treatment. Patient reports she was scratched by a dog a month ago. Family is present at bedside. Wound base to right lower leg with moist red and yellow tissue. Periwound tissue is intact with minimal redness. Cleansed with normal  saline and gauze. Recommending daily dressing changes with silver impregnated hydrofiber and silicone border dressing. Patient and family report they can manage this dressing change at home and come in for wound checks. Instructed patient and family to make appointment for this Friday and then going forward we will change to 1x weekly wound checks. They verbalized understanding. Card given with scheduling phone number.      Impression: Traumatic injury to right anterior lower leg.      Short term goals: Regain skin integrity, skin protection, daily dressing changes, 1x weekly wound checks.       Veronica Tucker RN    10/18/2023    17:53 EDT

## 2023-10-19 PROBLEM — T14.90XA TRAUMATIC INJURY: Status: ACTIVE | Noted: 2023-10-19

## 2023-10-20 ENCOUNTER — HOSPITAL ENCOUNTER (OUTPATIENT)
Dept: INFUSION THERAPY | Facility: HOSPITAL | Age: 73
Discharge: HOME | End: 2023-10-20
Payer: MEDICARE

## 2023-10-20 VITALS
SYSTOLIC BLOOD PRESSURE: 123 MMHG | OXYGEN SATURATION: 97 % | HEART RATE: 84 BPM | DIASTOLIC BLOOD PRESSURE: 71 MMHG | RESPIRATION RATE: 20 BRPM | TEMPERATURE: 97.8 F

## 2023-10-20 DIAGNOSIS — T14.90XA TRAUMATIC INJURY: Primary | ICD-10-CM

## 2023-10-20 PROCEDURE — G0463 HOSPITAL OUTPT CLINIC VISIT: HCPCS

## 2023-10-20 NOTE — SIGNIFICANT NOTE
Wound Eval / Progress Noted    NICOLE Duncan     Patient Name: Christina Espinoza  : 1950  MRN: [id number]  Today's Date: 10/20/2023                 Admit Date: 10/20/2023    Visit Dx:    ICD-10-CM ICD-9-CM   1. Traumatic injury  T14.90XA 959.9         * No active hospital problems. *        Past Medical History:   Diagnosis Date    Arthritis     Asthma     Cervicalgia 2020    Leg pain     Leg swelling     Limb pain     Lumbago 2020    low back pain     Lumbar spinal stenosis 2020    Lung disease     Nausea     Neurogenic claudication 2020    Neuropathy     Pulmonary fibrosis     Radiculopathy, lumbosacral region 2020    Spondylolisthesis, lumbar region 2020    grade 1 at L3/4        Past Surgical History:   Procedure Laterality Date    SPINE SURGERY      TONSILLECTOMY      TUBAL ABDOMINAL LIGATION           Physical Assessment:  Wound 10/18/23 Right lower leg Traumatic (Active)   Wound Image   10/20/23 1110   Dressing Appearance dry;intact 10/20/23 1110   Closure None 10/20/23 1110   Base moist;red;yellow 10/20/23 1110   Periwound dry;intact 10/20/23 1110   Periwound Temperature warm 10/20/23 1110   Periwound Skin Turgor soft 10/20/23 1110   Edges open 10/20/23 1110   Drainage Characteristics/Odor serosanguineous 10/20/23 1110   Drainage Amount small 10/20/23 1110   Care, Wound cleansed with;sterile normal saline 10/20/23 1110   Dressing Care dressing applied;hydrofiber;silver impregnated;silicone;border dressing 10/20/23 1110   Periwound Care absorptive dressing applied 10/20/23 1110      Wound Check / Follow-up: Patient seen today for wound follow-up and dressing change. Traumatic injury remains to right anterior lower leg. Wound base with red and yellow moist tissue. Cleansed with normal saline and gauze. Applied silver impregnated hydrofiber and secured with silicone border dressing. Recommending to continue daily dressing changes. Patient and family report no issues  changing dressings in absence of wound care nurse. Instructed patient and family to return in one week for 1x weekly wound check. They verbalized understanding.      Impression: Traumatic injury to right anterior lower leg.      Short term goals: Regain skin integrity, daily dressing changes, 1x weekly wound checks.       Veronica Tucker RN    10/20/2023    11:51 EDT

## 2023-10-25 ENCOUNTER — HOSPITAL ENCOUNTER (OUTPATIENT)
Dept: INFUSION THERAPY | Facility: HOSPITAL | Age: 73
Discharge: HOME | End: 2023-10-25
Admitting: STUDENT IN AN ORGANIZED HEALTH CARE EDUCATION/TRAINING PROGRAM
Payer: MEDICARE

## 2023-10-25 VITALS
HEART RATE: 103 BPM | SYSTOLIC BLOOD PRESSURE: 112 MMHG | DIASTOLIC BLOOD PRESSURE: 65 MMHG | OXYGEN SATURATION: 92 % | TEMPERATURE: 98 F | RESPIRATION RATE: 20 BRPM

## 2023-10-25 DIAGNOSIS — T14.90XA TRAUMATIC INJURY: Primary | ICD-10-CM

## 2023-10-25 PROCEDURE — G0463 HOSPITAL OUTPT CLINIC VISIT: HCPCS

## 2023-10-25 NOTE — SIGNIFICANT NOTE
Wound Eval / Progress Noted    NICOLE Duncan     Patient Name: Christina Espinoza  : 1950  MRN: [id number]  Today's Date: 10/25/2023                 Admit Date: 10/25/2023    Visit Dx:    ICD-10-CM ICD-9-CM   1. Traumatic injury  T14.90XA 959.9         * No active hospital problems. *        Past Medical History:   Diagnosis Date    Arthritis     Asthma     Cervicalgia 2020    Leg pain     Leg swelling     Limb pain     Lumbago 2020    low back pain     Lumbar spinal stenosis 2020    Lung disease     Nausea     Neurogenic claudication 2020    Neuropathy     Pulmonary fibrosis     Radiculopathy, lumbosacral region 2020    Spondylolisthesis, lumbar region 2020    grade 1 at L3/4        Past Surgical History:   Procedure Laterality Date    SPINE SURGERY      TONSILLECTOMY      TUBAL ABDOMINAL LIGATION           Physical Assessment:  Wound 10/18/23 Right lower leg Traumatic (Active)   Wound Image   10/25/23 1052   Dressing Appearance dry;intact 10/25/23 1052   Closure None 10/25/23 1052   Base dry;red;yellow 10/25/23 1052   Periwound dry;intact 10/25/23 1052   Periwound Temperature warm 10/25/23 1052   Periwound Skin Turgor soft 10/25/23 1052   Edges open 10/25/23 1052   Drainage Amount none 10/25/23 1052   Care, Wound cleansed with;sterile normal saline 10/25/23 1052   Dressing Care dressing applied;dressing moistened;hydrofiber;silver impregnated;silicone;border dressing 10/25/23 1052   Periwound Care absorptive dressing applied 10/25/23 1052      Wound Check / Follow-up: Patient seen today for wound follow-up and dressing change. Traumatic injury remains to right lower leg. Wound base with red and yellow dry tissue. No drainage present at this time. Applied normal saline moistened silver impregnated hydrofiber and secured with silicone border dressing. Recommending to continue daily dressing changes and moistened the silver impregnated hydrofiber with normal saline if wound  Use once daily in the morning base is dry. Instructed patient on how to do this. She verbalized understanding. Patient and family report no issues changing dressings in absence of wound care nurse.        Impression: Traumatic injury to right anterior lower leg.       Short term goals: Regain skin integrity, daily dressing changes, 1x weekly wound checks.         Veronica Tucker RN    10/25/2023    11:30 EDT

## 2023-11-01 ENCOUNTER — HOSPITAL ENCOUNTER (OUTPATIENT)
Dept: INFUSION THERAPY | Facility: HOSPITAL | Age: 73
Discharge: HOME | End: 2023-11-01
Admitting: STUDENT IN AN ORGANIZED HEALTH CARE EDUCATION/TRAINING PROGRAM
Payer: MEDICARE

## 2023-11-01 VITALS
SYSTOLIC BLOOD PRESSURE: 114 MMHG | RESPIRATION RATE: 20 BRPM | OXYGEN SATURATION: 95 % | DIASTOLIC BLOOD PRESSURE: 49 MMHG | TEMPERATURE: 97.9 F | HEART RATE: 75 BPM

## 2023-11-01 DIAGNOSIS — T14.90XA TRAUMATIC INJURY: Primary | ICD-10-CM

## 2023-11-01 PROCEDURE — G0463 HOSPITAL OUTPT CLINIC VISIT: HCPCS

## 2023-11-01 NOTE — SIGNIFICANT NOTE
Wound Eval / Progress Noted    NICOLE Duncan     Patient Name: Christina Espinoza  : 1950  MRN: [id number]  Today's Date: 2023                 Admit Date: 2023    Visit Dx:    ICD-10-CM ICD-9-CM   1. Traumatic injury  T14.90XA 959.9         * No active hospital problems. *        Past Medical History:   Diagnosis Date    Arthritis     Asthma     Cervicalgia 2020    Leg pain     Leg swelling     Limb pain     Lumbago 2020    low back pain     Lumbar spinal stenosis 2020    Lung disease     Nausea     Neurogenic claudication 2020    Neuropathy     Pulmonary fibrosis     Radiculopathy, lumbosacral region 2020    Spondylolisthesis, lumbar region 2020    grade 1 at L3/4        Past Surgical History:   Procedure Laterality Date    SPINE SURGERY      TONSILLECTOMY      TUBAL ABDOMINAL LIGATION           Physical Assessment:  Wound 10/18/23 Right lower leg Traumatic (Active)   Wound Image   23 1140   Dressing Appearance dry 23 1140   Closure None 23 1140   Base moist;red;yellow 23 1140   Periwound dry;intact 23 1140   Periwound Temperature warm 23 1140   Periwound Skin Turgor soft 23 1140   Edges open 23 1140   Drainage Characteristics/Odor serosanguineous;serous 23 1140   Drainage Amount scant 23 1140   Care, Wound cleansed with;sterile normal saline 23 1140   Dressing Care dressing applied;hydrofiber;silver impregnated;silicone;border dressing 23 1140   Periwound Care absorptive dressing applied 23 1140      Wound Check / Follow-up: Patient seen today for wound follow-up and dressing change. Traumatic injury to right lower leg showing improvement. Moist red and yellow tissue present to wound base. Applied silver impregnated hydrofiber and secured with silicone border dressing. Recommending to continue daily dressing changes. Patient wishes at this time to not return for wound checks and manage  dressing changes at home. She states she and her family are comfortable with wound care and dressing changes. Instructed her to return if any issues or concerns arise. She verbalized understanding.      Impression: Traumatic injury to right anterior lower leg.         Short term goals: Regain skin integrity, daily dressing changes.        Veronica Tucker RN    11/1/2023    11:44 EDT

## 2023-11-08 DIAGNOSIS — R06.09 DYSPNEA ON EXERTION: ICD-10-CM

## 2023-11-08 DIAGNOSIS — J84.10 PULMONARY FIBROSIS: ICD-10-CM

## 2023-11-08 DIAGNOSIS — J47.9 BRONCHIECTASIS WITHOUT COMPLICATION: ICD-10-CM

## 2023-11-08 DIAGNOSIS — I27.24 CTEPH (CHRONIC THROMBOEMBOLIC PULMONARY HYPERTENSION): ICD-10-CM

## 2023-11-08 DIAGNOSIS — R05.2 SUBACUTE COUGH: ICD-10-CM

## 2023-11-08 RX ORDER — BUDESONIDE, GLYCOPYRROLATE, AND FORMOTEROL FUMARATE 160; 9; 4.8 UG/1; UG/1; UG/1
AEROSOL, METERED RESPIRATORY (INHALATION)
Qty: 11 G | Refills: 3 | Status: SHIPPED | OUTPATIENT
Start: 2023-11-08

## 2023-11-15 ENCOUNTER — OFFICE VISIT (OUTPATIENT)
Dept: PULMONOLOGY | Facility: CLINIC | Age: 73
End: 2023-11-15
Payer: MEDICARE

## 2023-11-15 VITALS
HEART RATE: 90 BPM | OXYGEN SATURATION: 95 % | WEIGHT: 143 LBS | SYSTOLIC BLOOD PRESSURE: 129 MMHG | RESPIRATION RATE: 16 BRPM | TEMPERATURE: 97.8 F | HEIGHT: 63 IN | DIASTOLIC BLOOD PRESSURE: 77 MMHG | BODY MASS INDEX: 25.34 KG/M2

## 2023-11-15 DIAGNOSIS — J45.40 MODERATE PERSISTENT ASTHMA WITHOUT COMPLICATION: Primary | ICD-10-CM

## 2023-11-15 DIAGNOSIS — R06.09 DYSPNEA ON EXERTION: ICD-10-CM

## 2023-11-15 DIAGNOSIS — Z23 ENCOUNTER FOR IMMUNIZATION: ICD-10-CM

## 2023-11-15 DIAGNOSIS — J84.9 ILD (INTERSTITIAL LUNG DISEASE): ICD-10-CM

## 2023-11-15 DIAGNOSIS — I27.24 CTEPH (CHRONIC THROMBOEMBOLIC PULMONARY HYPERTENSION): ICD-10-CM

## 2023-11-15 DIAGNOSIS — R05.3 CHRONIC COUGH: ICD-10-CM

## 2023-11-15 DIAGNOSIS — Z79.899 HIGH RISK MEDICATION USE: ICD-10-CM

## 2023-11-15 RX ORDER — SULFAMETHOXAZOLE AND TRIMETHOPRIM 800; 160 MG/1; MG/1
TABLET ORAL
COMMUNITY
End: 2023-11-15

## 2023-11-15 RX ORDER — BROMPHENIRAMINE MALEATE, PSEUDOEPHEDRINE HYDROCHLORIDE, AND DEXTROMETHORPHAN HYDROBROMIDE 2; 30; 10 MG/5ML; MG/5ML; MG/5ML
5 SYRUP ORAL 4 TIMES DAILY PRN
Qty: 473 ML | Refills: 2 | Status: SHIPPED | OUTPATIENT
Start: 2023-11-15

## 2023-11-15 RX ORDER — LEVOFLOXACIN 750 MG/1
TABLET ORAL
COMMUNITY
End: 2023-11-15

## 2023-11-15 RX ORDER — GABAPENTIN 300 MG/1
CAPSULE ORAL
COMMUNITY
Start: 2023-11-07

## 2023-11-15 NOTE — PROGRESS NOTES
Primary Care Provider  Provider, No Known     Referring Provider  No ref. provider found     Chief Complaint  Cough (Clear phlegm ) and Follow-up (4 month f/up )    Subjective          Christina Espinoza presents to [facility]  History of Present Illness  Christina Espinoza is a 73 y.o. female patient of Dr. Carrion here for management of chronic thromboembolic pulmonary hypertension, ILD, shortness of breath, asthma and immunosuppression with chronic Imuran and prednisone use.    Patient states she is doing well since her last office visit.  She denies using any antibiotics or additional steroids for her lungs.  She denies any current fevers or chills.  Her shortness of breath is mild to moderate in severity, worse with exertion and improved with rest.  She continues to use Breztri as prescribed.  She will intermittently use albuterol if needed.  She does complain of a chronic dry cough.  She states that the Bromfed cough syrup is the only thing that she has found that works for her symptoms.  Patient states that she does have a fracture in her neck due to coughing and that her surgeon has given her a neck brace to wear.  She occasionally wears 2 L of oxygen.  She continues to take Imuran and prednisone for she is still able to perform her ADLs without difficulty.     Her history of smoking is   Tobacco Use: Low Risk  (11/15/2023)    Patient History     Smoking Tobacco Use: Never     Smokeless Tobacco Use: Never     Passive Exposure: Never   .    Review of Systems   Constitutional:  Negative for chills, fatigue, fever, unexpected weight gain and unexpected weight loss.   HENT:  Congestion: Nasal.    Respiratory:  Positive for cough and shortness of breath. Negative for apnea and wheezing.         Negative for Hemoptysis     Cardiovascular:  Negative for chest pain, palpitations and leg swelling.   Skin:         Negative for cyanosis      Sleep: Negative for Excessive  daytime sleepiness  Negative for morning headaches  Negative for Snoring    Family History   Problem Relation Age of Onset    Arthritis Mother     Cancer Mother     Osteoporosis Mother     Cancer Father     Heart disease Father     Diabetes Father     Arthritis Sister     Heart disease Sister     Bleeding Disorder Brother     Stroke Brother     Diabetes Maternal Grandfather     Diabetes Son         Social History     Socioeconomic History    Marital status:    Tobacco Use    Smoking status: Never     Passive exposure: Never    Smokeless tobacco: Never   Vaping Use    Vaping Use: Never used   Substance and Sexual Activity    Alcohol use: Never    Drug use: Never    Sexual activity: Defer        Past Medical History:   Diagnosis Date    Arthritis     Asthma     Cervicalgia 01/20/2020    Leg pain     Leg swelling     Limb pain     Lumbago 01/20/2020    low back pain     Lumbar spinal stenosis 01/20/2020    Lung disease     Nausea     Neurogenic claudication 01/20/2020    Neuropathy     Pulmonary fibrosis     Radiculopathy, lumbosacral region 01/20/2020    Spondylolisthesis, lumbar region 01/29/2020    grade 1 at L3/4        Immunization History   Administered Date(s) Administered    Fluzone High Dose =>65 Years ([employer] ONLY) 10/25/2022    Fluzone High-Dose 65+yrs 11/15/2023    Pneumococcal Conjugate 13-Valent (PCV13) 01/16/2017    Tdap 09/12/2023         Allergies   Allergen Reactions    Clindamycin Rash, Shortness Of Breath and Swelling    Doxycycline Other (See Comments)     SORE ON TONGUE    Adalimumab Rash    Azithromycin Rash, Nausea And Vomiting and Unknown - High Severity    Clindamycin Hcl Unknown - High Severity and Rash    Erythromycin Rash and Unknown - High Severity          Current Outpatient Medications:     albuterol (PROVENTIL) (2.5 MG/3ML) 0.083% nebulizer solution, Take 2.5 mg by nebulization Every 4 (Four) Hours As Needed for Shortness of Air., Disp: 120 each, Rfl: 5    albuterol sulfate HFA  108 (90 Base) MCG/ACT inhaler, Inhale 2 puffs Every 4 (Four) Hours As Needed for Wheezing., Disp: 18 g, Rfl: 5    alendronate (FOSAMAX) 70 MG tablet, Take 1 tablet by mouth Every 7 (Seven) Days. Every Tuesday, Disp: 4 tablet, Rfl: 3    atorvastatin (LIPITOR) 20 MG tablet, Take 1 tablet by mouth Daily., Disp: , Rfl:     azaTHIOprine (IMURAN) 50 MG tablet, TAKE 1 & 1/2 (ONE & ONE-HALF) TABLETS BY MOUTH ONCE DAILY, Disp: 60 tablet, Rfl: 0    Black Pepper-Turmeric (Turmeric Curcumin) 5-1000 MG capsule, as directed Orally, Disp: , Rfl:     Breztri Aerosphere 160-9-4.8 MCG/ACT aerosol inhaler, INHALE 2 PUFFS TWICE DAILY, Disp: 11 g, Rfl: 3    chlorhexidine (PERIDEX) 0.12 % solution, , Disp: , Rfl:     DULoxetine (CYMBALTA) 30 MG capsule, , Disp: , Rfl:     Eliquis 5 MG tablet tablet, , Disp: , Rfl:     gabapentin (NEURONTIN) 300 MG capsule, , Disp: , Rfl:     HYDROcodone-acetaminophen (NORCO)  MG per tablet, Take 1 tablet by mouth Every 6 (Six) Hours As Needed for Moderate Pain., Disp: , Rfl:     ibuprofen (ADVIL,MOTRIN) 800 MG tablet, Take 1 tablet by mouth Every 8 (Eight) Hours As Needed for Mild Pain., Disp: 90 tablet, Rfl: 1    linaclotide (LINZESS) 145 MCG capsule capsule, Take 1 capsule by mouth Daily., Disp: , Rfl:     O2 (OXYGEN), Inhale 2 L/min Continuous., Disp: , Rfl:     ondansetron (Zofran) 4 MG tablet, Every 8 (Eight) Hours., Disp: , Rfl:     predniSONE (DELTASONE) 10 MG tablet, Take 1 tablet by mouth Daily., Disp: , Rfl:     brompheniramine-pseudoephedrine-DM 30-2-10 MG/5ML syrup, Take 5 mL by mouth 4 (Four) Times a Day As Needed for Allergies., Disp: 473 mL, Rfl: 2    gabapentin (NEURONTIN) 100 MG capsule, , Disp: , Rfl:      Objective   Physical Exam  Constitutional:       General: She is not in acute distress.     Appearance: Normal appearance. She is normal weight.   HENT:      Right Ear: Hearing normal.      Left Ear: Hearing normal.      Nose: No nasal tenderness or congestion.       "Mouth/Throat:      Mouth: Mucous membranes are moist. No oral lesions.   Eyes:      Extraocular Movements: Extraocular movements intact.      Pupils: Pupils are equal, round, and reactive to light.   Neck:      Thyroid: No thyroid mass or thyromegaly.   Cardiovascular:      Rate and Rhythm: Normal rate and regular rhythm.      Pulses: Normal pulses.      Heart sounds: Normal heart sounds. No murmur heard.  Pulmonary:      Effort: Pulmonary effort is normal.      Breath sounds: Normal breath sounds. No wheezing, rhonchi or rales.   Musculoskeletal:      Cervical back: Neck supple.      Right lower leg: No edema.      Left lower leg: No edema.   Lymphadenopathy:      Cervical: No cervical adenopathy.      Upper Body:      Right upper body: No axillary adenopathy.   Skin:     General: Skin is warm and dry.      Findings: No lesion or rash.   Neurological:      General: No focal deficit present.      Mental Status: She is alert and oriented to person, place, and time.   Psychiatric:         Mood and Affect: Affect normal. Mood is not anxious or depressed.         Vital Signs:   /77 (BP Location: Left arm, Patient Position: Sitting, Cuff Size: Adult)   Pulse 90   Temp 97.8 °F (36.6 °C) (Temporal)   Resp 16   Ht 158.8 cm (62.5\")   Wt 64.9 kg (143 lb)   SpO2 95% Comment: RA  BMI 25.74 kg/m²        Result Review :   The following data was reviewed by: CATY Gee on 11/15/2023:  CMP          1/26/2023    10:37 7/27/2023    12:17   CMP   Glucose 79  98    BUN 18  15    Creatinine 0.79  0.85    EGFR 79.1  72.4    Sodium 142  142    Potassium 4.1  4.4    Chloride 106  107    Calcium 10.4  10.7    Total Protein 6.8  6.7    Albumin 4.4  4.3    Globulin 2.4  2.4    Total Bilirubin 0.7  0.4    Alkaline Phosphatase 86  76    AST (SGOT) 18  12    ALT (SGPT) 16  10    Albumin/Globulin Ratio 1.8  1.8    BUN/Creatinine Ratio 22.8  17.6    Anion Gap 10.2  12.4      CBC w/diff          1/26/2023    10:37 7/27/2023 "    12:17   CBC w/Diff   WBC 13.43  10.77    RBC 4.51  4.59    Hemoglobin 13.8  13.9    Hematocrit 41.4  43.6    MCV 91.8  95.0    MCH 30.6  30.3    MCHC 33.3  31.9    RDW 12.6  14.3    Platelets 284  363    Neutrophil Rel % 82.1  87.3    Immature Granulocyte Rel % 0.7  0.6    Lymphocyte Rel % 11.5  6.7    Monocyte Rel % 4.7  5.0    Eosinophil Rel % 0.8  0.0    Basophil Rel % 0.2  0.4      Data reviewed : Radiologic studies chest CT 6/21/2022, chest CT 6/21/2023, pulmonary function test 6/21/2023 and my last office note    Procedures        Assessment and Plan    Diagnoses and all orders for this visit:    1. Moderate persistent asthma without complication (Primary)    2. High risk medication use    3. CTEPH (chronic thromboembolic pulmonary hypertension)    4. ILD (interstitial lung disease)    5. Dyspnea on exertion    6. Encounter for immunization  -     Fluzone High-Dose 65+yrs ([id number])    7. Chronic cough  -     brompheniramine-pseudoephedrine-DM 30-2-10 MG/5ML syrup; Take 5 mL by mouth 4 (Four) Times a Day As Needed for Allergies.  Dispense: 473 mL; Refill: 2    8.  Continue Breztri as prescribed.  Rinse mouth out after each use.  9.  Continue albuterol nebulized treatments as needed.  10.  Continue Bromfed for cough.  11.  Continue Imuran and prednisone as prescribed.  12.  Continue supplemental oxygen to maintain saturations at or above 89%.  13.  Follow-up with Dr. Carrion in 4 months, sooner if needed.        Follow Up   Return in about 4 months (around 3/15/2024) for Recheck with Sheyla/Maddy.  Patient was given instructions and counseling regarding her condition or for health maintenance advice. Please see specific information pulled into the AVS if appropriate.

## 2024-01-09 ENCOUNTER — TELEPHONE (OUTPATIENT)
Dept: PULMONOLOGY | Facility: CLINIC | Age: 74
End: 2024-01-09
Payer: MEDICARE

## 2024-01-09 DIAGNOSIS — R06.09 DYSPNEA ON EXERTION: ICD-10-CM

## 2024-01-09 DIAGNOSIS — J45.909 ASTHMA, UNSPECIFIED ASTHMA SEVERITY, UNSPECIFIED WHETHER COMPLICATED, UNSPECIFIED WHETHER PERSISTENT: ICD-10-CM

## 2024-01-09 RX ORDER — ALBUTEROL SULFATE 90 UG/1
2 AEROSOL, METERED RESPIRATORY (INHALATION) EVERY 4 HOURS PRN
Qty: 18 G | Refills: 5 | Status: SHIPPED | OUTPATIENT
Start: 2024-01-09

## 2024-01-09 NOTE — TELEPHONE ENCOUNTER
Caller: CaseyChristina [email or web address]    Relationship: Self    Best call back number: [phone]    Requested Prescriptions: albuterol sulfate  (90 Base) MCG/ACT inhaler  Requested Prescriptions      No prescriptions requested or ordered in this encounter        Pharmacy where request should be sent:  [facility] [id number] - [place], [place] - [address] - [phone]  - [phone]  [phone] [address] [place] [address]      Last office visit with prescribing clinician: 11/15/2023   Last telemedicine visit with prescribing clinician: Visit date not found   Next office visit with prescribing clinician: Visit date not found     Additional details provided by patient:     Does the patient have less than a 3 day supply:  [x] Yes  [] No    Would you like a call back once the refill request has been completed: [x] Yes [] No    If the office needs to give you a call back, can they leave a voicemail: [x] Yes [] No    Raphael Eason Rep   01/09/24 09:38 EST

## 2024-01-29 DIAGNOSIS — M81.0 OSTEOPOROSIS, UNSPECIFIED OSTEOPOROSIS TYPE, UNSPECIFIED PATHOLOGICAL FRACTURE PRESENCE: ICD-10-CM

## 2024-01-29 RX ORDER — ALENDRONATE SODIUM 70 MG/1
TABLET ORAL
Qty: 4 TABLET | Refills: 3 | Status: SHIPPED | OUTPATIENT
Start: 2024-01-29

## 2024-02-07 ENCOUNTER — OFFICE VISIT (OUTPATIENT)
Dept: FAMILY MEDICINE CLINIC | Facility: CLINIC | Age: 74
End: 2024-02-07
Payer: MEDICARE

## 2024-02-07 VITALS
TEMPERATURE: 97.5 F | OXYGEN SATURATION: 99 % | DIASTOLIC BLOOD PRESSURE: 64 MMHG | SYSTOLIC BLOOD PRESSURE: 127 MMHG | BODY MASS INDEX: 25.62 KG/M2 | HEART RATE: 85 BPM | HEIGHT: 63 IN | WEIGHT: 144.6 LBS

## 2024-02-07 DIAGNOSIS — M85.89 OSTEOPENIA OF MULTIPLE SITES: ICD-10-CM

## 2024-02-07 DIAGNOSIS — Z13.29 THYROID DISORDER SCREENING: ICD-10-CM

## 2024-02-07 DIAGNOSIS — E78.2 MIXED HYPERLIPIDEMIA: Primary | ICD-10-CM

## 2024-02-07 PROCEDURE — 99203 OFFICE O/P NEW LOW 30 MIN: CPT | Performed by: NURSE PRACTITIONER

## 2024-02-07 PROCEDURE — 1160F RVW MEDS BY RX/DR IN RCRD: CPT | Performed by: NURSE PRACTITIONER

## 2024-02-07 PROCEDURE — 1159F MED LIST DOCD IN RCRD: CPT | Performed by: NURSE PRACTITIONER

## 2024-02-07 NOTE — PROGRESS NOTES
Chief Complaint  Establish Care (Establish Care with Physical Exam )    Subjective          Christina Espinoza is a 74 y.o. female who presents to [facility]    History of Present Illness    Here to establish primary care:  Osteopenia: managed on alendronate  Pulmonary fibrosis: Dr Carrion manages Imuran, Brextri, Albuterol inhaler.  Sees pain management for neuropathy. They manage gabapentin and Norco    PHQ-2 Total Score: 0   PHQ-9 Total Score: 0        Review of Systems       Medical History: has a past medical history of Arthritis, Asthma, Cervicalgia (01/20/2020), Leg pain, Leg swelling, Limb pain, Lumbago (01/20/2020), Lumbar spinal stenosis (01/20/2020), Lung disease, Nausea, Neurogenic claudication (01/20/2020), Neuropathy, Pulmonary fibrosis, Radiculopathy, lumbosacral region (01/20/2020), and Spondylolisthesis, lumbar region (01/29/2020).     Surgical History: has a past surgical history that includes Spine surgery (2020); Tonsillectomy; and Tubal ligation.     Family History: family history includes Arthritis in her mother and sister; Bleeding Disorder in her brother; Cancer in her father and mother; Diabetes in her father, maternal grandfather, and son; Heart disease in her father and sister; Osteoporosis in her mother; Stroke in her brother.     Social History: reports that she has never smoked. She has never been exposed to tobacco smoke. She has never used smokeless tobacco. She reports that she does not drink alcohol and does not use drugs.    Allergies: Clindamycin, Doxycycline, Adalimumab, Azithromycin, Clindamycin hcl, and Erythromycin      Health Maintenance Due   Topic Date Due    ZOSTER VACCINE (1 of 2) Never done    RSV Vaccine - Adults (1 - 1-dose 60+ series) Never done    ANNUAL WELLNESS VISIT  Never done            Current Outpatient Medications:     albuterol (PROVENTIL) (2.5 MG/3ML) 0.083% nebulizer solution, Take 2.5 mg by nebulization Every 4 (Four) Hours  As Needed for Shortness of Air., Disp: 120 each, Rfl: 5    albuterol sulfate  (90 Base) MCG/ACT inhaler, Inhale 2 puffs Every 4 (Four) Hours As Needed for Wheezing., Disp: 18 g, Rfl: 5    alendronate (FOSAMAX) 70 MG tablet, TAKE 1 TABLET BY MOUTH ONCE WEEKLY ON AN EMPTY STOMACH BEFORE BREAKFAST. REMAIN UPRIGHT FOR 30 MINUTES AND TAKE WITH 8 OUNCES OF WATER, Disp: 4 tablet, Rfl: 3    azaTHIOprine (IMURAN) 50 MG tablet, TAKE 1 & 1/2 (ONE & ONE-HALF) TABLETS BY MOUTH ONCE DAILY, Disp: 60 tablet, Rfl: 0    Black Pepper-Turmeric (Turmeric Curcumin) 5-1000 MG capsule, as directed Orally, Disp: , Rfl:     Breztri Aerosphere 160-9-4.8 MCG/ACT aerosol inhaler, INHALE 2 PUFFS TWICE DAILY, Disp: 11 g, Rfl: 3    brompheniramine-pseudoephedrine-DM 30-2-10 MG/5ML syrup, Take 5 mL by mouth 4 (Four) Times a Day As Needed for Allergies., Disp: 473 mL, Rfl: 2    chlorhexidine (PERIDEX) 0.12 % solution, , Disp: , Rfl:     gabapentin (NEURONTIN) 300 MG capsule, , Disp: , Rfl:     HYDROcodone-acetaminophen (NORCO)  MG per tablet, Take 1 tablet by mouth Every 6 (Six) Hours As Needed for Moderate Pain., Disp: , Rfl:     ibuprofen (ADVIL,MOTRIN) 800 MG tablet, Take 1 tablet by mouth Every 8 (Eight) Hours As Needed for Mild Pain., Disp: 90 tablet, Rfl: 1    linaclotide (LINZESS) 145 MCG capsule capsule, Take 1 capsule by mouth Daily., Disp: , Rfl:     ondansetron (Zofran) 4 MG tablet, Every 8 (Eight) Hours., Disp: , Rfl:     predniSONE (DELTASONE) 10 MG tablet, Take 1 tablet by mouth Daily., Disp: , Rfl:       Immunization History   Administered Date(s) Administered    Fluzone High Dose =>65 Years ([employer] ONLY) 10/25/2022    Fluzone High-Dose 65+yrs 11/15/2023    Pneumococcal Conjugate 13-Valent (PCV13) 01/16/2017    Tdap 09/12/2023         Objective       Vitals:    02/07/24 1102   BP: 127/64   BP Location: Left arm   Patient Position: Sitting   Cuff Size: Adult   Pulse: 85   Temp: 97.5 °F (36.4 °C)   TempSrc: Temporal  "  SpO2: 99%   Weight: 65.6 kg (144 lb 9.6 oz)   Height: 158.8 cm (62.5\")   PainSc:   6   PainLoc: Generalized      Body mass index is 26.03 kg/m².   Wt Readings from Last 3 Encounters:   02/07/24 65.6 kg (144 lb 9.6 oz)   11/15/23 64.9 kg (143 lb)   10/14/23 64 kg (141 lb)      BP Readings from Last 3 Encounters:   02/07/24 127/64   11/15/23 129/77   11/01/23 114/49                Physical Exam  Vitals reviewed.   Constitutional:       Appearance: Normal appearance.   HENT:      Head: Normocephalic and atraumatic.   Cardiovascular:      Rate and Rhythm: Normal rate and regular rhythm.      Pulses: Normal pulses.      Heart sounds: Normal heart sounds.   Pulmonary:      Effort: Pulmonary effort is normal.      Breath sounds: Normal breath sounds.   Skin:     General: Skin is warm and dry.   Neurological:      Mental Status: She is alert and oriented to person, place, and time.   Psychiatric:         Mood and Affect: Mood normal.         Behavior: Behavior normal.         Thought Content: Thought content normal.         Judgment: Judgment normal.           Result Review :       Common labs          7/27/2023    12:17   Common Labs   Glucose 98    BUN 15    Creatinine 0.85    Sodium 142    Potassium 4.4    Chloride 107    Calcium 10.7    Albumin 4.3    Total Bilirubin 0.4    Alkaline Phosphatase 76    AST (SGOT) 12    ALT (SGPT) 10    WBC 10.77    Hemoglobin 13.9    Hematocrit 43.6    Platelets 363    Total Cholesterol 256    Triglycerides 128    HDL Cholesterol 106    LDL Cholesterol  129                       Assessment and Plan        Diagnoses and all orders for this visit:    1. Mixed hyperlipidemia (Primary)  -     CBC & Differential; Future  -     Comprehensive Metabolic Panel; Future  -     Lipid Panel; Future    2. Osteopenia of multiple sites  -     Vitamin B12; Future  -     Vitamin D,25-Hydroxy; Future    3. Thyroid disorder screening  -     TSH; Future          Follow Up     Return in about 6 months " (around 8/7/2024).    Patient was given instructions and counseling regarding her condition or for health maintenance advice. Please see specific information pulled into the AVS if appropriate.     CATY Foster

## 2024-02-22 DIAGNOSIS — J84.9 ILD (INTERSTITIAL LUNG DISEASE): ICD-10-CM

## 2024-02-22 DIAGNOSIS — Z79.899 MEDICATION MANAGEMENT: ICD-10-CM

## 2024-02-22 DIAGNOSIS — D89.89 ANTISYNTHETASE SYNDROME: Primary | ICD-10-CM

## 2024-02-22 RX ORDER — AZATHIOPRINE 100 MG/1
100 TABLET ORAL DAILY
Qty: 30 TABLET | Refills: 1 | Status: SHIPPED | OUTPATIENT
Start: 2024-02-22

## 2024-02-23 DIAGNOSIS — J45.909 ASTHMA, UNSPECIFIED ASTHMA SEVERITY, UNSPECIFIED WHETHER COMPLICATED, UNSPECIFIED WHETHER PERSISTENT: ICD-10-CM

## 2024-02-26 RX ORDER — ALBUTEROL SULFATE 2.5 MG/3ML
SOLUTION RESPIRATORY (INHALATION)
Qty: 375 ML | Refills: 2 | Status: SHIPPED | OUTPATIENT
Start: 2024-02-26

## 2024-02-27 ENCOUNTER — TELEPHONE (OUTPATIENT)
Dept: FAMILY MEDICINE CLINIC | Facility: CLINIC | Age: 74
End: 2024-02-27
Payer: MEDICARE

## 2024-02-27 NOTE — TELEPHONE ENCOUNTER
That was from 4 months ago and she did not say anything to me about it at visit. She will need to be seen and have additional test done before I can decide if referring her to Dr amin is the best thing or not to do.

## 2024-02-27 NOTE — TELEPHONE ENCOUNTER
Pt called office and stated she would like to see  for a fractured neck. I see this in her chart but do not see where we have ever seen her for this. Please advise

## 2024-02-28 DIAGNOSIS — J84.10 PULMONARY FIBROSIS: ICD-10-CM

## 2024-02-29 RX ORDER — AZATHIOPRINE 50 MG/1
75 TABLET ORAL DAILY
Qty: 60 TABLET | Refills: 0 | OUTPATIENT
Start: 2024-02-29

## 2024-03-02 DIAGNOSIS — J84.10 PULMONARY FIBROSIS: ICD-10-CM

## 2024-03-02 DIAGNOSIS — J47.9 BRONCHIECTASIS WITHOUT COMPLICATION: ICD-10-CM

## 2024-03-02 DIAGNOSIS — I27.24 CTEPH (CHRONIC THROMBOEMBOLIC PULMONARY HYPERTENSION): ICD-10-CM

## 2024-03-02 DIAGNOSIS — R05.2 SUBACUTE COUGH: ICD-10-CM

## 2024-03-02 DIAGNOSIS — R06.09 DYSPNEA ON EXERTION: ICD-10-CM

## 2024-03-04 ENCOUNTER — OFFICE VISIT (OUTPATIENT)
Dept: FAMILY MEDICINE CLINIC | Facility: CLINIC | Age: 74
End: 2024-03-04
Payer: MEDICARE

## 2024-03-04 ENCOUNTER — TELEPHONE (OUTPATIENT)
Dept: FAMILY MEDICINE CLINIC | Facility: CLINIC | Age: 74
End: 2024-03-04

## 2024-03-04 VITALS
SYSTOLIC BLOOD PRESSURE: 135 MMHG | HEIGHT: 63 IN | DIASTOLIC BLOOD PRESSURE: 67 MMHG | HEART RATE: 104 BPM | TEMPERATURE: 97.8 F | WEIGHT: 145.6 LBS | OXYGEN SATURATION: 95 % | BODY MASS INDEX: 25.8 KG/M2

## 2024-03-04 DIAGNOSIS — M54.2 CERVICAL SPINE PAIN: ICD-10-CM

## 2024-03-04 DIAGNOSIS — M50.30 DEGENERATIVE DISC DISEASE, CERVICAL: ICD-10-CM

## 2024-03-04 DIAGNOSIS — S12.001G CLOSED NONDISPLACED FRACTURE OF FIRST CERVICAL VERTEBRA WITH DELAYED HEALING, UNSPECIFIED FRACTURE MORPHOLOGY, SUBSEQUENT ENCOUNTER: Primary | ICD-10-CM

## 2024-03-04 RX ORDER — BUDESONIDE, GLYCOPYRROLATE, AND FORMOTEROL FUMARATE 160; 9; 4.8 UG/1; UG/1; UG/1
2 AEROSOL, METERED RESPIRATORY (INHALATION) 2 TIMES DAILY
Qty: 11 G | Refills: 0 | Status: SHIPPED | OUTPATIENT
Start: 2024-03-04

## 2024-03-04 RX ORDER — KETOCONAZOLE 20 MG/ML
SHAMPOO TOPICAL 2 TIMES WEEKLY
Qty: 120 ML | Refills: 1 | Status: SHIPPED | OUTPATIENT
Start: 2024-03-04

## 2024-03-04 NOTE — PROGRESS NOTES
Chief Complaint  Neck Pain    Subjective          Christina Espinoza is a 74 y.o. female who presents to [facility]    History of Present Illness    Saw Dr Bunny Clemens (Neurosurgeon) in October for her chronic back pain. She complained of neck pain so he ordered her a CT scan of neck.     CT Scan of cervical spine 10/03/2023    1. There is a fracture at midline in the anterior ring of C1. This   fracture is suspected to be subacute with some partial healing noted with callus formation along the anterior margin of the fracture.   2. There is lateral subluxation of the right C1 lateral mass relative to the C2 body.   3. No additional fractures in the cervical spine.   4. Degenerative changes and other secondary findings are discussed in the body of the report.     Has worn her neck brace intermittently since then but is still having neck pain.    PHQ-2 Total Score:     PHQ-9 Total Score:       Review of Systems     Medical History: has a past medical history of Arthritis, Asthma, Cervicalgia (01/20/2020), Leg pain, Leg swelling, Limb pain, Lumbago (01/20/2020), Lumbar spinal stenosis (01/20/2020), Lung disease, Nausea, Neurogenic claudication (01/20/2020), Neuropathy, Pulmonary fibrosis, Radiculopathy, lumbosacral region (01/20/2020), and Spondylolisthesis, lumbar region (01/29/2020).     Surgical History: has a past surgical history that includes Spine surgery (2020); Tonsillectomy; and Tubal ligation.     Family History: family history includes Arthritis in her mother and sister; Bleeding Disorder in her brother; Cancer in her father and mother; Diabetes in her father, maternal grandfather, and son; Heart disease in her father and sister; Osteoporosis in her mother; Stroke in her brother.     Social History: reports that she has never smoked. She has never been exposed to tobacco smoke. She has never used smokeless tobacco. She reports that she does not drink alcohol and does not use  drugs.    Allergies: Clindamycin, Doxycycline, Adalimumab, Azithromycin, Clindamycin hcl, and Erythromycin      Health Maintenance Due   Topic Date Due    ZOSTER VACCINE (1 of 2) Never done    RSV Vaccine - Adults (1 - 1-dose 60+ series) Never done    ANNUAL WELLNESS VISIT  Never done            Current Outpatient Medications:     albuterol (PROVENTIL) (2.5 MG/3ML) 0.083% nebulizer solution, USE 1 VIAL IN NEBULIZER EVERY 4 HOURS AS NEEDED FOR SHORTNESS OF BREATH, Disp: 375 mL, Rfl: 2    albuterol sulfate  (90 Base) MCG/ACT inhaler, Inhale 2 puffs Every 4 (Four) Hours As Needed for Wheezing., Disp: 18 g, Rfl: 5    alendronate (FOSAMAX) 70 MG tablet, TAKE 1 TABLET BY MOUTH ONCE WEEKLY ON AN EMPTY STOMACH BEFORE BREAKFAST. REMAIN UPRIGHT FOR 30 MINUTES AND TAKE WITH 8 OUNCES OF WATER, Disp: 4 tablet, Rfl: 3    azaTHIOprine (IMURAN) 100 MG tablet, Take 1 tablet by mouth Daily., Disp: 30 tablet, Rfl: 1    azaTHIOprine (IMURAN) 50 MG tablet, TAKE 1 & 1/2 (ONE & ONE-HALF) TABLETS BY MOUTH ONCE DAILY, Disp: 60 tablet, Rfl: 0    Black Pepper-Turmeric (Turmeric Curcumin) 5-1000 MG capsule, as directed Orally, Disp: , Rfl:     Breztri Aerosphere 160-9-4.8 MCG/ACT aerosol inhaler, Inhale 2 puffs by mouth twice daily, Disp: 11 g, Rfl: 0    brompheniramine-pseudoephedrine-DM 30-2-10 MG/5ML syrup, Take 5 mL by mouth 4 (Four) Times a Day As Needed for Allergies., Disp: 473 mL, Rfl: 2    chlorhexidine (PERIDEX) 0.12 % solution, , Disp: , Rfl:     gabapentin (NEURONTIN) 300 MG capsule, , Disp: , Rfl:     HYDROcodone-acetaminophen (NORCO)  MG per tablet, Take 1 tablet by mouth Every 6 (Six) Hours As Needed for Moderate Pain., Disp: , Rfl:     ibuprofen (ADVIL,MOTRIN) 800 MG tablet, Take 1 tablet by mouth Every 8 (Eight) Hours As Needed for Mild Pain., Disp: 90 tablet, Rfl: 1    linaclotide (LINZESS) 145 MCG capsule capsule, Take 1 capsule by mouth Daily., Disp: , Rfl:     ondansetron (Zofran) 4 MG tablet, Every 8 (Eight)  "Hours., Disp: , Rfl:     predniSONE (DELTASONE) 10 MG tablet, Take 1 tablet by mouth Daily., Disp: , Rfl:       Immunization History   Administered Date(s) Administered    Fluzone High Dose =>65 Years ([employer] ONLY) 10/25/2022    Fluzone High-Dose 65+yrs 11/15/2023    Pneumococcal Conjugate 13-Valent (PCV13) 01/16/2017    Tdap 09/12/2023         Objective       Vitals:    03/04/24 1340   BP: 135/67   BP Location: Left arm   Patient Position: Sitting   Cuff Size: Adult   Pulse: 104   Temp: 97.8 °F (36.6 °C)   TempSrc: Temporal   SpO2: 95%   Weight: 66 kg (145 lb 9.6 oz)   Height: 158.8 cm (62.5\")   PainSc: 10-Worst pain ever   PainLoc: Neck      Body mass index is 26.21 kg/m².   Wt Readings from Last 3 Encounters:   03/04/24 66 kg (145 lb 9.6 oz)   02/07/24 65.6 kg (144 lb 9.6 oz)   11/15/23 64.9 kg (143 lb)      BP Readings from Last 3 Encounters:   03/04/24 135/67   02/07/24 127/64   11/15/23 129/77                Physical Exam  Vitals reviewed.   Constitutional:       Appearance: Normal appearance.   HENT:      Head: Normocephalic and atraumatic.   Eyes:      Conjunctiva/sclera: Conjunctivae normal.      Pupils: Pupils are equal, round, and reactive to light.   Musculoskeletal:      Cervical back: Normal range of motion. Tenderness present. Pain with movement and muscular tenderness present. Decreased range of motion.   Skin:     General: Skin is warm and dry.   Neurological:      Mental Status: She is alert and oriented to person, place, and time.   Psychiatric:         Mood and Affect: Mood normal.         Behavior: Behavior normal.         Thought Content: Thought content normal.         Judgment: Judgment normal.             Result Review :       Common labs          7/27/2023    12:17   Common Labs   Glucose 98    BUN 15    Creatinine 0.85    Sodium 142    Potassium 4.4    Chloride 107    Calcium 10.7    Albumin 4.3    Total Bilirubin 0.4    Alkaline Phosphatase 76    AST (SGOT) 12    ALT (SGPT) 10    WBC " 10.77    Hemoglobin 13.9    Hematocrit 43.6    Platelets 363    Total Cholesterol 256    Triglycerides 128    HDL Cholesterol 106    LDL Cholesterol  129                       Assessment and Plan        Diagnoses and all orders for this visit:    1. Closed nondisplaced fracture of first cervical vertebra with delayed healing, unspecified fracture morphology, subsequent encounter (Primary)  -     CT Cervical Spine Without Contrast; Future  -     Ambulatory Referral to [facility] Management Clinic    2. Degenerative disc disease, cervical  -     CT Cervical Spine Without Contrast; Future  -     Ambulatory Referral to Pain Management Clinic    3. Cervical spine pain  -     Ambulatory Referral to Pain Management Clinic          Follow Up     Return if symptoms worsen or fail to improve.    Patient was given instructions and counseling regarding her condition or for health maintenance advice. Please see specific information pulled into the AVS if appropriate.     Dori Sanford, APRN

## 2024-03-04 NOTE — TELEPHONE ENCOUNTER
After visit pt came back to the window stating she forgot to mention during the visit that she has psoriasis on her scalp and a place on her nose. She states she has tried numerous things over the counter but wanted to know if there is anything Dori could do to help it. Please advise

## 2024-03-11 RX ORDER — PREDNISONE 10 MG/1
TABLET ORAL DAILY
Qty: 30 TABLET | Refills: 2 | Status: SHIPPED | OUTPATIENT
Start: 2024-03-11

## 2024-03-18 ENCOUNTER — HOSPITAL ENCOUNTER (OUTPATIENT)
Dept: CT IMAGING | Facility: HOSPITAL | Age: 74
Discharge: HOME | End: 2024-03-18
Admitting: NURSE PRACTITIONER
Payer: MEDICARE

## 2024-03-18 DIAGNOSIS — S12.001G CLOSED NONDISPLACED FRACTURE OF FIRST CERVICAL VERTEBRA WITH DELAYED HEALING, UNSPECIFIED FRACTURE MORPHOLOGY, SUBSEQUENT ENCOUNTER: ICD-10-CM

## 2024-03-18 DIAGNOSIS — M50.30 DEGENERATIVE DISC DISEASE, CERVICAL: ICD-10-CM

## 2024-03-18 DIAGNOSIS — I27.24 CTEPH (CHRONIC THROMBOEMBOLIC PULMONARY HYPERTENSION): ICD-10-CM

## 2024-03-18 DIAGNOSIS — R06.09 DYSPNEA ON EXERTION: ICD-10-CM

## 2024-03-18 DIAGNOSIS — J47.9 BRONCHIECTASIS WITHOUT COMPLICATION: ICD-10-CM

## 2024-03-18 DIAGNOSIS — J84.10 PULMONARY FIBROSIS: ICD-10-CM

## 2024-03-18 DIAGNOSIS — R05.2 SUBACUTE COUGH: ICD-10-CM

## 2024-03-18 PROCEDURE — 72125 CT NECK SPINE W/O DYE: CPT

## 2024-03-18 RX ORDER — BUDESONIDE, GLYCOPYRROLATE, AND FORMOTEROL FUMARATE 160; 9; 4.8 UG/1; UG/1; UG/1
2 AEROSOL, METERED RESPIRATORY (INHALATION) 2 TIMES DAILY
Qty: 11 G | Refills: 5 | Status: SHIPPED | OUTPATIENT
Start: 2024-03-18

## 2024-03-20 DIAGNOSIS — R93.7 ABNORMAL CT SCAN, CERVICAL SPINE: Primary | ICD-10-CM

## 2024-03-28 ENCOUNTER — OFFICE VISIT (OUTPATIENT)
Dept: PULMONOLOGY | Facility: CLINIC | Age: 74
End: 2024-03-28
Payer: MEDICARE

## 2024-03-28 ENCOUNTER — LAB (OUTPATIENT)
Dept: LAB | Facility: HOSPITAL | Age: 74
End: 2024-03-28
Payer: MEDICARE

## 2024-03-28 VITALS
RESPIRATION RATE: 18 BRPM | DIASTOLIC BLOOD PRESSURE: 78 MMHG | HEIGHT: 63 IN | HEART RATE: 79 BPM | WEIGHT: 145.8 LBS | OXYGEN SATURATION: 97 % | SYSTOLIC BLOOD PRESSURE: 125 MMHG | BODY MASS INDEX: 25.83 KG/M2

## 2024-03-28 DIAGNOSIS — J45.909 ASTHMA, UNSPECIFIED ASTHMA SEVERITY, UNSPECIFIED WHETHER COMPLICATED, UNSPECIFIED WHETHER PERSISTENT: ICD-10-CM

## 2024-03-28 DIAGNOSIS — E78.2 MIXED HYPERLIPIDEMIA: ICD-10-CM

## 2024-03-28 DIAGNOSIS — J84.9 ILD (INTERSTITIAL LUNG DISEASE): Primary | ICD-10-CM

## 2024-03-28 DIAGNOSIS — J84.10 PULMONARY FIBROSIS: ICD-10-CM

## 2024-03-28 DIAGNOSIS — D84.9 IMMUNOSUPPRESSED STATUS: ICD-10-CM

## 2024-03-28 DIAGNOSIS — R06.09 DYSPNEA ON EXERTION: ICD-10-CM

## 2024-03-28 DIAGNOSIS — I27.24 CTEPH (CHRONIC THROMBOEMBOLIC PULMONARY HYPERTENSION): ICD-10-CM

## 2024-03-28 DIAGNOSIS — Z79.899 MEDICATION MANAGEMENT: ICD-10-CM

## 2024-03-28 DIAGNOSIS — Z13.29 THYROID DISORDER SCREENING: ICD-10-CM

## 2024-03-28 DIAGNOSIS — M60.9 MYOSITIS, UNSPECIFIED MYOSITIS TYPE, UNSPECIFIED SITE: ICD-10-CM

## 2024-03-28 DIAGNOSIS — R05.2 SUBACUTE COUGH: ICD-10-CM

## 2024-03-28 DIAGNOSIS — J84.9 ILD (INTERSTITIAL LUNG DISEASE): ICD-10-CM

## 2024-03-28 DIAGNOSIS — Z79.899 HIGH RISK MEDICATION USE: ICD-10-CM

## 2024-03-28 DIAGNOSIS — J47.9 BRONCHIECTASIS WITHOUT COMPLICATION: ICD-10-CM

## 2024-03-28 DIAGNOSIS — M85.89 OSTEOPENIA OF MULTIPLE SITES: ICD-10-CM

## 2024-03-28 LAB
25(OH)D3 SERPL-MCNC: 45 NG/ML (ref 30–100)
ALBUMIN SERPL-MCNC: 4.6 G/DL (ref 3.5–5.2)
ALBUMIN/GLOB SERPL: 1.8 G/DL
ALP SERPL-CCNC: 95 U/L (ref 39–117)
ALT SERPL W P-5'-P-CCNC: 15 U/L (ref 1–33)
ANION GAP SERPL CALCULATED.3IONS-SCNC: 12 MMOL/L (ref 5–15)
AST SERPL-CCNC: 19 U/L (ref 1–32)
BASOPHILS # BLD AUTO: 0.05 10*3/MM3 (ref 0–0.2)
BASOPHILS NFR BLD AUTO: 0.5 % (ref 0–1.5)
BILIRUB SERPL-MCNC: 0.3 MG/DL (ref 0–1.2)
BUN SERPL-MCNC: 15 MG/DL (ref 8–23)
BUN/CREAT SERPL: 14.6 (ref 7–25)
CALCIUM SPEC-SCNC: 10.4 MG/DL (ref 8.6–10.5)
CHLORIDE SERPL-SCNC: 109 MMOL/L (ref 98–107)
CHOLEST SERPL-MCNC: 281 MG/DL (ref 0–200)
CK SERPL-CCNC: 170 U/L (ref 20–180)
CO2 SERPL-SCNC: 25 MMOL/L (ref 22–29)
CREAT SERPL-MCNC: 1.03 MG/DL (ref 0.57–1)
DEPRECATED RDW RBC AUTO: 43.7 FL (ref 37–54)
EGFRCR SERPLBLD CKD-EPI 2021: 57.2 ML/MIN/1.73
EOSINOPHIL # BLD AUTO: 0.03 10*3/MM3 (ref 0–0.4)
EOSINOPHIL NFR BLD AUTO: 0.3 % (ref 0.3–6.2)
ERYTHROCYTE [DISTWIDTH] IN BLOOD BY AUTOMATED COUNT: 13 % (ref 12.3–15.4)
GLOBULIN UR ELPH-MCNC: 2.6 GM/DL
GLUCOSE SERPL-MCNC: 91 MG/DL (ref 65–99)
HCT VFR BLD AUTO: 40.2 % (ref 34–46.6)
HDLC SERPL-MCNC: 125 MG/DL (ref 40–60)
HGB BLD-MCNC: 13.1 G/DL (ref 12–15.9)
IMM GRANULOCYTES # BLD AUTO: 0.09 10*3/MM3 (ref 0–0.05)
IMM GRANULOCYTES NFR BLD AUTO: 0.8 % (ref 0–0.5)
LDLC SERPL CALC-MCNC: 139 MG/DL (ref 0–100)
LDLC/HDLC SERPL: 1.08 {RATIO}
LYMPHOCYTES # BLD AUTO: 1.06 10*3/MM3 (ref 0.7–3.1)
LYMPHOCYTES NFR BLD AUTO: 9.9 % (ref 19.6–45.3)
MCH RBC QN AUTO: 30.3 PG (ref 26.6–33)
MCHC RBC AUTO-ENTMCNC: 32.6 G/DL (ref 31.5–35.7)
MCV RBC AUTO: 93.1 FL (ref 79–97)
MONOCYTES # BLD AUTO: 0.34 10*3/MM3 (ref 0.1–0.9)
MONOCYTES NFR BLD AUTO: 3.2 % (ref 5–12)
NEUTROPHILS NFR BLD AUTO: 85.3 % (ref 42.7–76)
NEUTROPHILS NFR BLD AUTO: 9.18 10*3/MM3 (ref 1.7–7)
NRBC BLD AUTO-RTO: 0 /100 WBC (ref 0–0.2)
PLATELET # BLD AUTO: 344 10*3/MM3 (ref 140–450)
PMV BLD AUTO: 10.8 FL (ref 6–12)
POTASSIUM SERPL-SCNC: 4.7 MMOL/L (ref 3.5–5.2)
PROT SERPL-MCNC: 7.2 G/DL (ref 6–8.5)
RBC # BLD AUTO: 4.32 10*6/MM3 (ref 3.77–5.28)
SODIUM SERPL-SCNC: 146 MMOL/L (ref 136–145)
TRIGL SERPL-MCNC: 103 MG/DL (ref 0–150)
TSH SERPL DL<=0.05 MIU/L-ACNC: 1.44 UIU/ML (ref 0.27–4.2)
VIT B12 BLD-MCNC: 419 PG/ML (ref 211–946)
VLDLC SERPL-MCNC: 17 MG/DL (ref 5–40)
WBC NRBC COR # BLD AUTO: 10.75 10*3/MM3 (ref 3.4–10.8)

## 2024-03-28 PROCEDURE — 83516 IMMUNOASSAY NONANTIBODY: CPT | Performed by: INTERNAL MEDICINE

## 2024-03-28 PROCEDURE — 82550 ASSAY OF CK (CPK): CPT

## 2024-03-28 PROCEDURE — 1160F RVW MEDS BY RX/DR IN RCRD: CPT | Performed by: INTERNAL MEDICINE

## 2024-03-28 PROCEDURE — 80061 LIPID PANEL: CPT

## 2024-03-28 PROCEDURE — 86235 NUCLEAR ANTIGEN ANTIBODY: CPT | Performed by: INTERNAL MEDICINE

## 2024-03-28 PROCEDURE — 84443 ASSAY THYROID STIM HORMONE: CPT

## 2024-03-28 PROCEDURE — 82607 VITAMIN B-12: CPT

## 2024-03-28 PROCEDURE — 36415 COLL VENOUS BLD VENIPUNCTURE: CPT

## 2024-03-28 PROCEDURE — 82085 ASSAY OF ALDOLASE: CPT

## 2024-03-28 PROCEDURE — 1159F MED LIST DOCD IN RCRD: CPT | Performed by: INTERNAL MEDICINE

## 2024-03-28 PROCEDURE — 80053 COMPREHEN METABOLIC PANEL: CPT

## 2024-03-28 PROCEDURE — 85025 COMPLETE CBC W/AUTO DIFF WBC: CPT

## 2024-03-28 PROCEDURE — 82306 VITAMIN D 25 HYDROXY: CPT

## 2024-03-28 PROCEDURE — 99214 OFFICE O/P EST MOD 30 MIN: CPT | Performed by: INTERNAL MEDICINE

## 2024-03-28 RX ORDER — ALBUTEROL SULFATE 90 UG/1
2 AEROSOL, METERED RESPIRATORY (INHALATION) EVERY 4 HOURS PRN
Qty: 18 G | Refills: 5 | Status: SHIPPED | OUTPATIENT
Start: 2024-03-28

## 2024-03-28 RX ORDER — BUDESONIDE, GLYCOPYRROLATE, AND FORMOTEROL FUMARATE 160; 9; 4.8 UG/1; UG/1; UG/1
2 AEROSOL, METERED RESPIRATORY (INHALATION) 2 TIMES DAILY
Qty: 11 G | Refills: 5 | Status: SHIPPED | OUTPATIENT
Start: 2024-03-28

## 2024-03-28 NOTE — PROGRESS NOTES
"Chief Complaint  Pulmonary Fibrosis, Cough, Wheezing, Shortness of Breath, and Follow-up (Pt here for 4 month follow up)    Subjective        Christina Espinoza presents to [facility]  History of Present Illness  Follow-up for myositis interstitial lung disease  Feels better  On Imuran  Tolerating Imuran well  No complaints of shortness of breath over her baseline  Does complain of worsening weakness  Objective   Vital Signs:  /78 (BP Location: Left arm, Patient Position: Sitting, Cuff Size: Adult)   Pulse 79   Resp 18   Ht 158.8 cm (62.5\")   Wt 66.1 kg (145 lb 12.8 oz)   SpO2 97% Comment: room air/Pt has O2/PRN/2.5 liters  BMI 26.24 kg/m²   Estimated body mass index is 26.24 kg/m² as calculated from the following:    Height as of this encounter: 158.8 cm (62.5\").    Weight as of this encounter: 66.1 kg (145 lb 12.8 oz).               Physical Exam   Basilar crackles  Present female  Resting comfortably in bed  In no acute distress  Result Review :                     Assessment and Plan     Diagnoses and all orders for this visit:    1. ILD (interstitial lung disease) (Primary)  -     Complete PFT - Pre & Post Bronchodilator; Future  -     CT Chest Hi Resolution Diagnostic; Future  -     Aldolase; Future  -     CPK; Future  -     Myositis Panel III Plus  -     CBC & Differential; Future  -     Comprehensive Metabolic Panel; Future    2. Asthma, unspecified asthma severity, unspecified whether complicated, unspecified whether persistent  -     albuterol sulfate  (90 Base) MCG/ACT inhaler; Inhale 2 puffs Every 4 (Four) Hours As Needed for Wheezing.  Dispense: 18 g; Refill: 5    3. Dyspnea on exertion  -     albuterol sulfate  (90 Base) MCG/ACT inhaler; Inhale 2 puffs Every 4 (Four) Hours As Needed for Wheezing.  Dispense: 18 g; Refill: 5  -     Budeson-Glycopyrrol-Formoterol (Breztri Aerosphere) 160-9-4.8 MCG/ACT aerosol inhaler; Inhale 2 " puffs 2 (Two) Times a Day.  Dispense: 11 g; Refill: 5    4. Bronchiectasis without complication  -     Budeson-Glycopyrrol-Formoterol (Breztri Aerosphere) 160-9-4.8 MCG/ACT aerosol inhaler; Inhale 2 puffs 2 (Two) Times a Day.  Dispense: 11 g; Refill: 5    5. Pulmonary fibrosis  -     Budeson-Glycopyrrol-Formoterol (Breztri Aerosphere) 160-9-4.8 MCG/ACT aerosol inhaler; Inhale 2 puffs 2 (Two) Times a Day.  Dispense: 11 g; Refill: 5    6. CTEPH (chronic thromboembolic pulmonary hypertension)  -     Budeson-Glycopyrrol-Formoterol (Breztri Aerosphere) 160-9-4.8 MCG/ACT aerosol inhaler; Inhale 2 puffs 2 (Two) Times a Day.  Dispense: 11 g; Refill: 5    7. Subacute cough  -     Budeson-Glycopyrrol-Formoterol (Breztri Aerosphere) 160-9-4.8 MCG/ACT aerosol inhaler; Inhale 2 puffs 2 (Two) Times a Day.  Dispense: 11 g; Refill: 5    8. Myositis, unspecified myositis type, unspecified site    9. High risk medication use    10. Immunosuppressed status    Plan  Patient with chronic immune suppressed state on high risk medication with Imuran will continue Imuran at current dose currently patient taking 100 mg daily  Continue to follow with serial CMP's and CBCs  Patient is complaining of some worsening weakness  At this time will obtain CPK  Obtain aldolase  Obtain myositis panel  Obtain CBC  Obtain CMP  For the patient's chronic interstitial lung disease we will repeat pulmonary function  Continue with yearly CT scans  Does have some asthma on PFTs in the past  Will start patient on Breztri refills given         Follow Up     Return in about 3 months (around 6/28/2024).  Patient was given instructions and counseling regarding her condition or for health maintenance advice. Please see specific information pulled into the AVS if appropriate.

## 2024-04-01 LAB — ALDOLASE SERPL-CCNC: 5.3 U/L (ref 3.3–10.3)

## 2024-04-11 RX ORDER — ONDANSETRON 4 MG/1
4 TABLET, FILM COATED ORAL EVERY 8 HOURS PRN
Qty: 10 TABLET | Refills: 1 | Status: SHIPPED | OUTPATIENT
Start: 2024-04-11

## 2024-04-11 NOTE — TELEPHONE ENCOUNTER
Caller: Olga Christina L    Relationship: Self    Best call back number: [phone]     Requested Prescriptions:   Requested Prescriptions     Pending Prescriptions Disp Refills    ondansetron (Zofran) 4 MG tablet       Sig: Every 8 (Eight) Hours.        Pharmacy where request should be sent: [facility] [id number]  [place], [place] - [address] - [phone] [facility] [phone]      Last office visit with prescribing clinician: 3/4/2024   Last telemedicine visit with prescribing clinician: Visit date not found   Next office visit with prescribing clinician: 8/7/2024     Additional details provided by patient: OUT OF MEDICATION     Does the patient have less than a 3 day supply:  [x] Yes  [] No    Would you like a call back once the refill request has been completed: [x] Yes [] No    If the office needs to give you a call back, can they leave a voicemail: [x] Yes [] No    Raphael Manzano Rep   04/11/24 09:21 EDT

## 2024-04-13 LAB
EJ AB SER QL: NEGATIVE
ENA JO1 AB SER IA-ACNC: 132 UNITS
ENA PM/SCL AB SER-ACNC: <20 UNITS
ENA SS-A 52KD IGG SER IA-ACNC: >200 UNITS
FIBRILLARIN AB SER QL: NEGATIVE
KU AB SER QL: NEGATIVE
MDA5 AB SER LINE BLOT-ACNC: <20 UNITS
MI2 AB SER QL: NEGATIVE
MJ AB SER LINE BLOT-ACNC: <20 UNITS
OJ AB SER QL: NEGATIVE
PL12 AB SER QL: NEGATIVE
PL7 AB SER QL: NEGATIVE
SAE1 IGG SER QL LINE BLOT: <20 UNITS
SRP AB SERPL QL: NEGATIVE
TIF1-GAMMA AB SER IA-ACNC: <20 UNITS
U1 SNRNP AB SER IA-ACNC: <20 UNITS
U2 SNRNP AB SER QL: NEGATIVE

## 2024-04-27 DIAGNOSIS — J45.909 ASTHMA, UNSPECIFIED ASTHMA SEVERITY, UNSPECIFIED WHETHER COMPLICATED, UNSPECIFIED WHETHER PERSISTENT: ICD-10-CM

## 2024-04-27 DIAGNOSIS — J84.9 ILD (INTERSTITIAL LUNG DISEASE): ICD-10-CM

## 2024-04-27 DIAGNOSIS — D89.89 ANTISYNTHETASE SYNDROME: ICD-10-CM

## 2024-04-29 RX ORDER — AZATHIOPRINE 100 MG/1
100 TABLET ORAL DAILY
Qty: 30 TABLET | Refills: 1 | Status: SHIPPED | OUTPATIENT
Start: 2024-04-29

## 2024-04-29 RX ORDER — ALBUTEROL SULFATE 2.5 MG/3ML
SOLUTION RESPIRATORY (INHALATION)
Qty: 375 ML | Refills: 0 | Status: SHIPPED | OUTPATIENT
Start: 2024-04-29

## 2024-05-31 DIAGNOSIS — D89.89 ANTISYNTHETASE SYNDROME: ICD-10-CM

## 2024-05-31 DIAGNOSIS — J84.9 ILD (INTERSTITIAL LUNG DISEASE): ICD-10-CM

## 2024-05-31 RX ORDER — AZATHIOPRINE 100 MG/1
100 TABLET ORAL DAILY
Qty: 30 TABLET | Refills: 1 | Status: SHIPPED | OUTPATIENT
Start: 2024-05-31

## 2024-06-04 ENCOUNTER — PATIENT ROUNDING (BHMG ONLY) (OUTPATIENT)
Dept: NEUROSURGERY | Facility: CLINIC | Age: 74
End: 2024-06-04
Payer: MEDICARE

## 2024-06-04 ENCOUNTER — OFFICE VISIT (OUTPATIENT)
Dept: NEUROSURGERY | Facility: CLINIC | Age: 74
End: 2024-06-04
Payer: MEDICARE

## 2024-06-04 VITALS
HEIGHT: 63 IN | BODY MASS INDEX: 25.29 KG/M2 | SYSTOLIC BLOOD PRESSURE: 127 MMHG | WEIGHT: 142.7 LBS | DIASTOLIC BLOOD PRESSURE: 70 MMHG

## 2024-06-04 DIAGNOSIS — S12.091A OTHER CLOSED NONDISPLACED FRACTURE OF FIRST CERVICAL VERTEBRA, INITIAL ENCOUNTER: Primary | ICD-10-CM

## 2024-06-04 DIAGNOSIS — M06.9 RHEUMATOID ARTHRITIS INVOLVING MULTIPLE SITES, UNSPECIFIED WHETHER RHEUMATOID FACTOR PRESENT: ICD-10-CM

## 2024-06-04 DIAGNOSIS — M54.2 CERVICALGIA: ICD-10-CM

## 2024-06-04 PROCEDURE — 99203 OFFICE O/P NEW LOW 30 MIN: CPT | Performed by: NEUROLOGICAL SURGERY

## 2024-06-04 PROCEDURE — 1159F MED LIST DOCD IN RCRD: CPT | Performed by: NEUROLOGICAL SURGERY

## 2024-06-04 PROCEDURE — 1160F RVW MEDS BY RX/DR IN RCRD: CPT | Performed by: NEUROLOGICAL SURGERY

## 2024-06-04 NOTE — PROGRESS NOTES
"Chief Complaint  Neck Pain    Subjective          Christina Espinoza who is a 74 y.o. year old female who presents to [facility] for Evaluation of the Spine.     The patient complains of pain located in the cervical spine.  Patients states the pain has been present for several  years, worse over the last 2 years .  The pain came on gradually.  Her CT reports a broken C1 ring, she is unsure of when this might have happened. The pain scale level is 8.5/10.  The pain  radiates to the left shoulder .  The pain is constant and described as sharp and dull.  The pain is worse at no particular time of day. Patient states head turning makes the pain worse.  Patient states  nothing really  makes the pain better.    Associated Symptoms Include: Denies numbness and tingling  Conservative Interventions Include:  Injections in the neck (about a year ago)-minimal relief, norco-no help, NSAIDs-no help    Was this the result of an injury or accident? : No    History of Previous Spinal Surgery?: Yes.  Lumbar, Date 2020    This patient  reports that she has never smoked. She has never been exposed to tobacco smoke. She has never used smokeless tobacco.    Review of Systems   Musculoskeletal:  Positive for arthralgias and neck pain.        Objective   Vital Signs:   /70 (BP Location: Left arm, Patient Position: Sitting)   Ht 158.8 cm (62.5\")   Wt 64.7 kg (142 lb 11.2 oz)   BMI 25.68 kg/m²       Physical Exam  Constitutional:       Appearance: She is normal weight.   Cardiovascular:      Comments: No notable edema    Pulmonary:      Effort: Pulmonary effort is normal.   Musculoskeletal:      Comments: Finger deformity c/w rheumatoid arthritis   Neurological:      Mental Status: She is alert.      Sensory: No sensory deficit.      Motor: No weakness.      Deep Tendon Reflexes: Reflexes normal (1/4, neg Ca's).   Psychiatric:         Mood and Affect: Mood normal.          Result Review : "   I have personally interpreted the CT images which show old C1 ring anterior fracture. She has arthritis at the C1-2 articular joint on the left. Possible soft tissue gumma posterior to C2.        Assessment and Plan    Diagnoses and all orders for this visit:    1. Other closed nondisplaced fracture of first cervical vertebra, initial encounter (Primary)    2. Cervicalgia    3. Rheumatoid arthritis involving multiple sites, unspecified whether rheumatoid factor present    She has an old C1 ring fracture. This appears stable.     Surgery to attempt to reduce her neck pain would be extensive and fraught with complications, particularly in light of her osteoporosis.    She could consider a left C1-2 injection, if possible with pain medicine.     I would recommend a repeat bone density test in the future.     Follow Up   Return if symptoms worsen or fail to improve.  Patient was given instructions and counseling regarding her condition or for health maintenance advice. Please see specific information pulled into the AVS if appropriate.

## 2024-06-06 ENCOUNTER — HOSPITAL ENCOUNTER (OUTPATIENT)
Dept: RESPIRATORY THERAPY | Facility: HOSPITAL | Age: 74
Discharge: HOME | End: 2024-06-06
Payer: MEDICARE

## 2024-06-06 ENCOUNTER — HOSPITAL ENCOUNTER (OUTPATIENT)
Dept: CT IMAGING | Facility: HOSPITAL | Age: 74
Discharge: HOME | End: 2024-06-06
Payer: MEDICARE

## 2024-06-06 DIAGNOSIS — J84.9 ILD (INTERSTITIAL LUNG DISEASE): ICD-10-CM

## 2024-06-06 PROCEDURE — 94060 EVALUATION OF WHEEZING: CPT

## 2024-06-06 PROCEDURE — 71250 CT THORAX DX C-: CPT

## 2024-06-06 PROCEDURE — 94729 DIFFUSING CAPACITY: CPT

## 2024-06-06 PROCEDURE — 94726 PLETHYSMOGRAPHY LUNG VOLUMES: CPT

## 2024-06-06 RX ORDER — ALBUTEROL SULFATE 2.5 MG/3ML
2.5 SOLUTION RESPIRATORY (INHALATION) ONCE
Status: COMPLETED | OUTPATIENT
Start: 2024-06-06 | End: 2024-06-06

## 2024-06-06 RX ADMIN — ALBUTEROL SULFATE 2.5 MG: 2.5 SOLUTION RESPIRATORY (INHALATION) at 12:49

## 2024-06-12 DIAGNOSIS — M81.0 OSTEOPOROSIS, UNSPECIFIED OSTEOPOROSIS TYPE, UNSPECIFIED PATHOLOGICAL FRACTURE PRESENCE: ICD-10-CM

## 2024-06-12 RX ORDER — ALENDRONATE SODIUM 70 MG/1
70 TABLET ORAL WEEKLY
Qty: 4 TABLET | Refills: 3 | Status: SHIPPED | OUTPATIENT
Start: 2024-06-12

## 2024-06-20 ENCOUNTER — APPOINTMENT (OUTPATIENT)
Dept: GENERAL RADIOLOGY | Facility: HOSPITAL | Age: 74
End: 2024-06-20
Payer: MEDICARE

## 2024-06-20 ENCOUNTER — HOSPITAL ENCOUNTER (EMERGENCY)
Facility: HOSPITAL | Age: 74
Discharge: HOME | End: 2024-06-20
Attending: EMERGENCY MEDICINE
Payer: MEDICARE

## 2024-06-20 VITALS
BODY MASS INDEX: 25.86 KG/M2 | WEIGHT: 145.94 LBS | RESPIRATION RATE: 17 BRPM | SYSTOLIC BLOOD PRESSURE: 122 MMHG | OXYGEN SATURATION: 92 % | HEIGHT: 63 IN | DIASTOLIC BLOOD PRESSURE: 70 MMHG | HEART RATE: 75 BPM | TEMPERATURE: 97.7 F

## 2024-06-20 DIAGNOSIS — M54.2 NECK PAIN: Primary | ICD-10-CM

## 2024-06-20 PROCEDURE — 25010000002 ORPHENADRINE CITRATE PER 60 MG: Performed by: EMERGENCY MEDICINE

## 2024-06-20 PROCEDURE — 72050 X-RAY EXAM NECK SPINE 4/5VWS: CPT

## 2024-06-20 PROCEDURE — 25010000002 KETOROLAC TROMETHAMINE PER 15 MG: Performed by: EMERGENCY MEDICINE

## 2024-06-20 PROCEDURE — 25010000002 HYDROMORPHONE 1 MG/ML SOLUTION: Performed by: EMERGENCY MEDICINE

## 2024-06-20 PROCEDURE — 96374 THER/PROPH/DIAG INJ IV PUSH: CPT

## 2024-06-20 PROCEDURE — 96375 TX/PRO/DX INJ NEW DRUG ADDON: CPT

## 2024-06-20 PROCEDURE — 99283 EMERGENCY DEPT VISIT LOW MDM: CPT

## 2024-06-20 PROCEDURE — 96372 THER/PROPH/DIAG INJ SC/IM: CPT

## 2024-06-20 PROCEDURE — 25010000002 ONDANSETRON PER 1 MG: Performed by: EMERGENCY MEDICINE

## 2024-06-20 RX ORDER — KETOROLAC TROMETHAMINE 15 MG/ML
15 INJECTION, SOLUTION INTRAMUSCULAR; INTRAVENOUS ONCE
Status: COMPLETED | OUTPATIENT
Start: 2024-06-20 | End: 2024-06-20

## 2024-06-20 RX ORDER — ONDANSETRON 2 MG/ML
4 INJECTION INTRAMUSCULAR; INTRAVENOUS ONCE
Status: COMPLETED | OUTPATIENT
Start: 2024-06-20 | End: 2024-06-20

## 2024-06-20 RX ORDER — METHYLPREDNISOLONE 4 MG/1
TABLET ORAL
Qty: 21 TABLET | Refills: 0 | Status: SHIPPED | OUTPATIENT
Start: 2024-06-20 | End: 2024-06-27

## 2024-06-20 RX ORDER — ORPHENADRINE CITRATE 30 MG/ML
60 INJECTION INTRAMUSCULAR; INTRAVENOUS ONCE
Status: COMPLETED | OUTPATIENT
Start: 2024-06-20 | End: 2024-06-20

## 2024-06-20 RX ORDER — ORPHENADRINE CITRATE 100 MG/1
100 TABLET, EXTENDED RELEASE ORAL 2 TIMES DAILY
Qty: 20 TABLET | Refills: 0 | Status: SHIPPED | OUTPATIENT
Start: 2024-06-20

## 2024-06-20 RX ADMIN — HYDROMORPHONE HYDROCHLORIDE 1 MG: 1 INJECTION, SOLUTION INTRAMUSCULAR; INTRAVENOUS; SUBCUTANEOUS at 19:48

## 2024-06-20 RX ADMIN — ORPHENADRINE CITRATE 60 MG: 30 INJECTION, SOLUTION INTRAMUSCULAR; INTRAVENOUS at 19:53

## 2024-06-20 RX ADMIN — ONDANSETRON 4 MG: 2 INJECTION INTRAMUSCULAR; INTRAVENOUS at 19:52

## 2024-06-20 RX ADMIN — KETOROLAC TROMETHAMINE 15 MG: 15 INJECTION, SOLUTION INTRAMUSCULAR; INTRAVENOUS at 19:50

## 2024-06-20 NOTE — ED PROVIDER NOTES
Time: 7:30 PM EDT  Date of encounter:  6/20/2024  Independent Historian/Clinical History and Information was obtained by:   Patient and Family    History is limited by: N/A    Chief Complaint: Neck pain      History of Present Illness:  Patient is a 74 y.o. year old female who presents to the emergency department for evaluation of exacerbation of chronic neck pain.  The patient denies trauma.  Patient has no subjective neurological deficit.  Patient has no chest pain or shortness of breath.  Patient has no cough hemoptysis.  Patient has no fever or chills.    HPI    Patient Care Team  Primary Care Provider: Dori Sanford APRN    Past Medical History:     Allergies   Allergen Reactions    Clindamycin Rash, Shortness Of Breath and Swelling    Doxycycline Other (See Comments)     SORE ON TONGUE    Adalimumab Rash    Azithromycin Rash, Nausea And Vomiting and Unknown - High Severity    Clindamycin Hcl Unknown - High Severity and Rash    Erythromycin Rash and Unknown - High Severity     Past Medical History:   Diagnosis Date    Arthritis     Asthma     Cervicalgia 01/20/2020    Leg pain     Leg swelling     Limb pain     Lumbago 01/20/2020    low back pain     Lumbar spinal stenosis 01/20/2020    Lung disease     Nausea     Neurogenic claudication 01/20/2020    Neuropathy     Pulmonary fibrosis     Radiculopathy, lumbosacral region 01/20/2020    Spondylolisthesis, lumbar region 01/29/2020    grade 1 at L3/4     Past Surgical History:   Procedure Laterality Date    BACK SURGERY      SPINE SURGERY  2020    TONSILLECTOMY      TUBAL ABDOMINAL LIGATION       Family History   Problem Relation Age of Onset    Arthritis Mother     Cancer Mother     Osteoporosis Mother     Cancer Father     Heart disease Father     Diabetes Father     Arthritis Sister     Heart disease Sister     Bleeding Disorder Brother     Stroke Brother     Diabetes Maternal Grandfather     Diabetes Son        Home Medications:  Prior to Admission  medications    Medication Sig Start Date End Date Taking? Authorizing Provider   albuterol (PROVENTIL) (2.5 MG/3ML) 0.083% nebulizer solution USE 1 VIAL IN NEBULIZER EVERY 4 HOURS AS NEEDED FOR SHORTNESS OF BREATH 4/29/24   Maddy Torres APRN   albuterol sulfate  (90 Base) MCG/ACT inhaler Inhale 2 puffs Every 4 (Four) Hours As Needed for Wheezing. 3/28/24   Conrado Carrion, DO   alendronate (FOSAMAX) 70 MG tablet Take 1 tablet by mouth 1 (One) Time Per Week. 6/12/24   Dori Sanford APRN   azaTHIOprine (IMURAN) 100 MG tablet TAKE 1 TABLET BY MOUTH ONCE DAILY 5/31/24   Conrado Carrion,    brompheniramine-pseudoephedrine-DM 30-2-10 MG/5ML syrup Take 5 mL by mouth 4 (Four) Times a Day As Needed for Allergies.  Patient not taking: Reported on 6/4/2024 11/15/23   Maddy Torres APRN   Budeson-Glycopyrrol-Formoterol (Breztri Aerosphere) 160-9-4.8 MCG/ACT aerosol inhaler Inhale 2 puffs 2 (Two) Times a Day. 3/28/24   Conrado Carrion, DO   chlorhexidine (PERIDEX) 0.12 % solution     ProviderYoseph MD   gabapentin (NEURONTIN) 300 MG capsule  11/7/23   ProviderYoseph MD   HYDROcodone-acetaminophen (NORCO)  MG per tablet Take 1 tablet by mouth Every 6 (Six) Hours As Needed for Moderate Pain.    Yoseph Garcia MD   ibuprofen (ADVIL,MOTRIN) 800 MG tablet Take 1 tablet by mouth Every 8 (Eight) Hours As Needed for Mild Pain. 7/7/23   Maddy Torres APRN   ketoconazole (Nizoral) 2 % shampoo Apply  topically to the appropriate area as directed 2 (Two) Times a Week. 3/4/24   Dori Sanford APRN   linaclotide (LINZESS) 145 MCG capsule capsule Take 1 capsule by mouth Daily.    Yoseph Garcia MD   ondansetron (Zofran) 4 MG tablet Take 1 tablet by mouth Every 8 (Eight) Hours As Needed for Nausea or Vomiting. 4/11/24   Dori Sanford APRN   predniSONE (DELTASONE) 10 MG tablet TAKE 1 TABLET BY MOUTH DAILY 3/11/24   Maddy Torres APRN  "  azaTHIOprine (IMURAN) 50 MG tablet TAKE 1 & 1/2 (ONE & ONE-HALF) TABLETS BY MOUTH ONCE DAILY  Patient not taking: Reported on 3/28/2024 8/28/23 6/20/24  Conrado Carrion,    Black Pepper-Turmeric (Turmeric Curcumin) 5-1000 MG capsule   6/20/24  Provider, MD Yoseph        Social History:   Social History     Tobacco Use    Smoking status: Never     Passive exposure: Never    Smokeless tobacco: Never   Vaping Use    Vaping status: Never Used   Substance Use Topics    Alcohol use: Never    Drug use: Never         Review of Systems:  Review of Systems   Constitutional:  Negative for chills and fever.   HENT:  Negative for congestion, rhinorrhea and sore throat.    Eyes:  Negative for pain and visual disturbance.   Respiratory:  Negative for apnea, cough, chest tightness and shortness of breath.    Cardiovascular:  Negative for chest pain and palpitations.   Gastrointestinal:  Negative for abdominal pain, diarrhea, nausea and vomiting.   Genitourinary:  Negative for difficulty urinating and dysuria.   Musculoskeletal:  Negative for joint swelling and myalgias.   Skin:  Negative for color change.   Neurological:  Negative for seizures and headaches.   Psychiatric/Behavioral: Negative.     All other systems reviewed and are negative.       Physical Exam:  /70 (BP Location: Right arm, Patient Position: Lying)   Pulse 75   Temp 97.7 °F (36.5 °C) (Oral)   Resp 17   Ht 158.8 cm (62.5\")   Wt 66.2 kg (145 lb 15.1 oz)   SpO2 92%   BMI 26.27 kg/m²     Physical Exam  Vitals and nursing note reviewed.   Constitutional:       General: She is not in acute distress.     Appearance: Normal appearance. She is not toxic-appearing.   HENT:      Head: Normocephalic and atraumatic.      Jaw: There is normal jaw occlusion.   Eyes:      General: Lids are normal.      Extraocular Movements: Extraocular movements intact.      Conjunctiva/sclera: Conjunctivae normal.      Pupils: Pupils are equal, round, and " reactive to light.   Cardiovascular:      Rate and Rhythm: Normal rate and regular rhythm.      Pulses: Normal pulses.      Heart sounds: Normal heart sounds.   Pulmonary:      Effort: Pulmonary effort is normal. No respiratory distress.      Breath sounds: Normal breath sounds. No wheezing or rhonchi.   Abdominal:      General: Abdomen is flat.      Palpations: Abdomen is soft.      Tenderness: There is no abdominal tenderness. There is no guarding or rebound.   Musculoskeletal:         General: Normal range of motion.      Cervical back: Normal range of motion and neck supple.      Right lower leg: No edema.      Left lower leg: No edema.      Comments: (+) Left muscular cervical tenderness   Skin:     General: Skin is warm and dry.   Neurological:      Mental Status: She is alert and oriented to person, place, and time. Mental status is at baseline.   Psychiatric:         Mood and Affect: Mood normal.                  Procedures:  Procedures      Medical Decision Making:      Comorbidities that affect care:    Chronic neck pain    External Notes reviewed:    Previous Clinic Note: Patient recently saw Dr. Roth for neck pain      The following orders were placed and all results were independently analyzed by me:  Orders Placed This Encounter   Procedures    XR Spine Cervical Complete 4 or 5 View       Medications Given in the Emergency Department:  Medications   ketorolac (TORADOL) injection 15 mg (15 mg Intravenous Given [birth year]/1950)   orphenadrine (NORFLEX) injection 60 mg (60 mg Intramuscular Given 6/20/24 1953)   HYDROmorphone (DILAUDID) injection 1 mg (1 mg Intravenous Given 6/20/24 1948)   ondansetron (ZOFRAN) injection 4 mg (4 mg Intravenous Given 6/20/24 1952)        ED Course:         Labs:    Lab Results (last 24 hours)       ** No results found for the last 24 hours. **             Imaging:    XR Spine Cervical Complete 4 or 5 View    Result Date: 6/20/2024  XR SPINE CERVICAL COMPLETE 4 OR 5 VW Date  of Exam: 6/20/2024 8:30 PM EDT Indication: Neck pain Comparison: 3/18/2024 Findings: Straightening of the normal curvature of the spine again noted with mild anterolisthesis of C3-4 on C5. Moderate to advanced loss of disc space height and associated degenerative change at C5-6 and C6-7 again noted. Prevertebral soft tissues are within normal limits. Tip of the dens is intact. Lateral masses of C1 on C2 demonstrate advanced degenerative changes on the left similar to prior CT. Evaluation is somewhat limited. Oblique imaging demonstrates no disruption of the pars. Neural foraminal narrowing noted to a moderate degree at C4-5 on the left with moderate changes at see to 3 through C4-5 on the right. Mild tilt of the head is noted to the right. Limited imaging lung apices grossly unremarkable     Impression: Moderate to advanced degenerative changes of the cervical spine Electronically Signed: Nish Felix MD  6/20/2024 8:52 PM EDT  Workstation ID: [id number]       Differential Diagnosis and Discussion:    Neck Pain: The patient presents with neck pain. My differential diagnosis includes but is not limited to acute spinal epidural abscess, acute spinal epidural bleed, meningitis, musculoskeletal neck pain, spinal fracture, and osteoarthritis.     All X-rays impressions were independently interpreted by me.    MDM     The patient´s symptoms are consistent with musculoskeletal neck pain.  X-rays consistent with degenerative disease.  The patient is now resting comfortably, feels better, is alert, talkative, interactive and in no distress. The repeat examination is unremarkable and benign. The patient is neurologically intact and is ambulatory in the ED. The patient has no fever, no bowel or bladder incontinence, no saddle anesthesia, and is otherwise alert and well appearing. The history, physical exam, and diagnostics (if any) do not suggest the presence of acute spinal epidural abscess, acute spinal epidural bleed,  cauda equina syndrome, abdominal aortic aneurysm, aortic dissection or other process requiring further testing, treatment or consultation in the emergency department. The vital signs have been stable. The patient's condition is stable and appropriate for discharge. The patient will pursue further outpatient evaluation with the primary care physician or other designated for consulting position as indicated in the discharge instructions.          Patient Care Considerations:    NARCOTICS: I considered prescribing opiate pain medication as an outpatient, however patient is currently taking narcotics and going to pain management.      Consultants/Shared Management Plan:    None    Social Determinants of Health:    Patient is independent, reliable, and has access to care.       Disposition and Care Coordination:    Discharged: The patient is suitable and stable for discharge with no need for consideration of admission.    I have explained the patient´s condition, diagnoses and treatment plan based on the information available to me at this time. I have answered questions and addressed any concerns. The patient has a good  understanding of the patient´s diagnosis, condition, and treatment plan as can be expected at this point. The vital signs have been stable. The patient´s condition is stable and appropriate for discharge from the emergency department.      The patient will pursue further outpatient evaluation with the primary care physician or other designated or consulting physician as outlined in the discharge instructions. They are agreeable to this plan of care and follow-up instructions have been explained in detail. The patient has received these instructions in written format and has expressed an understanding of the discharge instructions. The patient is aware that any significant change in condition or worsening of symptoms should prompt an immediate return to this or the closest emergency department or call to  911.  I have explained discharge medications and the need for follow up with the patient/caretakers. This was also printed in the discharge instructions. Patient was discharged with the following medications and follow up:      Medication List        New Prescriptions      methylPREDNISolone 4 MG dose pack  Commonly known as: MEDROL  Take as directed on package instructions.     orphenadrine 100 MG 12 hr tablet  Commonly known as: NORFLEX  Take 1 tablet by mouth 2 (Two) Times a Day.               Where to Get Your Medications        These medications were sent to [facility] [id number] - [place] - [address] [facility] [address] - [phone]  - [phone]   [address] [place] [place] [address]      Phone: [phone]   methylPREDNISolone 4 MG dose pack  orphenadrine 100 MG 12 hr tablet      [facility]  [address] [facility] [address]  [phone]             Final diagnoses:   Neck pain        ED Disposition       ED Disposition   Discharge    Condition   Stable    Comment   --               This medical record created using voice recognition software.             Anh Oh MD  06/20/24 0036

## 2024-06-27 ENCOUNTER — OFFICE VISIT (OUTPATIENT)
Dept: PULMONOLOGY | Facility: CLINIC | Age: 74
End: 2024-06-27
Payer: MEDICARE

## 2024-06-27 ENCOUNTER — OFFICE VISIT (OUTPATIENT)
Dept: FAMILY MEDICINE CLINIC | Facility: CLINIC | Age: 74
End: 2024-06-27
Payer: MEDICARE

## 2024-06-27 VITALS
OXYGEN SATURATION: 93 % | RESPIRATION RATE: 16 BRPM | WEIGHT: 143.2 LBS | SYSTOLIC BLOOD PRESSURE: 121 MMHG | BODY MASS INDEX: 25.37 KG/M2 | DIASTOLIC BLOOD PRESSURE: 62 MMHG | HEART RATE: 87 BPM | HEIGHT: 63 IN | TEMPERATURE: 97.6 F

## 2024-06-27 VITALS
HEART RATE: 74 BPM | TEMPERATURE: 97 F | OXYGEN SATURATION: 97 % | RESPIRATION RATE: 18 BRPM | DIASTOLIC BLOOD PRESSURE: 61 MMHG | SYSTOLIC BLOOD PRESSURE: 106 MMHG | HEIGHT: 63 IN | BODY MASS INDEX: 25.66 KG/M2 | WEIGHT: 144.8 LBS

## 2024-06-27 DIAGNOSIS — G89.29 OTHER CHRONIC PAIN: Chronic | ICD-10-CM

## 2024-06-27 DIAGNOSIS — Z23 ENCOUNTER FOR IMMUNIZATION: ICD-10-CM

## 2024-06-27 DIAGNOSIS — J47.9 BRONCHIECTASIS WITHOUT COMPLICATION: ICD-10-CM

## 2024-06-27 DIAGNOSIS — J96.11 CHRONIC RESPIRATORY FAILURE WITH HYPOXIA: ICD-10-CM

## 2024-06-27 DIAGNOSIS — R05.3 CHRONIC COUGH: ICD-10-CM

## 2024-06-27 DIAGNOSIS — J84.9 ILD (INTERSTITIAL LUNG DISEASE): ICD-10-CM

## 2024-06-27 DIAGNOSIS — J45.40 MODERATE PERSISTENT ASTHMA WITHOUT COMPLICATION: ICD-10-CM

## 2024-06-27 DIAGNOSIS — I27.24 CTEPH (CHRONIC THROMBOEMBOLIC PULMONARY HYPERTENSION): ICD-10-CM

## 2024-06-27 DIAGNOSIS — M06.9 RHEUMATOID ARTHRITIS INVOLVING MULTIPLE SITES, UNSPECIFIED WHETHER RHEUMATOID FACTOR PRESENT: ICD-10-CM

## 2024-06-27 DIAGNOSIS — D89.89 ANTISYNTHETASE SYNDROME: ICD-10-CM

## 2024-06-27 DIAGNOSIS — Z00.00 ANNUAL PHYSICAL EXAM: ICD-10-CM

## 2024-06-27 DIAGNOSIS — R06.09 DYSPNEA ON EXERTION: ICD-10-CM

## 2024-06-27 DIAGNOSIS — E78.2 MIXED HYPERLIPIDEMIA: Primary | Chronic | ICD-10-CM

## 2024-06-27 DIAGNOSIS — J84.10 PULMONARY FIBROSIS: Chronic | ICD-10-CM

## 2024-06-27 DIAGNOSIS — M06.9 RHEUMATOID ARTHRITIS INVOLVING MULTIPLE SITES, UNSPECIFIED WHETHER RHEUMATOID FACTOR PRESENT: Primary | Chronic | ICD-10-CM

## 2024-06-27 PROBLEM — M25.559 HIP PAIN: Status: RESOLVED | Noted: 2020-01-20 | Resolved: 2024-06-27

## 2024-06-27 PROBLEM — U07.1 PNEUMONIA DUE TO COVID-19 VIRUS: Status: RESOLVED | Noted: 2021-09-22 | Resolved: 2024-06-27

## 2024-06-27 PROBLEM — J80 ARDS (ADULT RESPIRATORY DISTRESS SYNDROME): Status: RESOLVED | Noted: 2021-10-14 | Resolved: 2024-06-27

## 2024-06-27 PROBLEM — J12.82 PNEUMONIA DUE TO COVID-19 VIRUS: Status: RESOLVED | Noted: 2021-09-22 | Resolved: 2024-06-27

## 2024-06-27 PROBLEM — R42 DIZZINESS: Status: RESOLVED | Noted: 2021-08-26 | Resolved: 2024-06-27

## 2024-06-27 PROBLEM — Z79.899 HIGH RISK MEDICATION USE: Status: RESOLVED | Noted: 2022-01-10 | Resolved: 2024-06-27

## 2024-06-27 PROBLEM — E43 SEVERE MALNUTRITION: Status: RESOLVED | Noted: 2021-09-23 | Resolved: 2024-06-27

## 2024-06-27 PROBLEM — R06.00 DYSPNEA: Status: RESOLVED | Noted: 2021-09-16 | Resolved: 2024-06-27

## 2024-06-27 PROBLEM — I27.20 PULMONARY HYPERTENSION: Chronic | Status: ACTIVE | Noted: 2022-01-10

## 2024-06-27 PROBLEM — E66.3 OVERWEIGHT WITH BODY MASS INDEX (BMI) 25.0-29.9: Status: RESOLVED | Noted: 2021-08-26 | Resolved: 2024-06-27

## 2024-06-27 PROCEDURE — 99214 OFFICE O/P EST MOD 30 MIN: CPT | Performed by: FAMILY MEDICINE

## 2024-06-27 PROCEDURE — 1159F MED LIST DOCD IN RCRD: CPT | Performed by: FAMILY MEDICINE

## 2024-06-27 PROCEDURE — 1125F AMNT PAIN NOTED PAIN PRSNT: CPT | Performed by: FAMILY MEDICINE

## 2024-06-27 PROCEDURE — 90677 PCV20 VACCINE IM: CPT | Performed by: NURSE PRACTITIONER

## 2024-06-27 PROCEDURE — 1159F MED LIST DOCD IN RCRD: CPT | Performed by: NURSE PRACTITIONER

## 2024-06-27 PROCEDURE — G2211 COMPLEX E/M VISIT ADD ON: HCPCS | Performed by: FAMILY MEDICINE

## 2024-06-27 PROCEDURE — 1160F RVW MEDS BY RX/DR IN RCRD: CPT | Performed by: NURSE PRACTITIONER

## 2024-06-27 PROCEDURE — G0009 ADMIN PNEUMOCOCCAL VACCINE: HCPCS | Performed by: NURSE PRACTITIONER

## 2024-06-27 PROCEDURE — 99214 OFFICE O/P EST MOD 30 MIN: CPT | Performed by: NURSE PRACTITIONER

## 2024-06-27 PROCEDURE — 1160F RVW MEDS BY RX/DR IN RCRD: CPT | Performed by: FAMILY MEDICINE

## 2024-06-27 RX ORDER — BUDESONIDE, GLYCOPYRROLATE, AND FORMOTEROL FUMARATE 160; 9; 4.8 UG/1; UG/1; UG/1
2 AEROSOL, METERED RESPIRATORY (INHALATION) 2 TIMES DAILY
Qty: 1 EACH | Refills: 11 | Status: SHIPPED | OUTPATIENT
Start: 2024-06-27

## 2024-06-27 RX ORDER — ALBUTEROL SULFATE 90 UG/1
2 AEROSOL, METERED RESPIRATORY (INHALATION) EVERY 4 HOURS PRN
Qty: 18 G | Refills: 11 | Status: SHIPPED | OUTPATIENT
Start: 2024-06-27

## 2024-06-27 RX ORDER — BROMPHENIRAMINE MALEATE, PSEUDOEPHEDRINE HYDROCHLORIDE, AND DEXTROMETHORPHAN HYDROBROMIDE 2; 30; 10 MG/5ML; MG/5ML; MG/5ML
5 SYRUP ORAL 4 TIMES DAILY PRN
Qty: 473 ML | Refills: 2 | Status: SHIPPED | OUTPATIENT
Start: 2024-06-27

## 2024-06-27 RX ORDER — PREDNISONE 10 MG/1
10 TABLET ORAL DAILY
Qty: 30 TABLET | Refills: 2 | Status: SHIPPED | OUTPATIENT
Start: 2024-06-27

## 2024-06-27 RX ORDER — AZATHIOPRINE 100 MG/1
100 TABLET ORAL DAILY
Qty: 30 TABLET | Refills: 2 | Status: SHIPPED | OUTPATIENT
Start: 2024-06-27

## 2024-06-27 NOTE — PROGRESS NOTES
"Chief Complaint  Neck Pain, Establish Care, and Osteoarthritis (Causing pain.)    Subjective        Christina Espinoza presents to [facility]  History of Present Illness  She is here today to establish relations as new patient.  She has chronic neck pain with recent cervical fracture of C1, hyperlipidemia, chronic pain managed by pain management, COPD, asthma, history of DVT, rheumatoid arthritis.      Objective   Vital Signs:  /61 (BP Location: Left arm, Patient Position: Sitting, Cuff Size: Adult)   Pulse 74   Temp 97 °F (36.1 °C) (Temporal)   Resp 18   Ht 158.8 cm (62.5\")   Wt 65.7 kg (144 lb 12.8 oz)   SpO2 97%   BMI 26.06 kg/m²   Estimated body mass index is 26.06 kg/m² as calculated from the following:    Height as of this encounter: 158.8 cm (62.5\").    Weight as of this encounter: 65.7 kg (144 lb 12.8 oz).               Physical Exam  Vitals reviewed.   Constitutional:       Appearance: She is well-developed and overweight.   HENT:      Head: Normocephalic and atraumatic.      Right Ear: External ear normal.      Left Ear: External ear normal.      Mouth/Throat:      Pharynx: No oropharyngeal exudate.   Eyes:      Conjunctiva/sclera: Conjunctivae normal.      Pupils: Pupils are equal, round, and reactive to light.   Neck:      Vascular: No carotid bruit.   Cardiovascular:      Rate and Rhythm: Normal rate and regular rhythm.      Heart sounds: No murmur heard.     No friction rub. No gallop.   Pulmonary:      Effort: Pulmonary effort is normal.      Breath sounds: Normal breath sounds. No wheezing or rhonchi.   Abdominal:      General: There is no distension.   Skin:     General: Skin is warm and dry.   Neurological:      Mental Status: She is alert and oriented to person, place, and time.      Cranial Nerves: No cranial nerve deficit.      Motor: No weakness.   Psychiatric:         Mood and Affect: Mood and affect normal.         Behavior: Behavior normal.         " Thought Content: Thought content normal.         Judgment: Judgment normal.        Result Review :      CMP          7/27/2023    12:17 3/28/2024    12:14   CMP   Glucose 98  91    BUN 15  15    Creatinine 0.85  1.03    EGFR 72.4  57.2    Sodium 142  146    Potassium 4.4  4.7    Chloride 107  109    Calcium 10.7  10.4    Total Protein 6.7  7.2    Albumin 4.3  4.6    Globulin 2.4  2.6    Total Bilirubin 0.4  0.3    Alkaline Phosphatase 76  95    AST (SGOT) 12  19    ALT (SGPT) 10  15    Albumin/Globulin Ratio 1.8  1.8    BUN/Creatinine Ratio 17.6  14.6    Anion Gap 12.4  12.0      CBC          7/27/2023    12:17 3/28/2024    12:14   CBC   WBC 10.77  10.75    RBC 4.59  4.32    Hemoglobin 13.9  13.1    Hematocrit 43.6  40.2    MCV 95.0  93.1    MCH 30.3  30.3    MCHC 31.9  32.6    RDW 14.3  13.0    Platelets 363  344      Lipid Panel          7/27/2023    12:17 3/28/2024    12:14   Lipid Panel   Total Cholesterol 256  281    Triglycerides 128  103    HDL Cholesterol 106  125    VLDL Cholesterol 21  17    LDL Cholesterol  129  139    LDL/HDL Ratio 1.17  1.08      TSH          3/28/2024    12:14   TSH   TSH 1.440                   Assessment and Plan     Diagnoses and all orders for this visit:    1. Mixed hyperlipidemia (Primary)  Assessment & Plan:   Lipid abnormalities are improving with lifestyle modifications    Plan:  Continue same medication/s without change.      Counseled patient on lifestyle modifications to help control hyperlipidemia.   Cholesterol lowering dietary information shared with patient.    Patient Treatment Goals:   LDL goal is less than 70    Followup in 6 months.    Orders:  -     Lipid Panel; Future    2. Rheumatoid arthritis involving multiple sites, unspecified whether rheumatoid factor present  Assessment & Plan:  The patient was given referral today to rheumatology for further medical evaluation management.    Orders:  -     Ambulatory Referral to Rheumatology    3. Other chronic  pain  Assessment & Plan:  The patient's chronic pain most likely is due to her rheumatoid arthritis.  She is managed by pain management.      4. Annual physical exam  -     TSH+Free T4; Future  -     Comprehensive Metabolic Panel; Future  -     CBC & Differential; Future  -     TSH; Future  -     Urinalysis With Microscopic - Urine, Clean Catch; Future             Follow Up     Return in about 6 months (around 12/27/2024).  Patient was given instructions and counseling regarding her condition or for health maintenance advice. Please see specific information pulled into the AVS if appropriate.

## 2024-06-27 NOTE — PROGRESS NOTES
Primary Care Provider  Ewa Rodríguez DO     Referring Provider  No ref. provider found     Chief Complaint  Asthma and Follow-up (3 month f/up - lab results and medication refills. )    Subjective          Christina Espinoza presents to [facility]  History of Present Illness  Christina Espinoza is a 74 y.o. female patient of Dr. Carrion here for management of chronic thromboembolic pulmonary hypertension, ILD, shortness of breath, asthma and immunosuppression with chronic Imuran and prednisone use.     Patient states she is doing well since her last office visit.  She denies using any antibiotics or additional steroids for her lungs.  She denies any current fevers or chills.  Her shortness of breath is mild to moderate in severity, worse with exertion and improved with rest.  She continues to use Breztri as prescribed.  She will occasionally use her albuterol inhaler.  She does wear 2 L of oxygen as needed and is benefiting from its use.  She continues to take Imuran and prednisone.  She had a chest CT on 6/6/2024 which shows no significant change in the basilar predominant bronchiectasis with suspected early honeycombing.  There is a stable 5 mm pulmonary nodule in the left lower lobe.  She also had a pulmonary function test on 6/6/2024 which shows no obstruction, mild restriction and a significantly reduced diffusion capacity.  There has been no significant change in pulmonary function test since 2022.  Overall, she is she is doing very well and has no additional concerns at this time.  She is able to perform her ADLs without difficulty.  She is up-to-date with her flu and would like to receive a Prevnar 20 today in office.     Her history of smoking is   Tobacco Use: Low Risk  (6/27/2024)    Patient History     Smoking Tobacco Use: Never     Smokeless Tobacco Use: Never     Passive Exposure: Never   .    Review of Systems   Constitutional:  Negative for chills,  fatigue, fever, unexpected weight gain and unexpected weight loss.   HENT:  Congestion: Nasal.    Respiratory:  Positive for cough. Negative for apnea, shortness of breath and wheezing.         Negative for Hemoptysis     Cardiovascular:  Negative for chest pain, palpitations and leg swelling.   Skin:         Negative for cyanosis      Sleep: Negative for Excessive daytime sleepiness  Negative for morning headaches  Negative for Snoring    Family History   Problem Relation Age of Onset    Arthritis Mother     Cancer Mother     Osteoporosis Mother     Cancer Father     Heart disease Father     Diabetes Father     Arthritis Sister     Heart disease Sister     Bleeding Disorder Brother     Stroke Brother     Diabetes Maternal Grandfather     Diabetes Son         Social History     Socioeconomic History    Marital status:    Tobacco Use    Smoking status: Never     Passive exposure: Never    Smokeless tobacco: Never   Vaping Use    Vaping status: Never Used   Substance and Sexual Activity    Alcohol use: Never    Drug use: Never    Sexual activity: Defer        Past Medical History:   Diagnosis Date    Arthritis     Asthma     Cervicalgia 01/20/2020    Leg pain     Leg swelling     Limb pain     Lumbago 01/20/2020    low back pain     Lumbar spinal stenosis 01/20/2020    Lung disease     Nausea     Neurogenic claudication 01/20/2020    Neuropathy     Pulmonary fibrosis     Radiculopathy, lumbosacral region 01/20/2020    Spondylolisthesis, lumbar region 01/29/2020    grade 1 at L3/4        Immunization History   Administered Date(s) Administered    Fluzone High Dose =>65 Years ([employer] ONLY) 10/25/2022    Fluzone High-Dose 65+yrs 11/15/2023    Pneumococcal Conjugate 13-Valent (PCV13) 01/16/2017    Pneumococcal Conjugate 20-Valent (PCV20) 06/27/2024    Tdap 09/12/2023         Allergies   Allergen Reactions    Clindamycin Rash, Shortness Of Breath and Swelling    Doxycycline Other (See Comments)     SORE ON TONGUE     Adalimumab Rash    Azithromycin Rash, Nausea And Vomiting and Unknown - High Severity    Clindamycin Hcl Unknown - High Severity and Rash    Erythromycin Rash and Unknown - High Severity          Current Outpatient Medications:     albuterol (PROVENTIL) (2.5 MG/3ML) 0.083% nebulizer solution, USE 1 VIAL IN NEBULIZER EVERY 4 HOURS AS NEEDED FOR SHORTNESS OF BREATH, Disp: 375 mL, Rfl: 0    albuterol sulfate  (90 Base) MCG/ACT inhaler, Inhale 2 puffs Every 4 (Four) Hours As Needed for Wheezing., Disp: 18 g, Rfl: 11    alendronate (FOSAMAX) 70 MG tablet, Take 1 tablet by mouth 1 (One) Time Per Week., Disp: 4 tablet, Rfl: 3    azaTHIOprine (IMURAN) 100 MG tablet, Take 1 tablet by mouth Daily., Disp: 30 tablet, Rfl: 2    brompheniramine-pseudoephedrine-DM 30-2-10 MG/5ML syrup, Take 5 mL by mouth 4 (Four) Times a Day As Needed for Allergies., Disp: 473 mL, Rfl: 2    Budeson-Glycopyrrol-Formoterol (Breztri Aerosphere) 160-9-4.8 MCG/ACT aerosol inhaler, Inhale 2 puffs 2 (Two) Times a Day., Disp: 1 each, Rfl: 11    chlorhexidine (PERIDEX) 0.12 % solution, , Disp: , Rfl:     gabapentin (NEURONTIN) 300 MG capsule, , Disp: , Rfl:     HYDROcodone-acetaminophen (NORCO)  MG per tablet, Take 1 tablet by mouth Every 6 (Six) Hours As Needed for Moderate Pain., Disp: , Rfl:     ibuprofen (ADVIL,MOTRIN) 800 MG tablet, Take 1 tablet by mouth Every 8 (Eight) Hours As Needed for Mild Pain., Disp: 90 tablet, Rfl: 1    ketoconazole (Nizoral) 2 % shampoo, Apply  topically to the appropriate area as directed 2 (Two) Times a Week., Disp: 120 mL, Rfl: 1    linaclotide (LINZESS) 145 MCG capsule capsule, Take 1 capsule by mouth Daily., Disp: , Rfl:     ondansetron (Zofran) 4 MG tablet, Take 1 tablet by mouth Every 8 (Eight) Hours As Needed for Nausea or Vomiting., Disp: 10 tablet, Rfl: 1    orphenadrine (NORFLEX) 100 MG 12 hr tablet, Take 1 tablet by mouth 2 (Two) Times a Day., Disp: 20 tablet, Rfl: 0    predniSONE (DELTASONE) 10  "MG tablet, Take 1 tablet by mouth Daily., Disp: 30 tablet, Rfl: 2     Objective   Physical Exam  Constitutional:       General: She is not in acute distress.     Appearance: Normal appearance. She is normal weight.   HENT:      Right Ear: Hearing normal.      Left Ear: Hearing normal.      Nose: No nasal tenderness or congestion.      Mouth/Throat:      Mouth: Mucous membranes are moist. No oral lesions.   Eyes:      Extraocular Movements: Extraocular movements intact.      Pupils: Pupils are equal, round, and reactive to light.   Cardiovascular:      Rate and Rhythm: Normal rate and regular rhythm.      Pulses: Normal pulses.      Heart sounds: Normal heart sounds. No murmur heard.  Pulmonary:      Effort: Pulmonary effort is normal.      Breath sounds: Normal breath sounds. No wheezing, rhonchi or rales.      Comments: Velcro-like crackles in bilateral bases.  Musculoskeletal:      Right lower leg: No edema.      Left lower leg: No edema.   Skin:     General: Skin is warm and dry.      Findings: No lesion or rash.   Neurological:      General: No focal deficit present.      Mental Status: She is alert and oriented to person, place, and time.   Psychiatric:         Mood and Affect: Affect normal. Mood is not anxious or depressed.         Vital Signs:   /62 (BP Location: Right arm, Patient Position: Sitting, Cuff Size: Adult)   Pulse 87   Temp 97.6 °F (36.4 °C) (Oral)   Resp 16   Ht 160 cm (63\")   Wt 65 kg (143 lb 3.2 oz)   SpO2 93% Comment: RA; 2 L's PRN  BMI 25.37 kg/m²        Result Review :   The following data was reviewed by: CATY Gee on 06/27/2024:  CMP          7/27/2023    12:17 3/28/2024    12:14   CMP   Glucose 98  91    BUN 15  15    Creatinine 0.85  1.03    EGFR 72.4  57.2    Sodium 142  146    Potassium 4.4  4.7    Chloride 107  109    Calcium 10.7  10.4    Total Protein 6.7  7.2    Albumin 4.3  4.6    Globulin 2.4  2.6    Total Bilirubin 0.4  0.3    Alkaline Phosphatase 76  95 "    AST (SGOT) 12  19    ALT (SGPT) 10  15    Albumin/Globulin Ratio 1.8  1.8    BUN/Creatinine Ratio 17.6  14.6    Anion Gap 12.4  12.0      CBC w/diff          7/27/2023    12:17 3/28/2024    12:14   CBC w/Diff   WBC 10.77  10.75    RBC 4.59  4.32    Hemoglobin 13.9  13.1    Hematocrit 43.6  40.2    MCV 95.0  93.1    MCH 30.3  30.3    MCHC 31.9  32.6    RDW 14.3  13.0    Platelets 363  344    Neutrophil Rel % 87.3  85.3    Immature Granulocyte Rel % 0.6  0.8    Lymphocyte Rel % 6.7  9.9    Monocyte Rel % 5.0  3.2    Eosinophil Rel % 0.0  0.3    Basophil Rel % 0.4  0.5      Data reviewed : Radiologic studies chest CT 6/6/2024, pulmonary function test 6/6/2024 and Dr. Carrion's last office note and my last office note    Procedures        Assessment and Plan    Diagnoses and all orders for this visit:    1. Rheumatoid arthritis involving multiple sites, unspecified whether rheumatoid factor present (Primary)  -     predniSONE (DELTASONE) 10 MG tablet; Take 1 tablet by mouth Daily.  Dispense: 30 tablet; Refill: 2    2. Pulmonary fibrosis  -     CT Chest Hi Resolution Diagnostic; Future  -     Budeson-Glycopyrrol-Formoterol (Breztri Aerosphere) 160-9-4.8 MCG/ACT aerosol inhaler; Inhale 2 puffs 2 (Two) Times a Day.  Dispense: 1 each; Refill: 11    3. Chronic respiratory failure with hypoxia    4. Dyspnea on exertion  -     albuterol sulfate  (90 Base) MCG/ACT inhaler; Inhale 2 puffs Every 4 (Four) Hours As Needed for Wheezing.  Dispense: 18 g; Refill: 11  -     Budeson-Glycopyrrol-Formoterol (Breztri Aerosphere) 160-9-4.8 MCG/ACT aerosol inhaler; Inhale 2 puffs 2 (Two) Times a Day.  Dispense: 1 each; Refill: 11    5. Moderate persistent asthma without complication    6. Bronchiectasis without complication  -     CT Chest Hi Resolution Diagnostic; Future  -     Budeson-Glycopyrrol-Formoterol (Breztri Aerosphere) 160-9-4.8 MCG/ACT aerosol inhaler; Inhale 2 puffs 2 (Two) Times a Day.  Dispense: 1 each; Refill:  11    7. ILD (interstitial lung disease)  -     CT Chest Hi Resolution Diagnostic; Future  -     azaTHIOprine (IMURAN) 100 MG tablet; Take 1 tablet by mouth Daily.  Dispense: 30 tablet; Refill: 2    8. Antisynthetase syndrome  -     azaTHIOprine (IMURAN) 100 MG tablet; Take 1 tablet by mouth Daily.  Dispense: 30 tablet; Refill: 2    9. CTEPH (chronic thromboembolic pulmonary hypertension)    10. Encounter for immunization  -     Pneumococcal Conjugate Vaccine 20-Valent (PCV20)    11. Chronic cough  -     brompheniramine-pseudoephedrine-DM 30-2-10 MG/5ML syrup; Take 5 mL by mouth 4 (Four) Times a Day As Needed for Allergies.  Dispense: 473 mL; Refill: 2          Follow Up   Return in about 3 months (around 9/27/2024) for Recheck with Tamara.  Patient was given instructions and counseling regarding her condition or for health maintenance advice. Please see specific information pulled into the AVS if appropriate.

## 2024-06-27 NOTE — ASSESSMENT & PLAN NOTE
The patient's chronic pain most likely is due to her rheumatoid arthritis.  She is managed by pain management.

## 2024-06-27 NOTE — ASSESSMENT & PLAN NOTE
Lipid abnormalities are improving with lifestyle modifications    Plan:  Continue same medication/s without change.      Counseled patient on lifestyle modifications to help control hyperlipidemia.   Cholesterol lowering dietary information shared with patient.    Patient Treatment Goals:   LDL goal is less than 70    Followup in 6 months.

## 2024-07-11 ENCOUNTER — TELEPHONE (OUTPATIENT)
Dept: FAMILY MEDICINE CLINIC | Facility: CLINIC | Age: 74
End: 2024-07-11
Payer: MEDICARE

## 2024-07-11 NOTE — TELEPHONE ENCOUNTER
Caller: Christina Espinoza    Relationship to patient: Self    Best call back number: [phone]     Patient is needing:     PATIENT WOULD LIKE TO GO AHEAD WITH SCHEDULING APPOINTMENT WITH RHEUMATOLOGY DR. LINDA.    PLEASE CONTACT PATIENT WITH APPOINTMENT DATE AND TIME.         MYCHART NO, CALL OR TEXT. VOICEMAIL NOT SET UP.

## 2024-07-30 ENCOUNTER — TELEPHONE (OUTPATIENT)
Dept: PULMONOLOGY | Facility: CLINIC | Age: 74
End: 2024-07-30
Payer: MEDICARE

## 2024-08-06 RX ORDER — KETOCONAZOLE 20 MG/ML
SHAMPOO TOPICAL 2 TIMES WEEKLY
Qty: 120 ML | Refills: 1 | Status: SHIPPED | OUTPATIENT
Start: 2024-08-08

## 2024-08-06 NOTE — TELEPHONE ENCOUNTER
Caller: Olga Christina L    Relationship: Self    Best call back number: [phone]     Requested Prescriptions:   Requested Prescriptions     Pending Prescriptions Disp Refills    ketoconazole (Nizoral) 2 % shampoo 120 mL 1     Sig: Apply  topically to the appropriate area as directed 2 (Two) Times a Week.        Pharmacy where request should be sent: [facility] [id number]  [place] - [address] AT [facility] [address]  [phone] [facility] [phone]      Last office visit with prescribing clinician: 6/27/2024   Last telemedicine visit with prescribing clinician: Visit date not found   Next office visit with prescribing clinician: 12/19/2024     Additional details provided by patient: PATIENT IS COMPLETELY OUT OF MEDICATION.  PATIENT REQUESTS A TEXT ONCE THE REFILL REQUEST HAS BEEN COMPLETED.    Does the patient have less than a 3 day supply:  [x] Yes  [] No    Would you like a call back once the refill request has been completed: [x] Yes [] No    If the office needs to give you a call back, can they leave a voicemail: [] Yes [] No    Raphael Chaney Rep   08/06/24 08:37 EDT

## 2024-08-22 ENCOUNTER — TELEPHONE (OUTPATIENT)
Dept: PULMONOLOGY | Facility: CLINIC | Age: 74
End: 2024-08-22
Payer: MEDICARE

## 2024-08-22 NOTE — TELEPHONE ENCOUNTER
Dr. Hodges from [facility] was wanting to speak to Dr. Carrion about patient care. I relayed the message to rafael and she asked.  Dr. Carrion attempted to call him but no answer. Dr. Hodges cell phone number is [phone]. Thank you.

## 2024-09-03 ENCOUNTER — TELEPHONE (OUTPATIENT)
Dept: FAMILY MEDICINE CLINIC | Facility: CLINIC | Age: 74
End: 2024-09-03

## 2024-09-03 NOTE — TELEPHONE ENCOUNTER
Caller: Christina Espinoza    Relationship: Self    Best call back number: [phone]     What is the best time to reach you: ANYTIME     Who are you requesting to speak with (clinical staff, provider,  specific staff member): CLINICAL     What was the call regarding: PATIENT IS CALLING IN REGARDS TO THE MEDICATION ketoconazole (Nizoral) 2 % shampoo , STATING THAT THE MEDICATION IS NOT WORKING, AND IF POSSIBLE FOR A ALTERNATIVE OF SOMETHING STRONGER.

## 2024-09-03 NOTE — TELEPHONE ENCOUNTER
Patient was prescribed this by previous provider for psoriasis on the scalp, stated it is not working and asking for alternative

## 2024-09-15 RX ORDER — CALCIPOTRIENE AND BETAMETHASONE DIPROPIONATE 50; .5 UG/G; MG/G
SUSPENSION TOPICAL DAILY
Qty: 120 G | Refills: 0 | Status: SHIPPED | OUTPATIENT
Start: 2024-09-15

## 2024-09-16 RX ORDER — OXYCODONE AND ACETAMINOPHEN 10; 325 MG/1; MG/1
TABLET ORAL
COMMUNITY
Start: 2024-08-23

## 2024-09-16 RX ORDER — OXYCODONE AND ACETAMINOPHEN 7.5; 325 MG/1; MG/1
TABLET ORAL
COMMUNITY
Start: 2024-07-22

## 2024-09-16 NOTE — TELEPHONE ENCOUNTER
I sent in medication for her to try once daily for a month then put her on for a follow-up appointment.

## 2024-09-23 DIAGNOSIS — J84.9 ILD (INTERSTITIAL LUNG DISEASE): ICD-10-CM

## 2024-09-23 DIAGNOSIS — D89.89 ANTISYNTHETASE SYNDROME: ICD-10-CM

## 2024-09-24 RX ORDER — AZATHIOPRINE 100 MG/1
100 TABLET ORAL DAILY
Qty: 30 TABLET | Refills: 2 | Status: SHIPPED | OUTPATIENT
Start: 2024-09-24

## 2024-09-25 ENCOUNTER — TELEPHONE (OUTPATIENT)
Dept: PULMONOLOGY | Facility: CLINIC | Age: 74
End: 2024-09-25

## 2024-09-25 NOTE — TELEPHONE ENCOUNTER
Caller: LAMIN MONTERO    Relationship to patient: SELF    Best call back number: [phone]    Patient is needing: PATIENT WENT AND SAW HER RHEUMATOLOGIST AND THEY ARE WANTING HER OFF PREDNISONE AND THEY PUT HER ON SOMETHING ELSE. SHE IS WANTING TO KNOW IF DR YOU WANTS TO WING HER OFF OF PREDNISONE OR WHAT SHE SHOULD DO? PLEASE ADVISE

## 2024-09-26 NOTE — TELEPHONE ENCOUNTER
Spoke to patient, informed her about Santy' reply; she will contact Rheumatology to see how to proceed with weaning off Prednisone.

## 2024-10-01 DIAGNOSIS — M81.0 OSTEOPOROSIS, UNSPECIFIED OSTEOPOROSIS TYPE, UNSPECIFIED PATHOLOGICAL FRACTURE PRESENCE: ICD-10-CM

## 2024-10-01 RX ORDER — ALENDRONATE SODIUM 70 MG/1
70 TABLET ORAL WEEKLY
Qty: 4 TABLET | Refills: 3 | OUTPATIENT
Start: 2024-10-01

## 2024-10-02 ENCOUNTER — OFFICE VISIT (OUTPATIENT)
Dept: PULMONOLOGY | Facility: CLINIC | Age: 74
End: 2024-10-02
Payer: MEDICARE

## 2024-10-02 VITALS
HEIGHT: 63 IN | BODY MASS INDEX: 25.41 KG/M2 | HEART RATE: 101 BPM | OXYGEN SATURATION: 97 % | TEMPERATURE: 97.6 F | RESPIRATION RATE: 16 BRPM | WEIGHT: 143.4 LBS | DIASTOLIC BLOOD PRESSURE: 58 MMHG | SYSTOLIC BLOOD PRESSURE: 125 MMHG

## 2024-10-02 DIAGNOSIS — D89.89 ANTISYNTHETASE SYNDROME: ICD-10-CM

## 2024-10-02 DIAGNOSIS — R05.3 CHRONIC COUGH: Chronic | ICD-10-CM

## 2024-10-02 DIAGNOSIS — R06.09 DYSPNEA ON EXERTION: ICD-10-CM

## 2024-10-02 DIAGNOSIS — M81.0 OSTEOPOROSIS, UNSPECIFIED OSTEOPOROSIS TYPE, UNSPECIFIED PATHOLOGICAL FRACTURE PRESENCE: Chronic | ICD-10-CM

## 2024-10-02 DIAGNOSIS — J84.9 ILD (INTERSTITIAL LUNG DISEASE): Chronic | ICD-10-CM

## 2024-10-02 DIAGNOSIS — M06.9 RHEUMATOID ARTHRITIS INVOLVING MULTIPLE SITES, UNSPECIFIED WHETHER RHEUMATOID FACTOR PRESENT: Chronic | ICD-10-CM

## 2024-10-02 DIAGNOSIS — J45.30 MILD PERSISTENT ASTHMA WITHOUT COMPLICATION: Primary | Chronic | ICD-10-CM

## 2024-10-02 PROCEDURE — 1159F MED LIST DOCD IN RCRD: CPT | Performed by: NURSE PRACTITIONER

## 2024-10-02 PROCEDURE — 99214 OFFICE O/P EST MOD 30 MIN: CPT | Performed by: NURSE PRACTITIONER

## 2024-10-02 PROCEDURE — 1160F RVW MEDS BY RX/DR IN RCRD: CPT | Performed by: NURSE PRACTITIONER

## 2024-10-02 RX ORDER — GABAPENTIN 100 MG/1
CAPSULE ORAL
COMMUNITY
Start: 2024-09-18

## 2024-10-02 RX ORDER — AZATHIOPRINE 100 MG/1
100 TABLET ORAL DAILY
Qty: 30 TABLET | Refills: 3 | Status: SHIPPED | OUTPATIENT
Start: 2024-10-02

## 2024-10-02 RX ORDER — PREDNISONE 10 MG/1
10 TABLET ORAL DAILY
Qty: 30 TABLET | Refills: 3 | Status: SHIPPED | OUTPATIENT
Start: 2024-10-02

## 2024-10-02 RX ORDER — BROMPHENIRAMINE MALEATE, PSEUDOEPHEDRINE HYDROCHLORIDE, AND DEXTROMETHORPHAN HYDROBROMIDE 2; 30; 10 MG/5ML; MG/5ML; MG/5ML
5 SYRUP ORAL 4 TIMES DAILY PRN
Qty: 473 ML | Refills: 2 | Status: SHIPPED | OUTPATIENT
Start: 2024-10-02

## 2024-10-02 RX ORDER — ALENDRONATE SODIUM 70 MG/1
70 TABLET ORAL WEEKLY
Qty: 4 TABLET | Refills: 3 | Status: SHIPPED | OUTPATIENT
Start: 2024-10-02

## 2024-10-02 NOTE — PROGRESS NOTES
Primary Care Provider  Ewa Rodríguez DO     Referring Provider  No ref. provider found     Chief Complaint  Cough (Dry cough ), Follow-up (3 month f/up ), and Asthma    Subjective          Christina Espinoza presents to [facility] PULMONARY & CRITICAL CARE MEDICINE  History of Present Illness  Christina Espinoza is a 74 y.o. female patient of Dr. Carrion here for management of chronic thromboembolic pulmonary hypertension, ILD, shortness of breath, asthma and immunosuppression with chronic Imuran and prednisone use.    Patient states that she is doing well since her last office visit.  She denies having antibiotics for her lungs.  She is on chronic prednisone.  Her shortness breath is mild in severity, worse with exertion and improved with rest.  She does continue to have a dry cough.  She uses Breztri as prescribed.  She will intermittently use albuterol in the mornings and after meals.  She states that she will be starting an infusion for her rheumatoid arthritis.  She wears 2 L of oxygen as needed and is benefiting from its use.  Overall, she is doing well and has no other concerns at this time.  She is able to perform her ADLs without difficulty.  She is up-to-date with her flu and pneumonia vaccines.     Her history of smoking is   Tobacco Use: Low Risk  (10/2/2024)    Patient History     Smoking Tobacco Use: Never     Smokeless Tobacco Use: Never     Passive Exposure: Never   .    Review of Systems   Constitutional:  Negative for chills, fatigue, fever, unexpected weight gain and unexpected weight loss.   HENT:  Congestion: Nasal.    Respiratory:  Positive for cough and shortness of breath. Negative for apnea and wheezing.         Negative for Hemoptysis     Cardiovascular:  Negative for chest pain, palpitations and leg swelling.   Skin:         Negative for cyanosis      Sleep: Negative for Excessive daytime sleepiness  Negative for morning headaches  Negative for Snoring    Family History    Problem Relation Age of Onset    Arthritis Mother     Cancer Mother     Osteoporosis Mother     Cancer Father     Heart disease Father     Diabetes Father     Arthritis Sister     Heart disease Sister     Bleeding Disorder Brother     Stroke Brother     Diabetes Maternal Grandfather     Diabetes Son         Social History     Socioeconomic History    Marital status:    Tobacco Use    Smoking status: Never     Passive exposure: Never    Smokeless tobacco: Never   Vaping Use    Vaping status: Never Used   Substance and Sexual Activity    Alcohol use: Never    Drug use: Never    Sexual activity: Defer        Past Medical History:   Diagnosis Date    Arthritis     Asthma     Cervicalgia 01/20/2020    Leg pain     Leg swelling     Limb pain     Lumbago 01/20/2020    low back pain     Lumbar spinal stenosis 01/20/2020    Lung disease     Nausea     Neurogenic claudication 01/20/2020    Neuropathy     Pulmonary fibrosis     Radiculopathy, lumbosacral region 01/20/2020    Spondylolisthesis, lumbar region 01/29/2020    grade 1 at L3/4        Immunization History   Administered Date(s) Administered    Fluzone High-Dose 65+YRS 10/25/2022, 09/25/2024    Fluzone High-Dose 65+yrs 11/15/2023    Pneumococcal Conjugate 13-Valent (PCV13) 01/16/2017    Pneumococcal Conjugate 20-Valent (PCV20) 06/27/2024    Tdap 09/12/2023         Allergies   Allergen Reactions    Clindamycin Rash, Shortness Of Breath and Swelling    Doxycycline Other (See Comments)     SORE ON TONGUE    Adalimumab Rash    Azithromycin Rash, Nausea And Vomiting and Unknown - High Severity    Clindamycin Hcl Unknown - High Severity and Rash    Erythromycin Rash and Unknown - High Severity          Current Outpatient Medications:     albuterol (PROVENTIL) (2.5 MG/3ML) 0.083% nebulizer solution, USE 1 VIAL IN NEBULIZER EVERY 4 HOURS AS NEEDED FOR SHORTNESS OF BREATH, Disp: 375 mL, Rfl: 0    albuterol sulfate  (90 Base) MCG/ACT inhaler, Inhale 2 puffs  Every 4 (Four) Hours As Needed for Wheezing., Disp: 18 g, Rfl: 11    alendronate (FOSAMAX) 70 MG tablet, Take 1 tablet by mouth 1 (One) Time Per Week., Disp: 4 tablet, Rfl: 3    azaTHIOprine (IMURAN) 100 MG tablet, Take 1 tablet by mouth Daily., Disp: 30 tablet, Rfl: 3    brompheniramine-pseudoephedrine-DM 30-2-10 MG/5ML syrup, Take 5 mL by mouth 4 (Four) Times a Day As Needed for Allergies., Disp: 473 mL, Rfl: 2    Budeson-Glycopyrrol-Formoterol (Breztri Aerosphere) 160-9-4.8 MCG/ACT aerosol inhaler, Inhale 2 puffs 2 (Two) Times a Day., Disp: 1 each, Rfl: 11    calcipotriene-betamethasone (TACLONEX SCALP) 0.005-0.064 % external suspension, Apply  topically to the appropriate area as directed Daily., Disp: 120 g, Rfl: 0    chlorhexidine (PERIDEX) 0.12 % solution, , Disp: , Rfl:     gabapentin (NEURONTIN) 100 MG capsule, Take 1 capsule every day by oral route for 30 days., Disp: , Rfl:     gabapentin (NEURONTIN) 300 MG capsule, , Disp: , Rfl:     HYDROcodone-acetaminophen (NORCO)  MG per tablet, Take 1 tablet by mouth Every 6 (Six) Hours As Needed for Moderate Pain., Disp: , Rfl:     ibuprofen (ADVIL,MOTRIN) 800 MG tablet, Take 1 tablet by mouth Every 8 (Eight) Hours As Needed for Mild Pain., Disp: 90 tablet, Rfl: 1    ketoconazole (Nizoral) 2 % shampoo, Apply  topically to the appropriate area as directed 2 (Two) Times a Week., Disp: 120 mL, Rfl: 1    linaclotide (LINZESS) 145 MCG capsule capsule, Take 1 capsule by mouth Daily., Disp: , Rfl:     ondansetron (Zofran) 4 MG tablet, Take 1 tablet by mouth Every 8 (Eight) Hours As Needed for Nausea or Vomiting., Disp: 10 tablet, Rfl: 1    orphenadrine (NORFLEX) 100 MG 12 hr tablet, Take 1 tablet by mouth 2 (Two) Times a Day., Disp: 20 tablet, Rfl: 0    oxyCODONE-acetaminophen (PERCOCET)  MG per tablet, , Disp: , Rfl:     predniSONE (DELTASONE) 10 MG tablet, Take 1 tablet by mouth Daily., Disp: 30 tablet, Rfl: 3    oxyCODONE-acetaminophen (PERCOCET) 7.5-325 MG  "per tablet, , Disp: , Rfl:      Objective   Physical Exam  Constitutional:       General: She is not in acute distress.     Appearance: Normal appearance. She is normal weight.   HENT:      Right Ear: Hearing normal.      Left Ear: Hearing normal.      Nose: No nasal tenderness or congestion.      Mouth/Throat:      Mouth: Mucous membranes are moist. No oral lesions.   Eyes:      Extraocular Movements: Extraocular movements intact.      Pupils: Pupils are equal, round, and reactive to light.   Cardiovascular:      Rate and Rhythm: Normal rate and regular rhythm.      Pulses: Normal pulses.      Heart sounds: Normal heart sounds. No murmur heard.  Pulmonary:      Effort: Pulmonary effort is normal.      Breath sounds: Normal breath sounds. No wheezing, rhonchi or rales.      Comments: Bibasilar crackles  Musculoskeletal:      Right lower leg: No edema.      Left lower leg: No edema.   Skin:     General: Skin is warm and dry.      Findings: No lesion or rash.   Neurological:      General: No focal deficit present.      Mental Status: She is alert and oriented to person, place, and time.   Psychiatric:         Mood and Affect: Affect normal. Mood is not anxious or depressed.         Vital Signs:   /58 (BP Location: Left arm, Patient Position: Sitting, Cuff Size: Adult)   Pulse 101   Temp 97.6 °F (36.4 °C) (Oral)   Resp 16   Ht 160 cm (63\")   Wt 65 kg (143 lb 6.4 oz)   SpO2 97% Comment: RA; 2 L's PRN  BMI 25.40 kg/m²        Result Review :   The following data was reviewed by: CATY Gee on 10/02/2024:  CMP          3/28/2024    12:14 7/30/2024    15:21   CMP   Glucose 91     BUN 15     Creatinine 1.03     EGFR 57.2     Sodium 146     Potassium 4.7     Chloride 109     Calcium 10.4     Total Protein 7.2     Albumin 4.6     Globulin 2.6     Total Bilirubin 0.3     Alkaline Phosphatase 95     AST (SGOT) 19  14       ALT (SGPT) 15  10       Albumin/Globulin Ratio 1.8     BUN/Creatinine Ratio 14.6   "   Anion Gap 12.0        Details          This result is from an external source.             CBC w/diff          3/28/2024    12:14 7/30/2024    15:21   CBC w/Diff   WBC 10.75  10.53       RBC 4.32  4.39       Hemoglobin 13.1  13.2       Hematocrit 40.2  43.1       MCV 93.1  98.2       MCH 30.3  30.1       MCHC 32.6  30.6       RDW 13.0  14.6       Platelets 344  334       Neutrophil Rel % 85.3  82.4       Immature Granulocyte Rel % 0.8  0.7       Lymphocyte Rel % 9.9  11.6       Monocyte Rel % 3.2  4.6       Eosinophil Rel % 0.3  0.3       Basophil Rel % 0.5  0.4          Details          This result is from an external source.             Data reviewed : Radiologic studies chest CT 6/6/2024, PFT 6/6/2024 and my last office note and Dr. Carrion's last office note    Procedures        Assessment and Plan    Diagnoses and all orders for this visit:    1. Mild persistent asthma without complication (Primary)  Comments:  Continue Breztri    2. Rheumatoid arthritis involving multiple sites, unspecified whether rheumatoid factor present  Comments:  Continue Imuran and prednisone  Orders:  -     predniSONE (DELTASONE) 10 MG tablet; Take 1 tablet by mouth Daily.  Dispense: 30 tablet; Refill: 3    3. Osteoporosis, unspecified osteoporosis type, unspecified pathological fracture presence  Comments:  Continue Fosamax  Orders:  -     alendronate (FOSAMAX) 70 MG tablet; Take 1 tablet by mouth 1 (One) Time Per Week.  Dispense: 4 tablet; Refill: 3    4. ILD (interstitial lung disease)  Comments:  Continue Imuran and prednisone  Orders:  -     azaTHIOprine (IMURAN) 100 MG tablet; Take 1 tablet by mouth Daily.  Dispense: 30 tablet; Refill: 3    5. Antisynthetase syndrome  -     azaTHIOprine (IMURAN) 100 MG tablet; Take 1 tablet by mouth Daily.  Dispense: 30 tablet; Refill: 3    6. Chronic cough  Comments:  Stable  Orders:  -     brompheniramine-pseudoephedrine-DM 30-2-10 MG/5ML syrup; Take 5 mL by mouth 4 (Four) Times a Day As  Needed for Allergies.  Dispense: 473 mL; Refill: 2    7. Dyspnea on exertion  Comments:  Continue albuterol as needed          Follow Up   Return in about 4 months (around 2/2/2025) for Recheck with Tamara.  Patient was given instructions and counseling regarding her condition or for health maintenance advice. Please see specific information pulled into the AVS if appropriate.

## 2024-10-22 ENCOUNTER — TELEPHONE (OUTPATIENT)
Dept: FAMILY MEDICINE CLINIC | Facility: CLINIC | Age: 74
End: 2024-10-22
Payer: MEDICARE

## 2024-10-22 NOTE — TELEPHONE ENCOUNTER
Caller: Christina Espinoza    Relationship: Self    Best call back number: [phone]     What medication are you requesting: MEDICATED SHAMPOO    What are your current symptoms: PSORIASIS     How long have you been experiencing symptoms: N/A    Have you had these symptoms before:    [x] Yes  [] No    Have you been treated for these symptoms before:   [x] Yes  [] No    If a prescription is needed, what is your preferred pharmacy and phone number:      [facility] [id number]  [place] - [address] AT [facility] [address] - [phone] [facility] [phone]  [phone]     Additional notes:    THE PATIENT SAID THAT ketoconazole (Nizoral) 2 % shampoo  DID NOT WORK FOR HER AND SHE IS  REQUESTING ANOTHER SHAMPOO.

## 2024-12-19 ENCOUNTER — OFFICE VISIT (OUTPATIENT)
Dept: FAMILY MEDICINE CLINIC | Facility: CLINIC | Age: 74
End: 2024-12-19
Payer: MEDICARE

## 2024-12-19 VITALS
HEIGHT: 63 IN | DIASTOLIC BLOOD PRESSURE: 59 MMHG | SYSTOLIC BLOOD PRESSURE: 127 MMHG | BODY MASS INDEX: 24.88 KG/M2 | RESPIRATION RATE: 17 BRPM | WEIGHT: 140.4 LBS | HEART RATE: 79 BPM | OXYGEN SATURATION: 99 % | TEMPERATURE: 97.1 F

## 2024-12-19 DIAGNOSIS — M54.2 NECK PAIN: ICD-10-CM

## 2024-12-19 DIAGNOSIS — Z78.0 POSTMENOPAUSE: ICD-10-CM

## 2024-12-19 DIAGNOSIS — Z00.00 MEDICARE ANNUAL WELLNESS VISIT, SUBSEQUENT: Primary | ICD-10-CM

## 2024-12-19 DIAGNOSIS — Z12.31 ENCOUNTER FOR SCREENING MAMMOGRAM FOR MALIGNANT NEOPLASM OF BREAST: ICD-10-CM

## 2024-12-19 PROBLEM — J96.01 ACUTE RESPIRATORY FAILURE WITH HYPOXIA: Chronic | Status: ACTIVE | Noted: 2021-10-14

## 2024-12-19 PROBLEM — I26.09 ACUTE PULMONARY EMBOLISM WITH ACUTE COR PULMONALE: Chronic | Status: ACTIVE | Noted: 2021-10-14

## 2024-12-19 PROCEDURE — G0439 PPPS, SUBSEQ VISIT: HCPCS | Performed by: FAMILY MEDICINE

## 2024-12-19 PROCEDURE — 1125F AMNT PAIN NOTED PAIN PRSNT: CPT | Performed by: FAMILY MEDICINE

## 2024-12-19 PROCEDURE — 1170F FXNL STATUS ASSESSED: CPT | Performed by: FAMILY MEDICINE

## 2024-12-19 PROCEDURE — 1159F MED LIST DOCD IN RCRD: CPT | Performed by: FAMILY MEDICINE

## 2024-12-19 PROCEDURE — 1160F RVW MEDS BY RX/DR IN RCRD: CPT | Performed by: FAMILY MEDICINE

## 2024-12-19 RX ORDER — TURMERIC/TURMERIC EXT/PEPR EXT 900-100 MG
1 CAPSULE ORAL DAILY
COMMUNITY

## 2024-12-19 NOTE — ASSESSMENT & PLAN NOTE
Orders:    Ibuprofen 3 %, Gabapentin 10 %, Baclofen 2 %, lidocaine 4 %; Apply 1-2 g topically to the appropriate area as directed 3 (Three) to 4 (Four) times daily.

## 2024-12-19 NOTE — PROGRESS NOTES
Subjective   The ABCs of the Annual Wellness Visit  Medicare Wellness Visit      Christina Espinoza is a 74 y.o. patient who presents for a Medicare Wellness Visit.    The following portions of the patient's history were reviewed and   updated as appropriate: allergies, current medications, past family history, past medical history, past social history, past surgical history, and problem list.    Compared to one year ago, the patient's physical   health is the same.  Compared to one year ago, the patient's mental   health is the same.    Recent Hospitalizations:  She was not admitted to the hospital during the last year.     Current Medical Providers:  Patient Care Team:  Ewa Rodríguez DO as PCP - General (Family Medicine)  Conrado Carrion DO as Consulting Physician (Pulmonary Disease)  Garcia Hodges MD (Internal Medicine)    Outpatient Medications Prior to Visit   Medication Sig Dispense Refill    albuterol (PROVENTIL) (2.5 MG/3ML) 0.083% nebulizer solution USE 1 VIAL IN NEBULIZER EVERY 4 HOURS AS NEEDED FOR SHORTNESS OF BREATH 375 mL 0    albuterol sulfate  (90 Base) MCG/ACT inhaler Inhale 2 puffs Every 4 (Four) Hours As Needed for Wheezing. 18 g 11    alendronate (FOSAMAX) 70 MG tablet Take 1 tablet by mouth 1 (One) Time Per Week. 4 tablet 3    azaTHIOprine (IMURAN) 100 MG tablet Take 1 tablet by mouth Daily. 30 tablet 3    brompheniramine-pseudoephedrine-DM 30-2-10 MG/5ML syrup Take 5 mL by mouth 4 (Four) Times a Day As Needed for Allergies. 473 mL 2    Budeson-Glycopyrrol-Formoterol (Breztri Aerosphere) 160-9-4.8 MCG/ACT aerosol inhaler Inhale 2 puffs 2 (Two) Times a Day. 1 each 11    calcipotriene-betamethasone (TACLONEX SCALP) 0.005-0.064 % external suspension Apply  topically to the appropriate area as directed Daily. 120 g 0    Coal Tar Extract 2.5 % shampoo Apply 1 Application topically 2 (Two) Times a Week. Leave in hair for at least 5 minutes 480 mL 1    gabapentin (NEURONTIN) 300  MG capsule       ibuprofen (ADVIL,MOTRIN) 800 MG tablet Take 1 tablet by mouth Every 8 (Eight) Hours As Needed for Mild Pain. 90 tablet 1    ketoconazole (Nizoral) 2 % shampoo Apply  topically to the appropriate area as directed 2 (Two) Times a Week. 120 mL 1    linaclotide (LINZESS) 145 MCG capsule capsule Take 1 capsule by mouth Daily.      ondansetron (Zofran) 4 MG tablet Take 1 tablet by mouth Every 8 (Eight) Hours As Needed for Nausea or Vomiting. 10 tablet 1    orphenadrine (NORFLEX) 100 MG 12 hr tablet Take 1 tablet by mouth 2 (Two) Times a Day. 20 tablet 0    oxyCODONE-acetaminophen (PERCOCET)  MG per tablet       predniSONE (DELTASONE) 10 MG tablet Take 1 tablet by mouth Daily. 30 tablet 3    Turmeric Curcumin 5-1000 MG capsule Take 1 tablet by mouth Daily.      chlorhexidine (PERIDEX) 0.12 % solution  (Patient not taking: Reported on 12/19/2024)      gabapentin (NEURONTIN) 100 MG capsule Take 1 capsule every day by oral route for 30 days. (Patient not taking: Reported on 12/19/2024)      HYDROcodone-acetaminophen (NORCO)  MG per tablet Take 1 tablet by mouth Every 6 (Six) Hours As Needed for Moderate Pain. (Patient not taking: Reported on 12/19/2024)      oxyCODONE-acetaminophen (PERCOCET) 7.5-325 MG per tablet  (Patient not taking: Reported on 12/19/2024)       No facility-administered medications prior to visit.     Opioid medication/s are on active medication list.  and I have evaluated her active treatment plan and pain score trends (see table).  Vitals:    12/19/24 1149   PainSc:   8   PainLoc: Generalized     I have reviewed the chart for potential of high risk medication and harmful drug interactions in the elderly.        Aspirin is not on active medication list.  Aspirin use is not indicated based on review of current medical condition/s. Risk of harm outweighs potential benefits.  .    Patient Active Problem List   Diagnosis    Acquired spondylolisthesis    Age related osteoporosis     "Asthma    Chronic pain    COPD (chronic obstructive pulmonary disease) case management patient    Degeneration of lumbar intervertebral disc    Irritable bowel syndrome with constipation    Neck pain    Lumbago with sciatica    Spinal stenosis of lumbar region with neurogenic claudication    Mixed hyperlipidemia    Pulmonary fibrosis    Rheumatoid arthritis    S/P lumbar spinal fusion    Bronchiectasis without complication    Immunosuppressed status    Antisynthetase syndrome    Cough    Cytokine release syndrome, grade 3    Cytokine release syndrome, grade 4    Acute pulmonary embolism with acute cor pulmonale    Myositis    Acute deep vein thrombosis (DVT) of both lower extremities    Acute respiratory failure with hypoxia    Pulmonary hypertension    Traumatic injury    Medicare annual wellness visit, subsequent     Advance Care Planning Advance Directive is on file.  ACP discussion was held with the patient during this visit. Patient has an advance directive in EMR which is still valid.             Objective   Vitals:    12/19/24 1149   BP: 127/59   BP Location: Left arm   Patient Position: Sitting   Cuff Size: Small Adult   Pulse: 79   Resp: 17   Temp: 97.1 °F (36.2 °C)   TempSrc: Temporal   SpO2: 99%   Weight: 63.7 kg (140 lb 6.4 oz)   Height: 160 cm (62.99\")   PainSc:   8   PainLoc: Generalized       Estimated body mass index is 24.88 kg/m² as calculated from the following:    Height as of this encounter: 160 cm (62.99\").    Weight as of this encounter: 63.7 kg (140 lb 6.4 oz).    BMI is within normal parameters. No other follow-up for BMI required.           Does the patient have evidence of cognitive impairment? No                                                                                                Health  Risk Assessment    Smoking Status:  Social History     Tobacco Use   Smoking Status Never    Passive exposure: Never   Smokeless Tobacco Never     Alcohol Consumption:  Social History "     Substance and Sexual Activity   Alcohol Use Never       Fall Risk Screen  JULIENADI Fall Risk Assessment was completed, and patient is at LOW risk for falls.Assessment completed on:2024    Depression Screening   Little interest or pleasure in doing things? Not at all   Feeling down, depressed, or hopeless? Not at all   PHQ-2 Total Score 0      Health Habits and Functional and Cognitive Screenin/19/2024    11:00 AM   Functional & Cognitive Status   Do you have difficulty preparing food and eating? No   Do you have difficulty bathing yourself, getting dressed or grooming yourself? No   Do you have difficulty using the toilet? No   Do you have difficulty moving around from place to place? No   Do you have trouble with steps or getting out of a bed or a chair? No   Current Diet Well Balanced Diet   Dental Exam Up to date   Eye Exam Up to date   Exercise (times per week) 7 times per week   Current Exercises Include Walking;House Cleaning   Do you need help using the phone?  No   Are you deaf or do you have serious difficulty hearing?  No   Do you need help to go to places out of walking distance? Yes   Do you need help shopping? No   Do you need help preparing meals?  No   Do you need help with housework?  No   Do you need help with laundry? No   Do you need help taking your medications? No   Do you need help managing money? No   Do you ever drive or ride in a car without wearing a seat belt? No   Have you felt unusual stress, anger or loneliness in the last month? No   Who do you live with? Child   If you need help, do you have trouble finding someone available to you? No   Have you been bothered in the last four weeks by sexual problems? No   Do you have difficulty concentrating, remembering or making decisions? No           Age-appropriate Screening Schedule:  Refer to the list below for future screening recommendations based on patient's age, sex and/or medical conditions. Orders for these  recommended tests are listed in the plan section. The patient has been provided with a written plan.    Health Maintenance List  Health Maintenance   Topic Date Due    DXA SCAN  11/10/2024    ZOSTER VACCINE (2 of 2) 11/29/2024    MAMMOGRAM  11/10/2024    COVID-19 Vaccine (1) 12/21/2024 (Originally 1/7/1955)    LIPID PANEL  03/28/2025    ANNUAL WELLNESS VISIT  12/19/2025    COLORECTAL CANCER SCREENING  05/11/2028    TDAP/TD VACCINES (2 - Td or Tdap) 09/12/2033    HEPATITIS C SCREENING  Completed    INFLUENZA VACCINE  Completed    Pneumococcal Vaccine 65+  Completed                                                                                                                                                CMS Preventative Services Quick Reference  Risk Factors Identified During Encounter  Fall Risk-High or Moderate: Discussed Fall Prevention in the home    The above risks/problems have been discussed with the patient.  Pertinent information has been shared with the patient in the After Visit Summary.  An After Visit Summary and PPPS were made available to the patient.    Follow Up:   Next Medicare Wellness visit to be scheduled in 1 year.         Additional E&M Note during same encounter follows:  Patient has additional, significant, and separately identifiable condition(s)/problem(s) that require work above and beyond the Medicare Wellness Visit     Chief Complaint  Medicare Wellness-subsequent, Rheumatoid Arthritis, Fatigue, and Cough (Chronic-pt has hx of pulmonary fibrosis)    Subjective   HPI  Christina is also being seen today for additional medical problem/s.    Review of Systems   HENT:  Negative for trouble swallowing.    Eyes:  Negative for visual disturbance.   Respiratory:  Positive for cough. Negative for apnea.    Cardiovascular:  Negative for chest pain.   Gastrointestinal:  Negative for blood in stool.   Endocrine: Negative for polyphagia.   Genitourinary:  Negative for dysuria.   Skin:  Negative for color  "change.   Allergic/Immunologic: Negative for immunocompromised state.   Neurological:  Negative for seizures.   Hematological:  Negative for adenopathy.   Psychiatric/Behavioral:  Negative for behavioral problems.               Objective   Vital Signs:  /59 (BP Location: Left arm, Patient Position: Sitting, Cuff Size: Small Adult)   Pulse 79   Temp 97.1 °F (36.2 °C) (Temporal)   Resp 17   Ht 160 cm (62.99\")   Wt 63.7 kg (140 lb 6.4 oz)   SpO2 99%   BMI 24.88 kg/m²   Physical Exam        CMP          3/28/2024    12:14 7/30/2024    15:21   CMP   Glucose 91     BUN 15     Creatinine 1.03     EGFR 57.2     Sodium 146     Potassium 4.7     Chloride 109     Calcium 10.4     Total Protein 7.2     Albumin 4.6     Globulin 2.6     Total Bilirubin 0.3     Alkaline Phosphatase 95     AST (SGOT) 19  14       ALT (SGPT) 15  10       Albumin/Globulin Ratio 1.8     BUN/Creatinine Ratio 14.6     Anion Gap 12.0        Details          This result is from an external source.             CBC          3/28/2024    12:14 7/30/2024    15:21   CBC   WBC 10.75  10.53       RBC 4.32  4.39       Hemoglobin 13.1  13.2       Hematocrit 40.2  43.1       MCV 93.1  98.2       MCH 30.3  30.1       MCHC 32.6  30.6       RDW 13.0  14.6       Platelets 344  334          Details          This result is from an external source.             Lipid Panel          3/28/2024    12:14   Lipid Panel   Total Cholesterol 281    Triglycerides 103    HDL Cholesterol 125    VLDL Cholesterol 17    LDL Cholesterol  139    LDL/HDL Ratio 1.08      TSH          3/28/2024    12:14   TSH   TSH 1.440              Assessment and Plan            Medicare annual wellness visit, subsequent         Encounter for screening mammogram for malignant neoplasm of breast    Orders:    Mammo Screening Digital Tomosynthesis Bilateral With CAD; Future    Postmenopause    Orders:    DEXA Bone Density Axial; Future    Neck pain    Orders:    Ibuprofen 3 %, Gabapentin 10 %, " Baclofen 2 %, lidocaine 4 %; Apply 1-2 g topically to the appropriate area as directed 3 (Three) to 4 (Four) times daily.            Follow Up   Return in about 6 months (around 6/19/2025).  Patient was given instructions and counseling regarding her condition or for health maintenance advice. Please see specific information pulled into the AVS if appropriate.

## 2024-12-31 ENCOUNTER — TELEPHONE (OUTPATIENT)
Dept: FAMILY MEDICINE CLINIC | Facility: CLINIC | Age: 74
End: 2024-12-31
Payer: MEDICARE

## 2024-12-31 DIAGNOSIS — M54.2 NECK PAIN: Primary | ICD-10-CM

## 2024-12-31 DIAGNOSIS — M79.606 PAIN OF LOWER EXTREMITY, UNSPECIFIED LATERALITY: ICD-10-CM

## 2024-12-31 NOTE — TELEPHONE ENCOUNTER
LAMIN CALLED THIS MORNING TO LET YOU KNOW THAT SHE WOULD LIKE TO GO FORWARD WITH THE PHYSICAL THERAPY FOR HER NECK AND LEG DISCUSSED IN HER PREVIOUS OFFICE VISIT WITH YOU.

## 2025-01-02 ENCOUNTER — TELEPHONE (OUTPATIENT)
Dept: FAMILY MEDICINE CLINIC | Facility: CLINIC | Age: 75
End: 2025-01-02
Payer: MEDICARE

## 2025-01-02 NOTE — TELEPHONE ENCOUNTER
Caller: Christina Espinoza    Relationship: Self    Best call back number: [phone]    What is the best time to reach you: ANYTIME    Who are you requesting to speak with (clinical staff, provider,  specific staff member): NURSE     What was the call regarding: PATIENT IS CALLING REGARDING PHYSICAL THERAPY WANTS TO MAKE SURE IT IS IN [place] AND NOT [place].

## 2025-01-02 NOTE — TELEPHONE ENCOUNTER
Caller: Christina Espinoza    Relationship to patient: Self    Best call back number: [phone]    Patient is needing: PATIENT CALLED STATING THE TOPICAL CREAM DO MCMAHONLER SENT IN ON 12.19.24 WAS SENT TO THE WRONG PHARMACY. PATIENT IS WANTING TO KNOW IF THIS CAN PLEASE BE RESENT TO [facility] ON [address] IN [facility]. PLEASE CALL PATIENT ONCE COMPLETED.

## 2025-01-02 NOTE — TELEPHONE ENCOUNTER
This cream has to be sent to [facility] due to the compounding of the medication. Patient aware

## 2025-01-29 DIAGNOSIS — M81.0 OSTEOPOROSIS, UNSPECIFIED OSTEOPOROSIS TYPE, UNSPECIFIED PATHOLOGICAL FRACTURE PRESENCE: Chronic | ICD-10-CM

## 2025-01-29 RX ORDER — ALENDRONATE SODIUM 70 MG/1
70 TABLET ORAL WEEKLY
Qty: 4 TABLET | Refills: 3 | Status: SHIPPED | OUTPATIENT
Start: 2025-01-29

## 2025-02-11 ENCOUNTER — HOSPITAL ENCOUNTER (OUTPATIENT)
Dept: MAMMOGRAPHY | Facility: HOSPITAL | Age: 75
Discharge: HOME | End: 2025-02-11
Payer: COMMERCIAL

## 2025-02-11 ENCOUNTER — HOSPITAL ENCOUNTER (OUTPATIENT)
Dept: BONE DENSITY | Facility: HOSPITAL | Age: 75
Discharge: HOME | End: 2025-02-11
Payer: COMMERCIAL

## 2025-02-26 ENCOUNTER — OFFICE VISIT (OUTPATIENT)
Dept: PULMONOLOGY | Facility: CLINIC | Age: 75
End: 2025-02-26
Payer: COMMERCIAL

## 2025-02-26 VITALS
BODY MASS INDEX: 25.69 KG/M2 | TEMPERATURE: 97.6 F | OXYGEN SATURATION: 98 % | RESPIRATION RATE: 16 BRPM | DIASTOLIC BLOOD PRESSURE: 86 MMHG | SYSTOLIC BLOOD PRESSURE: 128 MMHG | HEART RATE: 97 BPM | HEIGHT: 63 IN | WEIGHT: 145 LBS

## 2025-02-26 DIAGNOSIS — J47.9 BRONCHIECTASIS WITHOUT COMPLICATION: ICD-10-CM

## 2025-02-26 DIAGNOSIS — J45.40 MODERATE PERSISTENT ASTHMA WITHOUT COMPLICATION: Primary | ICD-10-CM

## 2025-02-26 DIAGNOSIS — R05.3 CHRONIC COUGH: ICD-10-CM

## 2025-02-26 DIAGNOSIS — M06.9 RHEUMATOID ARTHRITIS INVOLVING MULTIPLE SITES, UNSPECIFIED WHETHER RHEUMATOID FACTOR PRESENT: Chronic | ICD-10-CM

## 2025-02-26 DIAGNOSIS — D89.89 ANTISYNTHETASE SYNDROME: ICD-10-CM

## 2025-02-26 DIAGNOSIS — J84.9 ILD (INTERSTITIAL LUNG DISEASE): Chronic | ICD-10-CM

## 2025-02-26 DIAGNOSIS — D84.9 IMMUNOSUPPRESSED STATUS: ICD-10-CM

## 2025-02-26 RX ORDER — PREDNISONE 10 MG/1
10 TABLET ORAL DAILY
Qty: 30 TABLET | Refills: 3 | Status: SHIPPED | OUTPATIENT
Start: 2025-02-26

## 2025-02-26 RX ORDER — AZATHIOPRINE 100 MG/1
100 TABLET ORAL DAILY
Qty: 30 TABLET | Refills: 3 | Status: SHIPPED | OUTPATIENT
Start: 2025-02-26

## 2025-02-26 NOTE — PROGRESS NOTES
Primary Care Provider  Ewa Rodríguez DO     Referring Provider  No ref. provider found       Patient or patient representative verbalized consent for the use of Ambient Listening during the visit with  CATY Gee for chart documentation. 2/26/2025  11:25 EST    Chief Complaint  Asthma, Cough (Dry cough ), Shortness of Breath (With exertion and while eating ), and Follow-up (4 month f/up )    Subjective          Christina Espinoza presents to [facility] PULMONARY & [facility]ITICAL [facility]ARE MEDICINE  History of Present Illness  Christina Espinoza is a 75 y.o. female patient of Dr. Carrion here for management of  ILD, shortness of breath, asthma and immunosuppression with chronic Imuran and prednisone use.    History of Present Illness  The patient is a 75-year-old female who presents for evaluation of chronic cough and rheumatoid arthritis.    She reports satisfactory respiratory function but experiences difficulty in achieving deep inhalation during meals, necessitating the use of her albuterol inhaler. She has not had any recent illnesses and does not have any fever or chills. She has a persistent cough but does not expectorate any sputum. Her current medication regimen includes Breztri and albuterol, which she also uses when walking distances.    She had to cancel an appointment with her rheumatologist but plans to reschedule. Her daughter believes she may need a different medication than Imuran for her rheumatoid arthritis. Dr. Carrion previously prescribed Imuran, which has been beneficial for her lungs. She prefers to continue with Imuran as it has been effective for her. She is on prednisone and Imuran.    MEDICATIONS  Breztri, prednisone, Imuran, albuterol       Her history of smoking is   Tobacco Use: Low Risk  (2/26/2025)    Patient History     Smoking Tobacco Use: Never     Smokeless Tobacco Use: Never     Passive Exposure: Never   .    Review of Systems   Constitutional:  Negative for chills,  fatigue, fever, unexpected weight gain and unexpected weight loss.   HENT:  Congestion: Nasal.    Respiratory:  Positive for cough and shortness of breath. Negative for apnea and wheezing.         Negative for Hemoptysis     Cardiovascular:  Negative for chest pain, palpitations and leg swelling.   Skin:         Negative for cyanosis      Sleep: Negative for Excessive daytime sleepiness  Negative for morning headaches  Negative for Snoring    Family History   Problem Relation Age of Onset    Arthritis Mother     Cancer Mother     Osteoporosis Mother     Cancer Father     Heart disease Father     Diabetes Father     Arthritis Sister     Heart disease Sister     Bleeding Disorder Brother     Stroke Brother     Diabetes Maternal Grandfather     Diabetes Son         Social History     Socioeconomic History    Marital status:    Tobacco Use    Smoking status: Never     Passive exposure: Never    Smokeless tobacco: Never   Vaping Use    Vaping status: Never Used   Substance and Sexual Activity    Alcohol use: Never    Drug use: Never    Sexual activity: Defer        Past Medical History:   Diagnosis Date    Asthma     Cervicalgia 01/20/2020    Leg pain     Leg swelling     Limb pain     Lumbago 01/20/2020    low back pain     Lumbar spinal stenosis 01/20/2020    Lung disease     Nausea     Neurogenic claudication 01/20/2020    Neuropathy     Pulmonary fibrosis     Radiculopathy, lumbosacral region 01/20/2020    Rheumatoid arthritis     Spondylolisthesis, lumbar region 01/29/2020    grade 1 at L3/4        Immunization History   Administered Date(s) Administered    Fluzone High-Dose 65+YRS 10/25/2022, 09/25/2024    Fluzone High-Dose 65+yrs 11/15/2023    Pneumococcal Conjugate 13-Valent (PCV13) 01/16/2017    Pneumococcal Conjugate 20-Valent (PCV20) 06/27/2024    Shingrix 10/04/2024    Tdap 09/12/2023         Allergies   Allergen Reactions    Clindamycin Rash, Shortness Of Breath and Swelling    Doxycycline Other (See  Comments)     SORE ON TONGUE    Adalimumab Rash    Azithromycin Rash, Nausea And Vomiting and Unknown - High Severity    Clindamycin Hcl Unknown - High Severity and Rash    Erythromycin Rash and Unknown - High Severity          Current Outpatient Medications:     albuterol (PROVENTIL) (2.5 MG/3ML) 0.083% nebulizer solution, USE 1 VIAL IN NEBULIZER EVERY 4 HOURS AS NEEDED FOR SHORTNESS OF BREATH, Disp: 375 mL, Rfl: 0    albuterol sulfate  (90 Base) MCG/ACT inhaler, Inhale 2 puffs Every 4 (Four) Hours As Needed for Wheezing., Disp: 18 g, Rfl: 11    alendronate (FOSAMAX) 70 MG tablet, TAKE 1 TABLET BY MOUTH ONCE WEEKLY, Disp: 4 tablet, Rfl: 3    azaTHIOprine (IMURAN) 100 MG tablet, Take 1 tablet by mouth Daily., Disp: 30 tablet, Rfl: 3    Budeson-Glycopyrrol-Formoterol (Breztri Aerosphere) 160-9-4.8 MCG/ACT aerosol inhaler, Inhale 2 puffs 2 (Two) Times a Day., Disp: 1 each, Rfl: 11    calcipotriene-betamethasone (TACLONEX SCALP) 0.005-0.064 % external suspension, Apply  topically to the appropriate area as directed Daily., Disp: 120 g, Rfl: 0    Coal Tar Extract 2.5 % shampoo, Apply 1 Application topically 2 (Two) Times a Week. Leave in hair for at least 5 minutes, Disp: 480 mL, Rfl: 1    gabapentin (NEURONTIN) 300 MG capsule, , Disp: , Rfl:     ibuprofen (ADVIL,MOTRIN) 800 MG tablet, Take 1 tablet by mouth Every 8 (Eight) Hours As Needed for Mild Pain., Disp: 90 tablet, Rfl: 1    Ibuprofen 3 %, Gabapentin 10 %, Baclofen 2 %, lidocaine 4 %, Apply 1-2 g topically to the appropriate area as directed 3 (Three) to 4 (Four) times daily., Disp: 90 g, Rfl: 5    ketoconazole (Nizoral) 2 % shampoo, Apply  topically to the appropriate area as directed 2 (Two) Times a Week., Disp: 120 mL, Rfl: 1    linaclotide (LINZESS) 145 MCG capsule capsule, Take 1 capsule by mouth Daily., Disp: , Rfl:     ondansetron (Zofran) 4 MG tablet, Take 1 tablet by mouth Every 8 (Eight) Hours As Needed for Nausea or Vomiting., Disp: 10 tablet,  "Rfl: 1    orphenadrine (NORFLEX) 100 MG 12 hr tablet, Take 1 tablet by mouth 2 (Two) Times a Day., Disp: 20 tablet, Rfl: 0    oxyCODONE-acetaminophen (PERCOCET)  MG per tablet, , Disp: , Rfl:     predniSONE (DELTASONE) 10 MG tablet, Take 1 tablet by mouth Daily., Disp: 30 tablet, Rfl: 3    Turmeric Curcumin 5-1000 MG capsule, Take 1 tablet by mouth Daily., Disp: , Rfl:      Objective   Physical Exam  Constitutional:       General: She is not in acute distress.     Appearance: Normal appearance. She is normal weight.   HENT:      Right Ear: Hearing normal.      Left Ear: Hearing normal.      Nose: No nasal tenderness or congestion.      Mouth/Throat:      Mouth: Mucous membranes are moist. No oral lesions.   Eyes:      Extraocular Movements: Extraocular movements intact.      Pupils: Pupils are equal, round, and reactive to light.   Cardiovascular:      Rate and Rhythm: Normal rate and regular rhythm.      Pulses: Normal pulses.      Heart sounds: Normal heart sounds. No murmur heard.  Pulmonary:      Effort: Pulmonary effort is normal.      Breath sounds: Normal breath sounds. No wheezing, rhonchi or rales.   Musculoskeletal:      Right lower leg: No edema.      Left lower leg: No edema.   Skin:     General: Skin is warm and dry.      Findings: No lesion or rash.   Neurological:      General: No focal deficit present.      Mental Status: She is alert and oriented to person, place, and time.   Psychiatric:         Mood and Affect: Affect normal. Mood is not anxious or depressed.         Vital Signs:   /86 (BP Location: Right arm, Patient Position: Sitting, Cuff Size: Adult)   Pulse 97   Temp 97.6 °F (36.4 °C) (Oral)   Resp 16   Ht 160 cm (63\")   Wt 65.8 kg (145 lb)   SpO2 98% Comment: RA; 2 L's PRN  BMI 25.69 kg/m²        Result Review :   The following data was reviewed by: CATY Gee on 02/04/2025:  CMP          3/28/2024    12:14 7/30/2024    15:21   CMP   Glucose 91     BUN 15   "   Creatinine 1.03     EGFR 57.2     Sodium 146     Potassium 4.7     Chloride 109     Calcium 10.4     Total Protein 7.2     Albumin 4.6     Globulin 2.6     Total Bilirubin 0.3     Alkaline Phosphatase 95     AST (SGOT) 19  14       ALT (SGPT) 15  10       Albumin/Globulin Ratio 1.8     BUN/Creatinine Ratio 14.6     Anion Gap 12.0        Details          This result is from an external source.             CBC w/diff          3/28/2024    12:14 7/30/2024    15:21   CBC w/Diff   WBC 10.75  10.53       RBC 4.32  4.39       Hemoglobin 13.1  13.2       Hematocrit 40.2  43.1       MCV 93.1  98.2       MCH 30.3  30.1       MCHC 32.6  30.6       RDW 13.0  14.6       Platelets 344  334       Neutrophil Rel % 85.3  82.4       Immature Granulocyte Rel % 0.8  0.7       Lymphocyte Rel % 9.9  11.6       Monocyte Rel % 3.2  4.6       Eosinophil Rel % 0.3  0.3       Basophil Rel % 0.5  0.4          Details          This result is from an external source.             Data reviewed : Radiologic studies chest CT 6/6/2024, PFT 6/6/2024 and my last office note    Procedures        Assessment and Plan    Diagnoses and all orders for this visit:    1. Moderate persistent asthma without complication (Primary)  Comments:  continue Breztri    2. Bronchiectasis without complication  Comments:  continue nebulizer treatments    3. Immunosuppressed status    4. Chronic cough    5. Rheumatoid arthritis involving multiple sites, unspecified whether rheumatoid factor present  Comments:  Continue Imuran and prednisone  Orders:  -     predniSONE (DELTASONE) 10 MG tablet; Take 1 tablet by mouth Daily.  Dispense: 30 tablet; Refill: 3    6. ILD (interstitial lung disease)  Comments:  Continue Imuran and prednisone  Orders:  -     azaTHIOprine (IMURAN) 100 MG tablet; Take 1 tablet by mouth Daily.  Dispense: 30 tablet; Refill: 3    7. Antisynthetase syndrome  -     azaTHIOprine (IMURAN) 100 MG tablet; Take 1 tablet by mouth Daily.  Dispense: 30 tablet;  Refill: 3          Assessment & Plan  1. Chronic cough.  She reports using her albuterol inhaler when eating and walking distances. She is currently on Breztri, prednisone, and Imuran. She has not experienced any recent illness, fever, or chills but has a persistent non-productive cough. Prescriptions for prednisone and Imuran have been renewed for 4 months. Breztri and albuterol prescriptions are valid until June 2025. She is advised to contact the office if any issues arise.    2. Rheumatoid arthritis.  She is currently on Imuran and prednisone. She had to cancel a rheumatology appointment but plans to reschedule. Her daughter suggested considering alternative treatments for RA, but she prefers to continue Imuran as it has been effective for her lungs. Prescriptions for prednisone and Imuran have been renewed for 4 months.    Follow-up  The patient will follow up in 4 months.          Follow Up   Return in about 4 months (around 6/26/2025) for Recheck with Merlin.  Patient was given instructions and counseling regarding her condition or for health maintenance advice. Please see specific information pulled into the AVS if appropriate.

## 2025-03-11 ENCOUNTER — TELEPHONE (OUTPATIENT)
Dept: FAMILY MEDICINE CLINIC | Facility: CLINIC | Age: 75
End: 2025-03-11
Payer: MEDICARE

## 2025-03-11 RX ORDER — KETOCONAZOLE 20 MG/ML
SHAMPOO, SUSPENSION TOPICAL
Qty: 120 ML | Refills: 1 | Status: SHIPPED | OUTPATIENT
Start: 2025-03-11

## 2025-03-11 NOTE — TELEPHONE ENCOUNTER
Caller: Christina Espinoza    Relationship: Self    Best call back number: [phone]     What medication are you requesting: SHAMPOO    What are your current symptoms: ITCHING    Have you had these symptoms before:    [x] Yes  [] No    Have you been treated for these symptoms before:   [x] Yes  [] No    If a prescription is needed, what is your preferred pharmacy and phone number: [id number] - [place], [place] - [address] AT [facility] [address] - [phone] [facility] [phone] FX     Additional notes: PATIENT STATES THAT MEDICATED SHAMPOO LISTED IS NOT HELPING WITH THE ITCHING AND IS REQUESTING THAT SOMETHING DIFFERENT BE PRESCRIBED.       ketoconazole (NIZORAL) 2 % shampoo

## 2025-03-16 RX ORDER — KETOCONAZOLE 20 MG/ML
SHAMPOO, SUSPENSION TOPICAL 2 TIMES WEEKLY
Qty: 120 ML | Refills: 1 | Status: SHIPPED | OUTPATIENT
Start: 2025-03-17

## 2025-05-24 DIAGNOSIS — M81.0 OSTEOPOROSIS, UNSPECIFIED OSTEOPOROSIS TYPE, UNSPECIFIED PATHOLOGICAL FRACTURE PRESENCE: Chronic | ICD-10-CM

## 2025-05-27 RX ORDER — ALENDRONATE SODIUM 70 MG/1
70 TABLET ORAL WEEKLY
Qty: 4 TABLET | Refills: 3 | Status: SHIPPED | OUTPATIENT
Start: 2025-05-27

## 2025-06-04 ENCOUNTER — OFFICE VISIT (OUTPATIENT)
Dept: PULMONOLOGY | Facility: CLINIC | Age: 75
End: 2025-06-04
Payer: MEDICARE

## 2025-06-04 VITALS
DIASTOLIC BLOOD PRESSURE: 59 MMHG | TEMPERATURE: 98.1 F | RESPIRATION RATE: 18 BRPM | HEIGHT: 63 IN | HEART RATE: 95 BPM | BODY MASS INDEX: 26.22 KG/M2 | WEIGHT: 148 LBS | OXYGEN SATURATION: 96 % | SYSTOLIC BLOOD PRESSURE: 124 MMHG

## 2025-06-04 DIAGNOSIS — M06.9 RHEUMATOID ARTHRITIS INVOLVING MULTIPLE SITES, UNSPECIFIED WHETHER RHEUMATOID FACTOR PRESENT: Chronic | ICD-10-CM

## 2025-06-04 DIAGNOSIS — J84.9 ILD (INTERSTITIAL LUNG DISEASE): Chronic | ICD-10-CM

## 2025-06-04 DIAGNOSIS — D89.89 ANTISYNTHETASE SYNDROME: ICD-10-CM

## 2025-06-04 DIAGNOSIS — R06.09 DYSPNEA ON EXERTION: ICD-10-CM

## 2025-06-04 DIAGNOSIS — J47.9 BRONCHIECTASIS WITHOUT COMPLICATION: ICD-10-CM

## 2025-06-04 DIAGNOSIS — J45.40 MODERATE PERSISTENT ASTHMA WITHOUT COMPLICATION: Primary | Chronic | ICD-10-CM

## 2025-06-04 PROCEDURE — 99214 OFFICE O/P EST MOD 30 MIN: CPT | Performed by: NURSE PRACTITIONER

## 2025-06-04 PROCEDURE — 1160F RVW MEDS BY RX/DR IN RCRD: CPT | Performed by: NURSE PRACTITIONER

## 2025-06-04 PROCEDURE — 1159F MED LIST DOCD IN RCRD: CPT | Performed by: NURSE PRACTITIONER

## 2025-06-04 RX ORDER — AZATHIOPRINE 100 MG/1
100 TABLET ORAL DAILY
Qty: 30 TABLET | Refills: 3 | Status: SHIPPED | OUTPATIENT
Start: 2025-06-04

## 2025-06-04 RX ORDER — PREDNISONE 10 MG/1
10 TABLET ORAL DAILY
Qty: 30 TABLET | Refills: 3 | Status: SHIPPED | OUTPATIENT
Start: 2025-06-04

## 2025-06-04 RX ORDER — BUDESONIDE, GLYCOPYRROLATE, AND FORMOTEROL FUMARATE 160; 9; 4.8 UG/1; UG/1; UG/1
2 AEROSOL, METERED RESPIRATORY (INHALATION) 2 TIMES DAILY
Qty: 1 EACH | Refills: 11 | Status: SHIPPED | OUTPATIENT
Start: 2025-06-04

## 2025-06-04 RX ORDER — CHLORHEXIDINE GLUCONATE ORAL RINSE 1.2 MG/ML
SOLUTION DENTAL
COMMUNITY

## 2025-06-04 RX ORDER — AMANTADINE HYDROCHLORIDE 100 MG/1
TABLET ORAL
COMMUNITY

## 2025-06-04 NOTE — PROGRESS NOTES
Primary Care Provider  Ewa Rodríguez DO     Referring Provider  No ref. provider found       Patient or patient representative verbalized consent for the use of Ambient Listening during the visit with  CATY Gee for chart documentation. 6/4/2025  13:56 EDT    Chief Complaint  Asthma and Follow-up (3 month)    Subjective          Christina Espinoza presents to [facility] PULMONARY & CRITICAL CARE MEDICINE  History of Present Illness  Christina Espinoza is a 75 y.o. female patient of Dr. Carrion here for management of  ILD, shortness of breath, asthma and immunosuppression with chronic Imuran and prednisone use.     History of Present Illness  The patient is a 75-year-old female who presents for evaluation of a persistent cough and medication refills. She is accompanied by her sister.    She reports a persistent cough, which she manages with an inhaler as needed and Breztri, administered as 2 puffs twice daily. She experiences coughing spells postprandially, which are alleviated by the use of her inhaler. Her sister raises the possibility of food aspiration as a contributing factor to her cough. She has a CT scan scheduled on Friday, 06/06/2025 at 10:00 AM.    She continues her regimen of Imuran and prednisone, both of which require refills. Additionally, she is on Fosamax.    MEDICATIONS  Breztri, Imuran, prednisone, Fosamax       Her history of smoking is   Tobacco Use: Medium Risk (6/4/2025)    Patient History     Smoking Tobacco Use: Former     Smokeless Tobacco Use: Never     Passive Exposure: Never   .    Review of Systems   Constitutional:  Negative for chills, fatigue, fever, unexpected weight gain and unexpected weight loss.   HENT:  Congestion: Nasal.    Respiratory:  Positive for cough and shortness of breath. Negative for apnea and wheezing.         Negative for Hemoptysis     Cardiovascular:  Negative for chest pain, palpitations and leg swelling.   Skin:         Negative for  cyanosis      Sleep: Negative for Excessive daytime sleepiness  Negative for morning headaches  Negative for Snoring    Family History   Problem Relation Age of Onset    Arthritis Mother     Cancer Mother     Osteoporosis Mother     Cancer Father     Heart disease Father     Diabetes Father     Arthritis Sister     Heart disease Sister     Bleeding Disorder Brother     Stroke Brother     Diabetes Maternal Grandfather     Diabetes Son         Social History     Socioeconomic History    Marital status:    Tobacco Use    Smoking status: Former     Current packs/day: 0.00     Average packs/day: 0.1 packs/day     Types: Cigarettes     Start date: 1974     Quit date: 1974     Years since quittin.4     Passive exposure: Never    Smokeless tobacco: Never    Tobacco comments:     Smoked for 1 week 1 pack    Vaping Use    Vaping status: Never Used   Substance and Sexual Activity    Alcohol use: Never    Drug use: Never    Sexual activity: Defer        Past Medical History:   Diagnosis Date    Asthma     Cervicalgia 2020    Leg pain     Leg swelling     Limb pain     Lumbago 2020    low back pain     Lumbar spinal stenosis 2020    Lung disease     Nausea     Neurogenic claudication 2020    Neuropathy     Pulmonary fibrosis     Radiculopathy, lumbosacral region 2020    Rheumatoid arthritis     Spondylolisthesis, lumbar region 2020    grade 1 at L3/4        Immunization History   Administered Date(s) Administered    Fluzone High-Dose 65+YRS 2016, 10/25/2022, 2024    Fluzone High-Dose 65+yrs 11/15/2023    Pneumococcal Conjugate 13-Valent (PCV13) 2017    Pneumococcal Conjugate 20-Valent (PCV20) 2024    Shingrix 10/04/2024    Tdap 2023         Allergies   Allergen Reactions    Clindamycin Rash, Shortness Of Breath and Swelling    Doxycycline Other (See Comments)     SORE ON TONGUE    Adalimumab Rash    Azithromycin Rash, Nausea And Vomiting and  Unknown - High Severity    Clindamycin Hcl Unknown - High Severity and Rash    Erythromycin Rash and Unknown - High Severity          Current Outpatient Medications:     albuterol (PROVENTIL) (2.5 MG/3ML) 0.083% nebulizer solution, USE 1 VIAL IN NEBULIZER EVERY 4 HOURS AS NEEDED FOR SHORTNESS OF BREATH, Disp: 375 mL, Rfl: 0    albuterol sulfate  (90 Base) MCG/ACT inhaler, Inhale 2 puffs Every 4 (Four) Hours As Needed for Wheezing., Disp: 18 g, Rfl: 11    alendronate (FOSAMAX) 70 MG tablet, TAKE 1 TABLET BY MOUTH ONCE WEEKLY ON AN EMPTY STOMACH BEFORE BREAKFAST. REMAIN UPRIGHT FOR 30 MINUTES AND TAKE WITH 8 OUNCES OF WATER, Disp: 4 tablet, Rfl: 3    amantadine (SYMMETREL) 100 MG tablet, , Disp: , Rfl:     azaTHIOprine (IMURAN) 100 MG tablet, Take 1 tablet by mouth Daily., Disp: 30 tablet, Rfl: 3    Budeson-Glycopyrrol-Formoterol (Breztri Aerosphere) 160-9-4.8 MCG/ACT aerosol inhaler, Inhale 2 puffs 2 (Two) Times a Day., Disp: 1 each, Rfl: 11    calcipotriene-betamethasone (TACLONEX SCALP) 0.005-0.064 % external suspension, Apply  topically to the appropriate area as directed Daily., Disp: 120 g, Rfl: 0    chlorhexidine (PERIDEX) 0.12 % solution, , Disp: , Rfl:     gabapentin (NEURONTIN) 300 MG capsule, , Disp: , Rfl:     ibuprofen (ADVIL,MOTRIN) 800 MG tablet, Take 1 tablet by mouth Every 8 (Eight) Hours As Needed for Mild Pain., Disp: 90 tablet, Rfl: 1    Ibuprofen 3 %, Gabapentin 10 %, Baclofen 2 %, lidocaine 4 %, Apply 1-2 g topically to the appropriate area as directed 3 (Three) to 4 (Four) times daily., Disp: 90 g, Rfl: 5    ketoconazole (NIZORAL) 2 % shampoo, Apply  topically to the appropriate area as directed 2 (Two) Times a Week., Disp: 120 mL, Rfl: 1    linaclotide (LINZESS) 145 MCG capsule capsule, Take 1 capsule by mouth Daily., Disp: , Rfl:     ondansetron (Zofran) 4 MG tablet, Take 1 tablet by mouth Every 8 (Eight) Hours As Needed for Nausea or Vomiting., Disp: 10 tablet, Rfl: 1    orphenadrine  "(NORFLEX) 100 MG 12 hr tablet, Take 1 tablet by mouth 2 (Two) Times a Day. (Patient taking differently: Take 1 tablet by mouth 2 (Two) Times a Day. PRN), Disp: 20 tablet, Rfl: 0    oxyCODONE-acetaminophen (PERCOCET)  MG per tablet, , Disp: , Rfl:     predniSONE (DELTASONE) 10 MG tablet, Take 1 tablet by mouth Daily., Disp: 30 tablet, Rfl: 3    Turmeric Curcumin 5-1000 MG capsule, Take 1 tablet by mouth Daily., Disp: , Rfl:     ketoconazole (NIZORAL) 2 % shampoo, APPLY TOPICALLY TO THE APPROPRIATE AREA AS DIRECTED 2 TIMES A WEEK, Disp: 120 mL, Rfl: 1     Objective   Physical Exam  Constitutional:       General: She is not in acute distress.     Appearance: Normal appearance. She is normal weight.   HENT:      Right Ear: Hearing normal.      Left Ear: Hearing normal.      Nose: No nasal tenderness or congestion.      Mouth/Throat:      Mouth: Mucous membranes are moist. No oral lesions.   Eyes:      Extraocular Movements: Extraocular movements intact.      Pupils: Pupils are equal, round, and reactive to light.   Cardiovascular:      Rate and Rhythm: Normal rate and regular rhythm.      Pulses: Normal pulses.      Heart sounds: Normal heart sounds. No murmur heard.  Pulmonary:      Effort: Pulmonary effort is normal.      Breath sounds: Normal breath sounds. No wheezing, rhonchi or rales.   Musculoskeletal:      Right lower leg: No edema.      Left lower leg: No edema.   Skin:     General: Skin is warm and dry.      Findings: No lesion or rash.   Neurological:      General: No focal deficit present.      Mental Status: She is alert and oriented to person, place, and time.   Psychiatric:         Mood and Affect: Affect normal. Mood is not anxious or depressed.         Vital Signs:   /59 (BP Location: Right arm, Patient Position: Sitting, Cuff Size: Adult)   Pulse 95   Temp 98.1 °F (36.7 °C) (Tympanic)   Resp 18   Ht 160 cm (63\")   Wt 67.1 kg (148 lb)   SpO2 96% Comment: room air  BMI 26.22 kg/m²      "   Result Review :   The following data was reviewed by: CATY Gee on 06/04/2025:  CMP          7/30/2024    15:21   CMP   AST (SGOT) 14       ALT (SGPT) 10          Details          This result is from an external source.             CBC w/diff          7/30/2024    15:21   CBC w/Diff   WBC 10.53       RBC 4.39       Hemoglobin 13.2       Hematocrit 43.1       MCV 98.2       MCH 30.1       MCHC 30.6       RDW 14.6       Platelets 334       Neutrophil Rel % 82.4       Immature Granulocyte Rel % 0.7       Lymphocyte Rel % 11.6       Monocyte Rel % 4.6       Eosinophil Rel % 0.3       Basophil Rel % 0.4          Details          This result is from an external source.             Data reviewed : Radiologic studies chest CT 6/6/2024, PFT 6/6/2024 and my last office note   Procedures        Assessment and Plan    Diagnoses and all orders for this visit:    1. Moderate persistent asthma without complication (Primary)  Comments:  continue Breztri  Orders:  -     Budeson-Glycopyrrol-Formoterol (Breztri Aerosphere) 160-9-4.8 MCG/ACT aerosol inhaler; Inhale 2 puffs 2 (Two) Times a Day.  Dispense: 1 each; Refill: 11    2. Rheumatoid arthritis involving multiple sites, unspecified whether rheumatoid factor present  Comments:  Continue Imuran and prednisone  Orders:  -     predniSONE (DELTASONE) 10 MG tablet; Take 1 tablet by mouth Daily.  Dispense: 30 tablet; Refill: 3    3. Dyspnea on exertion  Comments:  albuterol as needed    4. ILD (interstitial lung disease)  Comments:  Continue Imuran and prednisone  Orders:  -     azaTHIOprine (IMURAN) 100 MG tablet; Take 1 tablet by mouth Daily.  Dispense: 30 tablet; Refill: 3  -     Budeson-Glycopyrrol-Formoterol (Breztri Aerosphere) 160-9-4.8 MCG/ACT aerosol inhaler; Inhale 2 puffs 2 (Two) Times a Day.  Dispense: 1 each; Refill: 11    5. Antisynthetase syndrome  Comments:  continue Imuran  Orders:  -     azaTHIOprine (IMURAN) 100 MG tablet; Take 1 tablet by mouth Daily.   Dispense: 30 tablet; Refill: 3    6. Bronchiectasis without complication  Comments:  stable        Assessment & Plan  1. Persistent cough.  She reports frequent coughing, especially after eating, which improves with the use of her inhaler. The possibility of food aspiration contributing to the cough was discussed. She is currently using Breztri 2 puffs twice a day and an as-needed inhaler. A CT scan is scheduled for Friday, 06/06/2025 at 10:00 AM to further evaluate her condition. The results will be communicated to her next week.    2. Medication management.  Refills for Imuran and prednisone have been provided for another 4 months. She was informed that her Fosamax (alendronate) prescription was refilled last week.          Follow Up   Return in about 3 months (around 9/4/2025) for Recheck.  Patient was given instructions and counseling regarding her condition or for health maintenance advice. Please see specific information pulled into the AVS if appropriate.

## 2025-06-06 ENCOUNTER — HOSPITAL ENCOUNTER (OUTPATIENT)
Dept: CT IMAGING | Facility: HOSPITAL | Age: 75
Discharge: HOME | End: 2025-06-06
Payer: MEDICARE

## 2025-06-06 DIAGNOSIS — J84.9 ILD (INTERSTITIAL LUNG DISEASE): ICD-10-CM

## 2025-06-06 DIAGNOSIS — J84.10 PULMONARY FIBROSIS: Chronic | ICD-10-CM

## 2025-06-06 DIAGNOSIS — J47.9 BRONCHIECTASIS WITHOUT COMPLICATION: ICD-10-CM

## 2025-06-06 PROCEDURE — 71250 CT THORAX DX C-: CPT

## 2025-07-07 DIAGNOSIS — R06.09 DYSPNEA ON EXERTION: ICD-10-CM

## 2025-07-07 RX ORDER — ALBUTEROL SULFATE 90 UG/1
2 INHALANT RESPIRATORY (INHALATION) EVERY 4 HOURS PRN
Qty: 18 G | Refills: 11 | Status: SHIPPED | OUTPATIENT
Start: 2025-07-07

## 2025-07-22 ENCOUNTER — OFFICE VISIT (OUTPATIENT)
Dept: FAMILY MEDICINE CLINIC | Facility: CLINIC | Age: 75
End: 2025-07-22
Payer: MEDICARE

## 2025-07-22 VITALS
TEMPERATURE: 98.2 F | DIASTOLIC BLOOD PRESSURE: 75 MMHG | HEART RATE: 80 BPM | SYSTOLIC BLOOD PRESSURE: 128 MMHG | RESPIRATION RATE: 17 BRPM | HEIGHT: 63 IN | WEIGHT: 142.6 LBS | BODY MASS INDEX: 25.27 KG/M2 | OXYGEN SATURATION: 98 %

## 2025-07-22 DIAGNOSIS — J84.10 PULMONARY FIBROSIS: Chronic | ICD-10-CM

## 2025-07-22 DIAGNOSIS — D22.9 NEVUS OF MULTIPLE SITES: ICD-10-CM

## 2025-07-22 DIAGNOSIS — M06.9 RHEUMATOID ARTHRITIS INVOLVING MULTIPLE SITES, UNSPECIFIED WHETHER RHEUMATOID FACTOR PRESENT: Chronic | ICD-10-CM

## 2025-07-22 DIAGNOSIS — H65.93 BILATERAL OTITIS MEDIA WITH EFFUSION: ICD-10-CM

## 2025-07-22 DIAGNOSIS — M25.511 RIGHT SHOULDER PAIN, UNSPECIFIED CHRONICITY: Primary | ICD-10-CM

## 2025-07-22 DIAGNOSIS — M81.0 OSTEOPOROSIS, UNSPECIFIED OSTEOPOROSIS TYPE, UNSPECIFIED PATHOLOGICAL FRACTURE PRESENCE: ICD-10-CM

## 2025-07-22 DIAGNOSIS — Z78.0 POSTMENOPAUSE: ICD-10-CM

## 2025-07-22 PROCEDURE — 99214 OFFICE O/P EST MOD 30 MIN: CPT | Performed by: NURSE PRACTITIONER

## 2025-07-22 PROCEDURE — 1125F AMNT PAIN NOTED PAIN PRSNT: CPT | Performed by: NURSE PRACTITIONER

## 2025-07-22 PROCEDURE — 1160F RVW MEDS BY RX/DR IN RCRD: CPT | Performed by: NURSE PRACTITIONER

## 2025-07-22 PROCEDURE — 1159F MED LIST DOCD IN RCRD: CPT | Performed by: NURSE PRACTITIONER

## 2025-07-22 RX ORDER — AMOXICILLIN 500 MG/1
500 CAPSULE ORAL 2 TIMES DAILY
Qty: 20 CAPSULE | Refills: 0 | Status: SHIPPED | OUTPATIENT
Start: 2025-07-22 | End: 2025-08-01

## 2025-07-22 NOTE — ASSESSMENT & PLAN NOTE
- Chronic cough due to pulmonary fibrosis, no recent exacerbation.  - Currently taking prednisone 10 mg daily.  - No changes in management required at this time.

## 2025-07-22 NOTE — ASSESSMENT & PLAN NOTE
- DEXA scan in 2022 showed osteopenia in hips and osteoporosis in left forearm.  - New DEXA scan will be ordered to assess current bone health status.  - Patient will receive a call to schedule the scan.

## 2025-07-22 NOTE — ASSESSMENT & PLAN NOTE
- The presence of fluid in the ears suggests a possible infection.  - No significant wax buildup observed.  - Discussed the use of amoxicillin to address the potential infection.  - Prescription for amoxicillin sent to pharmacy; advised to continue current prednisone regimen.

## 2025-07-22 NOTE — ASSESSMENT & PLAN NOTE
- Moles present for a long time; daughter concerned about potential need for removal.  - No immediate concerning features noted.  - Referral to dermatologist will be made for further evaluation.

## 2025-07-22 NOTE — PROGRESS NOTES
Chief Complaint     Earache (Pt states that she is having ear ache in both ears - been going on a week. Has tried ear drops and did not help any. ) and Shoulder Pain (Pt is having right shoulder pain that is hard to lift anything has been going on 4 days. )    History of Present Illness     Christina Espinoza is a 75 y.o. female who presents to [facility] for evaluation of .          History of Present Illness  The patient is a 75-year-old female who presents with arm pain.    She reports experiencing pain in her shoulder and hand, which she attributes to her rheumatoid arthritis. Her hands occasionally swell up, causing significant discomfort. She takes a pain medication every morning for her rheumatoid arthritis. She has an upcoming appointment with her rheumatologist on 07/29/2025 and is scheduled for an infusion on 08/14/2025.    Approximately a week ago, she strained her shoulder while pulling a quilt out of the car. She did not hear any popping sound at the time. She is able to lift her arm over her head, but it causes her significant discomfort.    She has been experiencing ear pain for the past 4 to 5 days and has been using eardrops for relief. She has a history of wax buildup in her ears, which previously required professional cleaning.    She has a chronic cough due to pulmonary fibrosis, but it has not worsened recently. She is currently on a daily dose of prednisone 10 mg.    She had a DEXA scan scheduled by Dr. Elkins, but she had to cancel it due to severe pain. She has not rescheduled the appointment yet. She does not believe she has undergone a DEXA scan before.    She has moles on her back that have been present for a long time. Her daughter is concerned about them and suggested that some might need to be removed.          History      Past Medical History:   Diagnosis Date    Asthma     Cervicalgia 01/20/2020    Leg pain     Leg swelling     Limb pain     Lumbago  01/20/2020    low back pain     Lumbar spinal stenosis 01/20/2020    Lung disease     Nausea     Neurogenic claudication 01/20/2020    Neuropathy     Pulmonary fibrosis     Radiculopathy, lumbosacral region 01/20/2020    Rheumatoid arthritis     Spondylolisthesis, lumbar region 01/29/2020    grade 1 at L3/4       Past Surgical History:   Procedure Laterality Date    BACK SURGERY      SPINE SURGERY  2020    TONSILLECTOMY      TUBAL ABDOMINAL LIGATION         Family History   Problem Relation Age of Onset    Arthritis Mother     Cancer Mother     Osteoporosis Mother     Cancer Father     Heart disease Father     Diabetes Father     Arthritis Sister     Heart disease Sister     Bleeding Disorder Brother     Stroke Brother     Diabetes Maternal Grandfather     Diabetes Son         Current Medications        Current Outpatient Medications:     albuterol sulfate  (90 Base) MCG/ACT inhaler, INHALE 2 PUFFS BY MOUTH EVERY 4 HOURS AS NEEDED FOR WHEEZING, Disp: 18 g, Rfl: 11    alendronate (FOSAMAX) 70 MG tablet, TAKE 1 TABLET BY MOUTH ONCE WEEKLY ON AN EMPTY STOMACH BEFORE BREAKFAST. REMAIN UPRIGHT FOR 30 MINUTES AND TAKE WITH 8 OUNCES OF WATER, Disp: 4 tablet, Rfl: 3    amantadine (SYMMETREL) 100 MG tablet, , Disp: , Rfl:     azaTHIOprine (IMURAN) 100 MG tablet, Take 1 tablet by mouth Daily., Disp: 30 tablet, Rfl: 3    Budeson-Glycopyrrol-Formoterol (Breztri Aerosphere) 160-9-4.8 MCG/ACT aerosol inhaler, Inhale 2 puffs 2 (Two) Times a Day., Disp: 1 each, Rfl: 11    gabapentin (NEURONTIN) 300 MG capsule, , Disp: , Rfl:     ibuprofen (ADVIL,MOTRIN) 800 MG tablet, Take 1 tablet by mouth Every 8 (Eight) Hours As Needed for Mild Pain., Disp: 90 tablet, Rfl: 1    ketoconazole (NIZORAL) 2 % shampoo, APPLY TOPICALLY TO THE APPROPRIATE AREA AS DIRECTED 2 TIMES A WEEK, Disp: 120 mL, Rfl: 1    ketoconazole (NIZORAL) 2 % shampoo, Apply  topically to the appropriate area as directed 2 (Two) Times a Week., Disp: 120 mL, Rfl: 1     "linaclotide (LINZESS) 145 MCG capsule capsule, Take 1 capsule by mouth Daily., Disp: , Rfl:     ondansetron (Zofran) 4 MG tablet, Take 1 tablet by mouth Every 8 (Eight) Hours As Needed for Nausea or Vomiting., Disp: 10 tablet, Rfl: 1    orphenadrine (NORFLEX) 100 MG 12 hr tablet, Take 1 tablet by mouth 2 (Two) Times a Day., Disp: 20 tablet, Rfl: 0    oxyCODONE-acetaminophen (PERCOCET)  MG per tablet, , Disp: , Rfl:     predniSONE (DELTASONE) 10 MG tablet, Take 1 tablet by mouth Daily., Disp: 30 tablet, Rfl: 3    Turmeric Curcumin 5-1000 MG capsule, Take 1 tablet by mouth Daily., Disp: , Rfl:     amoxicillin (AMOXIL) 500 MG capsule, Take 1 capsule by mouth 2 (Two) Times a Day for 10 days., Disp: 20 capsule, Rfl: 0    Ibuprofen 3 %, Gabapentin 10 %, Baclofen 2 %, lidocaine 4 %, Apply 1-2 g topically to the appropriate area as directed 3 (Three) to 4 (Four) times daily. (Patient not taking: Reported on 7/22/2025), Disp: 90 g, Rfl: 5     Allergies     Allergies   Allergen Reactions    Clindamycin Rash, Shortness Of Breath and Swelling    Doxycycline Other (See Comments)     SORE ON TONGUE    Adalimumab Rash    Azithromycin Rash, Nausea And Vomiting and Unknown - High Severity    Clindamycin Hcl Unknown - High Severity and Rash    Erythromycin Rash and Unknown - High Severity       Social History       Social History     Social History Narrative    Not on file       Immunizations     Immunization:  Immunization History   Administered Date(s) Administered    Fluzone High-Dose 65+YRS 09/26/2016, 10/25/2022, 09/25/2024    Fluzone High-Dose 65+yrs 11/15/2023    Pneumococcal Conjugate 13-Valent (PCV13) 01/16/2017    Pneumococcal Conjugate 20-Valent (PCV20) 06/27/2024    Shingrix 10/04/2024    Tdap 09/12/2023          Objective     Objective     Vital Signs:   /75   Pulse 80   Temp 98.2 °F (36.8 °C) (Temporal)   Resp 17   Ht 160 cm (62.99\")   Wt 64.7 kg (142 lb 9.6 oz)   SpO2 98%   BMI 25.27 kg/m²   "     Physical Exam  Vitals reviewed.   Constitutional:       General: She is not in acute distress.  HENT:      Head: Normocephalic.      Right Ear: A middle ear effusion is present.      Left Ear: A middle ear effusion is present.      Nose: Nose normal.      Mouth/Throat:      Pharynx: Oropharynx is clear. No posterior oropharyngeal erythema.   Eyes:      General: No scleral icterus.     Extraocular Movements: Extraocular movements intact.      Conjunctiva/sclera: Conjunctivae normal.      Pupils: Pupils are equal, round, and reactive to light.   Cardiovascular:      Rate and Rhythm: Normal rate and regular rhythm.      Pulses: Normal pulses.      Heart sounds: Normal heart sounds.   Pulmonary:      Effort: Pulmonary effort is normal.      Breath sounds: Normal breath sounds.   Abdominal:      General: Bowel sounds are normal.      Palpations: Abdomen is soft.   Musculoskeletal:      Right shoulder: Tenderness and bony tenderness present. Decreased range of motion. Decreased strength.      Left hand: Swelling present.      Cervical back: Neck supple.      Comments: Patient unable to lift arm above head without assistance from other arm, positive empty can test   Skin:     General: Skin is warm and dry.      Findings: Lesion present.      Comments: Scattered nevi posterior torso    Neurological:      Mental Status: She is alert and oriented to person, place, and time.   Psychiatric:         Mood and Affect: Mood normal.         Behavior: Behavior normal.         Thought Content: Thought content normal.         Judgment: Judgment normal.         Physical Exam  Ears: Cloudy appearance with possible fluid present, no redness or excessive wax noted.      Results      Result Review :   The following data was reviewed by: CATY Hooks on 07/22/2025:  Common labs          7/30/2024    15:21   Common Labs   AST (SGOT) 14       ALT (SGPT) 10       WBC 10.53       Hemoglobin 13.2       Hematocrit 43.1        Platelets 334          Details          This result is from an external source.             Consultant notes pulm, pain mgt     Results  Imaging   - DEXA scan: 2022, Osteopenia in the hips and osteoporosis in the left forearm         Assessment and Plan        Assessment and Plan    Diagnoses and all orders for this visit:    1. Right shoulder pain, unspecified chronicity (Primary)  Assessment & Plan:  - Symptoms suggest a possible rotator cuff tear.  - Pain noted when lifting arm overhead, requiring assistance.  - Discussed the need for an MRI to confirm the diagnosis.  - MRI of the shoulder will be ordered; patient will receive a call to schedule.    Orders:  -     MRI Shoulder Right Without Contrast; Future    2. Postmenopause  -     DEXA Bone Density Axial; Future    3. Osteoporosis, unspecified osteoporosis type, unspecified pathological fracture presence  Assessment & Plan:  - DEXA scan in 2022 showed osteopenia in hips and osteoporosis in left forearm.  - New DEXA scan will be ordered to assess current bone health status.  - Patient will receive a call to schedule the scan.    Orders:  -     DEXA Bone Density Axial; Future    4. Bilateral otitis media with effusion  Assessment & Plan:  - The presence of fluid in the ears suggests a possible infection.  - No significant wax buildup observed.  - Discussed the use of amoxicillin to address the potential infection.  - Prescription for amoxicillin sent to pharmacy; advised to continue current prednisone regimen.      5. Pulmonary fibrosis  Assessment & Plan:  - Chronic cough due to pulmonary fibrosis, no recent exacerbation.  - Currently taking prednisone 10 mg daily.  - No changes in management required at this time.      6. Rheumatoid arthritis involving multiple sites, unspecified whether rheumatoid factor present  Assessment & Plan:  - Reports swelling and pain in hands, attributed to rheumatoid arthritis.  - Currently taking a pain pill every morning for  this condition.  - Upcoming rheumatologist appointments on 07/29/2025 and 08/14/2025, possibly for an infusion.  - Advised to continue current management and follow-up with rheumatologist.      7. Nevus of multiple sites  Assessment & Plan:  - Moles present for a long time; daughter concerned about potential need for removal.  - No immediate concerning features noted.  - Referral to dermatologist will be made for further evaluation.      Other orders  -     amoxicillin (AMOXIL) 500 MG capsule; Take 1 capsule by mouth 2 (Two) Times a Day for 10 days.  Dispense: 20 capsule; Refill: 0        Assessment & Plan  1. Ear pain.    2. Shoulder pain.      3. Rheumatoid arthritis.    4. Pulmonary fibrosis.    5. Osteoporosis.    6. Moles on back.          Follow Up        Follow Up   Return if symptoms worsen or fail to improve.  Patient was given instructions and counseling regarding her condition or for health maintenance advice. Please see specific information pulled into the AVS if appropriate.      Patient or patient representative verbalized consent for the use of Ambient Listening during the visit with  CATY Hooks for chart documentation. 7/22/2025  14:07 EDT

## 2025-07-22 NOTE — ASSESSMENT & PLAN NOTE
- Reports swelling and pain in hands, attributed to rheumatoid arthritis.  - Currently taking a pain pill every morning for this condition.  - Upcoming rheumatologist appointments on 07/29/2025 and 08/14/2025, possibly for an infusion.  - Advised to continue current management and follow-up with rheumatologist.

## 2025-07-22 NOTE — ASSESSMENT & PLAN NOTE
- Symptoms suggest a possible rotator cuff tear.  - Pain noted when lifting arm overhead, requiring assistance.  - Discussed the need for an MRI to confirm the diagnosis.  - MRI of the shoulder will be ordered; patient will receive a call to schedule.

## 2025-08-14 ENCOUNTER — HOSPITAL ENCOUNTER (OUTPATIENT)
Dept: MRI IMAGING | Facility: HOSPITAL | Age: 75
Discharge: HOME | End: 2025-08-14
Admitting: NURSE PRACTITIONER
Payer: MEDICARE

## 2025-08-14 DIAGNOSIS — M25.511 RIGHT SHOULDER PAIN, UNSPECIFIED CHRONICITY: ICD-10-CM

## 2025-08-14 PROCEDURE — 73221 MRI JOINT UPR EXTREM W/O DYE: CPT

## 2025-08-15 DIAGNOSIS — M25.511 RIGHT SHOULDER PAIN, UNSPECIFIED CHRONICITY: Primary | ICD-10-CM

## 2025-08-21 ENCOUNTER — TELEPHONE (OUTPATIENT)
Dept: FAMILY MEDICINE CLINIC | Facility: CLINIC | Age: 75
End: 2025-08-21
Payer: MEDICARE

## 2025-08-28 ENCOUNTER — HOSPITAL ENCOUNTER (OUTPATIENT)
Dept: BONE DENSITY | Facility: HOSPITAL | Age: 75
Discharge: HOME | End: 2025-08-28
Admitting: NURSE PRACTITIONER
Payer: MEDICARE

## 2025-08-28 DIAGNOSIS — Z78.0 POSTMENOPAUSE: ICD-10-CM

## 2025-08-28 DIAGNOSIS — D89.89 ANTISYNTHETASE SYNDROME: ICD-10-CM

## 2025-08-28 DIAGNOSIS — M81.0 OSTEOPOROSIS, UNSPECIFIED OSTEOPOROSIS TYPE, UNSPECIFIED PATHOLOGICAL FRACTURE PRESENCE: ICD-10-CM

## 2025-08-28 DIAGNOSIS — J84.9 ILD (INTERSTITIAL LUNG DISEASE): Chronic | ICD-10-CM

## 2025-08-28 PROCEDURE — 77080 DXA BONE DENSITY AXIAL: CPT

## 2025-08-28 RX ORDER — AZATHIOPRINE 100 MG/1
100 TABLET ORAL DAILY
Qty: 90 TABLET | Refills: 3 | Status: SHIPPED | OUTPATIENT
Start: 2025-08-28